# Patient Record
Sex: MALE | Race: WHITE | NOT HISPANIC OR LATINO | Employment: OTHER | URBAN - METROPOLITAN AREA
[De-identification: names, ages, dates, MRNs, and addresses within clinical notes are randomized per-mention and may not be internally consistent; named-entity substitution may affect disease eponyms.]

---

## 2017-11-27 ENCOUNTER — RECORDS - HEALTHEAST (OUTPATIENT)
Dept: LAB | Facility: CLINIC | Age: 72
End: 2017-11-27

## 2017-11-27 LAB
CHOLEST SERPL-MCNC: 132 MG/DL
FASTING STATUS PATIENT QL REPORTED: ABNORMAL
HDLC SERPL-MCNC: 35 MG/DL
LDLC SERPL CALC-MCNC: 68 MG/DL
PSA SERPL-MCNC: 0.9 NG/ML (ref 0–6.5)
TRIGL SERPL-MCNC: 144 MG/DL

## 2018-02-28 ENCOUNTER — RECORDS - HEALTHEAST (OUTPATIENT)
Dept: LAB | Facility: CLINIC | Age: 73
End: 2018-02-28

## 2018-02-28 LAB
ALBUMIN SERPL-MCNC: 3.9 G/DL (ref 3.5–5)
ALP SERPL-CCNC: 34 U/L (ref 45–120)
ALT SERPL W P-5'-P-CCNC: 35 U/L (ref 0–45)
ANION GAP SERPL CALCULATED.3IONS-SCNC: 11 MMOL/L (ref 5–18)
AST SERPL W P-5'-P-CCNC: 31 U/L (ref 0–40)
BILIRUB SERPL-MCNC: 0.8 MG/DL (ref 0–1)
BUN SERPL-MCNC: 31 MG/DL (ref 8–28)
CALCIUM SERPL-MCNC: 10.1 MG/DL (ref 8.5–10.5)
CHLORIDE BLD-SCNC: 100 MMOL/L (ref 98–107)
CO2 SERPL-SCNC: 27 MMOL/L (ref 22–31)
CREAT SERPL-MCNC: 1.83 MG/DL (ref 0.7–1.3)
GFR SERPL CREATININE-BSD FRML MDRD: 37 ML/MIN/1.73M2
GLUCOSE BLD-MCNC: 143 MG/DL (ref 70–125)
POTASSIUM BLD-SCNC: 3.4 MMOL/L (ref 3.5–5)
PROT SERPL-MCNC: 7.6 G/DL (ref 6–8)
SODIUM SERPL-SCNC: 138 MMOL/L (ref 136–145)

## 2018-05-30 ENCOUNTER — RECORDS - HEALTHEAST (OUTPATIENT)
Dept: LAB | Facility: CLINIC | Age: 73
End: 2018-05-30

## 2018-05-30 LAB
ALBUMIN SERPL-MCNC: 3.8 G/DL (ref 3.5–5)
ALP SERPL-CCNC: 32 U/L (ref 45–120)
ALT SERPL W P-5'-P-CCNC: 28 U/L (ref 0–45)
ANION GAP SERPL CALCULATED.3IONS-SCNC: 11 MMOL/L (ref 5–18)
AST SERPL W P-5'-P-CCNC: 27 U/L (ref 0–40)
BILIRUB SERPL-MCNC: 0.8 MG/DL (ref 0–1)
BUN SERPL-MCNC: 30 MG/DL (ref 8–28)
CALCIUM SERPL-MCNC: 10.2 MG/DL (ref 8.5–10.5)
CHLORIDE BLD-SCNC: 104 MMOL/L (ref 98–107)
CO2 SERPL-SCNC: 26 MMOL/L (ref 22–31)
CREAT SERPL-MCNC: 1.87 MG/DL (ref 0.7–1.3)
GFR SERPL CREATININE-BSD FRML MDRD: 36 ML/MIN/1.73M2
GLUCOSE BLD-MCNC: 153 MG/DL (ref 70–125)
POTASSIUM BLD-SCNC: 3.6 MMOL/L (ref 3.5–5)
PROT SERPL-MCNC: 6.9 G/DL (ref 6–8)
SODIUM SERPL-SCNC: 141 MMOL/L (ref 136–145)

## 2018-08-24 ENCOUNTER — RECORDS - HEALTHEAST (OUTPATIENT)
Dept: ADMINISTRATIVE | Facility: OTHER | Age: 73
End: 2018-08-24

## 2018-08-27 ENCOUNTER — HOSPITAL ENCOUNTER (OUTPATIENT)
Dept: ULTRASOUND IMAGING | Facility: HOSPITAL | Age: 73
Discharge: HOME | End: 2018-08-27
Attending: INTERNAL MEDICINE

## 2018-08-27 ENCOUNTER — COMMUNICATION - HEALTHEAST (OUTPATIENT)
Dept: TELEHEALTH | Facility: CLINIC | Age: 73
End: 2018-08-27

## 2018-08-27 DIAGNOSIS — N18.30 CHRONIC KIDNEY DISEASE, STAGE III (MODERATE) (H): ICD-10-CM

## 2018-08-30 ENCOUNTER — RECORDS - HEALTHEAST (OUTPATIENT)
Dept: LAB | Facility: CLINIC | Age: 73
End: 2018-08-30

## 2018-08-30 LAB
CREAT UR-MCNC: 296.8 MG/DL
MICROALBUMIN UR-MCNC: 2.7 MG/DL (ref 0–1.99)
MICROALBUMIN/CREAT UR: 9.1 MG/G

## 2018-11-29 ENCOUNTER — RECORDS - HEALTHEAST (OUTPATIENT)
Dept: LAB | Facility: CLINIC | Age: 73
End: 2018-11-29

## 2018-11-29 LAB
ALBUMIN SERPL-MCNC: 3.9 G/DL (ref 3.5–5)
ALP SERPL-CCNC: 30 U/L (ref 45–120)
ALT SERPL W P-5'-P-CCNC: 30 U/L (ref 0–45)
ANION GAP SERPL CALCULATED.3IONS-SCNC: 14 MMOL/L (ref 5–18)
AST SERPL W P-5'-P-CCNC: 30 U/L (ref 0–40)
BILIRUB SERPL-MCNC: 0.7 MG/DL (ref 0–1)
BUN SERPL-MCNC: 26 MG/DL (ref 8–28)
CALCIUM SERPL-MCNC: 9.9 MG/DL (ref 8.5–10.5)
CHLORIDE BLD-SCNC: 101 MMOL/L (ref 98–107)
CHOLEST SERPL-MCNC: 160 MG/DL
CO2 SERPL-SCNC: 26 MMOL/L (ref 22–31)
CREAT SERPL-MCNC: 2.08 MG/DL (ref 0.7–1.3)
FASTING STATUS PATIENT QL REPORTED: ABNORMAL
GFR SERPL CREATININE-BSD FRML MDRD: 31 ML/MIN/1.73M2
GLUCOSE BLD-MCNC: 124 MG/DL (ref 70–125)
HDLC SERPL-MCNC: 40 MG/DL
LDLC SERPL CALC-MCNC: 85 MG/DL
POTASSIUM BLD-SCNC: 3.2 MMOL/L (ref 3.5–5)
PROT SERPL-MCNC: 7.3 G/DL (ref 6–8)
SODIUM SERPL-SCNC: 141 MMOL/L (ref 136–145)
TRIGL SERPL-MCNC: 174 MG/DL

## 2019-01-18 ENCOUNTER — RECORDS - HEALTHEAST (OUTPATIENT)
Dept: LAB | Facility: CLINIC | Age: 74
End: 2019-01-18

## 2019-01-18 LAB
ALBUMIN SERPL-MCNC: 4.1 G/DL (ref 3.5–5)
ANION GAP SERPL CALCULATED.3IONS-SCNC: 15 MMOL/L (ref 5–18)
BUN SERPL-MCNC: 28 MG/DL (ref 8–28)
CALCIUM SERPL-MCNC: 10.7 MG/DL (ref 8.5–10.5)
CHLORIDE BLD-SCNC: 101 MMOL/L (ref 98–107)
CO2 SERPL-SCNC: 23 MMOL/L (ref 22–31)
CREAT SERPL-MCNC: 2.02 MG/DL (ref 0.7–1.3)
CREAT UR-MCNC: 124.7 MG/DL
GFR SERPL CREATININE-BSD FRML MDRD: 33 ML/MIN/1.73M2
GLUCOSE BLD-MCNC: 116 MG/DL (ref 70–125)
HGB BLD-MCNC: 16 G/DL (ref 14–18)
PHOSPHATE SERPL-MCNC: 3.9 MG/DL (ref 2.5–4.5)
POTASSIUM BLD-SCNC: 4 MMOL/L (ref 3.5–5)
PROTEIN, RANDOM URINE - HISTORICAL: 20 MG/DL
PROTEIN/CREAT RATIO, RANDOM UR: 0.16
PTH-INTACT SERPL-MCNC: 17 PG/ML (ref 10–86)
SODIUM SERPL-SCNC: 139 MMOL/L (ref 136–145)

## 2019-01-21 LAB — 25(OH)D3 SERPL-MCNC: 39.4 NG/ML (ref 30–80)

## 2019-03-18 ENCOUNTER — RECORDS - HEALTHEAST (OUTPATIENT)
Dept: LAB | Facility: CLINIC | Age: 74
End: 2019-03-18

## 2019-03-18 LAB
ALBUMIN SERPL-MCNC: 3.9 G/DL (ref 3.5–5)
ALBUMIN UR-MCNC: ABNORMAL MG/DL
ANION GAP SERPL CALCULATED.3IONS-SCNC: 13 MMOL/L (ref 5–18)
APPEARANCE UR: CLEAR
BACTERIA #/AREA URNS HPF: ABNORMAL HPF
BILIRUB UR QL STRIP: NEGATIVE
BUN SERPL-MCNC: 19 MG/DL (ref 8–28)
CALCIUM SERPL-MCNC: 10 MG/DL (ref 8.5–10.5)
CHLORIDE BLD-SCNC: 103 MMOL/L (ref 98–107)
CO2 SERPL-SCNC: 24 MMOL/L (ref 22–31)
COLOR UR AUTO: YELLOW
CREAT SERPL-MCNC: 1.66 MG/DL (ref 0.7–1.3)
CREAT UR-MCNC: 306.6 MG/DL
GFR SERPL CREATININE-BSD FRML MDRD: 41 ML/MIN/1.73M2
GLUCOSE BLD-MCNC: 124 MG/DL (ref 70–125)
GLUCOSE UR STRIP-MCNC: NEGATIVE MG/DL
HGB BLD-MCNC: 15.9 G/DL (ref 14–18)
HGB UR QL STRIP: NEGATIVE
HYALINE CASTS #/AREA URNS LPF: ABNORMAL LPF
KETONES UR STRIP-MCNC: NEGATIVE MG/DL
LEUKOCYTE ESTERASE UR QL STRIP: ABNORMAL
MUCOUS THREADS #/AREA URNS LPF: ABNORMAL LPF
NITRATE UR QL: NEGATIVE
PH UR STRIP: 6.5 [PH] (ref 4.5–8)
PHOSPHATE SERPL-MCNC: 3.1 MG/DL (ref 2.5–4.5)
POTASSIUM BLD-SCNC: 3.8 MMOL/L (ref 3.5–5)
PTH-INTACT SERPL-MCNC: 30 PG/ML (ref 10–86)
RBC #/AREA URNS AUTO: ABNORMAL HPF
SODIUM SERPL-SCNC: 140 MMOL/L (ref 136–145)
SP GR UR STRIP: 1.02 (ref 1–1.03)
SQUAMOUS #/AREA URNS AUTO: ABNORMAL LPF
UROBILINOGEN UR STRIP-ACNC: ABNORMAL
WBC #/AREA URNS AUTO: ABNORMAL HPF

## 2019-03-19 LAB — 25(OH)D3 SERPL-MCNC: 44 NG/ML (ref 30–80)

## 2019-05-17 ENCOUNTER — RECORDS - HEALTHEAST (OUTPATIENT)
Dept: LAB | Facility: CLINIC | Age: 74
End: 2019-05-17

## 2019-05-17 LAB
ANION GAP SERPL CALCULATED.3IONS-SCNC: 13 MMOL/L (ref 5–18)
BUN SERPL-MCNC: 35 MG/DL (ref 8–28)
CALCIUM SERPL-MCNC: 10.5 MG/DL (ref 8.5–10.5)
CHLORIDE BLD-SCNC: 105 MMOL/L (ref 98–107)
CO2 SERPL-SCNC: 24 MMOL/L (ref 22–31)
CREAT SERPL-MCNC: 1.95 MG/DL (ref 0.7–1.3)
GFR SERPL CREATININE-BSD FRML MDRD: 34 ML/MIN/1.73M2
GLUCOSE BLD-MCNC: 91 MG/DL (ref 70–125)
POTASSIUM BLD-SCNC: 3.8 MMOL/L (ref 3.5–5)
SODIUM SERPL-SCNC: 142 MMOL/L (ref 136–145)

## 2019-10-09 ENCOUNTER — RECORDS - HEALTHEAST (OUTPATIENT)
Dept: LAB | Facility: CLINIC | Age: 74
End: 2019-10-09

## 2019-10-09 LAB
CREAT UR-MCNC: 142 MG/DL
MICROALBUMIN UR-MCNC: 0.79 MG/DL (ref 0–1.99)
MICROALBUMIN/CREAT UR: 5.6 MG/G

## 2020-01-06 ENCOUNTER — RECORDS - HEALTHEAST (OUTPATIENT)
Dept: LAB | Facility: CLINIC | Age: 75
End: 2020-01-06

## 2020-01-06 LAB
ALBUMIN SERPL-MCNC: 4 G/DL (ref 3.5–5)
ALBUMIN UR-MCNC: ABNORMAL MG/DL
ANION GAP SERPL CALCULATED.3IONS-SCNC: 13 MMOL/L (ref 5–18)
APPEARANCE UR: CLEAR
BACTERIA #/AREA URNS HPF: ABNORMAL HPF
BILIRUB UR QL STRIP: NEGATIVE
BUN SERPL-MCNC: 24 MG/DL (ref 8–28)
CALCIUM SERPL-MCNC: 10.2 MG/DL (ref 8.5–10.5)
CHLORIDE BLD-SCNC: 102 MMOL/L (ref 98–107)
CHOLEST SERPL-MCNC: 140 MG/DL
CO2 SERPL-SCNC: 24 MMOL/L (ref 22–31)
COLOR UR AUTO: YELLOW
CREAT SERPL-MCNC: 1.66 MG/DL (ref 0.7–1.3)
CREAT UR-MCNC: 242.2 MG/DL
FASTING STATUS PATIENT QL REPORTED: YES
GFR SERPL CREATININE-BSD FRML MDRD: 41 ML/MIN/1.73M2
GLUCOSE BLD-MCNC: 190 MG/DL (ref 70–125)
GLUCOSE UR STRIP-MCNC: ABNORMAL MG/DL
HDLC SERPL-MCNC: 38 MG/DL
HGB UR QL STRIP: NEGATIVE
HYALINE CASTS #/AREA URNS LPF: ABNORMAL LPF
KETONES UR STRIP-MCNC: NEGATIVE MG/DL
LDLC SERPL CALC-MCNC: 69 MG/DL
LEUKOCYTE ESTERASE UR QL STRIP: NEGATIVE
MUCOUS THREADS #/AREA URNS LPF: ABNORMAL LPF
NITRATE UR QL: NEGATIVE
PH UR STRIP: 6.5 [PH] (ref 4.5–8)
PHOSPHATE SERPL-MCNC: 3 MG/DL (ref 2.5–4.5)
POTASSIUM BLD-SCNC: 3.7 MMOL/L (ref 3.5–5)
PROTEIN, RANDOM URINE - HISTORICAL: 73 MG/DL
PROTEIN/CREAT RATIO, RANDOM UR: 0.3
PSA SERPL-MCNC: 1.3 NG/ML (ref 0–6.5)
PTH-INTACT SERPL-MCNC: 20 PG/ML (ref 10–86)
RBC #/AREA URNS AUTO: ABNORMAL HPF
SODIUM SERPL-SCNC: 139 MMOL/L (ref 136–145)
SP GR UR STRIP: 1.02 (ref 1–1.03)
SQUAMOUS #/AREA URNS AUTO: ABNORMAL LPF
TRIGL SERPL-MCNC: 167 MG/DL
UROBILINOGEN UR STRIP-ACNC: ABNORMAL
WBC #/AREA URNS AUTO: ABNORMAL HPF

## 2020-06-11 ENCOUNTER — RECORDS - HEALTHEAST (OUTPATIENT)
Dept: LAB | Facility: CLINIC | Age: 75
End: 2020-06-11

## 2020-06-11 LAB
ALBUMIN SERPL-MCNC: 3.8 G/DL (ref 3.5–5)
ALP SERPL-CCNC: 37 U/L (ref 45–120)
ALT SERPL W P-5'-P-CCNC: 34 U/L (ref 0–45)
ANION GAP SERPL CALCULATED.3IONS-SCNC: 11 MMOL/L (ref 5–18)
AST SERPL W P-5'-P-CCNC: 31 U/L (ref 0–40)
BILIRUB SERPL-MCNC: 0.5 MG/DL (ref 0–1)
BUN SERPL-MCNC: 35 MG/DL (ref 8–28)
CALCIUM SERPL-MCNC: 9.9 MG/DL (ref 8.5–10.5)
CHLORIDE BLD-SCNC: 102 MMOL/L (ref 98–107)
CO2 SERPL-SCNC: 27 MMOL/L (ref 22–31)
CREAT SERPL-MCNC: 1.65 MG/DL (ref 0.7–1.3)
GFR SERPL CREATININE-BSD FRML MDRD: 41 ML/MIN/1.73M2
GLUCOSE BLD-MCNC: 189 MG/DL (ref 70–125)
POTASSIUM BLD-SCNC: 3.6 MMOL/L (ref 3.5–5)
PROT SERPL-MCNC: 7.2 G/DL (ref 6–8)
SODIUM SERPL-SCNC: 140 MMOL/L (ref 136–145)

## 2020-10-15 ENCOUNTER — RECORDS - HEALTHEAST (OUTPATIENT)
Dept: LAB | Facility: CLINIC | Age: 75
End: 2020-10-15

## 2020-10-15 LAB
ALBUMIN SERPL-MCNC: 4.1 G/DL (ref 3.5–5)
ALBUMIN UR-MCNC: NEGATIVE MG/DL
ANION GAP SERPL CALCULATED.3IONS-SCNC: 13 MMOL/L (ref 5–18)
APPEARANCE UR: CLEAR
BACTERIA #/AREA URNS HPF: ABNORMAL HPF
BASOPHILS # BLD AUTO: 0.1 THOU/UL (ref 0–0.2)
BASOPHILS NFR BLD AUTO: 1 % (ref 0–2)
BILIRUB UR QL STRIP: NEGATIVE
BUN SERPL-MCNC: 28 MG/DL (ref 8–28)
CALCIUM SERPL-MCNC: 9.5 MG/DL (ref 8.5–10.5)
CHLORIDE BLD-SCNC: 101 MMOL/L (ref 98–107)
CO2 SERPL-SCNC: 25 MMOL/L (ref 22–31)
COLOR UR AUTO: YELLOW
CREAT SERPL-MCNC: 1.55 MG/DL (ref 0.7–1.3)
CREAT UR-MCNC: 106.4 MG/DL
EOSINOPHIL # BLD AUTO: 0.2 THOU/UL (ref 0–0.4)
EOSINOPHIL NFR BLD AUTO: 2 % (ref 0–6)
ERYTHROCYTE [DISTWIDTH] IN BLOOD BY AUTOMATED COUNT: 12.8 % (ref 11–14.5)
GFR SERPL CREATININE-BSD FRML MDRD: 44 ML/MIN/1.73M2
GLUCOSE BLD-MCNC: 120 MG/DL (ref 70–125)
GLUCOSE UR STRIP-MCNC: NEGATIVE MG/DL
HCT VFR BLD AUTO: 47.4 % (ref 40–54)
HGB BLD-MCNC: 15.8 G/DL (ref 14–18)
HGB UR QL STRIP: NEGATIVE
HYALINE CASTS #/AREA URNS LPF: ABNORMAL LPF
IMM GRANULOCYTES # BLD: 0 THOU/UL
IMM GRANULOCYTES NFR BLD: 0 %
KETONES UR STRIP-MCNC: NEGATIVE MG/DL
LEUKOCYTE ESTERASE UR QL STRIP: NEGATIVE
LYMPHOCYTES # BLD AUTO: 2.5 THOU/UL (ref 0.8–4.4)
LYMPHOCYTES NFR BLD AUTO: 31 % (ref 20–40)
MCH RBC QN AUTO: 31.7 PG (ref 27–34)
MCHC RBC AUTO-ENTMCNC: 33.3 G/DL (ref 32–36)
MCV RBC AUTO: 95 FL (ref 80–100)
MONOCYTES # BLD AUTO: 1 THOU/UL (ref 0–0.9)
MONOCYTES NFR BLD AUTO: 12 % (ref 2–10)
MUCOUS THREADS #/AREA URNS LPF: ABNORMAL LPF
NEUTROPHILS # BLD AUTO: 4.4 THOU/UL (ref 2–7.7)
NEUTROPHILS NFR BLD AUTO: 54 % (ref 50–70)
NITRATE UR QL: NEGATIVE
PH UR STRIP: 6 [PH] (ref 4.5–8)
PHOSPHATE SERPL-MCNC: 3.1 MG/DL (ref 2.5–4.5)
PLATELET # BLD AUTO: 185 THOU/UL (ref 140–440)
PMV BLD AUTO: 11.9 FL (ref 8.5–12.5)
POTASSIUM BLD-SCNC: 3.4 MMOL/L (ref 3.5–5)
PROTEIN, RANDOM URINE - HISTORICAL: 12 MG/DL
PROTEIN/CREAT RATIO, RANDOM UR: 0.11
PTH-INTACT SERPL-MCNC: 46 PG/ML (ref 10–86)
RBC # BLD AUTO: 4.99 MILL/UL (ref 4.4–6.2)
RBC #/AREA URNS AUTO: ABNORMAL HPF
SODIUM SERPL-SCNC: 139 MMOL/L (ref 136–145)
SP GR UR STRIP: 1.01 (ref 1–1.03)
SQUAMOUS #/AREA URNS AUTO: ABNORMAL LPF
UROBILINOGEN UR STRIP-ACNC: ABNORMAL
WBC #/AREA URNS AUTO: ABNORMAL HPF
WBC: 8.1 THOU/UL (ref 4–11)

## 2020-10-16 LAB — 25(OH)D3 SERPL-MCNC: 31.2 NG/ML (ref 30–80)

## 2021-02-22 ENCOUNTER — RECORDS - HEALTHEAST (OUTPATIENT)
Dept: LAB | Facility: CLINIC | Age: 76
End: 2021-02-22

## 2021-02-22 LAB
CHOLEST SERPL-MCNC: 132 MG/DL
FASTING STATUS PATIENT QL REPORTED: ABNORMAL
HDLC SERPL-MCNC: 33 MG/DL
LDLC SERPL CALC-MCNC: 69 MG/DL
PSA SERPL-MCNC: 1.5 NG/ML (ref 0–6.5)
TRIGL SERPL-MCNC: 149 MG/DL

## 2021-04-05 ENCOUNTER — RECORDS - HEALTHEAST (OUTPATIENT)
Dept: LAB | Facility: CLINIC | Age: 76
End: 2021-04-05

## 2021-04-05 LAB
ALBUMIN SERPL-MCNC: 4 G/DL (ref 3.5–5)
ANION GAP SERPL CALCULATED.3IONS-SCNC: 15 MMOL/L (ref 5–18)
BUN SERPL-MCNC: 37 MG/DL (ref 8–28)
CALCIUM SERPL-MCNC: 9.2 MG/DL (ref 8.5–10.5)
CHLORIDE BLD-SCNC: 103 MMOL/L (ref 98–107)
CO2 SERPL-SCNC: 21 MMOL/L (ref 22–31)
CREAT SERPL-MCNC: 2.3 MG/DL (ref 0.7–1.3)
CREAT UR-MCNC: 290.8 MG/DL
GFR SERPL CREATININE-BSD FRML MDRD: 28 ML/MIN/1.73M2
GLUCOSE BLD-MCNC: 206 MG/DL (ref 70–125)
HGB BLD-MCNC: 15.3 G/DL (ref 14–18)
PHOSPHATE SERPL-MCNC: 3.3 MG/DL (ref 2.5–4.5)
POTASSIUM BLD-SCNC: 3.3 MMOL/L (ref 3.5–5)
PROTEIN, RANDOM URINE - HISTORICAL: 33 MG/DL
PROTEIN/CREAT RATIO, RANDOM UR: 0.11
PTH-INTACT SERPL-MCNC: 60 PG/ML (ref 10–86)
SODIUM SERPL-SCNC: 139 MMOL/L (ref 136–145)

## 2021-04-06 LAB
25(OH)D3 SERPL-MCNC: 36.8 NG/ML (ref 30–80)
HBA1C MFR BLD: 8.1 %

## 2021-05-19 ENCOUNTER — RECORDS - HEALTHEAST (OUTPATIENT)
Dept: LAB | Facility: CLINIC | Age: 76
End: 2021-05-19

## 2021-05-19 LAB
ALBUMIN SERPL-MCNC: 3.8 G/DL (ref 3.5–5)
ANION GAP SERPL CALCULATED.3IONS-SCNC: 13 MMOL/L (ref 5–18)
BASOPHILS # BLD AUTO: 0 THOU/UL (ref 0–0.2)
BASOPHILS NFR BLD AUTO: 1 % (ref 0–2)
BUN SERPL-MCNC: 26 MG/DL (ref 8–28)
CALCIUM SERPL-MCNC: 9.1 MG/DL (ref 8.5–10.5)
CHLORIDE BLD-SCNC: 103 MMOL/L (ref 98–107)
CO2 SERPL-SCNC: 23 MMOL/L (ref 22–31)
CREAT SERPL-MCNC: 1.8 MG/DL (ref 0.7–1.3)
CREAT UR-MCNC: 233.5 MG/DL
EOSINOPHIL # BLD AUTO: 0.2 THOU/UL (ref 0–0.4)
EOSINOPHIL NFR BLD AUTO: 3 % (ref 0–6)
ERYTHROCYTE [DISTWIDTH] IN BLOOD BY AUTOMATED COUNT: 12.7 % (ref 11–14.5)
GFR SERPL CREATININE-BSD FRML MDRD: 37 ML/MIN/1.73M2
GLUCOSE BLD-MCNC: 163 MG/DL (ref 70–125)
HCT VFR BLD AUTO: 45.1 % (ref 40–54)
HGB BLD-MCNC: 15.2 G/DL (ref 14–18)
IMM GRANULOCYTES # BLD: 0 THOU/UL
IMM GRANULOCYTES NFR BLD: 0 %
LYMPHOCYTES # BLD AUTO: 2.1 THOU/UL (ref 0.8–4.4)
LYMPHOCYTES NFR BLD AUTO: 30 % (ref 20–40)
MCH RBC QN AUTO: 31.8 PG (ref 27–34)
MCHC RBC AUTO-ENTMCNC: 33.7 G/DL (ref 32–36)
MCV RBC AUTO: 94 FL (ref 80–100)
MONOCYTES # BLD AUTO: 0.9 THOU/UL (ref 0–0.9)
MONOCYTES NFR BLD AUTO: 13 % (ref 2–10)
NEUTROPHILS # BLD AUTO: 3.8 THOU/UL (ref 2–7.7)
NEUTROPHILS NFR BLD AUTO: 53 % (ref 50–70)
PHOSPHATE SERPL-MCNC: 2.9 MG/DL (ref 2.5–4.5)
PLATELET # BLD AUTO: 185 THOU/UL (ref 140–440)
PMV BLD AUTO: 11.6 FL (ref 8.5–12.5)
POTASSIUM BLD-SCNC: 3.4 MMOL/L (ref 3.5–5)
PROTEIN, RANDOM URINE - HISTORICAL: 23 MG/DL
PROTEIN/CREAT RATIO, RANDOM UR: 0.1
RBC # BLD AUTO: 4.78 MILL/UL (ref 4.4–6.2)
SODIUM SERPL-SCNC: 139 MMOL/L (ref 136–145)
WBC: 7.2 THOU/UL (ref 4–11)

## 2021-06-26 ENCOUNTER — HEALTH MAINTENANCE LETTER (OUTPATIENT)
Age: 76
End: 2021-06-26

## 2021-10-16 ENCOUNTER — HEALTH MAINTENANCE LETTER (OUTPATIENT)
Age: 76
End: 2021-10-16

## 2021-11-16 ENCOUNTER — LAB REQUISITION (OUTPATIENT)
Dept: LAB | Facility: CLINIC | Age: 76
End: 2021-11-16

## 2021-11-16 DIAGNOSIS — N18.32 CHRONIC KIDNEY DISEASE, STAGE 3B (H): ICD-10-CM

## 2021-11-16 LAB
ALBUMIN MFR UR ELPH: 50.8 MG/DL
ALBUMIN SERPL-MCNC: 3.8 G/DL (ref 3.5–5)
ALBUMIN UR-MCNC: 50 MG/DL
ANION GAP SERPL CALCULATED.3IONS-SCNC: 15 MMOL/L (ref 5–18)
APPEARANCE UR: CLEAR
BASOPHILS # BLD AUTO: 0.1 10E3/UL (ref 0–0.2)
BASOPHILS NFR BLD AUTO: 1 %
BILIRUB UR QL STRIP: NEGATIVE
BUN SERPL-MCNC: 25 MG/DL (ref 8–28)
CALCIUM SERPL-MCNC: 9.3 MG/DL (ref 8.5–10.5)
CHLORIDE BLD-SCNC: 103 MMOL/L (ref 98–107)
CO2 SERPL-SCNC: 21 MMOL/L (ref 22–31)
COLOR UR AUTO: YELLOW
CREAT SERPL-MCNC: 2.05 MG/DL (ref 0.7–1.3)
CREAT UR-MCNC: 315 MG/DL
EOSINOPHIL # BLD AUTO: 0.3 10E3/UL (ref 0–0.7)
EOSINOPHIL NFR BLD AUTO: 4 %
ERYTHROCYTE [DISTWIDTH] IN BLOOD BY AUTOMATED COUNT: 12.9 % (ref 10–15)
GFR SERPL CREATININE-BSD FRML MDRD: 31 ML/MIN/1.73M2
GLUCOSE BLD-MCNC: 174 MG/DL (ref 70–125)
GLUCOSE UR STRIP-MCNC: NEGATIVE MG/DL
HCT VFR BLD AUTO: 47.5 % (ref 40–53)
HGB BLD-MCNC: 16 G/DL (ref 13.3–17.7)
HGB UR QL STRIP: NEGATIVE
IMM GRANULOCYTES # BLD: 0 10E3/UL
IMM GRANULOCYTES NFR BLD: 1 %
KETONES UR STRIP-MCNC: NEGATIVE MG/DL
LEUKOCYTE ESTERASE UR QL STRIP: ABNORMAL
LYMPHOCYTES # BLD AUTO: 2.3 10E3/UL (ref 0.8–5.3)
LYMPHOCYTES NFR BLD AUTO: 26 %
MCH RBC QN AUTO: 32.2 PG (ref 26.5–33)
MCHC RBC AUTO-ENTMCNC: 33.7 G/DL (ref 31.5–36.5)
MCV RBC AUTO: 96 FL (ref 78–100)
MONOCYTES # BLD AUTO: 1 10E3/UL (ref 0–1.3)
MONOCYTES NFR BLD AUTO: 12 %
NEUTROPHILS # BLD AUTO: 5.1 10E3/UL (ref 1.6–8.3)
NEUTROPHILS NFR BLD AUTO: 56 %
NITRATE UR QL: NEGATIVE
NRBC # BLD AUTO: 0 10E3/UL
NRBC BLD AUTO-RTO: 0 /100
PH UR STRIP: 6 [PH] (ref 5–7)
PHOSPHATE SERPL-MCNC: 2.8 MG/DL (ref 2.5–4.5)
PLATELET # BLD AUTO: 209 10E3/UL (ref 150–450)
POTASSIUM BLD-SCNC: 3.5 MMOL/L (ref 3.5–5)
PROT/CREAT 24H UR: 0.16 MG/MG CR
PTH-INTACT SERPL-MCNC: 91 PG/ML (ref 10–86)
RBC # BLD AUTO: 4.97 10E6/UL (ref 4.4–5.9)
SODIUM SERPL-SCNC: 139 MMOL/L (ref 136–145)
SP GR UR STRIP: 1.03 (ref 1–1.03)
UROBILINOGEN UR STRIP-MCNC: <2 MG/DL
WBC # BLD AUTO: 8.8 10E3/UL (ref 4–11)

## 2021-11-16 PROCEDURE — 80069 RENAL FUNCTION PANEL: CPT | Performed by: FAMILY MEDICINE

## 2021-11-16 PROCEDURE — 81001 URINALYSIS AUTO W/SCOPE: CPT | Performed by: FAMILY MEDICINE

## 2021-11-16 PROCEDURE — 83036 HEMOGLOBIN GLYCOSYLATED A1C: CPT | Performed by: FAMILY MEDICINE

## 2021-11-16 PROCEDURE — 85025 COMPLETE CBC W/AUTO DIFF WBC: CPT | Performed by: FAMILY MEDICINE

## 2021-11-16 PROCEDURE — 84156 ASSAY OF PROTEIN URINE: CPT | Performed by: FAMILY MEDICINE

## 2021-11-16 PROCEDURE — 82306 VITAMIN D 25 HYDROXY: CPT | Performed by: FAMILY MEDICINE

## 2021-11-16 PROCEDURE — 83970 ASSAY OF PARATHORMONE: CPT | Performed by: FAMILY MEDICINE

## 2021-11-17 LAB
DEPRECATED CALCIDIOL+CALCIFEROL SERPL-MC: 38 UG/L (ref 30–80)
HBA1C MFR BLD: 7.3 %

## 2022-04-28 ENCOUNTER — LAB REQUISITION (OUTPATIENT)
Dept: LAB | Facility: CLINIC | Age: 77
End: 2022-04-28

## 2022-04-28 DIAGNOSIS — I12.9 HYPERTENSIVE CHRONIC KIDNEY DISEASE WITH STAGE 1 THROUGH STAGE 4 CHRONIC KIDNEY DISEASE, OR UNSPECIFIED CHRONIC KIDNEY DISEASE: ICD-10-CM

## 2022-04-28 DIAGNOSIS — E78.5 HYPERLIPIDEMIA, UNSPECIFIED: ICD-10-CM

## 2022-04-28 LAB
ALBUMIN SERPL-MCNC: 3.8 G/DL (ref 3.5–5)
ALP SERPL-CCNC: 32 U/L (ref 45–120)
ALT SERPL W P-5'-P-CCNC: 31 U/L (ref 0–45)
ANION GAP SERPL CALCULATED.3IONS-SCNC: 14 MMOL/L (ref 5–18)
AST SERPL W P-5'-P-CCNC: 33 U/L (ref 0–40)
BILIRUB SERPL-MCNC: 0.7 MG/DL (ref 0–1)
BUN SERPL-MCNC: 30 MG/DL (ref 8–28)
CALCIUM SERPL-MCNC: 9.8 MG/DL (ref 8.5–10.5)
CHLORIDE BLD-SCNC: 103 MMOL/L (ref 98–107)
CHOLEST SERPL-MCNC: 123 MG/DL
CO2 SERPL-SCNC: 25 MMOL/L (ref 22–31)
CREAT SERPL-MCNC: 1.75 MG/DL (ref 0.7–1.3)
FASTING STATUS PATIENT QL REPORTED: ABNORMAL
GFR SERPL CREATININE-BSD FRML MDRD: 40 ML/MIN/1.73M2
GLUCOSE BLD-MCNC: 147 MG/DL (ref 70–125)
HDLC SERPL-MCNC: 35 MG/DL
LDLC SERPL CALC-MCNC: 65 MG/DL
POTASSIUM BLD-SCNC: 3.7 MMOL/L (ref 3.5–5)
PROT SERPL-MCNC: 7.1 G/DL (ref 6–8)
SODIUM SERPL-SCNC: 142 MMOL/L (ref 136–145)
TRIGL SERPL-MCNC: 115 MG/DL

## 2022-04-28 PROCEDURE — 80053 COMPREHEN METABOLIC PANEL: CPT | Performed by: FAMILY MEDICINE

## 2022-04-28 PROCEDURE — 80061 LIPID PANEL: CPT | Performed by: FAMILY MEDICINE

## 2022-06-28 ENCOUNTER — LAB REQUISITION (OUTPATIENT)
Dept: LAB | Facility: CLINIC | Age: 77
End: 2022-06-28

## 2022-06-28 DIAGNOSIS — E11.22 TYPE 2 DIABETES MELLITUS WITH DIABETIC CHRONIC KIDNEY DISEASE (H): ICD-10-CM

## 2022-06-28 DIAGNOSIS — N18.32 CHRONIC KIDNEY DISEASE, STAGE 3B (H): ICD-10-CM

## 2022-06-28 DIAGNOSIS — I12.9 HYPERTENSIVE CHRONIC KIDNEY DISEASE WITH STAGE 1 THROUGH STAGE 4 CHRONIC KIDNEY DISEASE, OR UNSPECIFIED CHRONIC KIDNEY DISEASE: ICD-10-CM

## 2022-06-28 LAB
ALBUMIN MFR UR ELPH: 18 MG/DL
ALBUMIN SERPL-MCNC: 3.7 G/DL (ref 3.5–5)
ANION GAP SERPL CALCULATED.3IONS-SCNC: 13 MMOL/L (ref 5–18)
BUN SERPL-MCNC: 26 MG/DL (ref 8–28)
CALCIUM SERPL-MCNC: 9.4 MG/DL (ref 8.5–10.5)
CHLORIDE BLD-SCNC: 103 MMOL/L (ref 98–107)
CO2 SERPL-SCNC: 26 MMOL/L (ref 22–31)
CREAT SERPL-MCNC: 1.77 MG/DL (ref 0.7–1.3)
CREAT UR-MCNC: 132 MG/DL
CREAT UR-MCNC: 140 MG/DL
GFR SERPL CREATININE-BSD FRML MDRD: 39 ML/MIN/1.73M2
GLUCOSE BLD-MCNC: 91 MG/DL (ref 70–125)
HBA1C MFR BLD: 6.9 %
HGB BLD-MCNC: 15.6 G/DL (ref 13.3–17.7)
MICROALBUMIN UR-MCNC: 1.56 MG/DL (ref 0–1.99)
MICROALBUMIN/CREAT UR: 11.8 MG/G CR
PHOSPHATE SERPL-MCNC: 2.6 MG/DL (ref 2.5–4.5)
POTASSIUM BLD-SCNC: 3.3 MMOL/L (ref 3.5–5)
PROT/CREAT 24H UR: 0.13 MG/MG CR
PTH-INTACT SERPL-MCNC: 46 PG/ML (ref 10–86)
SODIUM SERPL-SCNC: 142 MMOL/L (ref 136–145)

## 2022-06-28 PROCEDURE — 82043 UR ALBUMIN QUANTITATIVE: CPT | Performed by: FAMILY MEDICINE

## 2022-06-28 PROCEDURE — 82306 VITAMIN D 25 HYDROXY: CPT | Performed by: FAMILY MEDICINE

## 2022-06-28 PROCEDURE — 80069 RENAL FUNCTION PANEL: CPT | Performed by: FAMILY MEDICINE

## 2022-06-28 PROCEDURE — 83970 ASSAY OF PARATHORMONE: CPT | Performed by: FAMILY MEDICINE

## 2022-06-28 PROCEDURE — 85018 HEMOGLOBIN: CPT | Performed by: FAMILY MEDICINE

## 2022-06-28 PROCEDURE — 83036 HEMOGLOBIN GLYCOSYLATED A1C: CPT | Performed by: FAMILY MEDICINE

## 2022-06-28 PROCEDURE — 84156 ASSAY OF PROTEIN URINE: CPT | Performed by: FAMILY MEDICINE

## 2022-06-29 LAB — DEPRECATED CALCIDIOL+CALCIFEROL SERPL-MC: 35 UG/L (ref 20–75)

## 2022-07-17 ENCOUNTER — HEALTH MAINTENANCE LETTER (OUTPATIENT)
Age: 77
End: 2022-07-17

## 2022-09-25 ENCOUNTER — HEALTH MAINTENANCE LETTER (OUTPATIENT)
Age: 77
End: 2022-09-25

## 2022-12-15 ENCOUNTER — LAB REQUISITION (OUTPATIENT)
Dept: LAB | Facility: CLINIC | Age: 77
End: 2022-12-15

## 2022-12-15 DIAGNOSIS — E11.319 TYPE 2 DIABETES MELLITUS WITH UNSPECIFIED DIABETIC RETINOPATHY WITHOUT MACULAR EDEMA (H): ICD-10-CM

## 2022-12-15 DIAGNOSIS — I12.9 HYPERTENSIVE CHRONIC KIDNEY DISEASE WITH STAGE 1 THROUGH STAGE 4 CHRONIC KIDNEY DISEASE, OR UNSPECIFIED CHRONIC KIDNEY DISEASE: ICD-10-CM

## 2022-12-15 LAB
ALBUMIN MFR UR ELPH: 73.8 MG/DL
ALBUMIN SERPL BCG-MCNC: 4.2 G/DL (ref 3.5–5.2)
ANION GAP SERPL CALCULATED.3IONS-SCNC: 15 MMOL/L (ref 7–15)
BUN SERPL-MCNC: 23.5 MG/DL (ref 8–23)
CALCIUM SERPL-MCNC: 9.2 MG/DL (ref 8.8–10.2)
CHLORIDE SERPL-SCNC: 100 MMOL/L (ref 98–107)
CREAT SERPL-MCNC: 2.03 MG/DL (ref 0.67–1.17)
CREAT UR-MCNC: 226 MG/DL
CREAT UR-MCNC: 226 MG/DL
DEPRECATED HCO3 PLAS-SCNC: 25 MMOL/L (ref 22–29)
GFR SERPL CREATININE-BSD FRML MDRD: 33 ML/MIN/1.73M2
GLUCOSE SERPL-MCNC: 183 MG/DL (ref 70–99)
HGB BLD-MCNC: 15.6 G/DL (ref 13.3–17.7)
MICROALBUMIN UR-MCNC: 111 MG/L
MICROALBUMIN/CREAT UR: 49.12 MG/G CR (ref 0–17)
PHOSPHATE SERPL-MCNC: 2.7 MG/DL (ref 2.5–4.5)
POTASSIUM SERPL-SCNC: 3.2 MMOL/L (ref 3.4–5.3)
PROT/CREAT 24H UR: 0.33 MG/MG CR (ref 0–0.2)
PTH-INTACT SERPL-MCNC: 27 PG/ML (ref 15–65)
SODIUM SERPL-SCNC: 140 MMOL/L (ref 136–145)

## 2022-12-15 PROCEDURE — 82306 VITAMIN D 25 HYDROXY: CPT | Performed by: FAMILY MEDICINE

## 2022-12-15 PROCEDURE — 80069 RENAL FUNCTION PANEL: CPT | Performed by: FAMILY MEDICINE

## 2022-12-15 PROCEDURE — 84156 ASSAY OF PROTEIN URINE: CPT | Performed by: FAMILY MEDICINE

## 2022-12-15 PROCEDURE — 82043 UR ALBUMIN QUANTITATIVE: CPT | Performed by: FAMILY MEDICINE

## 2022-12-15 PROCEDURE — 85018 HEMOGLOBIN: CPT | Performed by: FAMILY MEDICINE

## 2022-12-15 PROCEDURE — 83970 ASSAY OF PARATHORMONE: CPT | Performed by: FAMILY MEDICINE

## 2022-12-16 LAB — DEPRECATED CALCIDIOL+CALCIFEROL SERPL-MC: 30 UG/L (ref 20–75)

## 2023-02-04 ENCOUNTER — HEALTH MAINTENANCE LETTER (OUTPATIENT)
Age: 78
End: 2023-02-04

## 2023-04-20 ENCOUNTER — LAB REQUISITION (OUTPATIENT)
Dept: LAB | Facility: CLINIC | Age: 78
End: 2023-04-20

## 2023-04-20 DIAGNOSIS — Z12.5 ENCOUNTER FOR SCREENING FOR MALIGNANT NEOPLASM OF PROSTATE: ICD-10-CM

## 2023-04-20 DIAGNOSIS — E78.5 HYPERLIPIDEMIA, UNSPECIFIED: ICD-10-CM

## 2023-04-20 LAB
CHOLEST SERPL-MCNC: 108 MG/DL
HDLC SERPL-MCNC: 35 MG/DL
LDLC SERPL CALC-MCNC: 51 MG/DL
NONHDLC SERPL-MCNC: 73 MG/DL
PSA SERPL DL<=0.01 NG/ML-MCNC: 2.21 NG/ML (ref 0–6.5)
TRIGL SERPL-MCNC: 108 MG/DL

## 2023-04-20 PROCEDURE — G0103 PSA SCREENING: HCPCS | Performed by: FAMILY MEDICINE

## 2023-04-20 PROCEDURE — 80061 LIPID PANEL: CPT | Performed by: FAMILY MEDICINE

## 2023-07-07 ENCOUNTER — LAB REQUISITION (OUTPATIENT)
Dept: LAB | Facility: CLINIC | Age: 78
End: 2023-07-07

## 2023-07-07 DIAGNOSIS — E78.5 HYPERLIPIDEMIA, UNSPECIFIED: ICD-10-CM

## 2023-07-07 DIAGNOSIS — N18.32 CHRONIC KIDNEY DISEASE, STAGE 3B (H): ICD-10-CM

## 2023-07-07 LAB
ALBUMIN SERPL BCG-MCNC: 4.4 G/DL (ref 3.5–5.2)
ANION GAP SERPL CALCULATED.3IONS-SCNC: 16 MMOL/L (ref 7–15)
BUN SERPL-MCNC: 30.4 MG/DL (ref 8–23)
CALCIUM SERPL-MCNC: 9.7 MG/DL (ref 8.8–10.2)
CHLORIDE SERPL-SCNC: 100 MMOL/L (ref 98–107)
CREAT SERPL-MCNC: 2.01 MG/DL (ref 0.67–1.17)
CREAT UR-MCNC: 211 MG/DL
DEPRECATED HCO3 PLAS-SCNC: 23 MMOL/L (ref 22–29)
GFR SERPL CREATININE-BSD FRML MDRD: 34 ML/MIN/1.73M2
GLUCOSE SERPL-MCNC: 176 MG/DL (ref 70–99)
MICROALBUMIN UR-MCNC: 24.4 MG/L
MICROALBUMIN/CREAT UR: 11.56 MG/G CR (ref 0–17)
PHOSPHATE SERPL-MCNC: 2.7 MG/DL (ref 2.5–4.5)
POTASSIUM SERPL-SCNC: 3.5 MMOL/L (ref 3.4–5.3)
SODIUM SERPL-SCNC: 139 MMOL/L (ref 136–145)

## 2023-07-07 PROCEDURE — 82306 VITAMIN D 25 HYDROXY: CPT | Performed by: FAMILY MEDICINE

## 2023-07-07 PROCEDURE — 87086 URINE CULTURE/COLONY COUNT: CPT | Performed by: FAMILY MEDICINE

## 2023-07-07 PROCEDURE — 80069 RENAL FUNCTION PANEL: CPT | Performed by: FAMILY MEDICINE

## 2023-07-07 PROCEDURE — 82570 ASSAY OF URINE CREATININE: CPT | Performed by: FAMILY MEDICINE

## 2023-07-07 PROCEDURE — 83970 ASSAY OF PARATHORMONE: CPT | Performed by: FAMILY MEDICINE

## 2023-07-08 LAB
DEPRECATED CALCIDIOL+CALCIFEROL SERPL-MC: 35 UG/L (ref 20–75)
PTH-INTACT SERPL-MCNC: 33 PG/ML (ref 15–65)

## 2023-07-09 LAB — BACTERIA UR CULT: NORMAL

## 2023-08-05 ENCOUNTER — HEALTH MAINTENANCE LETTER (OUTPATIENT)
Age: 78
End: 2023-08-05

## 2023-08-17 ENCOUNTER — TRANSFERRED RECORDS (OUTPATIENT)
Dept: HEALTH INFORMATION MANAGEMENT | Facility: CLINIC | Age: 78
End: 2023-08-17
Payer: COMMERCIAL

## 2023-08-17 ENCOUNTER — MEDICAL CORRESPONDENCE (OUTPATIENT)
Dept: HEALTH INFORMATION MANAGEMENT | Facility: CLINIC | Age: 78
End: 2023-08-17
Payer: COMMERCIAL

## 2023-08-25 ENCOUNTER — OFFICE VISIT (OUTPATIENT)
Dept: CARDIOLOGY | Facility: CLINIC | Age: 78
End: 2023-08-25
Payer: COMMERCIAL

## 2023-08-25 VITALS
HEART RATE: 68 BPM | RESPIRATION RATE: 16 BRPM | HEIGHT: 72 IN | WEIGHT: 215 LBS | OXYGEN SATURATION: 94 % | SYSTOLIC BLOOD PRESSURE: 108 MMHG | BODY MASS INDEX: 29.12 KG/M2 | DIASTOLIC BLOOD PRESSURE: 58 MMHG

## 2023-08-25 DIAGNOSIS — Z79.4 TYPE 2 DIABETES MELLITUS WITHOUT COMPLICATION, WITH LONG-TERM CURRENT USE OF INSULIN (H): ICD-10-CM

## 2023-08-25 DIAGNOSIS — N18.30 STAGE 3 CHRONIC KIDNEY DISEASE, UNSPECIFIED WHETHER STAGE 3A OR 3B CKD (H): ICD-10-CM

## 2023-08-25 DIAGNOSIS — I44.7 LBBB (LEFT BUNDLE BRANCH BLOCK): ICD-10-CM

## 2023-08-25 DIAGNOSIS — E11.9 TYPE 2 DIABETES MELLITUS WITHOUT COMPLICATION, WITH LONG-TERM CURRENT USE OF INSULIN (H): ICD-10-CM

## 2023-08-25 DIAGNOSIS — I25.9 CHEST PAIN DUE TO MYOCARDIAL ISCHEMIA, UNSPECIFIED ISCHEMIC CHEST PAIN TYPE: Primary | ICD-10-CM

## 2023-08-25 DIAGNOSIS — R00.1 SINUS BRADYCARDIA: ICD-10-CM

## 2023-08-25 PROCEDURE — 99204 OFFICE O/P NEW MOD 45 MIN: CPT | Performed by: INTERNAL MEDICINE

## 2023-08-25 RX ORDER — LISINOPRIL AND HYDROCHLOROTHIAZIDE 12.5; 2 MG/1; MG/1
1 TABLET ORAL DAILY
COMMUNITY

## 2023-08-25 RX ORDER — LOVASTATIN 40 MG
1 TABLET ORAL 2 TIMES DAILY
COMMUNITY
End: 2024-07-01

## 2023-08-25 RX ORDER — ASPIRIN 81 MG/1
2 TABLET, CHEWABLE ORAL EVERY 24 HOURS
Status: ON HOLD | COMMUNITY
End: 2023-12-19

## 2023-08-25 RX ORDER — FAMOTIDINE 20 MG/1
20 TABLET, FILM COATED ORAL 2 TIMES DAILY
COMMUNITY
Start: 2023-06-04

## 2023-08-25 RX ORDER — IBUPROFEN 200 MG
200 TABLET ORAL 4 TIMES DAILY PRN
COMMUNITY
End: 2023-08-25

## 2023-08-25 RX ORDER — AMLODIPINE BESYLATE 2.5 MG/1
5 TABLET ORAL DAILY
COMMUNITY
Start: 2023-08-22

## 2023-08-25 RX ORDER — ASPIRIN 325 MG
325 TABLET ORAL DAILY
COMMUNITY

## 2023-08-25 NOTE — LETTER
8/25/2023    Farzaneh Sandy MD  [address]  [place] [address]    RE: Amor Zavaleta       Dear Colleague,     I had the pleasure of seeing Amor Zavaleta in the [facility].      Click to link to [facility] HEART CARE NOTE    Thank you, Dr. Sandy, for asking me to see Amor Zavaleta in consultation at [facility] to evaluate chest pain, sinus bradycardia.      Assessment/Plan:   Chest pain: The patient developed intermittent chest pain, chronic.  It is not exertional.  The patient does have a several risk factors.  His ECG showed left bundle branch block.  He has several risk factors for developing type 2 diabetes, stage III CKD, hypertension and his age.  After discussion, stress cardiac MRI is requested for evaluation of myocardial ischemia, heart function and structure, viability.  Sinus bradycardia, left bundle branch block: The patient developed left bundle branch block, sinus bradycardia, ventricular rate of 40 bpm.  He is not on AV von blockade agents.  Holter monitor is requested for further evaluation to decide if he needs pacemaker placement.  Benign essential hypertension: His blood pressure is controlled with amlodipine 2.5 mg daily, lisinopril hydrochlorothiazide 20-12.5 mg daily.  Dyslipidemia: Continue lovastatin 40 mg at bedtime.  Stage III CKD: The patient has appointment with nephrology clinic.    Thank you for the opportunity to be involved in the care of Amor Zavaleta. If you have any questions, please feel free to contact me.  I will see the patient again in 2 months and as needed    Much or all of the text in this note was generated through the use of Dragon Dictate voice-to-text software. Errors in spelling or words which seem out of context are unintentional.   Sound alike errors, in particular, may have escaped editing.       History of Present Illness:   It is my pleasure to see Amor Zavaleta  at the [facility] clinic for evaluation of sinus bradycardia, chest pain. Amor Zavaleta is a 77 year old male with a medical history of type 2 diabetes, stage III CKD, benign essential hypertension, left bundle branch block.    The patient states that he has smoking injury 30 years ago.  He developed intermittent chest pain over last 30 years.  It is not exertional, moderate in severity, dull aching pain, no radiation.  He has mild shortness of breath on exertion sometimes up.  He had no palpitations, dizziness, syncope, orthopnea, leg edema.  His blood pressure and heart rate are controlled.    His ECG showed sinus bradycardia, left bundle branch block, ventricular rate of 40 bpm.    Past Medical History:     Patient Active Problem List   Diagnosis    Diabetes mellitus, type 2 (H)    Stage 3 chronic kidney disease, unspecified whether stage 3a or 3b CKD (H)    Sinus bradycardia    LBBB (left bundle branch block)       Past Surgical History:   No past surgical history on file.    Family History:   Reviewed: No family history of coronary artery disease    Social History:    reports that he has never smoked. He has never used smokeless tobacco. He reports that he does not currently use alcohol. He reports that he does not use drugs.    Review of Systems:   12 systems are reviewed negative except for in HPI.    Meds:     Current Outpatient Medications:     amLODIPine (NORVASC) 2.5 MG tablet, Take 2.5 mg by mouth daily, Disp: , Rfl:     aspirin (ASA) 325 MG tablet, Take 1 tablet by mouth, Disp: , Rfl:     aspirin (ASA) 81 MG chewable tablet, Take 2 tablets by mouth every 24 hours, Disp: , Rfl:     famotidine (PEPCID) 20 MG tablet, Take 20 mg by mouth daily, Disp: , Rfl:     insulin aspart (NOVOLOG PEN) 100 UNIT/ML pen, Inject 16 Units Subcutaneous, Disp: , Rfl:     insulin glargine (LANTUS PEN) 100 UNIT/ML pen, Inject 56 Units Subcutaneous every 24 hours, Disp: , Rfl:      lisinopril-hydrochlorothiazide (ZESTORETIC) 20-12.5 MG tablet, Take 1 tablet by mouth, Disp: , Rfl:     lovastatin (MEVACOR) 40 MG tablet, Take 1 tablet by mouth 2 times daily, Disp: , Rfl:      Allergies:   Sulfa antibiotics    Objective:      Physical Exam  97.5 kg (215 lb)  1.829 m (6')  Body mass index is 29.16 kg/m .  /58 (BP Location: Right arm, Patient Position: Sitting, Cuff Size: Adult Large)   Pulse 68   Resp 16   Ht 1.829 m (6')   Wt 97.5 kg (215 lb)   SpO2 94%   BMI 29.16 kg/m      General Appearance:   Awake, Alert, No acute distress.   HEENT:  Pupil equal, reactive to light. No scleral icterus; the mucous membranes were moist. No oral ulcers or thrush.    Neck: No cervical bruits. No JVD. No thyromegaly. No lymph node enlargement or tenderness.   Chest: The spine was straight. The chest was symmetric.   Lungs:   Respirations unlabored. Lungs are clear to auscultation. No crackles. No wheezing.   Cardiovascular:   RRR, normal first and second heart sounds with no murmurs. No rubs or gallops.    Abdomen:  Soft. No tenderness. Non-distended. Bowels sounds are present   Extremities: Equal posterior tibial pulses. No leg edema.   Skin: No rashes or ulcers. Warm, Dry.   Musculoskeletal: No tenderness. No deformity.   Neurologic: Mood and affect are appropriate. No focal deficits.         EKG:  Personally reivewed  Sinus bradycardia  Left bundle branch block  No previous ECG comparison    Lab Review   Lab Results   Component Value Date     07/07/2023    CO2 23 07/07/2023    CO2 26 06/28/2022    BUN 30.4 07/07/2023    BUN 26 06/28/2022     Lab Results   Component Value Date    WBC 8.8 11/16/2021    HGB 15.6 12/15/2022    HCT 47.5 11/16/2021    MCV 96 11/16/2021     11/16/2021     Lab Results   Component Value Date    CHOL 108 04/20/2023    TRIG 108 04/20/2023    HDL 35 04/20/2023    LDL 51 04/20/2023     LDL Cholesterol Calculated   Date Value Ref Range Status   04/20/2023 51 <=100  mg/dL Final                   Thank you for allowing me to participate in the care of your patient.      Sincerely,     Newton Fonseca MD     [facility]  cc:   No referring provider defined for this encounter.

## 2023-08-25 NOTE — PROGRESS NOTES
Click to link to [facility] HEART CARE NOTE    Thank you, Dr. Sandy, for asking me to see Amor Zavaleta in consultation at [facility] to evaluate chest pain, sinus bradycardia.      Assessment/Plan:   Chest pain: The patient developed intermittent chest pain, chronic.  It is not exertional.  The patient does have a several risk factors.  His ECG showed left bundle branch block.  He has several risk factors for developing type 2 diabetes, stage III CKD, hypertension and his age.  After discussion, stress cardiac MRI is requested for evaluation of myocardial ischemia, heart function and structure, viability.  Sinus bradycardia, left bundle branch block: The patient developed left bundle branch block, sinus bradycardia, ventricular rate of 40 bpm.  He is not on AV von blockade agents.  Holter monitor is requested for further evaluation to decide if he needs pacemaker placement.  Benign essential hypertension: His blood pressure is controlled with amlodipine 2.5 mg daily, lisinopril hydrochlorothiazide 20-12.5 mg daily.  Dyslipidemia: Continue lovastatin 40 mg at bedtime.  Stage III CKD: The patient has appointment with nephrology clinic.    Thank you for the opportunity to be involved in the care of Amor Zavaleta. If you have any questions, please feel free to contact me.  I will see the patient again in 2 months and as needed    Much or all of the text in this note was generated through the use of Dragon Dictate voice-to-text software. Errors in spelling or words which seem out of context are unintentional.   Sound alike errors, in particular, may have escaped editing.       History of Present Illness:   It is my pleasure to see Amor Zavaleta at the [facility] for evaluation of sinus bradycardia, chest pain. Amor Zavaleta is a 77 year old male with a medical history of type 2 diabetes, stage III CKD, benign essential  hypertension, left bundle branch block.    The patient states that he has smoking injury 30 years ago.  He developed intermittent chest pain over last 30 years.  It is not exertional, moderate in severity, dull aching pain, no radiation.  He has mild shortness of breath on exertion sometimes up.  He had no palpitations, dizziness, syncope, orthopnea, leg edema.  His blood pressure and heart rate are controlled.    His ECG showed sinus bradycardia, left bundle branch block, ventricular rate of 40 bpm.    Past Medical History:     Patient Active Problem List   Diagnosis    Diabetes mellitus, type 2 (H)    Stage 3 chronic kidney disease, unspecified whether stage 3a or 3b CKD (H)    Sinus bradycardia    LBBB (left bundle branch block)       Past Surgical History:   No past surgical history on file.    Family History:   Reviewed: No family history of coronary artery disease    Social History:    reports that he has never smoked. He has never used smokeless tobacco. He reports that he does not currently use alcohol. He reports that he does not use drugs.    Review of Systems:   12 systems are reviewed negative except for in HPI.    Meds:     Current Outpatient Medications:     amLODIPine (NORVASC) 2.5 MG tablet, Take 2.5 mg by mouth daily, Disp: , Rfl:     aspirin (ASA) 325 MG tablet, Take 1 tablet by mouth, Disp: , Rfl:     aspirin (ASA) 81 MG chewable tablet, Take 2 tablets by mouth every 24 hours, Disp: , Rfl:     famotidine (PEPCID) 20 MG tablet, Take 20 mg by mouth daily, Disp: , Rfl:     insulin aspart (NOVOLOG PEN) 100 UNIT/ML pen, Inject 16 Units Subcutaneous, Disp: , Rfl:     insulin glargine (LANTUS PEN) 100 UNIT/ML pen, Inject 56 Units Subcutaneous every 24 hours, Disp: , Rfl:     lisinopril-hydrochlorothiazide (ZESTORETIC) 20-12.5 MG tablet, Take 1 tablet by mouth, Disp: , Rfl:     lovastatin (MEVACOR) 40 MG tablet, Take 1 tablet by mouth 2 times daily, Disp: , Rfl:      Allergies:   Sulfa  antibiotics    Objective:      Physical Exam  97.5 kg (215 lb)  1.829 m (6')  Body mass index is 29.16 kg/m .  /58 (BP Location: Right arm, Patient Position: Sitting, Cuff Size: Adult Large)   Pulse 68   Resp 16   Ht 1.829 m (6')   Wt 97.5 kg (215 lb)   SpO2 94%   BMI 29.16 kg/m      General Appearance:   Awake, Alert, No acute distress.   HEENT:  Pupil equal, reactive to light. No scleral icterus; the mucous membranes were moist. No oral ulcers or thrush.    Neck: No cervical bruits. No JVD. No thyromegaly. No lymph node enlargement or tenderness.   Chest: The spine was straight. The chest was symmetric.   Lungs:   Respirations unlabored. Lungs are clear to auscultation. No crackles. No wheezing.   Cardiovascular:   RRR, normal first and second heart sounds with no murmurs. No rubs or gallops.    Abdomen:  Soft. No tenderness. Non-distended. Bowels sounds are present   Extremities: Equal posterior tibial pulses. No leg edema.   Skin: No rashes or ulcers. Warm, Dry.   Musculoskeletal: No tenderness. No deformity.   Neurologic: Mood and affect are appropriate. No focal deficits.         EKG:  Personally reivewed  Sinus bradycardia  Left bundle branch block  No previous ECG comparison    Lab Review   Lab Results   Component Value Date     07/07/2023    CO2 23 07/07/2023    CO2 26 06/28/2022    BUN 30.4 07/07/2023    BUN 26 06/28/2022     Lab Results   Component Value Date    WBC 8.8 11/16/2021    HGB 15.6 12/15/2022    HCT 47.5 11/16/2021    MCV 96 11/16/2021     11/16/2021     Lab Results   Component Value Date    CHOL 108 04/20/2023    TRIG 108 04/20/2023    HDL 35 04/20/2023    LDL 51 04/20/2023     LDL Cholesterol Calculated   Date Value Ref Range Status   04/20/2023 51 <=100 mg/dL Final

## 2023-08-29 ENCOUNTER — HOSPITAL ENCOUNTER (OUTPATIENT)
Dept: CARDIOLOGY | Facility: CLINIC | Age: 78
Discharge: HOME | End: 2023-08-29
Attending: INTERNAL MEDICINE | Admitting: INTERNAL MEDICINE
Payer: COMMERCIAL

## 2023-08-29 DIAGNOSIS — I44.7 LBBB (LEFT BUNDLE BRANCH BLOCK): ICD-10-CM

## 2023-08-29 DIAGNOSIS — R00.1 SINUS BRADYCARDIA: ICD-10-CM

## 2023-08-29 PROCEDURE — 93226 XTRNL ECG REC<48 HR SCAN A/R: CPT

## 2023-09-01 PROCEDURE — 93227 XTRNL ECG REC<48 HR R&I: CPT | Performed by: INTERNAL MEDICINE

## 2023-10-25 NOTE — PROGRESS NOTES
Pt called and was given the prep for the test.  Pt verbalized understanding of medications to be held.  Pt verbalized understanding of instructions.    aLura Sullivan RN

## 2023-10-26 ENCOUNTER — HOSPITAL ENCOUNTER (OUTPATIENT)
Dept: MRI IMAGING | Facility: HOSPITAL | Age: 78
Discharge: HOME | End: 2023-10-26
Attending: INTERNAL MEDICINE
Payer: COMMERCIAL

## 2023-10-26 VITALS — SYSTOLIC BLOOD PRESSURE: 116 MMHG | HEART RATE: 59 BPM | OXYGEN SATURATION: 95 % | DIASTOLIC BLOOD PRESSURE: 50 MMHG

## 2023-10-26 DIAGNOSIS — I44.7 LBBB (LEFT BUNDLE BRANCH BLOCK): Primary | ICD-10-CM

## 2023-10-26 DIAGNOSIS — R00.1 SINUS BRADYCARDIA: ICD-10-CM

## 2023-10-26 DIAGNOSIS — I25.9 CHEST PAIN DUE TO MYOCARDIAL ISCHEMIA, UNSPECIFIED ISCHEMIC CHEST PAIN TYPE: ICD-10-CM

## 2023-10-26 LAB
ATRIAL RATE - MUSE: 53 BPM
ATRIAL RATE - MUSE: 55 BPM
DIASTOLIC BLOOD PRESSURE - MUSE: NORMAL MMHG
DIASTOLIC BLOOD PRESSURE - MUSE: NORMAL MMHG
INTERPRETATION ECG - MUSE: NORMAL
INTERPRETATION ECG - MUSE: NORMAL
P AXIS - MUSE: 44 DEGREES
P AXIS - MUSE: 66 DEGREES
PR INTERVAL - MUSE: 232 MS
PR INTERVAL - MUSE: 234 MS
QRS DURATION - MUSE: 154 MS
QRS DURATION - MUSE: 164 MS
QT - MUSE: 482 MS
QT - MUSE: 498 MS
QTC - MUSE: 452 MS
QTC - MUSE: 476 MS
R AXIS - MUSE: -62 DEGREES
R AXIS - MUSE: -64 DEGREES
SYSTOLIC BLOOD PRESSURE - MUSE: NORMAL MMHG
SYSTOLIC BLOOD PRESSURE - MUSE: NORMAL MMHG
T AXIS - MUSE: 60 DEGREES
T AXIS - MUSE: 72 DEGREES
VENTRICULAR RATE- MUSE: 53 BPM
VENTRICULAR RATE- MUSE: 55 BPM

## 2023-10-26 PROCEDURE — 93005 ELECTROCARDIOGRAM TRACING: CPT

## 2023-10-26 PROCEDURE — 999N000122 MR MYOCARDIUM  OVERREAD

## 2023-10-26 PROCEDURE — A9585 GADOBUTROL INJECTION: HCPCS | Mod: JZ | Performed by: INTERNAL MEDICINE

## 2023-10-26 PROCEDURE — 75563 CARD MRI W/STRESS IMG & DYE: CPT

## 2023-10-26 PROCEDURE — 93017 CV STRESS TEST TRACING ONLY: CPT | Performed by: INTERNAL MEDICINE

## 2023-10-26 PROCEDURE — 93016 CV STRESS TEST SUPVJ ONLY: CPT | Performed by: INTERNAL MEDICINE

## 2023-10-26 PROCEDURE — 255N000002 HC RX 255 OP 636: Mod: JZ | Performed by: INTERNAL MEDICINE

## 2023-10-26 PROCEDURE — 93010 ELECTROCARDIOGRAM REPORT: CPT | Performed by: INTERNAL MEDICINE

## 2023-10-26 PROCEDURE — 75563 CARD MRI W/STRESS IMG & DYE: CPT | Mod: 26 | Performed by: INTERNAL MEDICINE

## 2023-10-26 PROCEDURE — 250N000011 HC RX IP 250 OP 636: Performed by: INTERNAL MEDICINE

## 2023-10-26 RX ORDER — GADOBUTROL 604.72 MG/ML
20 INJECTION INTRAVENOUS ONCE
Status: COMPLETED | OUTPATIENT
Start: 2023-10-26 | End: 2023-10-26

## 2023-10-26 RX ORDER — REGADENOSON 0.08 MG/ML
0.4 INJECTION, SOLUTION INTRAVENOUS ONCE
Status: COMPLETED | OUTPATIENT
Start: 2023-10-26 | End: 2023-10-26

## 2023-10-26 RX ADMIN — GADOBUTROL 20 ML: 604.72 INJECTION INTRAVENOUS at 11:59

## 2023-10-26 RX ADMIN — REGADENOSON 0.4 MG: 0.08 INJECTION, SOLUTION INTRAVENOUS at 11:21

## 2023-10-26 NOTE — PROGRESS NOTES
Pt here for ischemic evaluation and heart function.  Pt denies chest pain pressure or tightness.  Pt denies shortness of breath.  Laura Sullivan RN

## 2023-12-11 ENCOUNTER — ANESTHESIA (OUTPATIENT)
Dept: SURGERY | Facility: HOSPITAL | Age: 78
DRG: 853 | End: 2023-12-11
Payer: COMMERCIAL

## 2023-12-11 ENCOUNTER — ANESTHESIA EVENT (OUTPATIENT)
Dept: SURGERY | Facility: HOSPITAL | Age: 78
DRG: 853 | End: 2023-12-11
Payer: COMMERCIAL

## 2023-12-11 ENCOUNTER — ANCILLARY PROCEDURE (OUTPATIENT)
Dept: ULTRASOUND IMAGING | Facility: HOSPITAL | Age: 78
End: 2023-12-11
Payer: COMMERCIAL

## 2023-12-11 ENCOUNTER — APPOINTMENT (OUTPATIENT)
Dept: CT IMAGING | Facility: HOSPITAL | Age: 78
DRG: 853 | End: 2023-12-11
Attending: STUDENT IN AN ORGANIZED HEALTH CARE EDUCATION/TRAINING PROGRAM
Payer: COMMERCIAL

## 2023-12-11 ENCOUNTER — HOSPITAL ENCOUNTER (INPATIENT)
Facility: HOSPITAL | Age: 78
LOS: 9 days | Discharge: HOME | DRG: 853 | End: 2023-12-20
Attending: STUDENT IN AN ORGANIZED HEALTH CARE EDUCATION/TRAINING PROGRAM | Admitting: INTERNAL MEDICINE
Payer: COMMERCIAL
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
## 2023-12-11 DIAGNOSIS — L03.113 CELLULITIS OF RIGHT UPPER EXTREMITY: Primary | ICD-10-CM

## 2023-12-11 LAB
ALBUMIN SERPL BCG-MCNC: 3.4 G/DL (ref 3.5–5.2)
ALP SERPL-CCNC: 120 U/L (ref 40–150)
ALT SERPL W P-5'-P-CCNC: 45 U/L (ref 0–70)
ANION GAP SERPL CALCULATED.3IONS-SCNC: 17 MMOL/L (ref 7–15)
ANION GAP SERPL CALCULATED.3IONS-SCNC: 19 MMOL/L (ref 7–15)
AST SERPL W P-5'-P-CCNC: 27 U/L (ref 0–45)
BASE EXCESS BLDA CALC-SCNC: 0.9 MMOL/L
BASOPHILS # BLD AUTO: 0.1 10E3/UL (ref 0–0.2)
BASOPHILS NFR BLD AUTO: 0 %
BILIRUB SERPL-MCNC: 1.1 MG/DL
BUN SERPL-MCNC: 44 MG/DL (ref 8–23)
BUN SERPL-MCNC: 47.2 MG/DL (ref 8–23)
CALCIUM SERPL-MCNC: 8.5 MG/DL (ref 8.8–10.2)
CALCIUM SERPL-MCNC: 9 MG/DL (ref 8.8–10.2)
CHLORIDE SERPL-SCNC: 87 MMOL/L (ref 98–107)
CHLORIDE SERPL-SCNC: 95 MMOL/L (ref 98–107)
COHGB MFR BLD: 92.8 % (ref 95–96)
CREAT SERPL-MCNC: 2.01 MG/DL (ref 0.67–1.17)
CREAT SERPL-MCNC: 2.33 MG/DL (ref 0.67–1.17)
CRP SERPL-MCNC: 269 MG/L
DEPRECATED HCO3 PLAS-SCNC: 16 MMOL/L (ref 22–29)
DEPRECATED HCO3 PLAS-SCNC: 27 MMOL/L (ref 22–29)
EGFRCR SERPLBLD CKD-EPI 2021: 28 ML/MIN/1.73M2
EGFRCR SERPLBLD CKD-EPI 2021: 33 ML/MIN/1.73M2
EOSINOPHIL # BLD AUTO: 0 10E3/UL (ref 0–0.7)
EOSINOPHIL NFR BLD AUTO: 0 %
ERYTHROCYTE [DISTWIDTH] IN BLOOD BY AUTOMATED COUNT: 13 % (ref 10–15)
ERYTHROCYTE [SEDIMENTATION RATE] IN BLOOD BY WESTERGREN METHOD: 61 MM/HR (ref 0–20)
GLUCOSE BLDC GLUCOMTR-MCNC: 268 MG/DL (ref 70–99)
GLUCOSE BLDC GLUCOMTR-MCNC: 278 MG/DL (ref 70–99)
GLUCOSE BLDC GLUCOMTR-MCNC: 356 MG/DL (ref 70–99)
GLUCOSE BLDC GLUCOMTR-MCNC: 362 MG/DL (ref 70–99)
GLUCOSE BLDC GLUCOMTR-MCNC: 374 MG/DL (ref 70–99)
GLUCOSE SERPL-MCNC: 399 MG/DL (ref 70–99)
GLUCOSE SERPL-MCNC: 468 MG/DL (ref 70–99)
GRAM STAIN RESULT: NORMAL
HBA1C MFR BLD: 8.9 %
HCO3 BLD-SCNC: 25 MMOL/L (ref 23–29)
HCT VFR BLD AUTO: 44.8 % (ref 40–53)
HGB BLD-MCNC: 15.2 G/DL (ref 13.3–17.7)
HOLD SPECIMEN: NORMAL
HOLD SPECIMEN: NORMAL
IMM GRANULOCYTES # BLD: 0.9 10E3/UL
IMM GRANULOCYTES NFR BLD: 3 %
LACTATE SERPL-SCNC: 2.7 MMOL/L (ref 0.7–2)
LACTATE SERPL-SCNC: 3.5 MMOL/L (ref 0.7–2)
LYMPHOCYTES # BLD AUTO: 1.3 10E3/UL (ref 0.8–5.3)
LYMPHOCYTES NFR BLD AUTO: 5 %
MAGNESIUM SERPL-MCNC: 2.4 MG/DL (ref 1.7–2.3)
MCH RBC QN AUTO: 31.5 PG (ref 26.5–33)
MCHC RBC AUTO-ENTMCNC: 33.9 G/DL (ref 31.5–36.5)
MCV RBC AUTO: 93 FL (ref 78–100)
MONOCYTES # BLD AUTO: 1.5 10E3/UL (ref 0–1.3)
MONOCYTES NFR BLD AUTO: 6 %
NEUTROPHILS # BLD AUTO: 22.6 10E3/UL (ref 1.6–8.3)
NEUTROPHILS NFR BLD AUTO: 86 %
NRBC # BLD AUTO: 0 10E3/UL
NRBC BLD AUTO-RTO: 0 /100
OXYHGB MFR BLD: 91.9 % (ref 95–96)
PCO2 BLD: 35 MM HG (ref 35–45)
PH BLD: 7.46 [PH] (ref 7.37–7.44)
PLATELET # BLD AUTO: 242 10E3/UL (ref 150–450)
PO2 BLD: 63 MM HG (ref 75–85)
POTASSIUM SERPL-SCNC: 2.5 MMOL/L (ref 3.4–5.3)
POTASSIUM SERPL-SCNC: 2.9 MMOL/L (ref 3.4–5.3)
POTASSIUM SERPL-SCNC: 2.9 MMOL/L (ref 3.4–5.3)
PROT SERPL-MCNC: 7.5 G/DL (ref 6.4–8.3)
RBC # BLD AUTO: 4.83 10E6/UL (ref 4.4–5.9)
SODIUM SERPL-SCNC: 130 MMOL/L (ref 135–145)
SODIUM SERPL-SCNC: 131 MMOL/L (ref 135–145)
TEMPERATURE: 37 DEGREES C
WBC # BLD AUTO: 26.4 10E3/UL (ref 4–11)

## 2023-12-11 PROCEDURE — 250N000013 HC RX MED GY IP 250 OP 250 PS 637: Performed by: STUDENT IN AN ORGANIZED HEALTH CARE EDUCATION/TRAINING PROGRAM

## 2023-12-11 PROCEDURE — 250N000011 HC RX IP 250 OP 636: Mod: JZ | Performed by: STUDENT IN AN ORGANIZED HEALTH CARE EDUCATION/TRAINING PROGRAM

## 2023-12-11 PROCEDURE — 85652 RBC SED RATE AUTOMATED: CPT | Performed by: PHYSICIAN ASSISTANT

## 2023-12-11 PROCEDURE — 250N000011 HC RX IP 250 OP 636: Performed by: ORTHOPAEDIC SURGERY

## 2023-12-11 PROCEDURE — 85025 COMPLETE CBC W/AUTO DIFF WBC: CPT | Performed by: STUDENT IN AN ORGANIZED HEALTH CARE EDUCATION/TRAINING PROGRAM

## 2023-12-11 PROCEDURE — 86140 C-REACTIVE PROTEIN: CPT | Performed by: PHYSICIAN ASSISTANT

## 2023-12-11 PROCEDURE — 87205 SMEAR GRAM STAIN: CPT | Performed by: ORTHOPAEDIC SURGERY

## 2023-12-11 PROCEDURE — 250N000011 HC RX IP 250 OP 636: Mod: JZ | Performed by: INTERNAL MEDICINE

## 2023-12-11 PROCEDURE — 80053 COMPREHEN METABOLIC PANEL: CPT | Performed by: STUDENT IN AN ORGANIZED HEALTH CARE EDUCATION/TRAINING PROGRAM

## 2023-12-11 PROCEDURE — 83605 ASSAY OF LACTIC ACID: CPT | Performed by: STUDENT IN AN ORGANIZED HEALTH CARE EDUCATION/TRAINING PROGRAM

## 2023-12-11 PROCEDURE — 120N000001 HC R&B MED SURG/OB

## 2023-12-11 PROCEDURE — 258N000001 HC RX 258: Performed by: ORTHOPAEDIC SURGERY

## 2023-12-11 PROCEDURE — 83735 ASSAY OF MAGNESIUM: CPT | Performed by: STUDENT IN AN ORGANIZED HEALTH CARE EDUCATION/TRAINING PROGRAM

## 2023-12-11 PROCEDURE — 73200 CT UPPER EXTREMITY W/O DYE: CPT | Mod: RT,XS

## 2023-12-11 PROCEDURE — 999N000141 HC STATISTIC PRE-PROCEDURE NURSING ASSESSMENT: Performed by: ORTHOPAEDIC SURGERY

## 2023-12-11 PROCEDURE — 258N000003 HC RX IP 258 OP 636: Performed by: EMERGENCY MEDICINE

## 2023-12-11 PROCEDURE — 710N000009 HC RECOVERY PHASE 1, LEVEL 1, PER MIN: Performed by: ORTHOPAEDIC SURGERY

## 2023-12-11 PROCEDURE — 0JDJ0ZZ EXTRACTION OF RIGHT HAND SUBCUTANEOUS TISSUE AND FASCIA, OPEN APPROACH: ICD-10-PCS | Performed by: ORTHOPAEDIC SURGERY

## 2023-12-11 PROCEDURE — 250N000009 HC RX 250: Performed by: NURSE ANESTHETIST, CERTIFIED REGISTERED

## 2023-12-11 PROCEDURE — 73200 CT UPPER EXTREMITY W/O DYE: CPT | Mod: RT

## 2023-12-11 PROCEDURE — 84132 ASSAY OF SERUM POTASSIUM: CPT | Performed by: INTERNAL MEDICINE

## 2023-12-11 PROCEDURE — 999N000157 HC STATISTIC RCP TIME EA 10 MIN

## 2023-12-11 PROCEDURE — 36415 COLL VENOUS BLD VENIPUNCTURE: CPT | Performed by: STUDENT IN AN ORGANIZED HEALTH CARE EDUCATION/TRAINING PROGRAM

## 2023-12-11 PROCEDURE — 0LD50ZZ EXTRACTION OF RIGHT LOWER ARM AND WRIST TENDON, OPEN APPROACH: ICD-10-PCS | Performed by: ORTHOPAEDIC SURGERY

## 2023-12-11 PROCEDURE — 83036 HEMOGLOBIN GLYCOSYLATED A1C: CPT | Performed by: INTERNAL MEDICINE

## 2023-12-11 PROCEDURE — 87040 BLOOD CULTURE FOR BACTERIA: CPT | Performed by: STUDENT IN AN ORGANIZED HEALTH CARE EDUCATION/TRAINING PROGRAM

## 2023-12-11 PROCEDURE — 82962 GLUCOSE BLOOD TEST: CPT

## 2023-12-11 PROCEDURE — 82805 BLOOD GASES W/O2 SATURATION: CPT | Performed by: INTERNAL MEDICINE

## 2023-12-11 PROCEDURE — 250N000025 HC SEVOFLURANE, PER MIN: Performed by: ORTHOPAEDIC SURGERY

## 2023-12-11 PROCEDURE — 93005 ELECTROCARDIOGRAM TRACING: CPT | Performed by: STUDENT IN AN ORGANIZED HEALTH CARE EDUCATION/TRAINING PROGRAM

## 2023-12-11 PROCEDURE — 96365 THER/PROPH/DIAG IV INF INIT: CPT

## 2023-12-11 PROCEDURE — 36600 WITHDRAWAL OF ARTERIAL BLOOD: CPT

## 2023-12-11 PROCEDURE — 99285 EMERGENCY DEPT VISIT HI MDM: CPT | Mod: 25

## 2023-12-11 PROCEDURE — 99222 1ST HOSP IP/OBS MODERATE 55: CPT | Performed by: INTERNAL MEDICINE

## 2023-12-11 PROCEDURE — 36415 COLL VENOUS BLD VENIPUNCTURE: CPT | Performed by: INTERNAL MEDICINE

## 2023-12-11 PROCEDURE — 250N000011 HC RX IP 250 OP 636: Performed by: NURSE ANESTHETIST, CERTIFIED REGISTERED

## 2023-12-11 PROCEDURE — 96361 HYDRATE IV INFUSION ADD-ON: CPT

## 2023-12-11 PROCEDURE — 96375 TX/PRO/DX INJ NEW DRUG ADDON: CPT

## 2023-12-11 PROCEDURE — 87077 CULTURE AEROBIC IDENTIFY: CPT | Performed by: ORTHOPAEDIC SURGERY

## 2023-12-11 PROCEDURE — 272N000001 HC OR GENERAL SUPPLY STERILE: Performed by: ORTHOPAEDIC SURGERY

## 2023-12-11 PROCEDURE — 370N000017 HC ANESTHESIA TECHNICAL FEE, PER MIN: Performed by: ORTHOPAEDIC SURGERY

## 2023-12-11 PROCEDURE — 87102 FUNGUS ISOLATION CULTURE: CPT | Performed by: ORTHOPAEDIC SURGERY

## 2023-12-11 PROCEDURE — 87070 CULTURE OTHR SPECIMN AEROBIC: CPT | Performed by: ORTHOPAEDIC SURGERY

## 2023-12-11 PROCEDURE — 360N000075 HC SURGERY LEVEL 2, PER MIN: Performed by: ORTHOPAEDIC SURGERY

## 2023-12-11 PROCEDURE — 258N000003 HC RX IP 258 OP 636: Performed by: NURSE ANESTHETIST, CERTIFIED REGISTERED

## 2023-12-11 PROCEDURE — 258N000003 HC RX IP 258 OP 636: Performed by: STUDENT IN AN ORGANIZED HEALTH CARE EDUCATION/TRAINING PROGRAM

## 2023-12-11 PROCEDURE — 87075 CULTR BACTERIA EXCEPT BLOOD: CPT | Performed by: ORTHOPAEDIC SURGERY

## 2023-12-11 PROCEDURE — 250N000012 HC RX MED GY IP 250 OP 636 PS 637: Performed by: ANESTHESIOLOGY

## 2023-12-11 PROCEDURE — 258N000003 HC RX IP 258 OP 636: Performed by: INTERNAL MEDICINE

## 2023-12-11 RX ORDER — ONDANSETRON 4 MG/1
4 TABLET, ORALLY DISINTEGRATING ORAL EVERY 30 MIN PRN
Status: DISCONTINUED | OUTPATIENT
Start: 2023-12-11 | End: 2023-12-11 | Stop reason: HOSPADM

## 2023-12-11 RX ORDER — HYDROMORPHONE HYDROCHLORIDE 1 MG/ML
0.2 INJECTION, SOLUTION INTRAMUSCULAR; INTRAVENOUS; SUBCUTANEOUS EVERY 5 MIN PRN
Status: DISCONTINUED | OUTPATIENT
Start: 2023-12-11 | End: 2023-12-11 | Stop reason: HOSPADM

## 2023-12-11 RX ORDER — POTASSIUM CHLORIDE 1500 MG/1
40 TABLET, EXTENDED RELEASE ORAL ONCE
Status: COMPLETED | OUTPATIENT
Start: 2023-12-11 | End: 2023-12-11

## 2023-12-11 RX ORDER — NICOTINE POLACRILEX 4 MG
15-30 LOZENGE BUCCAL
Status: DISCONTINUED | OUTPATIENT
Start: 2023-12-11 | End: 2023-12-20 | Stop reason: HOSPADM

## 2023-12-11 RX ORDER — SODIUM CHLORIDE, SODIUM LACTATE, POTASSIUM CHLORIDE, CALCIUM CHLORIDE 600; 310; 30; 20 MG/100ML; MG/100ML; MG/100ML; MG/100ML
INJECTION, SOLUTION INTRAVENOUS CONTINUOUS
Status: DISCONTINUED | OUTPATIENT
Start: 2023-12-11 | End: 2023-12-11

## 2023-12-11 RX ORDER — LIDOCAINE 40 MG/G
CREAM TOPICAL
Status: DISCONTINUED | OUTPATIENT
Start: 2023-12-11 | End: 2023-12-11 | Stop reason: HOSPADM

## 2023-12-11 RX ORDER — FENTANYL CITRATE 50 UG/ML
25 INJECTION, SOLUTION INTRAMUSCULAR; INTRAVENOUS EVERY 5 MIN PRN
Status: DISCONTINUED | OUTPATIENT
Start: 2023-12-11 | End: 2023-12-11 | Stop reason: HOSPADM

## 2023-12-11 RX ORDER — AMOXICILLIN 250 MG
2 CAPSULE ORAL 2 TIMES DAILY PRN
Status: DISCONTINUED | OUTPATIENT
Start: 2023-12-11 | End: 2023-12-20 | Stop reason: HOSPADM

## 2023-12-11 RX ORDER — ASPIRIN 325 MG
325 TABLET ORAL DAILY
Status: DISCONTINUED | OUTPATIENT
Start: 2023-12-12 | End: 2023-12-20 | Stop reason: HOSPADM

## 2023-12-11 RX ORDER — VANCOMYCIN HYDROCHLORIDE 1 G/200ML
1000 INJECTION, SOLUTION INTRAVENOUS EVERY 24 HOURS
Status: DISCONTINUED | OUTPATIENT
Start: 2023-12-12 | End: 2023-12-14

## 2023-12-11 RX ORDER — ONDANSETRON 2 MG/ML
4 INJECTION INTRAMUSCULAR; INTRAVENOUS EVERY 30 MIN PRN
Status: DISCONTINUED | OUTPATIENT
Start: 2023-12-11 | End: 2023-12-11 | Stop reason: HOSPADM

## 2023-12-11 RX ORDER — FENTANYL CITRATE 50 UG/ML
50 INJECTION, SOLUTION INTRAMUSCULAR; INTRAVENOUS EVERY 5 MIN PRN
Status: DISCONTINUED | OUTPATIENT
Start: 2023-12-11 | End: 2023-12-11 | Stop reason: HOSPADM

## 2023-12-11 RX ORDER — LIDOCAINE 40 MG/G
CREAM TOPICAL
Status: DISCONTINUED | OUTPATIENT
Start: 2023-12-11 | End: 2023-12-20 | Stop reason: HOSPADM

## 2023-12-11 RX ORDER — FENTANYL CITRATE 50 UG/ML
25-100 INJECTION, SOLUTION INTRAMUSCULAR; INTRAVENOUS
Status: DISCONTINUED | OUTPATIENT
Start: 2023-12-11 | End: 2023-12-11 | Stop reason: HOSPADM

## 2023-12-11 RX ORDER — AMPICILLIN AND SULBACTAM 2; 1 G/1; G/1
3 INJECTION, POWDER, FOR SOLUTION INTRAMUSCULAR; INTRAVENOUS EVERY 6 HOURS
Status: DISCONTINUED | OUTPATIENT
Start: 2023-12-11 | End: 2023-12-15

## 2023-12-11 RX ORDER — LIDOCAINE HYDROCHLORIDE 10 MG/ML
INJECTION, SOLUTION INFILTRATION; PERINEURAL PRN
Status: DISCONTINUED | OUTPATIENT
Start: 2023-12-11 | End: 2023-12-11

## 2023-12-11 RX ORDER — FENTANYL CITRATE 50 UG/ML
INJECTION, SOLUTION INTRAMUSCULAR; INTRAVENOUS PRN
Status: DISCONTINUED | OUTPATIENT
Start: 2023-12-11 | End: 2023-12-11

## 2023-12-11 RX ORDER — ACETAMINOPHEN 325 MG/1
650 TABLET ORAL EVERY 4 HOURS PRN
Status: DISCONTINUED | OUTPATIENT
Start: 2023-12-11 | End: 2023-12-20 | Stop reason: HOSPADM

## 2023-12-11 RX ORDER — GUAIFENESIN 600 MG/1
15 TABLET, EXTENDED RELEASE ORAL 2 TIMES DAILY
Status: DISCONTINUED | OUTPATIENT
Start: 2023-12-11 | End: 2023-12-20 | Stop reason: HOSPADM

## 2023-12-11 RX ORDER — NICOTINE POLACRILEX 4 MG
15-30 LOZENGE BUCCAL
Status: DISCONTINUED | OUTPATIENT
Start: 2023-12-11 | End: 2023-12-16

## 2023-12-11 RX ORDER — AMOXICILLIN 250 MG
1 CAPSULE ORAL 2 TIMES DAILY PRN
Status: DISCONTINUED | OUTPATIENT
Start: 2023-12-11 | End: 2023-12-20 | Stop reason: HOSPADM

## 2023-12-11 RX ORDER — CALCIUM CARBONATE 500 MG/1
1000 TABLET, CHEWABLE ORAL 4 TIMES DAILY PRN
Status: DISCONTINUED | OUTPATIENT
Start: 2023-12-11 | End: 2023-12-20 | Stop reason: HOSPADM

## 2023-12-11 RX ORDER — POTASSIUM CHLORIDE 7.45 MG/ML
10 INJECTION INTRAVENOUS ONCE
Status: COMPLETED | OUTPATIENT
Start: 2023-12-11 | End: 2023-12-11

## 2023-12-11 RX ORDER — DEXTROSE MONOHYDRATE 25 G/50ML
25-50 INJECTION, SOLUTION INTRAVENOUS
Status: DISCONTINUED | OUTPATIENT
Start: 2023-12-11 | End: 2023-12-20 | Stop reason: HOSPADM

## 2023-12-11 RX ORDER — PANTOPRAZOLE SODIUM 40 MG/1
40 TABLET, DELAYED RELEASE ORAL
Status: DISCONTINUED | OUTPATIENT
Start: 2023-12-12 | End: 2023-12-20 | Stop reason: HOSPADM

## 2023-12-11 RX ORDER — SODIUM CHLORIDE 9 MG/ML
INJECTION, SOLUTION INTRAVENOUS CONTINUOUS PRN
Status: DISCONTINUED | OUTPATIENT
Start: 2023-12-11 | End: 2023-12-11

## 2023-12-11 RX ORDER — ATORVASTATIN CALCIUM 10 MG/1
10 TABLET, FILM COATED ORAL EVERY EVENING
Status: DISCONTINUED | OUTPATIENT
Start: 2023-12-11 | End: 2023-12-20 | Stop reason: HOSPADM

## 2023-12-11 RX ORDER — PROPOFOL 10 MG/ML
INJECTION, EMULSION INTRAVENOUS PRN
Status: DISCONTINUED | OUTPATIENT
Start: 2023-12-11 | End: 2023-12-11

## 2023-12-11 RX ORDER — ONDANSETRON 2 MG/ML
INJECTION INTRAMUSCULAR; INTRAVENOUS PRN
Status: DISCONTINUED | OUTPATIENT
Start: 2023-12-11 | End: 2023-12-11

## 2023-12-11 RX ORDER — HYDROMORPHONE HYDROCHLORIDE 1 MG/ML
0.4 INJECTION, SOLUTION INTRAMUSCULAR; INTRAVENOUS; SUBCUTANEOUS EVERY 5 MIN PRN
Status: DISCONTINUED | OUTPATIENT
Start: 2023-12-11 | End: 2023-12-11 | Stop reason: HOSPADM

## 2023-12-11 RX ORDER — SODIUM CHLORIDE 9 MG/ML
INJECTION, SOLUTION INTRAVENOUS CONTINUOUS
Status: DISCONTINUED | OUTPATIENT
Start: 2023-12-11 | End: 2023-12-13

## 2023-12-11 RX ORDER — AMPICILLIN AND SULBACTAM 2; 1 G/1; G/1
3 INJECTION, POWDER, FOR SOLUTION INTRAMUSCULAR; INTRAVENOUS ONCE
Status: COMPLETED | OUTPATIENT
Start: 2023-12-11 | End: 2023-12-11

## 2023-12-11 RX ORDER — PIPERACILLIN SODIUM, TAZOBACTAM SODIUM 3; .375 G/15ML; G/15ML
3.38 INJECTION, POWDER, LYOPHILIZED, FOR SOLUTION INTRAVENOUS ONCE
Status: DISCONTINUED | OUTPATIENT
Start: 2023-12-11 | End: 2023-12-11

## 2023-12-11 RX ORDER — EPHEDRINE SULFATE 50 MG/ML
INJECTION, SOLUTION INTRAMUSCULAR; INTRAVENOUS; SUBCUTANEOUS PRN
Status: DISCONTINUED | OUTPATIENT
Start: 2023-12-11 | End: 2023-12-11

## 2023-12-11 RX ORDER — ACETAMINOPHEN 650 MG/1
650 SUPPOSITORY RECTAL EVERY 4 HOURS PRN
Status: DISCONTINUED | OUTPATIENT
Start: 2023-12-11 | End: 2023-12-20 | Stop reason: HOSPADM

## 2023-12-11 RX ORDER — DEXTROSE MONOHYDRATE 25 G/50ML
25-50 INJECTION, SOLUTION INTRAVENOUS
Status: DISCONTINUED | OUTPATIENT
Start: 2023-12-11 | End: 2023-12-16

## 2023-12-11 RX ORDER — CEFAZOLIN SODIUM 1 G/50ML
2000 SOLUTION INTRAVENOUS ONCE
Status: COMPLETED | OUTPATIENT
Start: 2023-12-11 | End: 2023-12-11

## 2023-12-11 RX ADMIN — PHENYLEPHRINE HYDROCHLORIDE 100 MCG: 10 INJECTION INTRAVENOUS at 19:43

## 2023-12-11 RX ADMIN — AMPICILLIN SODIUM AND SULBACTAM SODIUM 3 G: 2; 1 INJECTION, POWDER, FOR SOLUTION INTRAMUSCULAR; INTRAVENOUS at 14:25

## 2023-12-11 RX ADMIN — FENTANYL CITRATE 100 MCG: 50 INJECTION INTRAMUSCULAR; INTRAVENOUS at 19:30

## 2023-12-11 RX ADMIN — VANCOMYCIN HYDROCHLORIDE 2000 MG: 5 INJECTION, POWDER, LYOPHILIZED, FOR SOLUTION INTRAVENOUS at 15:03

## 2023-12-11 RX ADMIN — INSULIN ASPART 10 UNITS: 100 INJECTION, SOLUTION INTRAVENOUS; SUBCUTANEOUS at 18:35

## 2023-12-11 RX ADMIN — SODIUM CHLORIDE 1000 ML: 9 INJECTION, SOLUTION INTRAVENOUS at 15:04

## 2023-12-11 RX ADMIN — AMPICILLIN SODIUM AND SULBACTAM SODIUM 3 G: 2; 1 INJECTION, POWDER, FOR SOLUTION INTRAMUSCULAR; INTRAVENOUS at 22:36

## 2023-12-11 RX ADMIN — POTASSIUM CHLORIDE 10 MEQ: 7.46 INJECTION, SOLUTION INTRAVENOUS at 14:10

## 2023-12-11 RX ADMIN — POTASSIUM CHLORIDE 40 MEQ: 1500 TABLET, EXTENDED RELEASE ORAL at 14:16

## 2023-12-11 RX ADMIN — SODIUM CHLORIDE: 9 INJECTION, SOLUTION INTRAVENOUS at 22:24

## 2023-12-11 RX ADMIN — PROPOFOL 200 MG: 10 INJECTION, EMULSION INTRAVENOUS at 19:30

## 2023-12-11 RX ADMIN — HYDROMORPHONE HYDROCHLORIDE 0.5 MG: 1 INJECTION, SOLUTION INTRAMUSCULAR; INTRAVENOUS; SUBCUTANEOUS at 20:05

## 2023-12-11 RX ADMIN — ROCURONIUM BROMIDE 40 MG: 50 INJECTION, SOLUTION INTRAVENOUS at 19:30

## 2023-12-11 RX ADMIN — HYDROMORPHONE HYDROCHLORIDE 0.5 MG: 1 INJECTION, SOLUTION INTRAMUSCULAR; INTRAVENOUS; SUBCUTANEOUS at 19:55

## 2023-12-11 RX ADMIN — ONDANSETRON 4 MG: 2 INJECTION INTRAMUSCULAR; INTRAVENOUS at 20:05

## 2023-12-11 RX ADMIN — SODIUM CHLORIDE 1000 ML: 9 INJECTION, SOLUTION INTRAVENOUS at 13:58

## 2023-12-11 RX ADMIN — SUGAMMADEX 200 MG: 100 INJECTION, SOLUTION INTRAVENOUS at 20:15

## 2023-12-11 RX ADMIN — Medication 5 MG: at 19:43

## 2023-12-11 RX ADMIN — LIDOCAINE HYDROCHLORIDE 50 MG: 10 INJECTION, SOLUTION INFILTRATION; PERINEURAL at 19:30

## 2023-12-11 RX ADMIN — SODIUM CHLORIDE: 9 INJECTION, SOLUTION INTRAVENOUS at 19:22

## 2023-12-11 ASSESSMENT — ACTIVITIES OF DAILY LIVING (ADL)
ADLS_ACUITY_SCORE: 35
ADLS_ACUITY_SCORE: 23
ADLS_ACUITY_SCORE: 35

## 2023-12-11 ASSESSMENT — ENCOUNTER SYMPTOMS: DYSRHYTHMIAS: 1

## 2023-12-11 NOTE — H&P
[facility]    History and Physical - Hospitalist Service       Date of Admission:  12/11/2023    Assessment & Plan   Amor Zavaleta is a 78 year old male with history of type 2 diabetes, stage III kidney disease, hypertension and LBBB who presents to the ER with painful right upper extremity swelling and redness following cat bite.    Extensive right upper extremity cellulitis  Concern for right upper extremity abscess  Extensive right upper extremity swelling and redness  Severe sepsis  CT scan of the right upper extremity showed multifocal tenosynovitis involving the extensor tendons in the hand hand extensive cellulitis in the forearm, wrist and hand without discrete fluid collection to suggest an abscess or soft tissue gas.    N.p.o. for now  Gentle IV hydration with normal saline for now  Stop IV Ringer's lactate  Incision and drainage of the right upper extremity in the OR for this evening.  Patient is currently NPO.  Continue Unasyn   Trend lactate  Vancomycin as per pharmacy  Surgical cultures to be obtained during surgery  Follow-up blood culture    Hyponatremia  Possibly secondary to hyperglycemia   Monitor sodium  IV hydration as above    Hypokalemia  Monitor potassium and replace as per protocol    Stage IV CKD  Creatinine 2.33 compared to 2.01 on 7/7/2023  Gentle IV hydration as above  Monitor BMP  Avoid nephrotoxins    .  Diet: NPO per Anesthesia Guidelines for Procedure/Surgery Except for: Meds  DVT Prophylaxis: Pneumatic Compression Devices  Browning Catheter: Not present  Lines: None     Cardiac Monitoring: None  Code Status: Full Code    Clinically Significant Risk Factors Present on Admission        # Hypokalemia: Lowest K = 2.5 mmol/L in last 2 days, will replace as needed       # Hypoalbuminemia: Lowest albumin = 3.4 g/dL at 12/11/2023  1:16 PM, will monitor as appropriate   # Drug Induced Platelet Defect: home medication list includes an antiplatelet medication   #  Hypertension: Home medication list includes antihypertensive(s)                 Disposition Plan      Expected Discharge Date: 12/13/2023                  Garcia Tillman MD  Hospitalist Service  [facility]  Securely message with [employer] (more info)  Text page via [facility] Paging/Directory     ______________________________________________________________________    Chief Complaint   Painful right upper extremity swelling and redness following cat bite.    History is obtained from the patient    History of Present Illness   Amor Zavaleta is a 78 year old male with history of type 2 diabetes, stage III kidney disease, hypertension and LBBB who presents to the ER with painful right upper extremity swelling and redness following cat bite.    He was in his usual state of health until about a week ago when he sustained a cat bite to the right upper extremity. He reports that a friend had recently given him the cat to care for, and the bite occurred on the first day the cat arrived at his residence. The cat is current with its rabies vaccinations, and the patient is up-to-date on tetanus vaccination.  CT scan of the right upper extremity showed multifocal tenosynovitis involving the extensor tendons in the hand hand extensive cellulitis in the forearm, wrist and hand without discrete fluid collection to suggest an abscess or soft tissue gas.     Hand surgery service has scheduled incision and drainage of the right upper extremity in the OR for this evening.  Patient is currently NPO.      Initial blood pressure was 134/65, pulse 56, respiratory rate 18, temperature 98.7  F and oxygen saturation of 94% on room air.  Notable laboratory findings include sodium 131, potassium 2.5, creatinine 2.33 compared to 2.01 on 7/7/2023, magnesium 2.4, glucose 468, lactate 2.5, and WBC 26.4.  Blood culture is pending. CT scan of the right upper extremity showed multifocal tenosynovitis involving the extensor  tendons in the hand hand extensive cellulitis in the forearm, wrist and hand without discrete fluid collection to suggest an abscess or soft tissue gas.     He received Unasyn, potassium chloride, and vancomycin in the ER.    Past Medical History    No past medical history on file.    Past Surgical History   No past surgical history on file.    Prior to Admission Medications   Prior to Admission Medications   Prescriptions Last Dose Informant Patient Reported? Taking?   Multiple Vitamin (MULTIVITAMIN PO) 12/10/2023 at pm  Yes Yes   Sig: Take 1 tablet by mouth 2 times daily   amLODIPine (NORVASC) 2.5 MG tablet 12/10/2023 at am  Yes Yes   Sig: Take 2.5 mg by mouth daily   aspirin (ASA) 325 MG tablet 12/10/2023 at am  Yes Yes   Sig: Take 1 tablet by mouth   aspirin (ASA) 81 MG chewable tablet 12/10/2023 at am  Yes Yes   Sig: Take 2 tablets by mouth every 24 hours   famotidine (PEPCID) 20 MG tablet 12/10/2023 at pm  Yes Yes   Sig: Take 20 mg by mouth daily   insulin aspart (NOVOLOG PEN) 100 UNIT/ML pen 12/10/2023 at am  Yes Yes   Sig: Inject 16 Units Subcutaneous   insulin glargine (LANTUS PEN) 100 UNIT/ML pen 12/10/2023 at pm  Yes Yes   Sig: Inject 56 Units Subcutaneous every 24 hours   lisinopril-hydrochlorothiazide (ZESTORETIC) 20-12.5 MG tablet 12/10/2023 at am  Yes Yes   Sig: Take 1 tablet by mouth   lovastatin (MEVACOR) 40 MG tablet 12/10/2023 at pm  Yes Yes   Sig: Take 1 tablet by mouth 2 times daily      Facility-Administered Medications: None        Review of Systems    The 10 point Review of Systems is negative other than noted in the HPI or here.     Physical Exam   Vital Signs: Temp: 98.7  F (37.1  C) Temp src: Oral BP: 129/68 Pulse: 59   Resp: 21 SpO2: 96 % O2 Device: None (Room air)    Weight: 0 lbs 0 oz      General appearance: Dehydrated, awake, Alert, Cooperative, not in any obvious distress and appears stated age   HEENT: Normocephalic, atraumatic, conjunctiva clear without icterus and ears without  discharge  Lungs: Clear to auscultation bilaterally, no wheezing, good air exchange, normal work of breathing  Cardiovascular: Regular Rate and Rythm, normal apical impulse, normal S1 and S2, no lower extremity edema bilaterally  Abdomen: Soft, non-tender and Non-distended, active bowel sounds  Skin: Extensive right upper extremity swelling with areas of erythema and tenderness.  Distal radial pulses are intact, however, the swelling is firm   Musculoskeletal: No bony deformities or joint tenderness. Normal ROM upon flexion & extension.   Neurologic: Alert & Oriented X 3, Facial symmetry preserved and upper & lower extremities moving well with symmetry  Psychiatric: Calm, normal eye contact and normal affect      Medical Decision Making       60 MINUTES SPENT BY ME on the date of service doing chart review, history, exam, documentation & further activities per the note.      Data     I have personally reviewed the following data over the past 24 hrs:    26.4 (H)  \   15.2   / 242     131 (L) 87 (L) 47.2 (H) /  362 (H)   2.5 (LL) 27 2.33 (H) \     ALT: 45 AST: 27 AP: 120 TBILI: 1.1   ALB: 3.4 (L) TOT PROTEIN: 7.5 LIPASE: N/A     Procal: N/A CRP: 269.00 (H) Lactic Acid: 2.7 (H)         Imaging results reviewed over the past 24 hrs:   Recent Results (from the past 24 hour(s))   CT Forearm Right w/o Contrast    Narrative    EXAM: CT FOREARM RIGHT W/O CONTRAST, CT HAND RIGHT W/O CONTRAST  LOCATION: [facility]  DATE: 12/11/2023    INDICATION: Infection sepsis cat bite, forearm and hand pain.  COMPARISON: None.  TECHNIQUE: Noncontrast. Axial, sagittal and coronal thin-section reconstruction. Dose reduction techniques were used.     FINDINGS:     BONES:  -No fracture. No degenerative changes. No evidence of osteomyelitis.    SOFT TISSUES:  -There is moderate circumferential subcutaneous cellulitis throughout the forearm extending into the wrist and hand. Distally, in the hand and wrist these changes  are more marked within the dorsal soft tissues of the wrist and hand including likely   tenosynovitis of the compartment 2, 3 and 4 extensor tendons as seen on series 5 image 16 of the hand study. No soft tissue gas. No discrete fluid collection to suggest an abscess. No deep fascial fluid. The forearm musculature is normal in appearance as   is the musculature of the hand.      Impression    IMPRESSION:  1.  Extensive cellulitis in the forearm, wrist and hand. No discrete fluid collection to suggest an abscess. No soft tissue gas.    2.  There is likely multifocal tenosynovitis involving the extensor tendons in the hand, as described. MRI may be helpful in further evaluation.    3.  No fracture or foreign body.     CT Hand Right w/o Contrast    Narrative    EXAM: CT FOREARM RIGHT W/O CONTRAST, CT HAND RIGHT W/O CONTRAST  LOCATION: [facility]  DATE: 12/11/2023    INDICATION: Infection sepsis cat bite, forearm and hand pain.  COMPARISON: None.  TECHNIQUE: Noncontrast. Axial, sagittal and coronal thin-section reconstruction. Dose reduction techniques were used.     FINDINGS:     BONES:  -No fracture. No degenerative changes. No evidence of osteomyelitis.    SOFT TISSUES:  -There is moderate circumferential subcutaneous cellulitis throughout the forearm extending into the wrist and hand. Distally, in the hand and wrist these changes are more marked within the dorsal soft tissues of the wrist and hand including likely   tenosynovitis of the compartment 2, 3 and 4 extensor tendons as seen on series 5 image 16 of the hand study. No soft tissue gas. No discrete fluid collection to suggest an abscess. No deep fascial fluid. The forearm musculature is normal in appearance as   is the musculature of the hand.      Impression    IMPRESSION:  1.  Extensive cellulitis in the forearm, wrist and hand. No discrete fluid collection to suggest an abscess. No soft tissue gas.    2.  There is likely multifocal  "tenosynovitis involving the extensor tendons in the hand, as described. MRI may be helpful in further evaluation.    3.  No fracture or foreign body.     POC US Guidance Needle Placement    Narrative    Ultrasound was performed as guidance to an anesthesia procedure.  Click   \"PACS images\" hyperlink below to view any stored images.  For specific   procedure details, view procedure note authored by anesthesia.     "

## 2023-12-11 NOTE — PHARMACY-VANCOMYCIN DOSING SERVICE
Pharmacy Vancomycin Initial Note  Date of Service 2023  Patient's  1945  78 year old, male    Indication: Skin and Soft Tissue Infection    Current estimated CrCl = Estimated Creatinine Clearance: 31.6 mL/min (A) (based on SCr of 2.33 mg/dL (H)).    Creatinine for last 3 days  2023:  1:16 PM Creatinine 2.33 mg/dL    Recent Vancomycin Level(s) for last 3 days  No results found for requested labs within last 3 days.      Vancomycin IV Administrations (past 72 hours)        No vancomycin orders with administrations in past 72 hours.                    Nephrotoxins and other renal medications (From now, onward)      Start     Dose/Rate Route Frequency Ordered Stop    23 1430  vancomycin (VANCOCIN) 2,000 mg in sodium chloride 0.9 % 500 mL intermittent infusion         2,000 mg  over 2 Hours Intravenous ONCE 23 1419              Contrast Orders - past 72 hours (72h ago, onward)      None           Plan:  Start vancomycin  2000 mg IV one time  Please reconsult pharmacy if vancomycin therapy is desired beyond this one time dose. Thank you.      Brian Mena, [facility]

## 2023-12-11 NOTE — PROGRESS NOTES
Took over pt cares at 1720. . Awaiting insulin pen. Pt has not been covered. Surgery called and they want pt sent up. K+ being drawn at present time.     Pt left for procedure at 1735.

## 2023-12-11 NOTE — OR NURSING
1747: Spoke with Dr. Cano prior to pt arrival in [facility] re: pre-op POC. Will obtain STAT BMP et ABG, including capillary blood glucose, upon arrival in [facility] et prior to regional block.   Insulin, ordered by ED provider post 1316 CMP, was not given. BMP not able to be run off blood drawn in ED immediately prior to departure for [facility]. Lab called for stat drawn.   1814:  at bedside, has just successfully drawn BMP. Dr. Cano notified of capillary blood glucose et no ED Insulin coverage for prior reported blood glucose. Order rec'd for 10U Aspart subcutaneous Stat with recheck 30 minutes post.   1930: Dr. Cano notified of 30 minute post insulin capillary blood glucose recheck, 356 mg/dL. No new orders rec'd.   2036: Post-op, capillary Blood Glucose 276 mg/dL. Dr. Cano notified. No new orders rec'd.

## 2023-12-11 NOTE — MEDICATION SCRIBE - ADMISSION MEDICATION HISTORY
Medication Scribe Admission Medication History    Admission medication history is complete. The information provided in this note is only as accurate as the sources available at the time of the update.    Information Source(s): Patient and CareEverywhere/SureScripts via in-person    Pertinent Information: pt states hasn't taken any am med's today   Pt reports didn't eat today,didn't take both insulins today.    Changes made to PTA medication list:  Added: multi vitamin   Deleted: None  Changed: None    Medication Affordability:  Not including over the counter (OTC) medications, was there a time in the past 3 months when you did not take your medications as prescribed because of cost?: No    Allergies reviewed with patient and updates made in EHR: yes    Medication History Completed By: OLESYA BAZZI 12/11/2023 2:24 PM    PTA Med List   Medication Sig Last Dose    amLODIPine (NORVASC) 2.5 MG tablet Take 2.5 mg by mouth daily 12/10/2023 at am    aspirin (ASA) 325 MG tablet Take 1 tablet by mouth 12/10/2023 at am    aspirin (ASA) 81 MG chewable tablet Take 2 tablets by mouth every 24 hours 12/10/2023 at pm    famotidine (PEPCID) 20 MG tablet Take 20 mg by mouth daily 12/10/2023 at pm    insulin aspart (NOVOLOG PEN) 100 UNIT/ML pen Inject 16 Units Subcutaneous 12/10/2023 at am    insulin glargine (LANTUS PEN) 100 UNIT/ML pen Inject 56 Units Subcutaneous every 24 hours 12/10/2023 at pm    lisinopril-hydrochlorothiazide (ZESTORETIC) 20-12.5 MG tablet Take 1 tablet by mouth 12/10/2023 at am    lovastatin (MEVACOR) 40 MG tablet Take 1 tablet by mouth 2 times daily 12/10/2023 at pm    Multiple Vitamin (MULTIVITAMIN PO) Take 1 tablet by mouth 2 times daily 12/10/2023 at pm

## 2023-12-11 NOTE — ED PROVIDER NOTES
NAME: Amor Zavaleta  AGE: 78 year old male  YOB: 1945  MRN: [id number]  EVALUATION DATE & TIME: No admission date for patient encounter.    PCP: Farzaneh Sandy  ED PROVIDER: Penelope Womack MD.    Chief Complaint   Patient presents with    HAND AND ARM SWELLING       FINAL IMPRESSION:  1. Cellulitis of right upper extremity        MEDICAL DECISION MAKIN:53 PM I met with the patient, obtained history, performed an initial exam, and discussed options and plan for diagnostics and treatment here in the ED.   2:02 PM I spoke with the clinical pharmacist about the patient plan of care and they recommended vancomycin and unasyn for antibiotics  2:44 PM I rechecked and updated the patient  3:24 PM I spoke with Leah Leyva MD, [facility], about patient plan of care. Someone will come and see him at bedside  4:24 PM I rechecked and updated the patient. Continues to have strong right 2+ DP pulse  4:47 PM I spoke with Dr. Tillman, hospitalist, about patient admission.     MDM: 77 y/o M with h/o T2DM, sinus bradycardia, CKD who presents with hand and arm swelling that has worsened since bit by a cat 8 days ago. Per patient cat is UTD on rabies vaccine. Patient with tetanus UTD (2017). He has significant arm/hand swelling on exam. Considered compartment syndrome, necrotizing infection, abscess, cellulitis, septic joint, DVT among others. Has strong pulses and normal capillary refill. Found to be septic with elevated lactic acid WBC 26.4. Started on unasyn, vancomycin and fluids. Also found to have significant hypokalemia and prolonged Qtc - potassium replacement started and magnesium checked (2.4). Glucose is elevated with mild AG at 17 but normal CO2 and is getting fluids. Started with stat CT scan to evaluate for soft tissue gas, etc (tech called radiologist and states we should not give contrast given his GFR, may need to also consider MRI). CT showed extensive cellulitis  without evidence of abscess or soft tissue gas, likely multifocal extensor tenosynovitis. Hand surgery saw patient at bedside and plan for OR tonight, please see their note. Hospitalist accepted patient for admission. Patient with pain well controlled and continues to have normal perfusion on repeat exams.    Medical Decision Making    History:  Supplemental history from: Documented in chart, if applicable  External Record(s) reviewed: Documented in chart, if applicable.    Work Up:  Chart documentation includes differential considered and any EKGs or imaging interpreted by provider.  In additional to work up documented, I considered the following work up: Documented in chart, if applicable.    External consultation:  Discussion of management with another provider: Documented in chart, if applicable    Complicating factors:  Care impacted by chronic illness: Chronic Kidney Disease, Diabetes, and Hypertension  Care affected by social determinants of health: N/A    Disposition considerations: Admit.    Critical Care  Performed by: Penelope Womack MD  Authorized by: Penelope Womack MD     Total critical care time: 60 minutes  Critical care time was exclusive of separately billable procedures and treating other patients.  Critical care was necessary to treat or prevent imminent or life-threatening deterioration of the following conditions: severe sepsis  Critical care was time spent personally by me on the following activities: development of treatment plan with patient or surrogate, discussions with consultants, examination of patient, evaluation of patient's response to treatment, obtaining history from patient or surrogate, ordering and performing treatments and interventions, ordering and review of laboratory studies, ordering and review of radiographic studies and re-evaluation of patient's condition, this excludes any separately billable procedures.      MEDICATIONS GIVEN IN THE EMERGENCY:  Medications   vancomycin  (VANCOCIN) 2,000 mg in sodium chloride 0.9 % 500 mL intermittent infusion (2,000 mg Intravenous $New Bag 12/11/23 1503)   sodium chloride 0.9% BOLUS 1,000 mL (0 mLs Intravenous Stopped 12/11/23 1643)   sodium chloride 0.9% BOLUS 1,000 mL (0 mLs Intravenous Stopped 12/11/23 1502)   potassium chloride ER (KLOR-CON M) CR tablet 40 mEq (40 mEq Oral $Given 12/11/23 1416)   potassium chloride 10 mEq in 100 mL sterile water infusion (0 mEq Intravenous Stopped 12/11/23 1518)   ampicillin-sulbactam (UNASYN) 3 g vial to attach to  mL bag (0 g Intravenous Stopped 12/11/23 1502)       NEW PRESCRIPTIONS STARTED AT TODAY'S ER VISIT:  New Prescriptions    No medications on file        =================================================================  HPI    Patient information was obtained from: patient   Use of : N/A       Amor Zavaleta is a 78 year old male with a past medical history of hypertension, T2D, CKD, who presents for hand and arm swelling. Patient reports he was scratched by a cat 8 days ago and developed right forearm and right hand swelling. He endorses pain in the top of his hand. The patient knows the owner of the cat who assured him that the cat has had its rabies vaccinations UTD. Denies fever, vomiting, diarrhea, chest pain, abdominal pain, upper arm pain. Tetanus last updated 2017.    PAST MEDICAL HISTORY:  No past medical history on file.    PAST SURGICAL HISTORY:  No past surgical history on file.    CURRENT MEDICATIONS:      Current Facility-Administered Medications:     vancomycin (VANCOCIN) 2,000 mg in sodium chloride 0.9 % 500 mL intermittent infusion, 2,000 mg, Intravenous, Once, Penelope Womack MD, 2,000 mg at 12/11/23 1503    Current Outpatient Medications:     amLODIPine (NORVASC) 2.5 MG tablet, Take 2.5 mg by mouth daily, Disp: , Rfl:     aspirin (ASA) 325 MG tablet, Take 1 tablet by mouth, Disp: , Rfl:     aspirin (ASA) 81 MG chewable tablet, Take 2 tablets by mouth every 24  hours, Disp: , Rfl:     famotidine (PEPCID) 20 MG tablet, Take 20 mg by mouth daily, Disp: , Rfl:     insulin aspart (NOVOLOG PEN) 100 UNIT/ML pen, Inject 16 Units Subcutaneous, Disp: , Rfl:     insulin glargine (LANTUS PEN) 100 UNIT/ML pen, Inject 56 Units Subcutaneous every 24 hours, Disp: , Rfl:     lisinopril-hydrochlorothiazide (ZESTORETIC) 20-12.5 MG tablet, Take 1 tablet by mouth, Disp: , Rfl:     lovastatin (MEVACOR) 40 MG tablet, Take 1 tablet by mouth 2 times daily, Disp: , Rfl:     Multiple Vitamin (MULTIVITAMIN PO), Take 1 tablet by mouth 2 times daily, Disp: , Rfl:     ALLERGIES:  Allergies   Allergen Reactions    Sulfa Antibiotics Other (See Comments)       FAMILY HISTORY:  No family history on file.    SOCIAL HISTORY:   Social History     Socioeconomic History    Marital status: Single   Tobacco Use    Smoking status: Never    Smokeless tobacco: Never   Substance and Sexual Activity    Alcohol use: Not Currently    Drug use: Never       PHYSICAL EXAM:    Vitals: /65   Pulse 56   Temp 98.7  F (37.1  C) (Oral)   Resp 18   SpO2 94%    Constitutional: Well developed, well nourished. No acute distress.  HENT: Normocephalic, atraumatic. Neck-gross ROM intact.   Eyes: Pupils mid-range, sclera white  Respiratory: no respiratory distress  Cardiovascular: Normal heart rate. 2+ right DP pulse with normal capillary refill to right fingers  Musculoskeletal/Skin: dorsum of right hand and proximal right forearm with tense swelling and erythema, no crepitance or obvious focal areas of flucutance, erythema extends up the forearm   Neurologic: Alert & oriented, speech clear, slight decreased sensation in finger tips otherwise sensation intact in the RUE, strength sensation difficult due to limited ROM          LAB:  All pertinent labs reviewed and interpreted.  Labs Ordered and Resulted from Time of ED Arrival to Time of ED Departure   COMPREHENSIVE METABOLIC PANEL - Abnormal       Result Value    Sodium 131  (*)     Potassium 2.5 (*)     Carbon Dioxide (CO2) 27      Anion Gap 17 (*)     Urea Nitrogen 47.2 (*)     Creatinine 2.33 (*)     GFR Estimate 28 (*)     Calcium 9.0      Chloride 87 (*)     Glucose 468 (*)     Alkaline Phosphatase 120      AST 27      ALT 45      Protein Total 7.5      Albumin 3.4 (*)     Bilirubin Total 1.1     LACTIC ACID WHOLE BLOOD - Abnormal    Lactic Acid 3.5 (*)    CBC WITH PLATELETS AND DIFFERENTIAL - Abnormal    WBC Count 26.4 (*)     RBC Count 4.83      Hemoglobin 15.2      Hematocrit 44.8      MCV 93      MCH 31.5      MCHC 33.9      RDW 13.0      Platelet Count 242      % Neutrophils 86      % Lymphocytes 5      % Monocytes 6      % Eosinophils 0      % Basophils 0      % Immature Granulocytes 3      NRBCs per 100 WBC 0      Absolute Neutrophils 22.6 (*)     Absolute Lymphocytes 1.3      Absolute Monocytes 1.5 (*)     Absolute Eosinophils 0.0      Absolute Basophils 0.1      Absolute Immature Granulocytes 0.9 (*)     Absolute NRBCs 0.0     MAGNESIUM - Abnormal    Magnesium 2.4 (*)    LACTIC ACID WHOLE BLOOD   POTASSIUM   ERYTHROCYTE SEDIMENTATION RATE AUTO   CRP INFLAMMATION   BLOOD CULTURE   BLOOD CULTURE       RADIOLOGY:  CT Hand Right w/o Contrast   Final Result   IMPRESSION:   1.  Extensive cellulitis in the forearm, wrist and hand. No discrete fluid collection to suggest an abscess. No soft tissue gas.      2.  There is likely multifocal tenosynovitis involving the extensor tendons in the hand, as described. MRI may be helpful in further evaluation.      3.  No fracture or foreign body.         CT Forearm Right w/o Contrast   Final Result   IMPRESSION:   1.  Extensive cellulitis in the forearm, wrist and hand. No discrete fluid collection to suggest an abscess. No soft tissue gas.      2.  There is likely multifocal tenosynovitis involving the extensor tendons in the hand, as described. MRI may be helpful in further evaluation.      3.  No fracture or foreign body.              EKG:   Performed at: 2:07 PM on 12/11/23  Impression: Sinus bradycardia with 1st degree A-V block. Left axis deviation. Left bundle branch block.  Rate: 57  Rhythm: Sinus   QRS Interval: 176  QTc Interval: 580  Comparison: When compared with ECG of 10/26/23, QT has lengthened   I have independently reviewed and interpreted the EKG(s) documented above.     PROCEDURES:   Procedures     I, Rubio Hall , am serving as a scribe to document services personally performed by Dr. Penelope Womack based on my observation and the provider's statements to me. I, Penelope Womack MD attest that Rubio Hall  is acting in a scribe capacity, has observed my performance of the services and has documented them in accordance with my direction.    Penelope Womack M.D.  Emergency Medicine  [facility] EMERGENCY DEPARTMENT  [address] [place] [id number]  [phone]  Dept: [phone]     Penelope Womack MD  12/11/23 2036

## 2023-12-12 ENCOUNTER — APPOINTMENT (OUTPATIENT)
Dept: OCCUPATIONAL THERAPY | Facility: HOSPITAL | Age: 78
DRG: 853 | End: 2023-12-12
Attending: INTERNAL MEDICINE
Payer: COMMERCIAL

## 2023-12-12 ENCOUNTER — APPOINTMENT (OUTPATIENT)
Dept: PHYSICAL THERAPY | Facility: HOSPITAL | Age: 78
DRG: 853 | End: 2023-12-12
Attending: INTERNAL MEDICINE
Payer: COMMERCIAL

## 2023-12-12 LAB
ANION GAP SERPL CALCULATED.3IONS-SCNC: 14 MMOL/L (ref 7–15)
BASOPHILS # BLD AUTO: 0.1 10E3/UL (ref 0–0.2)
BASOPHILS NFR BLD AUTO: 0 %
BUN SERPL-MCNC: 30.9 MG/DL (ref 8–23)
CALCIUM SERPL-MCNC: 7.5 MG/DL (ref 8.8–10.2)
CHLORIDE SERPL-SCNC: 99 MMOL/L (ref 98–107)
CREAT SERPL-MCNC: 1.5 MG/DL (ref 0.67–1.17)
CREAT SERPL-MCNC: 1.64 MG/DL (ref 0.67–1.17)
DEPRECATED HCO3 PLAS-SCNC: 19 MMOL/L (ref 22–29)
EGFRCR SERPLBLD CKD-EPI 2021: 43 ML/MIN/1.73M2
EGFRCR SERPLBLD CKD-EPI 2021: 47 ML/MIN/1.73M2
EOSINOPHIL # BLD AUTO: 0.1 10E3/UL (ref 0–0.7)
EOSINOPHIL NFR BLD AUTO: 0 %
ERYTHROCYTE [DISTWIDTH] IN BLOOD BY AUTOMATED COUNT: 13.3 % (ref 10–15)
ERYTHROCYTE [DISTWIDTH] IN BLOOD BY AUTOMATED COUNT: 13.4 % (ref 10–15)
GLUCOSE BLDC GLUCOMTR-MCNC: 174 MG/DL (ref 70–99)
GLUCOSE BLDC GLUCOMTR-MCNC: 297 MG/DL (ref 70–99)
GLUCOSE BLDC GLUCOMTR-MCNC: 303 MG/DL (ref 70–99)
GLUCOSE BLDC GLUCOMTR-MCNC: 333 MG/DL (ref 70–99)
GLUCOSE SERPL-MCNC: 320 MG/DL (ref 70–99)
HCT VFR BLD AUTO: 34.7 % (ref 40–53)
HCT VFR BLD AUTO: 38.1 % (ref 40–53)
HGB BLD-MCNC: 11.6 G/DL (ref 13.3–17.7)
HGB BLD-MCNC: 12.7 G/DL (ref 13.3–17.7)
HOLD SPECIMEN: NORMAL
IMM GRANULOCYTES # BLD: 0.6 10E3/UL
IMM GRANULOCYTES NFR BLD: 2 %
LACTATE SERPL-SCNC: 1.2 MMOL/L (ref 0.7–2)
LYMPHOCYTES # BLD AUTO: 2 10E3/UL (ref 0.8–5.3)
LYMPHOCYTES NFR BLD AUTO: 8 %
MAGNESIUM SERPL-MCNC: 2.2 MG/DL (ref 1.7–2.3)
MCH RBC QN AUTO: 31.4 PG (ref 26.5–33)
MCH RBC QN AUTO: 31.5 PG (ref 26.5–33)
MCHC RBC AUTO-ENTMCNC: 33.3 G/DL (ref 31.5–36.5)
MCHC RBC AUTO-ENTMCNC: 33.4 G/DL (ref 31.5–36.5)
MCV RBC AUTO: 94 FL (ref 78–100)
MCV RBC AUTO: 94 FL (ref 78–100)
MONOCYTES # BLD AUTO: 1.4 10E3/UL (ref 0–1.3)
MONOCYTES NFR BLD AUTO: 5 %
NEUTROPHILS # BLD AUTO: 21.1 10E3/UL (ref 1.6–8.3)
NEUTROPHILS NFR BLD AUTO: 85 %
NRBC # BLD AUTO: 0 10E3/UL
NRBC BLD AUTO-RTO: 0 /100
PLATELET # BLD AUTO: 194 10E3/UL (ref 150–450)
PLATELET # BLD AUTO: 201 10E3/UL (ref 150–450)
POTASSIUM SERPL-SCNC: 2.7 MMOL/L (ref 3.4–5.3)
POTASSIUM SERPL-SCNC: 3.7 MMOL/L (ref 3.4–5.3)
RBC # BLD AUTO: 3.68 10E6/UL (ref 4.4–5.9)
RBC # BLD AUTO: 4.04 10E6/UL (ref 4.4–5.9)
SODIUM SERPL-SCNC: 132 MMOL/L (ref 135–145)
WBC # BLD AUTO: 25 10E3/UL (ref 4–11)
WBC # BLD AUTO: 25.2 10E3/UL (ref 4–11)

## 2023-12-12 PROCEDURE — 83735 ASSAY OF MAGNESIUM: CPT | Performed by: INTERNAL MEDICINE

## 2023-12-12 PROCEDURE — 250N000013 HC RX MED GY IP 250 OP 250 PS 637: Performed by: INTERNAL MEDICINE

## 2023-12-12 PROCEDURE — 0R9N0ZZ DRAINAGE OF RIGHT WRIST JOINT, OPEN APPROACH: ICD-10-PCS | Performed by: ORTHOPAEDIC SURGERY

## 2023-12-12 PROCEDURE — 36415 COLL VENOUS BLD VENIPUNCTURE: CPT | Performed by: INTERNAL MEDICINE

## 2023-12-12 PROCEDURE — 80048 BASIC METABOLIC PNL TOTAL CA: CPT | Performed by: INTERNAL MEDICINE

## 2023-12-12 PROCEDURE — 84132 ASSAY OF SERUM POTASSIUM: CPT | Performed by: INTERNAL MEDICINE

## 2023-12-12 PROCEDURE — 120N000001 HC R&B MED SURG/OB

## 2023-12-12 PROCEDURE — 83605 ASSAY OF LACTIC ACID: CPT | Performed by: INTERNAL MEDICINE

## 2023-12-12 PROCEDURE — 99232 SBSQ HOSP IP/OBS MODERATE 35: CPT | Performed by: INTERNAL MEDICINE

## 2023-12-12 PROCEDURE — 0JDJ0ZZ EXTRACTION OF RIGHT HAND SUBCUTANEOUS TISSUE AND FASCIA, OPEN APPROACH: ICD-10-PCS | Performed by: ORTHOPAEDIC SURGERY

## 2023-12-12 PROCEDURE — 85027 COMPLETE CBC AUTOMATED: CPT | Performed by: INTERNAL MEDICINE

## 2023-12-12 PROCEDURE — 97165 OT EVAL LOW COMPLEX 30 MIN: CPT | Mod: GO

## 2023-12-12 PROCEDURE — 0LD50ZZ EXTRACTION OF RIGHT LOWER ARM AND WRIST TENDON, OPEN APPROACH: ICD-10-PCS | Performed by: ORTHOPAEDIC SURGERY

## 2023-12-12 PROCEDURE — 99222 1ST HOSP IP/OBS MODERATE 55: CPT | Performed by: INTERNAL MEDICINE

## 2023-12-12 PROCEDURE — 250N000013 HC RX MED GY IP 250 OP 250 PS 637: Performed by: PHYSICIAN ASSISTANT

## 2023-12-12 PROCEDURE — 82565 ASSAY OF CREATININE: CPT | Performed by: INTERNAL MEDICINE

## 2023-12-12 PROCEDURE — 258N000003 HC RX IP 258 OP 636: Performed by: INTERNAL MEDICINE

## 2023-12-12 PROCEDURE — 85025 COMPLETE CBC W/AUTO DIFF WBC: CPT | Performed by: PHYSICIAN ASSISTANT

## 2023-12-12 PROCEDURE — 250N000012 HC RX MED GY IP 250 OP 636 PS 637: Performed by: INTERNAL MEDICINE

## 2023-12-12 PROCEDURE — 97535 SELF CARE MNGMENT TRAINING: CPT | Mod: GO

## 2023-12-12 PROCEDURE — 250N000011 HC RX IP 250 OP 636: Mod: JZ | Performed by: INTERNAL MEDICINE

## 2023-12-12 PROCEDURE — 97161 PT EVAL LOW COMPLEX 20 MIN: CPT | Mod: GP

## 2023-12-12 RX ORDER — AMOXICILLIN 250 MG
2 CAPSULE ORAL 2 TIMES DAILY PRN
Qty: 30 TABLET | Refills: 0 | Status: SHIPPED | OUTPATIENT
Start: 2023-12-12 | End: 2024-03-11

## 2023-12-12 RX ORDER — POTASSIUM CHLORIDE 1500 MG/1
40 TABLET, EXTENDED RELEASE ORAL ONCE
Status: COMPLETED | OUTPATIENT
Start: 2023-12-12 | End: 2023-12-12

## 2023-12-12 RX ORDER — POTASSIUM CHLORIDE 1500 MG/1
20 TABLET, EXTENDED RELEASE ORAL ONCE
Status: COMPLETED | OUTPATIENT
Start: 2023-12-12 | End: 2023-12-12

## 2023-12-12 RX ORDER — NALOXONE HYDROCHLORIDE 0.4 MG/ML
0.2 INJECTION, SOLUTION INTRAMUSCULAR; INTRAVENOUS; SUBCUTANEOUS
Status: DISCONTINUED | OUTPATIENT
Start: 2023-12-12 | End: 2023-12-20 | Stop reason: HOSPADM

## 2023-12-12 RX ORDER — OXYCODONE HYDROCHLORIDE 5 MG/1
5-10 TABLET ORAL EVERY 4 HOURS PRN
Status: DISCONTINUED | OUTPATIENT
Start: 2023-12-12 | End: 2023-12-13

## 2023-12-12 RX ORDER — NALOXONE HYDROCHLORIDE 0.4 MG/ML
0.4 INJECTION, SOLUTION INTRAMUSCULAR; INTRAVENOUS; SUBCUTANEOUS
Status: DISCONTINUED | OUTPATIENT
Start: 2023-12-12 | End: 2023-12-20 | Stop reason: HOSPADM

## 2023-12-12 RX ORDER — ACETAMINOPHEN 325 MG/1
650 TABLET ORAL EVERY 4 HOURS PRN
Qty: 100 TABLET | Refills: 0 | Status: SHIPPED | OUTPATIENT
Start: 2023-12-12 | End: 2024-03-15

## 2023-12-12 RX ADMIN — AMPICILLIN SODIUM AND SULBACTAM SODIUM 3 G: 2; 1 INJECTION, POWDER, FOR SOLUTION INTRAMUSCULAR; INTRAVENOUS at 20:05

## 2023-12-12 RX ADMIN — Medication 15 ML: at 08:51

## 2023-12-12 RX ADMIN — HYDROMORPHONE HYDROCHLORIDE 0.4 MG: 1 INJECTION, SOLUTION INTRAMUSCULAR; INTRAVENOUS; SUBCUTANEOUS at 03:10

## 2023-12-12 RX ADMIN — SODIUM CHLORIDE: 9 INJECTION, SOLUTION INTRAVENOUS at 19:37

## 2023-12-12 RX ADMIN — AMPICILLIN SODIUM AND SULBACTAM SODIUM 3 G: 2; 1 INJECTION, POWDER, FOR SOLUTION INTRAMUSCULAR; INTRAVENOUS at 15:32

## 2023-12-12 RX ADMIN — SODIUM CHLORIDE: 9 INJECTION, SOLUTION INTRAVENOUS at 08:51

## 2023-12-12 RX ADMIN — ACETAMINOPHEN 650 MG: 325 TABLET ORAL at 20:32

## 2023-12-12 RX ADMIN — AMPICILLIN SODIUM AND SULBACTAM SODIUM 3 G: 2; 1 INJECTION, POWDER, FOR SOLUTION INTRAMUSCULAR; INTRAVENOUS at 08:51

## 2023-12-12 RX ADMIN — PANTOPRAZOLE SODIUM 40 MG: 40 TABLET, DELAYED RELEASE ORAL at 06:41

## 2023-12-12 RX ADMIN — INSULIN GLARGINE 56 UNITS: 100 INJECTION, SOLUTION SUBCUTANEOUS at 08:51

## 2023-12-12 RX ADMIN — POTASSIUM CHLORIDE 20 MEQ: 1500 TABLET, EXTENDED RELEASE ORAL at 12:51

## 2023-12-12 RX ADMIN — POTASSIUM CHLORIDE 40 MEQ: 1500 TABLET, EXTENDED RELEASE ORAL at 03:15

## 2023-12-12 RX ADMIN — ATORVASTATIN CALCIUM 10 MG: 10 TABLET, FILM COATED ORAL at 20:01

## 2023-12-12 RX ADMIN — AMPICILLIN SODIUM AND SULBACTAM SODIUM 3 G: 2; 1 INJECTION, POWDER, FOR SOLUTION INTRAMUSCULAR; INTRAVENOUS at 02:08

## 2023-12-12 RX ADMIN — ACETAMINOPHEN 650 MG: 325 TABLET ORAL at 00:14

## 2023-12-12 RX ADMIN — POTASSIUM CHLORIDE 40 MEQ: 1500 TABLET, EXTENDED RELEASE ORAL at 11:09

## 2023-12-12 RX ADMIN — POTASSIUM CHLORIDE 20 MEQ: 1500 TABLET, EXTENDED RELEASE ORAL at 05:13

## 2023-12-12 RX ADMIN — Medication 15 ML: at 20:01

## 2023-12-12 RX ADMIN — OXYCODONE HYDROCHLORIDE 5 MG: 5 TABLET ORAL at 22:14

## 2023-12-12 RX ADMIN — ACETAMINOPHEN 650 MG: 325 TABLET ORAL at 16:24

## 2023-12-12 RX ADMIN — VANCOMYCIN HYDROCHLORIDE 1000 MG: 1 INJECTION, SOLUTION INTRAVENOUS at 05:15

## 2023-12-12 RX ADMIN — ASPIRIN 325 MG ORAL TABLET 325 MG: 325 PILL ORAL at 08:51

## 2023-12-12 ASSESSMENT — ACTIVITIES OF DAILY LIVING (ADL)
ADLS_ACUITY_SCORE: 25
ADLS_ACUITY_SCORE: 23
ADLS_ACUITY_SCORE: 23
IADL_COMMENTS: IND FOR IADLS, DRIVES
ADLS_ACUITY_SCORE: 24
ADLS_ACUITY_SCORE: 23
ADLS_ACUITY_SCORE: 25
ADLS_ACUITY_SCORE: 23
ADLS_ACUITY_SCORE: 24
ADLS_ACUITY_SCORE: 25
DEPENDENT_IADLS:: INDEPENDENT
ADLS_ACUITY_SCORE: 25
ADLS_ACUITY_SCORE: 25
ADLS_ACUITY_SCORE: 23

## 2023-12-12 NOTE — PHARMACY-VANCOMYCIN DOSING SERVICE
Pharmacy Vancomycin Initial Note  Date of Service 2023  Patient's  1945  78 year old, male    Indication: Skin and Soft Tissue Infection    Current estimated CrCl =  36.8mL/min (Cb=789.9cm, Wt=98.2kg (Weight obtained from [facility] records 23)    Creatinine for last 3 days  2023:  1:16 PM Creatinine 2.33 mg/dL;  6:18 PM Creatinine 2.01 mg/dL    Recent Vancomycin Level(s) for last 3 days  No results found for requested labs within last 3 days.      Vancomycin IV Administrations (past 72 hours)                     vancomycin (VANCOCIN) 2,000 mg in sodium chloride 0.9 % 500 mL intermittent infusion (mg) 2,000 mg New Bag 23 1503                    Nephrotoxins and other renal medications (From now, onward)      Start     Dose/Rate Route Frequency Ordered Stop    23 0600  vancomycin (VANCOCIN) 1,000 mg in 200 mL dextrose intermittent infusion         1,000 mg  200 mL/hr over 1 Hours Intravenous EVERY 24 HOURS 23 2235      23 2100  ampicillin-sulbactam (UNASYN) 3 g vial to attach to  mL bag         3 g  over 15-30 Minutes Intravenous EVERY 6 HOURS 23 1710              Contrast Orders - past 72 hours (72h ago, onward)      None            [employer] Prediction of Planned Initial Vancomycin Regimen  Loading dose: N/A  Regimen: 1000 mg IV every 24 hours.  Start time: 06:00 on 2023  Exposure target: AUC24 (range)400-600 mg/L.hr   AUC24,ss: 507 mg/L.hr  Probability of AUC24 > 400: 75 %  Ctrough,ss: 17 mg/L  Probability of Ctrough,ss > 20: 35 %  Probability of nephrotoxicity (Lodise FREDDY ): 13 %          Plan:  Start vancomycin  1000 mg IV q24h.   Vancomycin monitoring method: AUC  Vancomycin therapeutic monitoring goal: 400-600 mg*h/L  Pharmacy will check vancomycin levels as appropriate in 1-3 Days.    Serum creatinine levels will be ordered daily for the first week of therapy and at least twice weekly for subsequent weeks.      Jeremy Behl, [facility]

## 2023-12-12 NOTE — PROGRESS NOTES
CLINICAL NUTRITION SERVICES - ASSESSMENT NOTE     Nutrition Prescription    RECOMMENDATIONS FOR MDs/PROVIDERS TO ORDER:      Malnutrition Status:    Moderate in context of acute illness    Recommendations already ordered by Registered Dietitian (RD):  Asked CDE to stop by to see pt for DM guidelines when ill/injured.    Future/Additional Recommendations:  Will monitor po intake, wt, labs, wound healing, education needs     REASON FOR ASSESSMENT  Amor Zavaleta is a/an 78 year old male assessed by the dietitian for Admission Nutrition Risk Screen for eating poorly.  Pt reports weight loss.     Per chart, pt with history of type 2 diabetes, stage III kidney disease, hypertension and LBBB who presents to the ER with painful right upper extremity swelling and redness following cat bite. S/p I&D right hand/wrist 23    NUTRITION HISTORY  Pt reports he has not been eating well for 8 days.  He was eating only breakfast some days, as he was trying not to increase his BG any further.  He takes insulin and Lantus at night for DM.  His blood glucose was high 360 which is the maximum for his continuous glucose monitor.  His did check with meter which read at 365.  Pt was unsure about eating and taking his insulin and Lantus in this situation. This is the first time this has happened to him.       CURRENT NUTRITION ORDERS  Diet: Regular  Intake/Tolerance: 100% lunch today    LABS  Labs reviewed  K 2.7 L  Creat 1.64 H  FSB, 333    MEDICATIONS  Medications reviewed  IV abx  Lipitor   Novolog with meals and at bedtime sliding scale insulin, and 1 unit per gm cho before meals  Lantus 56 units at breakfast  Multivitamin with minerals  Pantoprazole  Kcl  Cont, IV NaCl at 100 mL/hr    ANTHROPOMETRICS  Height: 6'  Most Recent Weight: 97.7 kg (215 lb 6.2 oz)    IBW: 80.9 kg  BMI: Overweight BMI 25-29.9  Weight History:   Wt Readings from Last 3 Encounters:   23 97.7 kg (215 lb 6.2 oz)   23 97.5 kg (215 lb)        Dosing Weight: 85.1 kg adjusted    ASSESSED NUTRITION NEEDS  Estimated Energy Needs: 2130 -2555 kcals/day (25 - 30 kcals/kg)  Justification: Increased needs  Estimated Protein Needs:  grams protein/day (1 - 1.2 grams of pro/kg)  Justification: Increased needs and Wound healing, watch renal labs   Estimated Fluid Needs: 2555 mL/day (30 mL/kg), or per provider  Justification: Increased needs and Maintenance    PHYSICAL FINDINGS  Per chart  Drains right hand  No BM yet    MALNUTRITION:  % Weight Loss:  None noted  % Intake:  </= 50% for >/= 5 days (severe malnutrition)  Subcutaneous Fat Loss:  Orbital region mild depletion  Muscle Loss:  Clavicle bone region mild depletion  Fluid Retention:  right arm, hand, wrist +3    Malnutrition Diagnosis: Moderate malnutrition  In Context of:  Acute illness or injury    NUTRITION DIAGNOSIS  Malnutrition related to poor oral intake as evidenced by < =50% intake for >= 5 days, mild subcutaneous fat and muscle losses     INTERVENTIONS  Implementation  Nutrition education on nutrition-related medication management   Collaboration with other providers     Goals  Patient to consume % of nutritionally adequate meals three times per day, or the equivalent with supplements/snacks.    BG < 180 mg/dL    Wound healing     Monitoring/Evaluation  Progress toward goals will be monitored and evaluated per protocol.

## 2023-12-12 NOTE — PLAN OF CARE
Problem: Adult Inpatient Plan of Care  Goal: Absence of Hospital-Acquired Illness or Injury  Outcome: Progressing     Problem: Adult Inpatient Plan of Care  Goal: Optimal Comfort and Wellbeing  Outcome: Progressing   Goal Outcome Evaluation:         Pt arrived on unit about 2155. Pt alert and oriented, VSS on 2L NC oxygen. Rating 4/10 right hand pain, declines pain meds. R arm dressing intact, with some drainage. NS cont running 100 ml/hr. Ampicillin infused as ordered. Blood sugar 268 covered. Call light within reach. Bed alarm on.

## 2023-12-12 NOTE — ANESTHESIA POSTPROCEDURE EVALUATION
Patient: Amor Zavaleta    Procedure: Procedure(s):  INCISION AND DRAINAGE, UPPER EXTREMITY       Anesthesia Type:  General    Note:  Disposition: Inpatient   Postop Pain Control: Uneventful            Sign Out: Well controlled pain   PONV: No   Neuro/Psych:    Airway/Respiratory: Uneventful            Sign Out: Acceptable/Baseline resp. status   CV/Hemodynamics: Uneventful            Sign Out: Acceptable CV status; No obvious hypovolemia; No obvious fluid overload   Other NRE: NONE   DID A NON-ROUTINE EVENT OCCUR? No    Event details/Postop Comments:  Ongoing cares with fluid resuscitation, antibiosis, BG control, and RENO remediation needed.  Patient admitted for these cares.           Last vitals:  Vitals Value Taken Time   /62 12/11/23 2130   Temp 37  C (98.6  F) 12/11/23 2028   Pulse 61 12/11/23 2137   Resp 22 12/11/23 2137   SpO2 95 % 12/11/23 2137   Vitals shown include unfiled device data.    Electronically Signed By: Rayna Patel MD  December 11, 2023  9:44 PM

## 2023-12-12 NOTE — PROGRESS NOTES
[facility]    Medicine Progress Note - Hospitalist Service    Date of Admission:  12/11/2023    Assessment & Plan   Amor Zavaleta is a 78 year old male with history of type 2 diabetes, stage III kidney disease, hypertension and LBBB who presents to the ER with painful right upper extremity swelling and redness following cat bite.    Incision and drainage with copious irrigation of right dorsal forearm, wrist, hand and fourth dorsal extensor compartment was performed by the surgeon on 12/11/2023.  Notable findings operative include right dorsal forearm abscess and superior of extensor tenosynovitis.  He is receiving Unasyn and vancomycin per ID recommendation.    Right dorsal forearm abscess and superior of extensor tenosynovitis.   Extensive right upper extremity cellulitis  Concern for right upper extremity abscess  Extensive right upper extremity swelling and redness  Severe sepsis  CT scan of the right upper extremity showed multifocal tenosynovitis involving the extensor tendons in the hand hand extensive cellulitis in the forearm, wrist and hand without discrete fluid collection to suggest an abscess or soft tissue gas.      Continue Unasyn   Trend lactate  Vancomycin as per pharmacy  Follow-up culture of surgical specimen  Follow-up blood culture  ID following-appreciate assistance    Hyponatremia  Possibly secondary to hyperglycemia and hypovolemia  Monitor sodium  IV hydration as above    Hypokalemia  Monitor potassium and replace as per protocol    RENO on stage III CKD  Creatinine is improving  Received IV fluid briefly.  Oral hydration as tolerated  Monitor BMP  Avoid nephrotoxins        Diet: Full Liquid Diet  NPO per Anesthesia Guidelines for Procedure/Surgery Except for: Meds  Discharge Instruction - Regular Diet Adult    DVT Prophylaxis: Pneumatic Compression Devices  Browning Catheter: Not present  Lines: None     Cardiac Monitoring: None  Code Status: Full Code       Clinically Significant Risk Factors Present on Admission        # Hypokalemia: Lowest K = 2.5 mmol/L in last 2 days, will replace as needed      # Anion Gap Metabolic Acidosis: Highest Anion Gap = 19 mmol/L in last 2 days, will monitor and treat as appropriate  # Hypoalbuminemia: Lowest albumin = 3.4 g/dL at 12/11/2023  1:16 PM, will monitor as appropriate   # Drug Induced Platelet Defect: home medication list includes an antiplatelet medication   # Hypertension: Home medication list includes antihypertensive(s)     # DMII: A1C = 8.9 % (Ref range: <5.7 %) within past 6 months              Disposition Plan     Expected Discharge Date: 12/13/2023                 Garcia Tillman MD  Hospitalist Service  [facility]  Securely message with [employer] (more info)  Text page via [employer] Paging/Directory   ______________________________________________________________________    Interval History   No new complaints today and no acute events overnight.  Glucose is well-controlled.    Physical Exam   Vital Signs: Temp: 98.3  F (36.8  C) Temp src: Oral BP: 123/62 Pulse: 60   Resp: 21 SpO2: 94 % O2 Device: Nasal cannula Oxygen Delivery: 1 LPM  Weight: 215 lbs 6.23 oz    General appearance: Dehydrated, awake, Alert, Cooperative, not in any obvious distress and appears stated age   HEENT: Normocephalic, atraumatic, conjunctiva clear without icterus and ears without discharge  Lungs: Clear to auscultation bilaterally, no wheezing, good air exchange, normal work of breathing  Cardiovascular: Regular Rate and Rythm, normal apical impulse, normal S1 and S2, no lower extremity edema bilaterally  Abdomen: Soft, non-tender and Non-distended, active bowel sounds  Skin: Extensive right upper extremity swelling with areas of erythema and tenderness.  Ace wrap in place.  Musculoskeletal: No bony deformities or joint tenderness. Normal ROM upon flexion & extension.   Neurologic: Alert & Oriented X 3, Facial symmetry  preserved and upper & lower extremities moving well with symmetry  Psychiatric: Calm, normal eye contact and normal affect         Medical Decision Making       40 MINUTES SPENT BY ME on the date of service doing chart review, history, exam, documentation & further activities per the note.      Data   Imaging results reviewed over the past 24 hrs:   Recent Results (from the past 24 hour(s))   CT Forearm Right w/o Contrast    Narrative    EXAM: CT FOREARM RIGHT W/O CONTRAST, CT HAND RIGHT W/O CONTRAST  LOCATION: [facility]  DATE: 12/11/2023    INDICATION: Infection sepsis cat bite, forearm and hand pain.  COMPARISON: None.  TECHNIQUE: Noncontrast. Axial, sagittal and coronal thin-section reconstruction. Dose reduction techniques were used.     FINDINGS:     BONES:  -No fracture. No degenerative changes. No evidence of osteomyelitis.    SOFT TISSUES:  -There is moderate circumferential subcutaneous cellulitis throughout the forearm extending into the wrist and hand. Distally, in the hand and wrist these changes are more marked within the dorsal soft tissues of the wrist and hand including likely   tenosynovitis of the compartment 2, 3 and 4 extensor tendons as seen on series 5 image 16 of the hand study. No soft tissue gas. No discrete fluid collection to suggest an abscess. No deep fascial fluid. The forearm musculature is normal in appearance as   is the musculature of the hand.      Impression    IMPRESSION:  1.  Extensive cellulitis in the forearm, wrist and hand. No discrete fluid collection to suggest an abscess. No soft tissue gas.    2.  There is likely multifocal tenosynovitis involving the extensor tendons in the hand, as described. MRI may be helpful in further evaluation.    3.  No fracture or foreign body.     CT Hand Right w/o Contrast    Narrative    EXAM: CT FOREARM RIGHT W/O CONTRAST, CT HAND RIGHT W/O CONTRAST  LOCATION: [facility]  DATE:  "12/11/2023    INDICATION: Infection sepsis cat bite, forearm and hand pain.  COMPARISON: None.  TECHNIQUE: Noncontrast. Axial, sagittal and coronal thin-section reconstruction. Dose reduction techniques were used.     FINDINGS:     BONES:  -No fracture. No degenerative changes. No evidence of osteomyelitis.    SOFT TISSUES:  -There is moderate circumferential subcutaneous cellulitis throughout the forearm extending into the wrist and hand. Distally, in the hand and wrist these changes are more marked within the dorsal soft tissues of the wrist and hand including likely   tenosynovitis of the compartment 2, 3 and 4 extensor tendons as seen on series 5 image 16 of the hand study. No soft tissue gas. No discrete fluid collection to suggest an abscess. No deep fascial fluid. The forearm musculature is normal in appearance as   is the musculature of the hand.      Impression    IMPRESSION:  1.  Extensive cellulitis in the forearm, wrist and hand. No discrete fluid collection to suggest an abscess. No soft tissue gas.    2.  There is likely multifocal tenosynovitis involving the extensor tendons in the hand, as described. MRI may be helpful in further evaluation.    3.  No fracture or foreign body.     POC US Guidance Needle Placement    Narrative    Ultrasound was performed as guidance to an anesthesia procedure.  Click   \"PACS images\" hyperlink below to view any stored images.  For specific   procedure details, view procedure note authored by anesthesia.     "

## 2023-12-12 NOTE — PLAN OF CARE
Problem: Adult Inpatient Plan of Care  Goal: Absence of Hospital-Acquired Illness or Injury  12/12/2023 0604 by Maral Tejada, RN  Outcome: Progressing     Problem: Adult Inpatient Plan of Care  Goal: Optimal Comfort and Wellbeing  12/12/2023 0604 by Maral Tejada, RN  Outcome: Progressing   Goal Outcome Evaluation:         Pt alert and oriented, VSS on 2L NC oxygen. Rating 6/10 right hand pain, managed with PRN dilaudid and tylenol. R arm dressing intact, with drainage. NS cont running 100 ml/hr. Ampicillin and vanco infused as ordered. On K and Mg protocol, Mg recheck at 6am. K replaced with recheck at 0952. Call light within reach. Bed alarm on.

## 2023-12-12 NOTE — PROGRESS NOTES
"Orthopedic Progress Note      Assessment: 1 Day Post-Op  S/P Procedure(s):  INCISION AND DRAINAGE, RIGHT UPPER FOREARM     Plan:   - Continue PT/OT  - Weightbearing status: WBAT RUE  - Activity: Up with assist and assistive device until independent.  - Anticoagulation: Prophylaxis per primary in addition to SCDs, randal stockings and early ambulation.  - Antibiotics: Per ID  - Pain Management; continue current regimen  - Diet: progress diet as tolerated  - Labs: hgb 12.7, transfuse if <7.0. No indication today.  WBC 25.0 (25.2)  - Dressing: Keep dry and intact, may remove for warm soapy water soaks TID  - Elevation: Elevate RUE on pillow to keep above the level of the heart as much as possible  - Follow-up: Outpatient follow up in 2 weeks  - We will continue to monitor for improvement.  NPO at midnight tonight ahead of evaluation tomorrow morning.      Subjective:  Pain: moderate  Nausea, Vomiting:  No  Lightheadedness, Dizziness:  No  Neuro:  Patient denies new onset numbness or paresthesias  Fever, chills: No  Chest pain: No  SOB: No    Patient reports feeling well today. Patient reports pain is tolerable with current pain regimen. Patient eating and drinking well. Patient voiding and passing gas however no BM. All questions/concerns answered.      Objective:  /66 (BP Location: Left arm)   Pulse 62   Temp 98.3  F (36.8  C) (Oral)   Resp 19   Wt 97.7 kg (215 lb 6.2 oz)   SpO2 97%   BMI 29.21 kg/m      The patient is A&Ox3. Appears comfortable, sitting up at bedside.  Sensation is intact.  AIN, median, and motor nerve intact.   Wrist ROM and  strength is intact.  Radial pulse intact.  Incision draining serosanguinous fluid.  No purulent drainage.  Drains in place.  Still significant erythema and swelling of dorsal hand and wrist        Pertinent Labs   Lab Results: personally reviewed.   No results found for: \"INR\", \"PROTIME\"  Lab Results   Component Value Date    WBC 25.0 (H) 12/12/2023    HGB 12.7 (L) " 12/12/2023    HCT 38.1 (L) 12/12/2023    MCV 94 12/12/2023     12/12/2023     Lab Results   Component Value Date     (L) 12/11/2023    CO2 16 (L) 12/11/2023         Report completed by:  FLAVIO GRAYSON PA-C  Date: 12/12/2023    [facility]

## 2023-12-12 NOTE — OP NOTE
Date of Procedure: 12/11/2023    Surgeon: Leah Leyva M.D.    Assistant: Sharron New PA-C PA-C assist required for patient positioning, soft tissue retraction, instrumentation assist, and patient's safety.    Preoperative diagnosis: Right dorsal forearm cellulitis with impending abscess and/or extensor tenosynovitis.    Postoperative diagnosis: Right dorsal forearm abscess and superior of extensor tenosynovitis.    Operation/procedures performed: Incision and drainage with copious irrigation of right dorsal forearm, wrist, hand and fourth dorsal extensor compartment.    Sedation/anesthesia: General endotracheal anesthesia    Complications: None    Drains: None    Estimated blood loss: 30 cc    Implants: None.    Specimens: The purulent material over the dorsum of the right distal forearm, the purulent material over the dorsum of the right hand, necrotic soft tissue over the dorsum of the right hand, and tenosynovium from the fourth dorsal compartment were swabbed/collected and sent for:  1.  Stat Gram stain.  2.  Aerobic culture.  3.  Anaerobic culture.  4.  Fungal culture.  5.  AFB culture.    Indications for Surgery: The patient is a 78-year-old male with a history of type 2 diabetes sustained multiple cat scratches approximately 8 days ago.  He has developed significant pain, swelling, and erythema over the dorsum of his right forearm, wrist, and hand.  Based on the patient's history and clinical exam, I have recommended proceeding with surgery to do a surgical debridement of the dorsum of the forearm.  The risks, benefits, and alternatives of this surgical procedure were thoroughly discussed with the patient.  I outlined the risks of surgery which included, but are not limited to: Infection, recurrence, incisional pain, the development of scar tissue, and/or neurovascular injury. The consequences of such risks could include hospital admission, additional surgery, chronic pain, loss of strength, loss of  "sensation, loss of motion and/or loss of function. I explained that although these risks are low they are real. The patient then had opportunity to ask questions which I answered. After thorough discussion, the patient verbalized understanding and stated that they wished proceed with operative intervention.    Tourniquet time: Per anesthesia record    Disposition: Recovery room in good condition    Operation description: The patient was identified, informed consent was obtained, and the surgical site was marked in the preop area.  The patient was then brought into the operating room and placed supine with the right upper extremity on an arm table.  The anesthetist administered general endotracheal anesthesia.    The patient's right upper extremity was prepped and draped in standard surgical fashion.  This included placing a well-padded tourniquet, upper arm.  Prior to beginning the case of a \"timeout\" was performed by the surgical team to confirm surgical site, side, and procedure.  The right upper extremity was elevated for ~5 minutes.  The tourniquet was then inflated to 250 mmHg.    I made a 10-15 cm incision over the dorsum of the wrist/hand.  The incision went from 4 cm proximal to the wrist crease to over the metacarpal.  The incision was made midline through skin only.    After making the incision I used tenotomy scissors to just dissect through the subcutaneous tissue.  It immediately became clear that there was purulence in the subcutaneous tissue.  The purulent material over the distal forearm was swabbed and sent for stat Gram stain, aerobic culture, anaerobic culture, fungal culture, and AFB.  Distally, the purulent material over the dorsum of the hand was also swabbed and sent for stat Gram stain, aerobic culture, anaerobic culture, fungal culture, and AFB.  I then excised some necrotic tissue from the dorsal subcutaneous tissue.  This tissue was sent for aerobic and anaerobic culture.    Bluntly " dissected with my finger to the radial border of the wrist and the ulnar border of the wrist.  I also bluntly dissected to the level of the MP joint.  There was copious amount of pus throughout the entire dorsum of the wrist and hand.  I evacuated approximately 30 to 40 cc of purulent material.      There was also pus identified in the fourth extensor compartment.  I opened up the extensor retinaculum.  There was approximately 5-10 cc of purulence within the extensor compartment.  I took some tenosynovium and this was sent for aerobic and anaerobic culture.    The dorsal soft tissues of the distal forearm, wrist, and hand were then copiously irrigated with 3 L of normal saline impregnated with 3 g of Ancef.  2 segments of 1/4 inch Penrose drain placed in the wound to allow for drainage.    The surgical incision was loosely closed with 3-0 nylon placed in a simple fashion.  A sterile soft dressing was applied.  The patient was then awoken from anesthesia and brought to recovery in good condition.  There were no complications.    Postoperative plan:  1.  Pain control.  2.  Obtain ID consult.  3.  Continue IV Vanco until ID makes different recommendation.  4.  Elevate right upper extremity.  5.  Warm soaks to right upper extremity incision.  6.  Pull drains on Wednesday, 12/13/2023, in AM.

## 2023-12-12 NOTE — CONSULTS
Care Management Initial Consult    General Information  Assessment completed with: Patient,    Type of CM/SW Visit: Initial Assessment    Primary Care Provider verified and updated as needed: Yes   Readmission within the last 30 days: no previous admission in last 30 days      Reason for Consult: discharge planning  Advance Care Planning:            Communication Assessment  Patient's communication style: spoken language (English or Bilingual)    Hearing Difficulty or Deaf: no   Wear Glasses or Blind: yes    Cognitive  Cognitive/Neuro/Behavioral: WDL  Level of Consciousness: alert  Arousal Level: opens eyes spontaneously, arouses to touch/gentle shaking  Orientation: disoriented to, place  Mood/Behavior: calm, cooperative  Best Language: 0 - No aphasia  Speech: logical, clear    Living Environment:   People in home: alone     Current living Arrangements: apartment      Able to return to prior arrangements: yes       Family/Social Support:  Care provided by: self  Provides care for: no one  Marital Status: Single  Sibling(s)          Description of Support System: Supportive, Involved         Current Resources:   Patient receiving home care services: No     Community Resources: None  Equipment currently used at home: none  Supplies currently used at home: None    Employment/Financial:  Employment Status: retired        Financial Concerns: none   Referral to Financial Worker: No  Lifestyle & Psychosocial Needs:  Social Determinants of Health     Food Insecurity: Not on file   Depression: Not on file   Housing Stability: Not on file   Tobacco Use: Low Risk  (12/12/2023)    Patient History     Smoking Tobacco Use: Never     Smokeless Tobacco Use: Never     Passive Exposure: Not on file   Financial Resource Strain: Not on file   Alcohol Use: Not on file   Transportation Needs: Not on file   Physical Activity: Not on file   Interpersonal Safety: Not on file   Stress: Not on file   Social Connections: Not on file        Functional Status:  Prior to admission patient needed assistance:   Dependent ADLs:: Ambulation-cane  Dependent IADLs:: Independent       Mental Health Status:  Mental Health Status: No Current Concerns       Chemical Dependency Status:  Chemical Dependency Status: No Current Concerns             Values/Beliefs:  Spiritual, Cultural Beliefs, Protestant Practices, Values that affect care: no               Additional Information:  SW introduced self and CM role to Pt and Pt's friend, Rosita who was present at bedside. Pt reports living in an apartment alone.  Pt reports independence with I/ADLs, uses a cane for ambulation.  Pt identifies positive support from his brother and sister (pt requests that SW adds his sister, Melba Shaffer's contact information to emergency contact list), pt confirms his siblings can receive information for hospital staff as needed.  SW discussed therapy recommendations for home PT and OT.  Pt in agreement with this, denies any agency preference. SW sent referral to [facility]. Rosita will assist Pt with groceries, check in, and provide transport upon discharge.     CM following care progression to assist with safe discharge planning and follow up on team recommendations. ID following.     JEFFREY Damico

## 2023-12-12 NOTE — PROGRESS NOTES
12/12/23 1040   Appointment Info   Signing Clinician's Name / Credentials (PT) Clarisse Johnson PT   Living Environment   People in Home alone   Current Living Arrangements apartment   Home Accessibility stairs to enter home;stairs within home   Number of Stairs, Main Entrance 2   Stair Railings, Main Entrance railings safe and in good condition   Number of Stairs, Within Home, Primary greater than 10 stairs  (3rd floor apt)   Stair Railings, Within Home, Primary railings safe and in good condition   Transportation Anticipated family or friend will provide   Living Environment Comments 3rd floor apt   Self-Care   Equipment Currently Used at Home walker, rolling;cane, straight  (4WW)   Activity/Exercise/Self-Care Comment independent ADL's/IADL's   General Information   Onset of Illness/Injury or Date of Surgery 12/11/23   Referring Physician Dr. Tillman   Patient/Family Therapy Goals Statement (PT) none stated   Pertinent History of Current Problem (include personal factors and/or comorbidities that impact the POC) RUE cellulitis from cat bite s/p I&D 12/11/23   Existing Precautions/Restrictions fall;oxygen therapy device and L/min  (O2 1L NC)   Weight-Bearing Status - RUE weight-bearing as tolerated   Cognition   Affect/Mental Status (Cognition) WFL   Orientation Status (Cognition) oriented x 4   Follows Commands (Cognition) WFL   Integumentary/Edema   Integumentary/Edema Comments R hand/wrist dressing and drains in place; R hand edema   Range of Motion (ROM)   ROM Comment decreased rom/strength R hand/wrist   Strength (Manual Muscle Testing)   Strength Comments decreased rom/strength R hand/wrist; generalized weakness BLE   Bed Mobility   Bed Mobility supine-sit;sit-supine   Supine-Sit [place] (Bed Mobility) modified independence   Sit-Supine [place] (Bed Mobility) independent   Transfers   Transfers sit-stand transfer   Sit-Stand Transfer   Sit-Stand [place] (Transfers) supervision;verbal  cues;nonverbal cues (demo/gesture)   Assistive Device (Sit-Stand Transfers) walker, 4-wheeled   Comment, (Sit-Stand Transfer) cuing for safe use of 4WW brakes   Gait/Stairs (Locomotion)   [place] Level (Gait) supervision;verbal cues;nonverbal cues (demo/gesture)   Assistive Device (Gait) walker, 4-wheeled   Distance in Feet (Gait) 90   Pattern (Gait) step-through   Deviations/Abnormal Patterns (Gait) gait speed decreased;sulaiman decreased;stride length decreased;weight shifting decreased   Comment, (Gait/Stairs) pt declines stair trial this session; educated pt on safe stair climbing technique; pt states he can go up stairs on his bottom if needed   Clinical Impression   Criteria for Skilled Therapeutic Intervention Yes, treatment indicated   PT Diagnosis (PT) impaired functional mobility   Influenced by the following impairments decreased rom, strength, balance, skin integrity   Functional limitations due to impairments gait, stairs   Clinical Presentation (PT Evaluation Complexity) evolving   Clinical Presentation Rationale pt presents as medically diagnosed   Clinical Decision Making (Complexity) low complexity   Planned Therapy Interventions (PT) balance training;bed mobility training;gait training;home exercise program;neuromuscular re-education;patient/family education;stair training;strengthening;ROM (range of motion);stretching;transfer training   Risk & Benefits of therapy have been explained evaluation/treatment results reviewed;patient   PT Total Evaluation Time   PT Eval, Low Complexity Minutes (63551) 20   Physical Therapy Goals   PT Frequency Daily   PT Predicted Duration/Target Date for Goal Attainment 12/19/23   PT Goals Transfers;Gait;Stairs   PT: Transfers Modified independent;Sit to/from stand;Assistive device   PT: Gait Modified independent;Assistive device;150 feet   PT: Stairs Supervision/stand-by assist;8 stairs;Rail on left;Rail on right   PT Discharge Planning   PT Plan bring 4WW with  O2 for gait; stairs with railing   PT Discharge Recommendation (DC Rec) home with assist;home with home care physical therapy   PT Rationale for DC Rec continue with home PT for strenthening and mobility training   PT Brief overview of current status amb. 90 feet sba with 4WW   PT Equipment Needed at Discharge walker, rolling  (pt has 4WW at home)   Total Session Time   Total Session Time (sum of timed and untimed services) 20

## 2023-12-12 NOTE — ANESTHESIA PREPROCEDURE EVALUATION
Anesthesia Pre-Procedure Evaluation    Patient: Amor Zavaleta   MRN: [id number] : 1945        Procedure : Procedure(s):  INCISION AND DRAINAGE, UPPER EXTREMITY          No past medical history on file.   No past surgical history on file.   Allergies   Allergen Reactions    Sulfa Antibiotics Other (See Comments)      Social History     Tobacco Use    Smoking status: Never    Smokeless tobacco: Never   Substance Use Topics    Alcohol use: Not Currently      Wt Readings from Last 1 Encounters:   23 97.5 kg (215 lb)        Anesthesia Evaluation   Pt has had prior anesthetic.         ROS/MED HX  ENT/Pulmonary:       Neurologic:       Cardiovascular: Comment: Echo 10.26.2023    (+)  - -   -  - -                        dysrhythmias (LBBB), Other,        Previous cardiac testing   Echo: Date: Results:    Stress Test:  Date: 10/2023 Results:  1.  Pharmacological Regadenoson stress cardiac MRI is negative for inducible myocardial ischemia.  2.  Regadenoson stress ECG is nondiagnostic due to left bundle branch block.  3.  Normal left ventricular size, wall motion and systolic function.  Septal motion is consistent with left  bundle branch block.  4.  No previous myocardial infarction. Myocardial scar in the mid layer of basal inferolateral segment  indicative non-ischemic pattern, possible viral infection in the past. No other infiltrative process.  5.  Normal right ventricular size and systolic function.  6.  No obvious valvular disease    ECG Reviewed:  Date: Results:    Cath:  Date: Results:      METS/Exercise Tolerance:     Hematologic:       Musculoskeletal:       GI/Hepatic:       Renal/Genitourinary:     (+) renal disease, type: CRI,            Endo:     (+)  type II DM, Last HgA1c: 8.9,                   Psychiatric/Substance Use:       Infectious Disease: Comment: Cat scratch with significant cellulitis of UE    (+) Recent Fever,           Malignancy:       Other:            Physical Exam    Airway   "      Mallampati: II   TM distance: > 3 FB      Respiratory Devices and Support         Dental     Comment: Dicussed risk of dental trauma    (+) Multiple visibly decayed, broken teeth      Cardiovascular   cardiovascular exam normal       Rhythm and rate: regular and normal     Pulmonary           breath sounds clear to auscultation           OUTSIDE LABS:  CBC:   Lab Results   Component Value Date    WBC 26.4 (H) 12/11/2023    WBC 8.8 11/16/2021    HGB 15.2 12/11/2023    HGB 15.6 12/15/2022    HCT 44.8 12/11/2023    HCT 47.5 11/16/2021     12/11/2023     11/16/2021     BMP:   Lab Results   Component Value Date     (L) 12/11/2023     07/07/2023    POTASSIUM 2.5 (LL) 12/11/2023    POTASSIUM 3.5 07/07/2023    CHLORIDE 87 (L) 12/11/2023    CHLORIDE 100 07/07/2023    CO2 27 12/11/2023    CO2 23 07/07/2023    BUN 47.2 (H) 12/11/2023    BUN 30.4 (H) 07/07/2023    CR 2.33 (H) 12/11/2023    CR 2.01 (H) 07/07/2023     (H) 12/11/2023     (H) 12/11/2023     COAGS: No results found for: \"PTT\", \"INR\", \"FIBR\"  POC: No results found for: \"BGM\", \"HCG\", \"HCGS\"  HEPATIC:   Lab Results   Component Value Date    ALBUMIN 3.4 (L) 12/11/2023    PROTTOTAL 7.5 12/11/2023    ALT 45 12/11/2023    AST 27 12/11/2023    ALKPHOS 120 12/11/2023    BILITOTAL 1.1 12/11/2023     OTHER:   Lab Results   Component Value Date    LACT 2.7 (H) 12/11/2023    A1C 8.9 (H) 12/11/2023    VLADIMIR 9.0 12/11/2023    PHOS 2.7 07/07/2023    MAG 2.4 (H) 12/11/2023    SED 61 (H) 12/11/2023       Anesthesia Plan    ASA Status:  3, emergent    NPO Status:  NPO Appropriate    Anesthesia Type: General.     - Airway: ETT   Induction: Intravenous.   Maintenance: Balanced.        Consents            Postoperative Care            Comments:    Other Comments: Patient with multiple lab abnormalities and significant UE swelling and sensate/mobility issues.  Given this and the history of poorly controlled DM and kidney dysfunction we will be " not do a PNB.  Patient will be a GETA.    Will treat BG with insulin SubCu, 10 units  check ABG for possible sepsis protocol  Hydrate with NS with fluid bolus given hyponatremia  Dilaudid for pain control  PONV prophylaxis with zofran only, avoid decadron               Rayna Patel MD    I have reviewed the pertinent notes and labs in the chart from the past 30 days and (re)examined the patient.  Any updates or changes from those notes are reflected in this note.    # Hypokalemia: Lowest K = 2.5 mmol/L in last 2 days, will replace as needed  # Hyponatremia: Lowest Na = 131 mmol/L in last 30 days, will monitor as appropriate      # Hypoalbuminemia: Lowest albumin = 3.4 g/dL at 12/11/2023  1:16 PM, will monitor as appropriate    # Drug Induced Platelet Defect: home medication list includes an antiplatelet medication  # DMII: A1C = 8.9 % (Ref range: <5.7 %) within past 6 months

## 2023-12-12 NOTE — ANESTHESIA PROCEDURE NOTES
Airway       Patient location during procedure: OR       Procedure Start/Stop Times: 12/11/2023 7:32 PM  Staff -        CRNA: Aylin Lowe APRN CRNA       Performed By: CRNA  Consent for Airway        Urgency: elective  Indications and Patient Condition       Indications for airway management: leana-procedural       Induction type:intravenous       Mask difficulty assessment: 1 - vent by mask    Final Airway Details       Final airway type: endotracheal airway       Successful airway: ETT - single and Oral  Endotracheal Airway Details        ETT size (mm): 8.0       Cuffed: yes       Cuff volume (mL): 10       Successful intubation technique: direct laryngoscopy       DL Blade Type: [place] 2       Grade View of Cords: 1       Adjucts: stylet       Position: Right       Measured from: lips       Secured at (cm): 21       Bite block used: None    Post intubation assessment        Placement verified by: capnometry, equal breath sounds and chest rise        Number of attempts at approach: 1       Secured with: tape       Ease of procedure: easy       Dentition: Intact and Unchanged       Dental guard used and removed.    Medication(s) Administered   Medication Administration Time: 12/11/2023 7:32 PM

## 2023-12-12 NOTE — PROGRESS NOTES
12/12/23 1310   Appointment Info   Signing Clinician's Name / Credentials (OT) Farzaneh Gannon, OTR/L   Living Environment   People in Home alone   Current Living Arrangements apartment   Living Environment Comments tub shower, regular toilet   Self-Care   Equipment Currently Used at Home none   Activity/Exercise/Self-Care Comment ind for BADLs   Instrumental Activities of Daily Living (IADL)   IADL Comments ind for IADLs, drives   General Information   Onset of Illness/Injury or Date of Surgery 12/11/23   Referring Physician Garcia Tillman MD   Patient/Family Therapy Goal Statement (OT) go home   Additional Occupational Profile Info/Pertinent History of Current Problem admit following cat bite with R dorsal forearm abcess, extensor tenosynovitis, cellulitis, dx of severe sepsis   Right Upper Extremity (Weight-bearing Status) weight-bearing as tolerated (WBAT)   Cognitive Status Examination   Orientation Status orientation to person, place and time   Affect/Mental Status (Cognitive) WFL   Follows Commands WFL   Pain Assessment   Patient Currently in Pain Yes, see Vital Sign flowsheet   Range of Motion Comprehensive   Comment, General Range of Motion R wrist/hand in ACE wrap and bandages, R digits swollen with minimal ability to move, LUE WFL   Strength Comprehensive (MMT)   Comment, General Manual Muscle Testing (MMT) Assessment LUE WFL, RUE NT   Bed Mobility   Bed Mobility supine-sit;sit-supine   Supine-Sit [place] (Bed Mobility) supervision   Sit-Supine [place] (Bed Mobility) supervision   Transfers   Transfers sit-stand transfer;toilet transfer;shower transfer   Sit-Stand Transfer   Sit-Stand [place] (Transfers) contact guard;verbal cues   Shower Transfer   Type (Shower Transfer) lateral   [place] Level (Shower Transfer) contact guard   Shower Transfer Comments per clinical judgement   Toilet Transfer   Type (Toilet Transfer) sit-stand;stand-sit   [place] Level (Toilet Transfer)  contact guard;verbal cues   Activities of Daily Living   BADL Assessment/Intervention lower body dressing;toileting   Lower Body Dressing Assessment/Training   [place] Level (Lower Body Dressing) moderate assist (50% patient effort)   Toileting   [place] Level (Toileting) minimum assist (75% patient effort)   Clinical Impression   Criteria for Skilled Therapeutic Interventions Met (OT) Yes, treatment indicated   OT Diagnosis dec BADL indep   OT Problem List-Impairments impacting ADL problems related to;activity tolerance impaired;mobility;strength;post-surgical precautions   Assessment of Occupational Performance 3-5 Performance Deficits   Identified Performance Deficits dressing, toileting, bathing, household mobility, functional transfers   Planned Therapy Interventions (OT) ADL retraining;transfer training;progressive activity/exercise   Clinical Decision Making Complexity (OT) problem focused assessment/low complexity   Risk & Benefits of therapy have been explained evaluation/treatment results reviewed;participants included;patient   OT Total Evaluation Time   OT Eval, Low Complexity Minutes (98809) 10   OT Goals   Therapy Frequency (OT) Daily   OT Predicted Duration/Target Date for Goal Attainment 12/19/23   OT Goals Lower Body Dressing;Transfers;Toilet Transfer/Toileting;OT Goal 1   OT: Lower Body Dressing Modified independent;within precautions   OT: Transfer Modified independent  (tub and shower chair sit <> stand)   OT: Toilet Transfer/Toileting Modified independent;toilet transfer;cleaning and garment management;within precautions   OT: Goal 1 Pt will complete RUE ROM exercises within precautions IND with handout.   Self-Care/Home Management   Self-Care/Home Mgmt/ADL, Compensatory, Meal Prep Minutes (36108) 24   Symptoms Noted During/After Treatment (Meal Preparation/Planning Training) fatigue   Treatment Detail/Skilled Intervention Educ pt on 1 handed TB dressing techniques. Following educ and  initial min cues, mod IND to don underwear and don/doff socks while at EOB. Discussed options for shoes - pt confirms he has outside shoes that he can slip on. Educ on hand placement for transfers - mod IND for transfers following educ. SBA with FWW for mobility to/from toilet. VC for hand placement to increase safety and simulate home - mod IND for toilet sit <> stand. Ind for BM hygiene while seated and mod IND for clothing management. Educ pt on use of shower chair to increase ability to complete LE bathing safely - confirms friend will purchase from [employer].   OT Discharge Planning   OT Plan check ortho note for ROM precautions (sticky note left, possible repeat I&D 12/13) and issue ROM exercises as able, tub transfer   OT Discharge Recommendation (DC Rec) home with home care occupational therapy;home with assist   OT Rationale for DC Rec recommend pt have friends check-in daily to provide IADL and transportation assist as needed. anticipate pt to achieve mod IND for BADLs.   OT Brief overview of current status mod IND toileting and LB dressing   Total Session Time   Timed Code Treatment Minutes 24   Total Session Time (sum of timed and untimed services) 34

## 2023-12-12 NOTE — ANESTHESIA CARE TRANSFER NOTE
Patient: Amor Zavaleta    Procedure: Procedure(s):  INCISION AND DRAINAGE, UPPER EXTREMITY       Diagnosis: Cellulitis of right upper extremity [L03.113]  Diagnosis Additional Information: No value filed.    Anesthesia Type:   General     Note:    Oropharynx: oropharynx clear of all foreign objects  Level of Consciousness: drowsy  Oxygen Supplementation: face mask  Level of Supplemental Oxygen (L/min / FiO2): 6  Independent Airway: airway patency satisfactory and stable  Dentition: dentition unchanged  Vital Signs Stable: post-procedure vital signs reviewed and stable  Report to RN Given: handoff report given  Patient transferred to: PACU    Handoff Report: Identifed the Patient, Identified the Reponsible Provider, Reviewed the pertinent medical history, Discussed the surgical course, Reviewed Intra-OP anesthesia mangement and issues during anesthesia, Set expectations for post-procedure period and Allowed opportunity for questions and acknowledgement of understanding    Vitals:  Vitals Value Taken Time   /63 12/11/23 2030   Temp 37  C (98.6  F) 12/11/23 2028   Pulse 62 12/11/23 2043   Resp 20 12/11/23 2043   SpO2 95 % 12/11/23 2043   Vitals shown include unfiled device data.    Electronically Signed By: CHIN Rehman CRNA  December 11, 2023  8:44 PM

## 2023-12-12 NOTE — CONSULTS
[facility] Orthopedic/Hand Surgery Consultation    Amor Zavaleta MRN# [id number]   Age: 78 year old YOB: 1945     Date of Admission:  12/11/2023    Reason for consult: Right forearm cellulitis       Requesting physician: Penelope Womack MD       Level of consult: Consult, follow and place orders           Assessment and Plan:   Assessment:   Right forearm cellulitis with possible extensor tendon tenosynovitis and/or impending abscess      Plan:   Based on patient's history, clinical exam, and laboratory markers, I am concerned that he is developing an abscess or provide he has an extensor tenosynovitis.  I am concerned that he may go on to develop sepsis.  I therefore have recommended taking patient to the OR today for an I&D of the dorsum of his right forearm where he has the most swelling and erythema.  The risks, benefits, and alternatives of [this surgical procedure] were thoroughly discussed with the patient.  I outlined the risks of surgery which included, but are not limited to: Infection, recurrence, incisional pain, the development of scar tissue, and/or neurovascular injury. The consequences of such risks could include hospital admission, additional surgery, chronic pain, loss of strength, loss of sensation, loss of motion and/or loss of function. I explained that although these risks are low they are real. The patient then had opportunity to ask questions which I answered. After thorough discussion, the patient verbalized understanding and stated that they wished proceed with operative intervention.              Chief Complaint:   Right dorsal forearm pain, erythema, & swelling s/p multiple cat scratches 8 days ago       History is obtained from the patient and electronic health record         History of Present Illness:   This patient is a 78 year old male with a history of type 2 diabetes, stage III kidney disease, hypertension and left bundle branch block  who presented to the ER earlier today with painful right upper extremity, swelling, and erythema.  The patient reports that he was scratched by a friend's cat 8 days ago and developed right forearm and right hand swelling soon afterwards.  He has had ongoing right forearm and right hand pain and swelling.  He denies fever, vomiting, diarrhea, chest pain, or abdominal pain.          Past Medical History:   I have reviewed this patient's past medical history          Past Surgical History:   History reviewed. No pertinent surgical history.          Social History:     Social History     Tobacco Use    Smoking status: Never    Smokeless tobacco: Never   Substance Use Topics    Alcohol use: Not Currently             Family History:   This patient has no significant family history          Immunizations:     Immunization History   Administered Date(s) Administered    COVID-19 12+ (2023-24) (Pfizer) 10/27/2023    COVID-19 Bivalent 12+ (Pfizer) 10/10/2022    COVID-19 MONOVALENT 12+ (Pfizer) 03/03/2021, 03/23/2021, 09/28/2021    COVID-19 Monovalent 12+ (Pfizer 2022) 04/04/2022             Allergies:     Allergies   Allergen Reactions    Sulfa Antibiotics Other (See Comments)             Medications:     vancomycin (VANCOCIN) 2,000 mg in sodium chloride 0.9 % 500 mL intermittent infusion (2,000 mg Intravenous $New Bag 12/11/23 1503)   sodium chloride 0.9% BOLUS 1,000 mL (0 mLs Intravenous Stopped 12/11/23 1643)   sodium chloride 0.9% BOLUS 1,000 mL (0 mLs Intravenous Stopped 12/11/23 1502)   potassium chloride ER (KLOR-CON M) CR tablet 40 mEq (40 mEq Oral $Given 12/11/23 1416)   potassium chloride 10 mEq in 100 mL sterile water infusion (0 mEq Intravenous Stopped 12/11/23 1518)   ampicillin-sulbactam (UNASYN) 3 g vial to attach to  mL bag (0 g Intravenous Stopped 12/11/23 1502)               Review of Systems:   The Review of Systems is negative other than noted in the HPI          Physical Exam:   Temp: 98.7  F (37.1  " C) Temp src: Oral BP: (!) 160/70 Pulse: 60   Resp: 20 SpO2: 96 % O2 Device: (P) None (Room air)    All vitals have been reviewed  RIGHT UPPER EXTREMITY:  There is extensive erythema and swelling over the dorsum right forearm, wrist, and hand.  There is some erythema that extends medially up towards the axilla.  The soft tissues over the dorsum of the right forearm, wrist, and hand are tense.  The patient otherwise complaints over the volar aspect of his wrist and hand.  No pain with passive range of motion of the digits.  There is no tenderness with palpation over the flexor tendons or in the deep spaces of the hand  The patient has limited range of motion of the digits secondary to swelling.  The patient has limited range of motion of the wrist secondary to pain and swelling.  Patient has full normal range of motion of his right elbow.  The patient has slightly diminished sensation at the tip of his digits.  There is brisk cap refill.            Data:     Lab Results   Component Value Date    PH 7.46 (H) 12/11/2023    PO2 63 (L) 12/11/2023    PCO2 35 12/11/2023    HCO3 25 12/11/2023    NAE 0.9 12/11/2023          Lab Results   Component Value Date     12/11/2023    Lab Results   Component Value Date    CHLORIDE 87 12/11/2023    CHLORIDE 103 06/28/2022    Lab Results   Component Value Date    BUN 47.2 12/11/2023    BUN 26 06/28/2022      Lab Results   Component Value Date    POTASSIUM 2.9 12/11/2023    POTASSIUM 3.3 06/28/2022    Lab Results   Component Value Date    CO2 27 12/11/2023    CO2 26 06/28/2022    Lab Results   Component Value Date    CR 2.33 12/11/2023        No results found for: \"NTBNPI\", \"NTBNP\"  Lab Results   Component Value Date    WBC 26.4 (H) 12/11/2023    HGB 15.2 12/11/2023    HCT 44.8 12/11/2023    MCV 93 12/11/2023     12/11/2023     Lab Results   Component Value Date    SED 61 (H) 12/11/2023     EXAM: CT FOREARM RIGHT W/O CONTRAST, CT HAND RIGHT W/O CONTRAST  LOCATION: [facility]  DATE: 12/11/2023     INDICATION: Infection sepsis cat bite, forearm and hand pain.  COMPARISON: None.  TECHNIQUE: Noncontrast. Axial, sagittal and coronal thin-section reconstruction. Dose reduction techniques were used.      FINDINGS:      BONES:  -No fracture. No degenerative changes. No evidence of osteomyelitis.     SOFT TISSUES:  -There is moderate circumferential subcutaneous cellulitis throughout the forearm extending into the wrist and hand. Distally, in the hand and wrist these changes are more marked within the dorsal soft tissues of the wrist and hand including likely   tenosynovitis of the compartment 2, 3 and 4 extensor tendons as seen on series 5 image 16 of the hand study. No soft tissue gas. No discrete fluid collection to suggest an abscess. No deep fascial fluid. The forearm musculature is normal in appearance as   is the musculature of the hand.                                                                      IMPRESSION:  1.  Extensive cellulitis in the forearm, wrist and hand. No discrete fluid collection to suggest an abscess. No soft tissue gas.     2.  There is likely multifocal tenosynovitis involving the extensor tendons in the hand, as described. MRI may be helpful in further evaluation.     3.  No fracture or foreign body.     Attestation:  Amount of time performed on this consult: 20 minutes.    Leah Leyva MD

## 2023-12-12 NOTE — PLAN OF CARE
Goal Outcome Evaluation:    Alert and oriented x 4. No c/o pain. Soak hand wound in soap and water as order. Attended PT/OT.

## 2023-12-12 NOTE — CONSULTS
"Infectious Disease Consultation:  Requesting MD:  Reason for consult:      HISTORY:  Nice man with DM.  He fostered a cat from a home for four years and brought another cat from the same dwelling to his to foster that cat and then immediately \"#Feline World War\" broke out causing major cellulitis to much of his R hand and forearm along with a less infected injury to finger 3.  He went to OR last night and has a big time leukemoid reaction.  Micro is pending.  On vanco and unasyn.  Denies other serious infections.         Pertinent past history, past infectious disease history:    Past Medical History   No past medical history on file.           Past Surgical History   No past surgical history on file.             Medications:  Reviewed prior to admission meds as applicable in chart review.  Current meds are reviewed in the EMR listed MAR.     ANTIBIOTICS:    Current:above   Prior:   Allergy to:    SH/FH and  travel history(if applicable to consult):    Social History           Tobacco Use    Smoking status: Never    Smokeless tobacco: Never   Substance Use Topics    Alcohol use: Not Currently              Family History:   This patient has no significant family history  REVIEW OF SYSTEMS:  All other systems negative    EXAMINATION:  /62 (BP Location: Left arm)   Pulse 60   Temp 98.3  F (36.8  C) (Oral)   Resp 21   Wt 97.7 kg (215 lb 6.2 oz)   SpO2 94%   BMI 29.21 kg/m    Alert, awake  Vitals tabulated above, reviewed  HEENT:normal  Neck supple without lymphadenopathy  Sclera clear  CARDIOVASCULAR regular rate and rhythm, no murmur  Lungs CLEAR TO AUSCULTATION   Abdomen soft, NT/ND, absent HEPATOSPLENOMEGALY  Skin normal  Joints normal  Neurologic exam non focal  Wound:  Most of the R arm is bandaged, a small lesion is seen on finger 3 not all that deep        CLINICAL DATABASE FOR---LAB/MICRO/CULTURES/IMAGING STUDIES:  Lab Results   Component Value Date    WBC 25.0 12/12/2023     Lab Results   Component " "Value Date    RBC 4.04 12/12/2023     Lab Results   Component Value Date    HGB 12.7 12/12/2023     Lab Results   Component Value Date    HCT 38.1 12/12/2023     No components found for: \"MCT\"  Lab Results   Component Value Date    MCV 94 12/12/2023     Lab Results   Component Value Date    MCH 31.4 12/12/2023     Lab Results   Component Value Date    MCHC 33.3 12/12/2023     Lab Results   Component Value Date    RDW 13.4 12/12/2023     Lab Results   Component Value Date     12/12/2023       Last Comprehensive Metabolic Panel:  Sodium   Date Value Ref Range Status   12/11/2023 130 (L) 135 - 145 mmol/L Final     Comment:     Reference intervals for this test were updated on 09/26/2023 to more accurately reflect our healthy population. There may be differences in the flagging of prior results with similar values performed with this method. Interpretation of those prior results can be made in the context of the updated reference intervals.      Potassium   Date Value Ref Range Status   12/12/2023 2.7 (L) 3.4 - 5.3 mmol/L Final   06/28/2022 3.3 (L) 3.5 - 5.0 mmol/L Final     Chloride   Date Value Ref Range Status   12/11/2023 95 (L) 98 - 107 mmol/L Final   06/28/2022 103 98 - 107 mmol/L Final     Carbon Dioxide (CO2)   Date Value Ref Range Status   12/11/2023 16 (L) 22 - 29 mmol/L Final   06/28/2022 26 22 - 31 mmol/L Final     Anion Gap   Date Value Ref Range Status   12/11/2023 19 (H) 7 - 15 mmol/L Final   06/28/2022 13 5 - 18 mmol/L Final     Glucose   Date Value Ref Range Status   06/28/2022 91 70 - 125 mg/dL Final     GLUCOSE BY METER POCT   Date Value Ref Range Status   12/12/2023 303 (H) 70 - 99 mg/dL Final     Urea Nitrogen   Date Value Ref Range Status   12/11/2023 44.0 (H) 8.0 - 23.0 mg/dL Final   06/28/2022 26 8 - 28 mg/dL Final     Creatinine   Date Value Ref Range Status   12/12/2023 1.64 (H) 0.67 - 1.17 mg/dL Final     GFR Estimate   Date Value Ref Range Status   12/12/2023 43 (L) >60 mL/min/1.73m2 " "Final   05/19/2021 37 (L) >60 mL/min/1.73m2 Final     Calcium   Date Value Ref Range Status   12/11/2023 8.5 (L) 8.8 - 10.2 mg/dL Final       Liver Function Studies -   Recent Labs   Lab Test 12/11/23  1316   PROTTOTAL 7.5   ALBUMIN 3.4*   BILITOTAL 1.1   ALKPHOS 120   AST 27   ALT 45       Erythrocyte Sedimentation Rate   Date Value Ref Range Status   12/11/2023 61 (H) 0 - 20 mm/hr Final       No results found for: \"CRP\"            IMPRESSION:  Cat scratch cellulitis, tenosynovitis  Post debride  DM  Specimens: The purulent material over the dorsum of the right distal forearm, the purulent material over the dorsum of the right hand, necrotic soft tissue over the dorsum of the right hand, and tenosynovium from the fourth dorsal compartment were swabbed/collected and sent for:  1.  Stat Gram stain.  2.  Aerobic culture.  3.  Anaerobic culture.  4.  Fungal culture.  5.  AFB culture.    PLAN:  Agree with present abx  I'll adjust if need to for things as micro guides.        MIRANDA ANSARI MD  [facility]  Office [phone]      "

## 2023-12-13 ENCOUNTER — ANESTHESIA EVENT (OUTPATIENT)
Dept: SURGERY | Facility: HOSPITAL | Age: 78
DRG: 853 | End: 2023-12-13
Payer: COMMERCIAL

## 2023-12-13 ENCOUNTER — ANESTHESIA (OUTPATIENT)
Dept: SURGERY | Facility: HOSPITAL | Age: 78
DRG: 853 | End: 2023-12-13
Payer: COMMERCIAL

## 2023-12-13 ENCOUNTER — APPOINTMENT (OUTPATIENT)
Dept: PHYSICAL THERAPY | Facility: HOSPITAL | Age: 78
DRG: 853 | End: 2023-12-13
Payer: COMMERCIAL

## 2023-12-13 LAB
ANION GAP SERPL CALCULATED.3IONS-SCNC: 10 MMOL/L (ref 7–15)
BACTERIA TISS BX CULT: ABNORMAL
BACTERIA TISS BX CULT: ABNORMAL
BUN SERPL-MCNC: 24.2 MG/DL (ref 8–23)
CALCIUM SERPL-MCNC: 7.5 MG/DL (ref 8.8–10.2)
CHLORIDE SERPL-SCNC: 105 MMOL/L (ref 98–107)
CREAT SERPL-MCNC: 1.38 MG/DL (ref 0.67–1.17)
CREAT SERPL-MCNC: 1.4 MG/DL (ref 0.67–1.17)
DEPRECATED HCO3 PLAS-SCNC: 23 MMOL/L (ref 22–29)
EGFRCR SERPLBLD CKD-EPI 2021: 51 ML/MIN/1.73M2
EGFRCR SERPLBLD CKD-EPI 2021: 52 ML/MIN/1.73M2
ERYTHROCYTE [DISTWIDTH] IN BLOOD BY AUTOMATED COUNT: 13.4 % (ref 10–15)
GLUCOSE BLDC GLUCOMTR-MCNC: 143 MG/DL (ref 70–99)
GLUCOSE BLDC GLUCOMTR-MCNC: 158 MG/DL (ref 70–99)
GLUCOSE BLDC GLUCOMTR-MCNC: 159 MG/DL (ref 70–99)
GLUCOSE BLDC GLUCOMTR-MCNC: 174 MG/DL (ref 70–99)
GLUCOSE BLDC GLUCOMTR-MCNC: 197 MG/DL (ref 70–99)
GLUCOSE SERPL-MCNC: 148 MG/DL (ref 70–99)
HCT VFR BLD AUTO: 35.9 % (ref 40–53)
HGB BLD-MCNC: 12 G/DL (ref 13.3–17.7)
HOLD SPECIMEN: NORMAL
MAGNESIUM SERPL-MCNC: 2.1 MG/DL (ref 1.7–2.3)
MCH RBC QN AUTO: 31.7 PG (ref 26.5–33)
MCHC RBC AUTO-ENTMCNC: 33.4 G/DL (ref 31.5–36.5)
MCV RBC AUTO: 95 FL (ref 78–100)
PLATELET # BLD AUTO: 214 10E3/UL (ref 150–450)
POTASSIUM SERPL-SCNC: 3.2 MMOL/L (ref 3.4–5.3)
POTASSIUM SERPL-SCNC: 3.2 MMOL/L (ref 3.4–5.3)
RBC # BLD AUTO: 3.79 10E6/UL (ref 4.4–5.9)
SODIUM SERPL-SCNC: 138 MMOL/L (ref 135–145)
WBC # BLD AUTO: 18.4 10E3/UL (ref 4–11)

## 2023-12-13 PROCEDURE — 250N000011 HC RX IP 250 OP 636

## 2023-12-13 PROCEDURE — 99232 SBSQ HOSP IP/OBS MODERATE 35: CPT | Performed by: INTERNAL MEDICINE

## 2023-12-13 PROCEDURE — 250N000009 HC RX 250

## 2023-12-13 PROCEDURE — 258N000001 HC RX 258: Performed by: ORTHOPAEDIC SURGERY

## 2023-12-13 PROCEDURE — 36415 COLL VENOUS BLD VENIPUNCTURE: CPT | Performed by: INTERNAL MEDICINE

## 2023-12-13 PROCEDURE — 84132 ASSAY OF SERUM POTASSIUM: CPT | Performed by: INTERNAL MEDICINE

## 2023-12-13 PROCEDURE — 97110 THERAPEUTIC EXERCISES: CPT | Mod: GP

## 2023-12-13 PROCEDURE — 250N000011 HC RX IP 250 OP 636: Performed by: ORTHOPAEDIC SURGERY

## 2023-12-13 PROCEDURE — 85027 COMPLETE CBC AUTOMATED: CPT | Performed by: INTERNAL MEDICINE

## 2023-12-13 PROCEDURE — 250N000009 HC RX 250: Performed by: NURSE ANESTHETIST, CERTIFIED REGISTERED

## 2023-12-13 PROCEDURE — 97116 GAIT TRAINING THERAPY: CPT | Mod: GP

## 2023-12-13 PROCEDURE — 120N000001 HC R&B MED SURG/OB

## 2023-12-13 PROCEDURE — 250N000013 HC RX MED GY IP 250 OP 250 PS 637: Performed by: PHYSICIAN ASSISTANT

## 2023-12-13 PROCEDURE — 82310 ASSAY OF CALCIUM: CPT | Performed by: INTERNAL MEDICINE

## 2023-12-13 PROCEDURE — 250N000011 HC RX IP 250 OP 636: Performed by: NURSE ANESTHETIST, CERTIFIED REGISTERED

## 2023-12-13 PROCEDURE — 360N000075 HC SURGERY LEVEL 2, PER MIN: Performed by: ORTHOPAEDIC SURGERY

## 2023-12-13 PROCEDURE — 370N000017 HC ANESTHESIA TECHNICAL FEE, PER MIN: Performed by: ORTHOPAEDIC SURGERY

## 2023-12-13 PROCEDURE — 83735 ASSAY OF MAGNESIUM: CPT | Performed by: INTERNAL MEDICINE

## 2023-12-13 PROCEDURE — 250N000013 HC RX MED GY IP 250 OP 250 PS 637: Performed by: INTERNAL MEDICINE

## 2023-12-13 PROCEDURE — 250N000011 HC RX IP 250 OP 636: Mod: JZ | Performed by: ANESTHESIOLOGY

## 2023-12-13 PROCEDURE — 258N000003 HC RX IP 258 OP 636: Performed by: INTERNAL MEDICINE

## 2023-12-13 PROCEDURE — 82565 ASSAY OF CREATININE: CPT | Performed by: INTERNAL MEDICINE

## 2023-12-13 PROCEDURE — 250N000011 HC RX IP 250 OP 636: Mod: JZ | Performed by: INTERNAL MEDICINE

## 2023-12-13 PROCEDURE — 258N000003 HC RX IP 258 OP 636: Performed by: NURSE ANESTHETIST, CERTIFIED REGISTERED

## 2023-12-13 PROCEDURE — 272N000001 HC OR GENERAL SUPPLY STERILE: Performed by: ORTHOPAEDIC SURGERY

## 2023-12-13 PROCEDURE — 999N000141 HC STATISTIC PRE-PROCEDURE NURSING ASSESSMENT: Performed by: ORTHOPAEDIC SURGERY

## 2023-12-13 PROCEDURE — 250N000011 HC RX IP 250 OP 636: Performed by: ANESTHESIOLOGY

## 2023-12-13 PROCEDURE — 250N000011 HC RX IP 250 OP 636: Performed by: INTERNAL MEDICINE

## 2023-12-13 RX ORDER — METHYLPREDNISOLONE SODIUM SUCCINATE 40 MG/ML
40 INJECTION, POWDER, LYOPHILIZED, FOR SOLUTION INTRAMUSCULAR; INTRAVENOUS ONCE
Status: COMPLETED | OUTPATIENT
Start: 2023-12-13 | End: 2023-12-13

## 2023-12-13 RX ORDER — PROPOFOL 10 MG/ML
INJECTION, EMULSION INTRAVENOUS CONTINUOUS PRN
Status: DISCONTINUED | OUTPATIENT
Start: 2023-12-13 | End: 2023-12-13

## 2023-12-13 RX ORDER — LIDOCAINE HYDROCHLORIDE 10 MG/ML
INJECTION, SOLUTION INFILTRATION; PERINEURAL PRN
Status: DISCONTINUED | OUTPATIENT
Start: 2023-12-13 | End: 2023-12-13

## 2023-12-13 RX ORDER — POLYETHYLENE GLYCOL 3350 17 G/17G
17 POWDER, FOR SOLUTION ORAL DAILY
Status: DISCONTINUED | OUTPATIENT
Start: 2023-12-14 | End: 2023-12-20 | Stop reason: HOSPADM

## 2023-12-13 RX ORDER — FENTANYL CITRATE 50 UG/ML
100 INJECTION, SOLUTION INTRAMUSCULAR; INTRAVENOUS ONCE
Status: COMPLETED | OUTPATIENT
Start: 2023-12-13 | End: 2023-12-13

## 2023-12-13 RX ORDER — LIDOCAINE 40 MG/G
CREAM TOPICAL
Status: DISCONTINUED | OUTPATIENT
Start: 2023-12-13 | End: 2023-12-16

## 2023-12-13 RX ORDER — AMOXICILLIN 250 MG
1 CAPSULE ORAL 2 TIMES DAILY
Status: DISCONTINUED | OUTPATIENT
Start: 2023-12-13 | End: 2023-12-20 | Stop reason: HOSPADM

## 2023-12-13 RX ORDER — ONDANSETRON 2 MG/ML
4 INJECTION INTRAMUSCULAR; INTRAVENOUS EVERY 6 HOURS PRN
Status: DISCONTINUED | OUTPATIENT
Start: 2023-12-13 | End: 2023-12-20 | Stop reason: HOSPADM

## 2023-12-13 RX ORDER — OXYCODONE HYDROCHLORIDE 5 MG/1
10 TABLET ORAL EVERY 4 HOURS PRN
Status: DISCONTINUED | OUTPATIENT
Start: 2023-12-13 | End: 2023-12-20 | Stop reason: HOSPADM

## 2023-12-13 RX ORDER — POTASSIUM CHLORIDE 1500 MG/1
40 TABLET, EXTENDED RELEASE ORAL ONCE
Status: COMPLETED | OUTPATIENT
Start: 2023-12-13 | End: 2023-12-13

## 2023-12-13 RX ORDER — PROCHLORPERAZINE MALEATE 5 MG
5 TABLET ORAL EVERY 6 HOURS PRN
Status: DISCONTINUED | OUTPATIENT
Start: 2023-12-13 | End: 2023-12-20 | Stop reason: HOSPADM

## 2023-12-13 RX ORDER — PROPOFOL 10 MG/ML
INJECTION, EMULSION INTRAVENOUS PRN
Status: DISCONTINUED | OUTPATIENT
Start: 2023-12-13 | End: 2023-12-13

## 2023-12-13 RX ORDER — OXYCODONE HYDROCHLORIDE 5 MG/1
5 TABLET ORAL EVERY 4 HOURS PRN
Status: DISCONTINUED | OUTPATIENT
Start: 2023-12-13 | End: 2023-12-20 | Stop reason: HOSPADM

## 2023-12-13 RX ORDER — SODIUM CHLORIDE 9 MG/ML
INJECTION, SOLUTION INTRAVENOUS CONTINUOUS
Status: DISCONTINUED | OUTPATIENT
Start: 2023-12-13 | End: 2023-12-15

## 2023-12-13 RX ORDER — BISACODYL 10 MG
10 SUPPOSITORY, RECTAL RECTAL DAILY PRN
Status: DISCONTINUED | OUTPATIENT
Start: 2023-12-13 | End: 2023-12-20 | Stop reason: HOSPADM

## 2023-12-13 RX ORDER — ONDANSETRON 2 MG/ML
INJECTION INTRAMUSCULAR; INTRAVENOUS PRN
Status: DISCONTINUED | OUTPATIENT
Start: 2023-12-13 | End: 2023-12-13

## 2023-12-13 RX ORDER — ONDANSETRON 4 MG/1
4 TABLET, ORALLY DISINTEGRATING ORAL EVERY 6 HOURS PRN
Status: DISCONTINUED | OUTPATIENT
Start: 2023-12-13 | End: 2023-12-20 | Stop reason: HOSPADM

## 2023-12-13 RX ORDER — SODIUM CHLORIDE, SODIUM LACTATE, POTASSIUM CHLORIDE, CALCIUM CHLORIDE 600; 310; 30; 20 MG/100ML; MG/100ML; MG/100ML; MG/100ML
INJECTION, SOLUTION INTRAVENOUS CONTINUOUS PRN
Status: DISCONTINUED | OUTPATIENT
Start: 2023-12-13 | End: 2023-12-13

## 2023-12-13 RX ADMIN — ROPIVACAINE HYDROCHLORIDE 30 ML: 5 INJECTION, SOLUTION EPIDURAL; INFILTRATION; PERINEURAL at 14:45

## 2023-12-13 RX ADMIN — VANCOMYCIN HYDROCHLORIDE 1000 MG: 1 INJECTION, SOLUTION INTRAVENOUS at 05:33

## 2023-12-13 RX ADMIN — AMPICILLIN SODIUM AND SULBACTAM SODIUM 3 G: 2; 1 INJECTION, POWDER, FOR SOLUTION INTRAMUSCULAR; INTRAVENOUS at 02:47

## 2023-12-13 RX ADMIN — AMPICILLIN SODIUM AND SULBACTAM SODIUM 3 G: 2; 1 INJECTION, POWDER, FOR SOLUTION INTRAMUSCULAR; INTRAVENOUS at 09:23

## 2023-12-13 RX ADMIN — ASPIRIN 325 MG ORAL TABLET 325 MG: 325 PILL ORAL at 08:16

## 2023-12-13 RX ADMIN — PROPOFOL 75 MCG/KG/MIN: 10 INJECTION, EMULSION INTRAVENOUS at 16:05

## 2023-12-13 RX ADMIN — SENNOSIDES AND DOCUSATE SODIUM 1 TABLET: 8.6; 5 TABLET ORAL at 20:09

## 2023-12-13 RX ADMIN — SODIUM CHLORIDE, POTASSIUM CHLORIDE, SODIUM LACTATE AND CALCIUM CHLORIDE: 600; 310; 30; 20 INJECTION, SOLUTION INTRAVENOUS at 15:56

## 2023-12-13 RX ADMIN — FENTANYL CITRATE 100 MCG: 50 INJECTION, SOLUTION INTRAMUSCULAR; INTRAVENOUS at 14:49

## 2023-12-13 RX ADMIN — METHYLPREDNISOLONE SODIUM SUCCINATE 40 MG: 40 INJECTION, POWDER, FOR SOLUTION INTRAMUSCULAR; INTRAVENOUS at 13:18

## 2023-12-13 RX ADMIN — INSULIN GLARGINE 56 UNITS: 100 INJECTION, SOLUTION SUBCUTANEOUS at 08:11

## 2023-12-13 RX ADMIN — LIDOCAINE HYDROCHLORIDE 3 ML: 10 INJECTION, SOLUTION INFILTRATION; PERINEURAL at 16:02

## 2023-12-13 RX ADMIN — ATORVASTATIN CALCIUM 10 MG: 10 TABLET, FILM COATED ORAL at 19:41

## 2023-12-13 RX ADMIN — Medication 15 ML: at 08:15

## 2023-12-13 RX ADMIN — OXYCODONE HYDROCHLORIDE 5 MG: 5 TABLET ORAL at 00:21

## 2023-12-13 RX ADMIN — SENNOSIDES AND DOCUSATE SODIUM 1 TABLET: 8.6; 5 TABLET ORAL at 08:15

## 2023-12-13 RX ADMIN — AMPICILLIN SODIUM AND SULBACTAM SODIUM 3 G: 2; 1 INJECTION, POWDER, FOR SOLUTION INTRAMUSCULAR; INTRAVENOUS at 20:09

## 2023-12-13 RX ADMIN — ACETAMINOPHEN 650 MG: 325 TABLET ORAL at 02:46

## 2023-12-13 RX ADMIN — POTASSIUM CHLORIDE 40 MEQ: 1500 TABLET, EXTENDED RELEASE ORAL at 19:41

## 2023-12-13 RX ADMIN — SODIUM CHLORIDE: 9 INJECTION, SOLUTION INTRAVENOUS at 08:23

## 2023-12-13 RX ADMIN — Medication 15 ML: at 19:43

## 2023-12-13 RX ADMIN — PROPOFOL 60 MG: 10 INJECTION, EMULSION INTRAVENOUS at 16:05

## 2023-12-13 RX ADMIN — ONDANSETRON 4 MG: 2 INJECTION INTRAMUSCULAR; INTRAVENOUS at 16:31

## 2023-12-13 RX ADMIN — PANTOPRAZOLE SODIUM 40 MG: 40 TABLET, DELAYED RELEASE ORAL at 06:40

## 2023-12-13 ASSESSMENT — ACTIVITIES OF DAILY LIVING (ADL)
ADLS_ACUITY_SCORE: 35
ADLS_ACUITY_SCORE: 23
ADLS_ACUITY_SCORE: 35
ADLS_ACUITY_SCORE: 35
ADLS_ACUITY_SCORE: 23

## 2023-12-13 ASSESSMENT — LIFESTYLE VARIABLES: TOBACCO_USE: 0

## 2023-12-13 NOTE — PROGRESS NOTES
Right hand wrist wound care soak with soap and water, cleansing of the site and bandage change has been completed.  Pt denies pain. Magi Mckeon RN

## 2023-12-13 NOTE — PROGRESS NOTES
[facility]    Medicine Progress Note - Hospitalist Service    Date of Admission:  12/11/2023    Assessment & Plan   Amor Zavaleta is a 78 year old male with history of type 2 diabetes, stage III kidney disease, hypertension and LBBB who presents to the ER with painful right upper extremity swelling and redness following cat bite.    Incision and drainage with copious irrigation of right dorsal forearm, wrist, hand and fourth dorsal extensor compartment was performed by the surgeon on 12/11/2023.  Notable findings operative include right dorsal forearm abscess and superior of extensor tenosynovitis.  He is receiving Unasyn and vancomycin per ID recommendation.    Right dorsal forearm abscess and superior of extensor tenosynovitis.   Extensive right upper extremity cellulitis  Concern for right upper extremity abscess  Extensive right upper extremity swelling and redness  Severe sepsis  CT scan of the right upper extremity showed multifocal tenosynovitis involving the extensor tendons in the hand hand extensive cellulitis in the forearm, wrist and hand without discrete fluid collection to suggest an abscess or soft tissue gas.      Continue Unasyn   Vancomycin as per pharmacy  Follow-up culture of surgical specimen  Follow-up blood culture  ID following-appreciate assistance  Hand surgery follow up - appreciate assistance     Hyponatremia  Possibly secondary to hyperglycemia and hypovolemia  Monitor sodium  IV hydration as above    Hypokalemia  Monitor potassium and replace as per protocol    RENO on stage III CKD  Creatinine is improving  Received IV fluid briefly.  Oral hydration as tolerated  Monitor BMP  Avoid nephrotoxins        Diet: Advance Diet as Tolerated: Regular Diet Adult  Discharge Instruction - Regular Diet Adult    DVT Prophylaxis: Pneumatic Compression Devices  Browning Catheter: Not present  Lines: None     Cardiac Monitoring: None  Code Status: Full Code      Clinically  Significant Risk Factors        # Hypokalemia: Lowest K = 2.5 mmol/L in last 2 days, will replace as needed   # Hypocalcemia: Lowest Ca = 7.5 mg/dL in last 2 days, will monitor and replace as appropriate    # Anion Gap Metabolic Acidosis: Highest Anion Gap = 19 mmol/L in last 2 days, will monitor and treat as appropriate  # Hypoalbuminemia: Lowest albumin = 3.4 g/dL at 12/11/2023  1:16 PM, will monitor as appropriate           # DMII: A1C = 8.9 % (Ref range: <5.7 %) within past 6 months    # Moderate Malnutrition: based on nutrition assessment, PRESENT ON ADMISSION   # Financial/Environmental Concerns: none         Disposition Plan     Expected Discharge Date: 12/13/2023      Destination: home with help/services           Garcia Tillman MD  Hospitalist Service  [facility]  Securely message with [employer] (more info)  Text page via [employer] Paging/Directory   ______________________________________________________________________    Interval History   No new complaints today and no acute events overnight.  Drainage from right upper extremity surgical wound seems to have increased.  Patient is currently n.p.o. as recommended by surgery team.  He is also receiving IV fluids.  Pain is well-controlled. Glucose is in the 150s this morning.      Physical Exam   Vital Signs: Temp: 99.1  F (37.3  C) Temp src: Oral BP: (!) 144/73 Pulse: 57   Resp: 18 SpO2: 96 % O2 Device: None (Room air) Oxygen Delivery: 1 LPM  Weight: 215 lbs 6.23 oz    General appearance: Dehydrated, awake, Alert, Cooperative, not in any obvious distress and appears stated age   HEENT: Normocephalic, atraumatic, conjunctiva clear without icterus and ears without discharge  Lungs: Clear to auscultation bilaterally, no wheezing, good air exchange, normal work of breathing  Cardiovascular: Regular Rate and Rythm, normal apical impulse, normal S1 and S2, no lower extremity edema bilaterally  Abdomen: Soft, non-tender and Non-distended,  active bowel sounds  Skin: Right upper extremity swelling with areas of erythema and tenderness.  Right hand wound dressing is moderately soaked. Ace wrap in place.  Musculoskeletal: No bony deformities or joint tenderness. Normal ROM upon flexion & extension.   Neurologic: Alert & Oriented X 3, Facial symmetry preserved and upper & lower extremities moving well with symmetry  Psychiatric: Calm, normal eye contact and normal affect         Medical Decision Making       40 MINUTES SPENT BY ME on the date of service doing chart review, history, exam, documentation & further activities per the note.      Data   Imaging results reviewed over the past 24 hrs:   No results found for this or any previous visit (from the past 24 hour(s)).

## 2023-12-13 NOTE — ANESTHESIA POSTPROCEDURE EVALUATION
Patient: Amor Zavaleta    Procedure: Procedure(s):  INCISION AND DRAINAGE, UPPER EXTREMITY       Anesthesia Type:  General    Note:  Disposition: Inpatient   Postop Pain Control: Uneventful            Sign Out: Well controlled pain   PONV: No   Neuro/Psych: Uneventful            Sign Out: Acceptable/Baseline neuro status   Airway/Respiratory: Uneventful            Sign Out: Acceptable/Baseline resp. status   CV/Hemodynamics: Uneventful            Sign Out: Acceptable CV status; No obvious hypovolemia; No obvious fluid overload   Other NRE: NONE   DID A NON-ROUTINE EVENT OCCUR?            Last vitals:  Vitals Value Taken Time   BP     Temp     Pulse     Resp     SpO2         Electronically Signed By: Bucky Brown MD  December 13, 2023  5:01 PM

## 2023-12-13 NOTE — PLAN OF CARE
Goal Outcome Evaluation:    Slept well. Endorsing 6/10 right hand pain- prn oxycodone and tylenol given with some effectiveness. Right hand kept elevated on pillow, chucks underneath d/t penrose draining serosanguinous output- gauze dressing in place and wrapped with ACE bandage. IVF and antibiotics infusing. NPO since midnight for possible procedure today- ortho to see and make decision this AM. K+ and mag protocols, re-check this am.     Temp: 98.6  F (37  C) Temp src: Oral BP: (!) 148/68 Pulse: 62   Resp: 20 SpO2: 94 % O2 Device: None (Room air) Oxygen Delivery: 1 LPM

## 2023-12-13 NOTE — ANESTHESIA PROCEDURE NOTES
Supraclavicular Procedure Note    Pre-Procedure   Staff -        Anesthesiologist:  Josh Taylor MD       Performed By: anesthesiologist       Location: pre-op       Procedure Start/Stop Times: 12/13/2023 2:45 PM and 12/13/2023 2:55 PM       Pre-Anesthestic Checklist: patient identified, IV checked, site marked, risks and benefits discussed, informed consent, monitors and equipment checked, pre-op evaluation, at physician/surgeon's request and post-op pain management  Timeout:       Correct Patient: Yes        Correct Procedure: Yes        Correct Site: Yes        Correct Position: Yes        Correct Laterality: Yes        Site Marked: Yes  Procedure Documentation  Procedure: Supraclavicular       Laterality: right       Patient Position: supine       Patient Prep/Sterile Barriers: sterile gloves, mask       Skin prep: Chloraprep       Needle Type: short bevel       Needle Gauge: 20.        Needle Length (Inches): 4        Ultrasound guided       1. Ultrasound was used to identify targeted nerve, plexus, vascular marker, or fascial plane and place a needle adjacent to it in real-time.       2. Ultrasound was used to visualize the spread of anesthetic in close proximity to the above referenced structure.       3. A permanent image is entered into the patient's record.       4. The visualized anatomic structures appeared normal.       5. There were no apparent abnormal pathologic findings.    Assessment/Narrative         The placement was negative for: blood aspirated, painful injection and site bleeding       Paresthesias: No.       Test dose of mL at.         Test dose negative, 3 minutes after injection, for signs of intravascular, subdural, or intrathecal injection.       Bolus given via needle. no blood aspirated via catheter.        Secured via.        Insertion/Infusion Method: Single Shot       Complications: none       Injection made incrementally with aspirations every 5 mL.    Medication(s)  "Administered   Ropivacaine 0.5% w/ 1:200K Epi (Injection) (Mixture components: ROPivacaine 5 MG/ML Soln, 50 mL; EPINEPHrine PF 1 MG/ML Soln, 0.25 mL) - Injection   30 mL - 12/13/2023 2:45:00 PM  Medication Administration Time: 12/13/2023 2:45 PM      FOR [facility] ONLY:   Pain Team Contact information: please page the Pain Team Via [employer]. Search \"Pain\". During daytime hours, please page the attending first. At night please page the resident first.      "

## 2023-12-13 NOTE — ANESTHESIA CARE TRANSFER NOTE
Patient: Amor Zavaleta    Procedure: Procedure(s):  INCISION AND DRAINAGE, UPPER EXTREMITY       Diagnosis: Cellulitis of right upper extremity [L03.113]  Diagnosis Additional Information: No value filed.    Anesthesia Type:   General     Note:    Oropharynx: oropharynx clear of all foreign objects  Level of Consciousness: awake  Oxygen Supplementation: room air    Independent Airway: airway patency satisfactory and stable  Dentition: dentition unchanged  Vital Signs Stable: post-procedure vital signs reviewed and stable  Report to RN Given: handoff report given            Vitals:  Vitals Value Taken Time   BP     Temp     Pulse     Resp     SpO2         Electronically Signed By: CHIN Anderson CRNA  December 13, 2023  4:55 PM

## 2023-12-13 NOTE — PROGRESS NOTES
Diabetes Care  Note  Situation:  Nurse called me about patient's questions about his insulin use    Background:  Noted blood sugars remain elevated above hospital goal of < 180 for improved outcomes.  Home PTA diabetes meds:  56 units of Lantus at hs  16 units of Novolog at meals plus correction scale (he thinks it is 1 unit for every 20 mg/dl over 140)  Current Inpatient diabetes meds:  56 units of Lantus in am  1:10 I:C ratio at meals, Novolog to carb grams  Novolog correction scale: 1/50 over 140 at meals, over 200 at hs  Values;  A1C:  8.9         GFR:  47      BMI:29.21       Intake documented 100%    Talked to patient about his insulin management and the two types of insulin he is on.  Educated about always taking his baseline insulin, Lantus and the 16 units Novolog at meals implies consistent carbs at meals. Reviewed carbs.  Reviewed sick day guidelines. Talked about effect of infection on Bg. Also reviewed the complications of diabetes. Aware of low BG sx as used to be on more Lantus and Novolog but doses decreased due to low BG.  He does see an OP CDE.     Assessment: BG elevated with infection, also could use insulin adjustment at meals    Recommendations:  1.Noting amount of basal insulin ordered, PTA insulin regimen, and guidelines, I:C ratio could be changed to 1:5 to improve BG control.   2.Follow-up with PCP, OP CDE for further insulin adjustment based on BG patterns    Hospital BG goals < 180 for improved outcomes. There is impaired immune function at BG levels above 180 mg/dl.    Thanks.     Tania De Anda RN, Certified [place] [address]  [email or web address]   Office: [phone]  Pager: [phone]

## 2023-12-13 NOTE — PROGRESS NOTES
Care Management Follow Up    Length of Stay (days): 2    Expected Discharge Date: 12/15/2023     Concerns to be Addressed: discharge planning     Patient plan of care discussed at interdisciplinary rounds: Yes    Anticipated Discharge Disposition: Home, Home Care     Anticipated Discharge Services: None  Anticipated Discharge DME: None    Patient/family educated on Medicare website which has current facility and service quality ratings: yes  Education Provided on the Discharge Plan: Yes  Patient/Family in Agreement with the Plan: yes    Referrals Placed by CM/SW: Homecare  Private pay costs discussed: Not applicable    Additional Information:      IVAN Walters

## 2023-12-13 NOTE — ANESTHESIA POSTPROCEDURE EVALUATION
Patient: Amor Zavaleta    Procedure: Procedure(s):  INCISION AND DRAINAGE, UPPER EXTREMITY       Anesthesia Type:  General    Note:  Anesthesia Post Evaluation    Last vitals:  Vitals Value Taken Time   BP     Temp     Pulse     Resp     SpO2         Electronically Signed By: CHIN Anderson CRNA  December 13, 2023  4:54 PM

## 2023-12-13 NOTE — OP NOTE
Date of Procedure: 12/13/2023     Surgeon: Leah Leyva M.D.     Assistant: Sharron New PA-C PA-C assist required for patient positioning, soft tissue retraction, instrumentation assist, and patient's safety.     Preoperative diagnosis:   1.  Right dorsal forearm suppurative fascitis/abscess and extensor tenosynovitis.  2.  Rule out septic right wrist joint.     Postoperative diagnosis:   Right dorsal forearm suppurative fascitis/abscess and extensor tenosynovitis.     Operation/procedures performed:   1.  Right wrist arthrotomy for possible septic arthritis  2.  Incision and drainage with copious irrigation of right dorsal forearm, wrist, hand and fourth dorsal extensor compartment.    Findings:  No purulence in the right wrist joint.     Sedation/anesthesia: General endotracheal anesthesia     Complications: None     Drains: 2 segments of a Penrose drain were placed to allow for drainage postoperatively     Estimated blood loss: 10 cc     Implants: None.     Specimens: None.  Cultures were taken on 12/11/2023.     Indications for Surgery: The patient is a 78-year-old male with a history of type 2 diabetes who sustained multiple cat scratches approximately 10 days ago.  He has developed significant pain, swelling, and erythema over the dorsum of his right forearm, wrist, and hand.  He presented to the ED on 12/11/2023.  Based on the patient's history and clinical exam, I recommended proceeding with surgery to incise and debride the dorsum of the forearm, wrist, and hand.  Patient was taken to the OR on the evening of 12/11/2023 and this patient procedure was performed.  There was extensive purulence in the soft tissues between the subcutaneous fat and the extensor tendon fascia.  There was also some purulence within the fourth dorsal compartment.  The patient has been on IV antibiotics and is clinically improving, but the improvement is quite slow.  I therefore have recommended taking him back to the OR for a  "repeat I&D.  The risks, benefits, and alternatives of this surgical procedure were thoroughly discussed with the patient.  I outlined the risks of surgery which included, but are not limited to: Infection, recurrence, incisional pain, the development of scar tissue, and/or neurovascular injury. The consequences of such risks could include hospital admission, additional surgery, chronic pain, loss of strength, loss of sensation, loss of motion and/or loss of function. I explained that although these risks are low they are real. The patient then had opportunity to ask questions which I answered. After thorough discussion, the patient verbalized understanding and stated that they wished proceed with operative intervention.     Tourniquet time: Per anesthesia record     Disposition: Recovery room in good condition     Operation description: The patient was identified, informed consent was obtained, and the surgical site was marked in the preop area.  The patient was then brought into the operating room and placed supine with the right upper extremity on an arm table.  The anesthetist administered general endotracheal anesthesia.     The patient's right upper extremity was prepped and draped in standard surgical fashion.  This included placing a well-padded tourniquet, upper arm.  Prior to beginning the case of a \"timeout\" was performed by the surgical team to confirm surgical site, side, and procedure.  The right upper extremity was elevated for ~5 minutes.  The tourniquet was then inflated to 250 mmHg.     I reopened the 10-15 cm incision over the midline of the dorsum of the wrist/hand.  The incision went from 4 cm proximal to the wrist crease to over the metacarpal.  I used tenotomy scissors to just dissect through the subcutaneous tissue.      [There was purulence in the subcutaneous tissue over the wrist and fourth dorsal extensor compartment.  Bluntly dissected with my finger to the radial border of the wrist and " the ulnar border of the wrist.  I also bluntly dissected to the level of the MP joint.  The pus in the subcutaneous tissue was contained to the of the fourth dorsal compartment just proximal to the carpal joint.  There was also purulence identified in the fourth extensor compartment.  I opened up the extensor retinaculum.  There was approximately 1-2 cc of purulence within the extensor compartment.  I opened up the second and third dorsal compartments.  There was also some purulence in the second and third dorsal compartments.     I debrided necrotic tissue from the the dorsal soft tissues of the distal forearm, wrist, and hand.  I also debrided necrotic tissue and tenosynovium from the second, third, and fourth dorsal compartments.  The dorsal soft tissues and the second, third, and fourth dorsal compartments were then copiously irrigated with 2.5 L of a bag of 3 L of normal saline impregnated with 3 g of Ancef.      After copiously irrigating the dorsal soft tissues, I retracted the EPL tendon.  I made a vertical incision in the dorsal wrist capsule and evaluated the radiocarpal joint.  There was joint fluid and serous fluid in the wrist joint.  There was no significant purulence in the wrist joint.  I then irrigated the wrist joint as well as the dorsal tissues with the remaining 0.5 L of the 3 L bag of normal saline impregnated with 3 g of Ancef.    2 segments of 1/4 inch Penrose drain placed in the wound to allow for drainage.     The surgical incision was loosely closed with 3-0 nylon placed in a simple fashion.  A sterile soft dressing was applied.  The patient was then awoken from anesthesia and brought to recovery in good condition.  There were no complications.     Postoperative plan:  1.  Pain control.  2.  Continue IV Abx (Vanco and Unasyn) per ID recommendation.  4.  Elevate right upper extremity.  5.  Warm soaks to right upper extremity incision.  6.  Pull drains on Friday, 12/15/2023, in AM.

## 2023-12-13 NOTE — PROGRESS NOTES
Patient has returned from I & D of the dorsum forearm, wrist and hand.  VSS Pt is A&O   Pt received a block, and has the R arm in a sling at this time.  Magi Mckeon

## 2023-12-13 NOTE — PROGRESS NOTES
"[facility] Progress Note    SUBJECTIVE:  Did soak the wound.  We have + micro now.        REVIEW OF SYSTEMS:  Negative unless as listed above.  Social history, Family history, Medications: reviewed.    OBJECTIVE:  BP (!) 144/73 (BP Location: Left arm)   Pulse 57   Temp 99.1  F (37.3  C) (Oral)   Resp 18   Wt 97.7 kg (215 lb 6.2 oz)   SpO2 96%   BMI 29.21 kg/m                PHYSICAL EXAM:  Alert, awake  Vitals tabulated above, reviewed  Neck supple without lymphadenopathy  Sclera normal color, not injected  CARDIOVASCULAR regular rate and rhythm, no murmur  Lungs CLEAR TO AUSCULTATION   Abdomen soft, NT/ND, absent HEPATOSPLENOMEGALY  Skin normal  Joints normal  Neurologic exam non focal  Wound wrapped but very swollen and warm R forearm    Antibiotics:unasyn, vanco    Pertinent labs:    Recent Labs   Lab Test 12/13/23  0806 12/12/23  0746 12/12/23  0624   WBC 18.4* 25.0* 25.2*   HGB 12.0* 12.7* 11.6*   HCT 35.9* 38.1* 34.7*   MCV 95 94 94    201 194       Lab Results   Component Value Date    RBC 3.79 12/13/2023     Lab Results   Component Value Date    HGB 12.0 12/13/2023     Lab Results   Component Value Date    HCT 35.9 12/13/2023     No components found for: \"MCT\"  Lab Results   Component Value Date    MCV 95 12/13/2023     Lab Results   Component Value Date    MCH 31.7 12/13/2023     Lab Results   Component Value Date    MCHC 33.4 12/13/2023     Lab Results   Component Value Date    RDW 13.4 12/13/2023     Lab Results   Component Value Date     12/13/2023       Last Comprehensive Metabolic Panel:  Sodium   Date Value Ref Range Status   12/13/2023 138 135 - 145 mmol/L Final     Comment:     Reference intervals for this test were updated on 09/26/2023 to more accurately reflect our healthy population. There may be differences in the flagging of prior results with similar values performed with this method. Interpretation of those prior results can be made in the context of the " "updated reference intervals.      Potassium   Date Value Ref Range Status   12/13/2023 3.2 (L) 3.4 - 5.3 mmol/L Final   12/13/2023 3.2 (L) 3.4 - 5.3 mmol/L Final   06/28/2022 3.3 (L) 3.5 - 5.0 mmol/L Final     Chloride   Date Value Ref Range Status   12/13/2023 105 98 - 107 mmol/L Final   06/28/2022 103 98 - 107 mmol/L Final     Carbon Dioxide (CO2)   Date Value Ref Range Status   12/13/2023 23 22 - 29 mmol/L Final   06/28/2022 26 22 - 31 mmol/L Final     Anion Gap   Date Value Ref Range Status   12/13/2023 10 7 - 15 mmol/L Final   06/28/2022 13 5 - 18 mmol/L Final     Glucose   Date Value Ref Range Status   12/13/2023 148 (H) 70 - 99 mg/dL Final   06/28/2022 91 70 - 125 mg/dL Final     GLUCOSE BY METER POCT   Date Value Ref Range Status   12/13/2023 159 (H) 70 - 99 mg/dL Final     Urea Nitrogen   Date Value Ref Range Status   12/13/2023 24.2 (H) 8.0 - 23.0 mg/dL Final   06/28/2022 26 8 - 28 mg/dL Final     Creatinine   Date Value Ref Range Status   12/13/2023 1.40 (H) 0.67 - 1.17 mg/dL Final   12/13/2023 1.38 (H) 0.67 - 1.17 mg/dL Final     GFR Estimate   Date Value Ref Range Status   12/13/2023 51 (L) >60 mL/min/1.73m2 Final   12/13/2023 52 (L) >60 mL/min/1.73m2 Final   05/19/2021 37 (L) >60 mL/min/1.73m2 Final     Calcium   Date Value Ref Range Status   12/13/2023 7.5 (L) 8.8 - 10.2 mg/dL Final       Liver Function Studies -   Recent Labs   Lab Test 12/11/23  1316   PROTTOTAL 7.5   ALBUMIN 3.4*   BILITOTAL 1.1   ALKPHOS 120   AST 27   ALT 45       Erythrocyte Sedimentation Rate   Date Value Ref Range Status   12/11/2023 61 (H) 0 - 20 mm/hr Final       No results found for: \"CRP\"            MICROBIOLOGY DATA:    pasteurella    IMAGING/RADIOLOGY:          ASSESSMENT:  Pasteurella cat bite infection/cellulitis    RECOMMENDATION:  Same abx  Surgery again today  Give 40mg solumedrol see if can tamp the swelling down some  Will follow    MIRANDA Barnes MD  Office [phone] option 2 to desk staff  "

## 2023-12-13 NOTE — PLAN OF CARE
Problem: Adult Inpatient Plan of Care  Goal: Plan of Care Review  Description: The Plan of Care Review/Shift note should be completed every shift.  The Outcome Evaluation is a brief statement about your assessment that the patient is improving, declining, or no change.  This information will be displayed automatically on your shift  note.  Outcome: Progressing   Goal Outcome Evaluation:       Warm water soak done and dressing changed as per order. Pt still having a lot of drainage from the penrose. CMS intact. NPO after midnight. Pt updated on plan of care. Call light and items placed within reach, bed in low position, alarm on, and side rails up.

## 2023-12-13 NOTE — ANESTHESIA PREPROCEDURE EVALUATION
Anesthesia Pre-Procedure Evaluation    Patient: Amor Zavaleta   MRN: [id number] : 1945        Procedure : Procedure(s):  INCISION AND DRAINAGE, UPPER EXTREMITY          History reviewed. No pertinent past medical history.   Past Surgical History:   Procedure Laterality Date    INCISION AND DRAINAGE UPPER EXTREMITY, COMBINED Right 2023    Procedure: INCISION AND DRAINAGE, RIGHT UPPER FOREARM;  Surgeon: Leah Leyva MD;  Location: [facility]      Allergies   Allergen Reactions    Sulfa Antibiotics Other (See Comments)      Social History     Tobacco Use    Smoking status: Never    Smokeless tobacco: Never   Substance Use Topics    Alcohol use: Not Currently      Wt Readings from Last 1 Encounters:   23 97.7 kg (215 lb 6.2 oz)        Anesthesia Evaluation   Pt has had prior anesthetic.     No history of anesthetic complications       ROS/MED HX  ENT/Pulmonary:    (-) tobacco use   Neurologic:  - neg neurologic ROS   (+)    peripheral neuropathy,                            Cardiovascular: Comment: LBBB.    (+)  hypertension- -   -  - -                          Irregular Heartbeat/Palpitations,            METS/Exercise Tolerance:     Hematologic:     (+)      anemia,          Musculoskeletal:  - neg musculoskeletal ROS     GI/Hepatic:     (+) GERD, Asymptomatic on medication,               (-) hiatal hernia and esophageal disease   Renal/Genitourinary:     (+) renal disease, type: CRI,            Endo:     (+)  type II DM,       Diabetic complications: nephropathy neuropathy retinopathy.             Psychiatric/Substance Use:  - neg psychiatric ROS     Infectious Disease: Comment: UE infection.    (+) Recent Fever,           Malignancy:  - neg malignancy ROS     Other:  - neg other ROS          Physical Exam    Airway        Mallampati: II   TM distance: > 3 FB   Neck ROM: full   Mouth opening: > 3 cm    Respiratory Devices and Support         Dental           Cardiovascular    "cardiovascular exam normal          Pulmonary                   OUTSIDE LABS:  CBC:   Lab Results   Component Value Date    WBC 18.4 (H) 12/13/2023    WBC 25.0 (H) 12/12/2023    HGB 12.0 (L) 12/13/2023    HGB 12.7 (L) 12/12/2023    HCT 35.9 (L) 12/13/2023    HCT 38.1 (L) 12/12/2023     12/13/2023     12/12/2023     BMP:   Lab Results   Component Value Date     12/13/2023     (L) 12/12/2023    POTASSIUM 3.2 (L) 12/13/2023    POTASSIUM 3.2 (L) 12/13/2023    CHLORIDE 105 12/13/2023    CHLORIDE 99 12/12/2023    CO2 23 12/13/2023    CO2 19 (L) 12/12/2023    BUN 24.2 (H) 12/13/2023    BUN 30.9 (H) 12/12/2023    CR 1.40 (H) 12/13/2023    CR 1.38 (H) 12/13/2023     (H) 12/13/2023     (H) 12/13/2023     COAGS: No results found for: \"PTT\", \"INR\", \"FIBR\"  POC: No results found for: \"BGM\", \"HCG\", \"HCGS\"  HEPATIC:   Lab Results   Component Value Date    ALBUMIN 3.4 (L) 12/11/2023    PROTTOTAL 7.5 12/11/2023    ALT 45 12/11/2023    AST 27 12/11/2023    ALKPHOS 120 12/11/2023    BILITOTAL 1.1 12/11/2023     OTHER:   Lab Results   Component Value Date    PH 7.46 (H) 12/11/2023    LACT 1.2 12/12/2023    A1C 8.9 (H) 12/11/2023    VLADIMIR 7.5 (L) 12/13/2023    PHOS 2.7 07/07/2023    MAG 2.1 12/13/2023    SED 61 (H) 12/11/2023       Anesthesia Plan    ASA Status:  3    NPO Status:  NPO Appropriate    Anesthesia Type: General.     - Airway: Mask Only      Maintenance: TIVA.        Consents    Anesthesia Plan(s) and associated risks, benefits, and realistic alternatives discussed. Questions answered and patient/representative(s) expressed understanding.     - Discussed:     - Discussed with:  Patient      - Extended Intubation/Ventilatory Support Discussed: No.      - Patient is DNR/DNI Status: No     Use of blood products discussed: No .     Postoperative Care    Pain management: Peripheral nerve block (Single Shot), IV analgesics, Multi-modal analgesia, Oral pain medications.   PONV prophylaxis: " Ondansetron (or other 5HT-3), Dexamethasone or Solumedrol     Comments:    Other Comments: Decadron, Zofran.  Diprivan infusion.  Ketamine (0.5 mg/kg).             Josh Taylor MD    I have reviewed the pertinent notes and labs in the chart from the past 30 days and (re)examined the patient.  Any updates or changes from those notes are reflected in this note.

## 2023-12-14 ENCOUNTER — APPOINTMENT (OUTPATIENT)
Dept: OCCUPATIONAL THERAPY | Facility: HOSPITAL | Age: 78
DRG: 853 | End: 2023-12-14
Payer: COMMERCIAL

## 2023-12-14 LAB
ATRIAL RATE - MUSE: 57 BPM
BACTERIA ABSC ANAEROBE+AEROBE CULT: ABNORMAL
BACTERIA TISS BX CULT: ABNORMAL
BACTERIA TISS BX CULT: ABNORMAL
CREAT SERPL-MCNC: 1.24 MG/DL (ref 0.67–1.17)
DIASTOLIC BLOOD PRESSURE - MUSE: 61 MMHG
EGFRCR SERPLBLD CKD-EPI 2021: 60 ML/MIN/1.73M2
ERYTHROCYTE [DISTWIDTH] IN BLOOD BY AUTOMATED COUNT: 13.3 % (ref 10–15)
GLUCOSE BLDC GLUCOMTR-MCNC: 172 MG/DL (ref 70–99)
GLUCOSE BLDC GLUCOMTR-MCNC: 177 MG/DL (ref 70–99)
GLUCOSE BLDC GLUCOMTR-MCNC: 205 MG/DL (ref 70–99)
GLUCOSE BLDC GLUCOMTR-MCNC: 206 MG/DL (ref 70–99)
GLUCOSE BLDC GLUCOMTR-MCNC: 216 MG/DL (ref 70–99)
GLUCOSE BLDC GLUCOMTR-MCNC: 223 MG/DL (ref 70–99)
HCT VFR BLD AUTO: 36.9 % (ref 40–53)
HGB BLD-MCNC: 12.2 G/DL (ref 13.3–17.7)
INTERPRETATION ECG - MUSE: NORMAL
MAGNESIUM SERPL-MCNC: 1.9 MG/DL (ref 1.7–2.3)
MCH RBC QN AUTO: 31.4 PG (ref 26.5–33)
MCHC RBC AUTO-ENTMCNC: 33.1 G/DL (ref 31.5–36.5)
MCV RBC AUTO: 95 FL (ref 78–100)
P AXIS - MUSE: 77 DEGREES
PLATELET # BLD AUTO: 236 10E3/UL (ref 150–450)
POTASSIUM SERPL-SCNC: 3.2 MMOL/L (ref 3.4–5.3)
POTASSIUM SERPL-SCNC: 4.1 MMOL/L (ref 3.4–5.3)
PR INTERVAL - MUSE: 218 MS
QRS DURATION - MUSE: 176 MS
QT - MUSE: 596 MS
QTC - MUSE: 580 MS
R AXIS - MUSE: -75 DEGREES
RBC # BLD AUTO: 3.88 10E6/UL (ref 4.4–5.9)
SYSTOLIC BLOOD PRESSURE - MUSE: 134 MMHG
T AXIS - MUSE: 66 DEGREES
VANCOMYCIN SERPL-MCNC: 25.2 UG/ML
VENTRICULAR RATE- MUSE: 57 BPM
WBC # BLD AUTO: 15.8 10E3/UL (ref 4–11)

## 2023-12-14 PROCEDURE — 82565 ASSAY OF CREATININE: CPT | Performed by: INTERNAL MEDICINE

## 2023-12-14 PROCEDURE — 99232 SBSQ HOSP IP/OBS MODERATE 35: CPT | Performed by: INTERNAL MEDICINE

## 2023-12-14 PROCEDURE — 250N000013 HC RX MED GY IP 250 OP 250 PS 637: Performed by: INTERNAL MEDICINE

## 2023-12-14 PROCEDURE — 84132 ASSAY OF SERUM POTASSIUM: CPT | Performed by: INTERNAL MEDICINE

## 2023-12-14 PROCEDURE — 36415 COLL VENOUS BLD VENIPUNCTURE: CPT | Performed by: INTERNAL MEDICINE

## 2023-12-14 PROCEDURE — 85027 COMPLETE CBC AUTOMATED: CPT | Performed by: PHYSICIAN ASSISTANT

## 2023-12-14 PROCEDURE — 36415 COLL VENOUS BLD VENIPUNCTURE: CPT | Performed by: PHYSICIAN ASSISTANT

## 2023-12-14 PROCEDURE — 83735 ASSAY OF MAGNESIUM: CPT | Performed by: INTERNAL MEDICINE

## 2023-12-14 PROCEDURE — 97110 THERAPEUTIC EXERCISES: CPT | Mod: GO

## 2023-12-14 PROCEDURE — 80202 ASSAY OF VANCOMYCIN: CPT | Performed by: INTERNAL MEDICINE

## 2023-12-14 PROCEDURE — 250N000013 HC RX MED GY IP 250 OP 250 PS 637: Performed by: PHYSICIAN ASSISTANT

## 2023-12-14 PROCEDURE — 250N000011 HC RX IP 250 OP 636: Performed by: INTERNAL MEDICINE

## 2023-12-14 PROCEDURE — 97535 SELF CARE MNGMENT TRAINING: CPT | Mod: GO

## 2023-12-14 PROCEDURE — 120N000001 HC R&B MED SURG/OB

## 2023-12-14 RX ORDER — METRONIDAZOLE 500 MG/1
500 TABLET ORAL 2 TIMES DAILY
Status: DISCONTINUED | OUTPATIENT
Start: 2023-12-14 | End: 2023-12-18

## 2023-12-14 RX ORDER — CEFAZOLIN SODIUM 1 G/50ML
1250 SOLUTION INTRAVENOUS EVERY 24 HOURS
Status: DISCONTINUED | OUTPATIENT
Start: 2023-12-15 | End: 2023-12-15

## 2023-12-14 RX ORDER — POTASSIUM CHLORIDE 1500 MG/1
40 TABLET, EXTENDED RELEASE ORAL ONCE
Status: COMPLETED | OUTPATIENT
Start: 2023-12-14 | End: 2023-12-14

## 2023-12-14 RX ADMIN — METRONIDAZOLE 500 MG: 500 TABLET ORAL at 20:58

## 2023-12-14 RX ADMIN — POLYETHYLENE GLYCOL 3350 17 G: 17 POWDER, FOR SOLUTION ORAL at 08:36

## 2023-12-14 RX ADMIN — HYDROMORPHONE HYDROCHLORIDE 0.2 MG: 1 INJECTION, SOLUTION INTRAMUSCULAR; INTRAVENOUS; SUBCUTANEOUS at 08:45

## 2023-12-14 RX ADMIN — SENNOSIDES AND DOCUSATE SODIUM 1 TABLET: 8.6; 5 TABLET ORAL at 20:58

## 2023-12-14 RX ADMIN — AMPICILLIN SODIUM AND SULBACTAM SODIUM 3 G: 2; 1 INJECTION, POWDER, FOR SOLUTION INTRAMUSCULAR; INTRAVENOUS at 15:15

## 2023-12-14 RX ADMIN — ATORVASTATIN CALCIUM 10 MG: 10 TABLET, FILM COATED ORAL at 20:58

## 2023-12-14 RX ADMIN — SENNOSIDES AND DOCUSATE SODIUM 1 TABLET: 8.6; 5 TABLET ORAL at 08:27

## 2023-12-14 RX ADMIN — AMPICILLIN SODIUM AND SULBACTAM SODIUM 3 G: 2; 1 INJECTION, POWDER, FOR SOLUTION INTRAMUSCULAR; INTRAVENOUS at 02:38

## 2023-12-14 RX ADMIN — ACETAMINOPHEN 650 MG: 325 TABLET ORAL at 20:58

## 2023-12-14 RX ADMIN — Medication 15 ML: at 20:58

## 2023-12-14 RX ADMIN — PANTOPRAZOLE SODIUM 40 MG: 40 TABLET, DELAYED RELEASE ORAL at 06:33

## 2023-12-14 RX ADMIN — AMPICILLIN SODIUM AND SULBACTAM SODIUM 3 G: 2; 1 INJECTION, POWDER, FOR SOLUTION INTRAMUSCULAR; INTRAVENOUS at 20:58

## 2023-12-14 RX ADMIN — ASPIRIN 325 MG ORAL TABLET 325 MG: 325 PILL ORAL at 08:28

## 2023-12-14 RX ADMIN — ACETAMINOPHEN 650 MG: 325 TABLET ORAL at 16:53

## 2023-12-14 RX ADMIN — VANCOMYCIN HYDROCHLORIDE 1000 MG: 1 INJECTION, SOLUTION INTRAVENOUS at 05:30

## 2023-12-14 RX ADMIN — METRONIDAZOLE 500 MG: 500 TABLET ORAL at 12:18

## 2023-12-14 RX ADMIN — HYDROMORPHONE HYDROCHLORIDE 0.4 MG: 1 INJECTION, SOLUTION INTRAMUSCULAR; INTRAVENOUS; SUBCUTANEOUS at 04:51

## 2023-12-14 RX ADMIN — POTASSIUM CHLORIDE 40 MEQ: 1500 TABLET, EXTENDED RELEASE ORAL at 08:26

## 2023-12-14 RX ADMIN — Medication 15 ML: at 08:26

## 2023-12-14 RX ADMIN — AMPICILLIN SODIUM AND SULBACTAM SODIUM 3 G: 2; 1 INJECTION, POWDER, FOR SOLUTION INTRAMUSCULAR; INTRAVENOUS at 08:32

## 2023-12-14 ASSESSMENT — ACTIVITIES OF DAILY LIVING (ADL)
ADLS_ACUITY_SCORE: 35

## 2023-12-14 NOTE — PROGRESS NOTES
[facility]    Medicine Progress Note - Hospitalist Service    Date of Admission:  12/11/2023    Assessment & Plan   Amor Zavaleta is a 78 year old male with history of type 2 diabetes, stage III kidney disease, hypertension and LBBB who presents to the ER with painful right upper extremity swelling and redness following cat bite.    Incision and drainage with copious irrigation of right dorsal forearm, wrist, hand and fourth dorsal extensor compartment was performed by the surgeon on 12/11/2023.  Notable findings operative include right dorsal forearm abscess and superior of extensor tenosynovitis.  He is receiving Unasyn and vancomycin per ID recommendation.    He was taken to OR again on 12/13/23 for right wrist arthrotomy for possible septic arthritis and Incision and drainage with copious irrigation of right dorsal forearm, wrist, hand and fourth dorsal extensor compartment.     Right dorsal forearm abscess and superior of extensor tenosynovitis.   Extensive right upper extremity cellulitis  Concern for right upper extremity abscess  Extensive right upper extremity swelling and redness  Severe sepsis  CT scan of the right upper extremity showed multifocal tenosynovitis involving the extensor tendons in the hand hand extensive cellulitis in the forearm, wrist and hand without discrete fluid collection to suggest an abscess or soft tissue gas.      Continue Unasyn   Vancomycin as per pharmacy  Follow-up culture of surgical specimen  Follow-up blood culture  ID following-appreciate assistance  Hand surgery follow up - appreciate assistance     Hyponatremia  Possibly secondary to hyperglycemia and hypovolemia  Monitor sodium  IV hydration as above    Hypokalemia  Monitor potassium and replace as per protocol    RENO on stage III CKD  Creatinine is improving  Received IV fluid briefly.  Oral hydration as tolerated  Monitor BMP  Avoid nephrotoxins    Type 2 diabetes  Increase lantus to 60  units daily   Continue insulin sliding scale   Insulin carb coverage 1:7  Monitor for hypoglycemia and correct per protocol        Diet: Discharge Instruction - Regular Diet Adult  Advance Diet as Tolerated: Regular Diet Adult    DVT Prophylaxis: Pneumatic Compression Devices  Browning Catheter: Not present  Lines: None     Cardiac Monitoring: None  Code Status: Full Code      Clinically Significant Risk Factors        # Hypokalemia: Lowest K = 3.2 mmol/L in last 2 days, will replace as needed       # Hypoalbuminemia: Lowest albumin = 3.4 g/dL at 12/11/2023  1:16 PM, will monitor as appropriate           # DMII: A1C = 8.9 % (Ref range: <5.7 %) within past 6 months    # Moderate Malnutrition: based on nutrition assessment, PRESENT ON ADMISSION   # Financial/Environmental Concerns: none         Disposition Plan     Expected Discharge Date: 12/15/2023    Discharge Delays: IV Medication - consider oral or Home Infusion  Lab Result Pending (enter specific test & time in comments)  Destination: home with help/services  Discharge Comments: IV abx         Garcia Tillman MD  Hospitalist Service  [facility]  Securely message with [employer] (more info)  Text page via [employer] by [employer] Paging/Directory   ______________________________________________________________________    Interval History   No new complaints today and no acute events overnight.  Pain is well controlled  Physical Exam   Vital Signs: Temp: 99  F (37.2  C) Temp src: Oral BP: (!) 158/74 Pulse: 62   Resp: 20 SpO2: 93 % O2 Device: None (Room air)    Weight: 215 lbs 6.23 oz    General appearance: Dehydrated, awake, Alert, Cooperative, not in any obvious distress and appears stated age   HEENT: Normocephalic, atraumatic, conjunctiva clear without icterus and ears without discharge  Lungs: Clear to auscultation bilaterally, no wheezing, good air exchange, normal work of breathing  Cardiovascular: Regular Rate and Rythm, normal apical impulse, normal S1  and S2, no lower extremity edema bilaterally  Abdomen: Soft, non-tender and Non-distended, active bowel sounds  Skin: Right hand and forearm in ace -wrap  Musculoskeletal: Right upper extremity sling in place. Right hand and forearm in ace -wrap  Neurologic: Alert & Oriented X 3, Facial symmetry preserved and upper & lower extremities moving well with symmetry  Psychiatric: Calm, normal eye contact and normal affect         Medical Decision Making       40 MINUTES SPENT BY ME on the date of service doing chart review, history, exam, documentation & further activities per the note.      Data   Imaging results reviewed over the past 24 hrs:   No results found for this or any previous visit (from the past 24 hour(s)).

## 2023-12-14 NOTE — PLAN OF CARE
Problem: Adult Inpatient Plan of Care  Goal: Absence of Hospital-Acquired Illness or Injury  Intervention: Identify and Manage Fall Risk  Recent Flowsheet Documentation  Taken 12/13/2023 1900 by Annmarie Mckeon RN  Safety Promotion/Fall Prevention:   assistive device/personal items within reach   clutter free environment maintained   increased rounding and observation   nonskid shoes/slippers when out of bed   treat reversible contributory factors   treat underlying cause  Taken 12/13/2023 1856 by Annmarie Mckeon RN  Safety Promotion/Fall Prevention:   assistive device/personal items within reach   clutter free environment maintained   increased rounding and observation   nonskid shoes/slippers when out of bed   treat reversible contributory factors   treat underlying cause  T Goal Outcome Evaluation:  BP (!) 149/70 (BP Location: Left arm)   Pulse 60   Temp 98.8  F (37.1  C) (Oral)   Resp 20   Wt 97.7 kg (215 lb 6.2 oz)   SpO2 96%   BMI 29.21 kg/m    A&O x4 VSS  See notes regarding I&D this afternoon.  Pt's diet has been advanced to regular.  Good appetite, good hydration.  Using urinal, with good urine output.  Pt had block anesthesia for afternoon procedure and denies any pain.  Patient appears comfortable in bed at bedside shift change.  Magi Mckeon RN

## 2023-12-14 NOTE — PLAN OF CARE
Problem: Adult Inpatient Plan of Care  Goal: Plan of Care Review  Description: The Plan of Care Review/Shift note should be completed every shift.  The Outcome Evaluation is a brief statement about your assessment that the patient is improving, declining, or no change.  This information will be displayed automatically on your shift  note.  Outcome: Progressing     Problem: Pain Acute  Goal: Optimal Pain Control and Function  Outcome: Progressing  Intervention: Prevent or Manage Pain  Recent Flowsheet Documentation  Taken 12/14/2023 0013 by Erik Calderón, RN  Medication Review/Management: medications reviewed  Taken 12/13/2023 1938 by Erik Calderón, RN  Medication Review/Management: medications reviewed     Problem: Comorbidity Management  Goal: Blood Glucose Levels Within Targeted Range  Outcome: Progressing  Intervention: Monitor and Manage Glycemia  Recent Flowsheet Documentation  Taken 12/14/2023 0013 by Erik Calderón, RN  Medication Review/Management: medications reviewed  Taken 12/13/2023 1938 by Erik Calderón, RN  Medication Review/Management: medications reviewed   Goal Outcome Evaluation:       PRN Dilaudid given for arm pain with good effect. IV antibiotics given as scheduled. Dressing on right upper extremity intact but soak, incontinent sheet applied under right arm. Voiding with no issue using urinals. BG At  and 0200  172. Vitals within normal range. No significant changes noted this shift. VSS on RA.

## 2023-12-14 NOTE — PROGRESS NOTES
"[facility] Progress Note    SUBJECTIVE:  His sister is here.  The arm is better today.  Second debridement done 12-13, reviewed.  There was pus found again.        REVIEW OF SYSTEMS:  Negative unless as listed above.  Social history, Family history, Medications: reviewed.    OBJECTIVE:  BP (!) 158/74 (BP Location: Left arm)   Pulse 62   Temp 99  F (37.2  C) (Oral)   Resp 20   Wt 97.7 kg (215 lb 6.2 oz)   SpO2 93%   BMI 29.21 kg/m                PHYSICAL EXAM:  Alert, awake  Vitals tabulated above, reviewed  Neck supple without lymphadenopathy  Sclera normal color, not injected  CARDIOVASCULAR regular rate and rhythm, no murmur  Lungs CLEAR TO AUSCULTATION   Abdomen soft, NT/ND, absent HEPATOSPLENOMEGALY  Skin normal  Joints normal  Neurologic exam non focal  Wound wrapped but very swollen and warm R forearm    Antibiotics:unasyn, vanco    Pertinent labs:    Recent Labs   Lab Test 12/14/23  0843 12/13/23  0806 12/12/23  0746   WBC 15.8* 18.4* 25.0*   HGB 12.2* 12.0* 12.7*   HCT 36.9* 35.9* 38.1*   MCV 95 95 94    214 201       Lab Results   Component Value Date    RBC 3.79 12/13/2023     Lab Results   Component Value Date    HGB 12.0 12/13/2023     Lab Results   Component Value Date    HCT 35.9 12/13/2023     No components found for: \"MCT\"  Lab Results   Component Value Date    MCV 95 12/13/2023     Lab Results   Component Value Date    MCH 31.7 12/13/2023     Lab Results   Component Value Date    MCHC 33.4 12/13/2023     Lab Results   Component Value Date    RDW 13.4 12/13/2023     Lab Results   Component Value Date     12/13/2023       Last Comprehensive Metabolic Panel:  Sodium   Date Value Ref Range Status   12/13/2023 138 135 - 145 mmol/L Final     Comment:     Reference intervals for this test were updated on 09/26/2023 to more accurately reflect our healthy population. There may be differences in the flagging of prior results with similar values performed with this method. " "Interpretation of those prior results can be made in the context of the updated reference intervals.      Potassium   Date Value Ref Range Status   12/14/2023 3.2 (L) 3.4 - 5.3 mmol/L Final   06/28/2022 3.3 (L) 3.5 - 5.0 mmol/L Final     Chloride   Date Value Ref Range Status   12/13/2023 105 98 - 107 mmol/L Final   06/28/2022 103 98 - 107 mmol/L Final     Carbon Dioxide (CO2)   Date Value Ref Range Status   12/13/2023 23 22 - 29 mmol/L Final   06/28/2022 26 22 - 31 mmol/L Final     Anion Gap   Date Value Ref Range Status   12/13/2023 10 7 - 15 mmol/L Final   06/28/2022 13 5 - 18 mmol/L Final     Glucose   Date Value Ref Range Status   06/28/2022 91 70 - 125 mg/dL Final     GLUCOSE BY METER POCT   Date Value Ref Range Status   12/14/2023 206 (H) 70 - 99 mg/dL Final     Comment:     Dr/RN Notified     Urea Nitrogen   Date Value Ref Range Status   12/13/2023 24.2 (H) 8.0 - 23.0 mg/dL Final   06/28/2022 26 8 - 28 mg/dL Final     Creatinine   Date Value Ref Range Status   12/14/2023 1.24 (H) 0.67 - 1.17 mg/dL Final     GFR Estimate   Date Value Ref Range Status   12/14/2023 60 (L) >60 mL/min/1.73m2 Final   05/19/2021 37 (L) >60 mL/min/1.73m2 Final     Calcium   Date Value Ref Range Status   12/13/2023 7.5 (L) 8.8 - 10.2 mg/dL Final       Liver Function Studies -   Recent Labs   Lab Test 12/11/23  1316   PROTTOTAL 7.5   ALBUMIN 3.4*   BILITOTAL 1.1   ALKPHOS 120   AST 27   ALT 45       Erythrocyte Sedimentation Rate   Date Value Ref Range Status   12/11/2023 61 (H) 0 - 20 mm/hr Final       No results found for: \"CRP\"            MICROBIOLOGY DATA:    Pasteurella, staph, bacteroides pyogenes    IMAGING/RADIOLOGY:          ASSESSMENT:  Pasteurella cat bite infection/cellulitis    RECOMMENDATION:  Vanco, unasyn, add flagyl PO  Pretty bad one here, may end up doing PICC line for a stretch.     MIRANDA Barnes MD  Office [phone] option 2 to desk staff  "

## 2023-12-14 NOTE — PLAN OF CARE
"  Problem: Adult Inpatient Plan of Care  Description: The Care Plan Review/Shift Note, Individualized Goals, Hospital-Acquired Illness or Injury, Comfort and Wellbeing, and Transition Planning are the \"Overarching Goals\" and should be updated throughout the hospitalization.  Please hover over the (i) for specific information on each goal topic.  Goal: Plan of Care Review  Description: The Plan of Care Review/Shift note should be completed every shift.  The Outcome Evaluation is a brief statement about your assessment that the patient is improving, declining, or no change.  This information will be displayed automatically on your shift  note.  Outcome: Progressing  Flowsheets (Taken 12/14/2023 3281)  Outcome Evaluation:   pt reports intake is better than baseline. Ate 100% of breakfast this am. Pt states needing ot order easy to handle foods d/t right hand dominant and unable to use at this time. Pt ordering inadequately   1500 kcal, 52 g protien yesterday over 3 meal = 70% of estimated kcal, 50%  of estimated protein needs.  Educated and reviewed protein foods with pt. BG in fair and improved control. RENO improving. Will order 75g CH/meal DM diet d/t dm and elevated BG and Glucerna BID = 440 kcal, 20 g protein  Plan of Care Reviewed With: patient  Overall Patient Progress: improving     Problem: Malnutrition  Goal: Improved Nutritional Intake  Outcome: Progressing   Goal Outcome Evaluation:      Plan of Care Reviewed With: patient    Overall Patient Progress: improvingOverall Patient Progress: improving    Outcome Evaluation: pt reports intake is better than baseline. Ate 100% of breakfast this am. Pt states needing ot order easy to handle foods d/t right hand dominant and unable to use at this time. Pt ordering inadequately; 1500 kcal, 52 g protien yesterday over 3 meal = 70% of estimated kcal, 50%  of estimated protein needs.  Educated and reviewed protein foods with pt. BG in fair and improved control. RENO " improving. Will order 75g CH/meal DM diet d/t dm and elevated BG and Glucerna BID = 440 kcal, 20 g protein

## 2023-12-14 NOTE — PHARMACY-VANCOMYCIN DOSING SERVICE
Pharmacy Vancomycin Note  Date of Service 2023  Patient's  1945   78 year old, male    Indication: Skin and Soft Tissue Infection  Day of Therapy: 4  Current vancomycin regimen:  1000 mg IV q24h  Current vancomycin monitoring method: AUC  Current vancomycin therapeutic monitoring goal: 400-600 mg*h/L    InsightRX Prediction of Current Vancomycin Regimen    Loading dose: N/A  Regimen: 1000 mg IV every 24 hours.  Start time: 05:30 on 12/15/2023  Exposure target: AUC24 (range)400-600 mg/L.hr   AUC24,ss: 357 mg/L.hr  Probability of AUC24 > 400: 33 %  Ctrough,ss: 10.7 mg/L  Probability of Ctrough,ss > 20: 4 %  Probability of nephrotoxicity (Lodise FREDDY ): 6 %      Current estimated CrCl = Estimated Creatinine Clearance: 59.4 mL/min (A) (based on SCr of 1.24 mg/dL (H)).    Creatinine for last 3 days  2023:  1:16 PM Creatinine 2.33 mg/dL;  6:18 PM Creatinine 2.01 mg/dL  2023:  6:24 AM Creatinine 1.64 mg/dL;  4:24 PM Creatinine 1.50 mg/dL  2023:  7:15 AM Creatinine 1.40 mg/dL;  7:15 AM Creatinine 1.38 mg/dL  2023:  6:54 AM Creatinine 1.24 mg/dL    Recent Vancomycin Levels (past 3 days)  2023:  6:54 AM Vancomycin 25.2 ug/mL    Vancomycin IV Administrations (past 72 hours)                     vancomycin (VANCOCIN) 1,000 mg in 200 mL dextrose intermittent infusion (mg) 1,000 mg New Bag 23 0530     1,000 mg New Bag 23 0533     1,000 mg New Bag 23 0515    vancomycin (VANCOCIN) 2,000 mg in sodium chloride 0.9 % 500 mL intermittent infusion (mg) 2,000 mg New Bag 23 1503                    Nephrotoxins and other renal medications (From now, onward)      Start     Dose/Rate Route Frequency Ordered Stop    12/15/23 0630  vancomycin (VANCOCIN) 1,250 mg in sodium chloride 0.9 % 250 mL intermittent infusion         1,250 mg  over 90 Minutes Intravenous EVERY 24 HOURS 23 0855      23  ampicillin-sulbactam (UNASYN) 3 g vial to attach to   mL bag         3 g  over 15-30 Minutes Intravenous EVERY 6 HOURS 12/11/23 1710                 Contrast Orders - past 72 hours (72h ago, onward)      None            Interpretation of levels and current regimen:  Vancomycin level is reflective of AUC less than 400    Unfortunately the level today was drawn only 30 minutes after the dose of vancomycin was given this morning (intended for the level to drawn prior to the dose). The distribution phase wasn't complete before this level was drawn, therefore the level may be inaccurate. Regardless, the patient's renal function has been improving and is predicted to be subtherapeutic. It's reasonable to increase the dose and attempt a new level tomorrow (ordered).    Has serum creatinine changed greater than 50% in last 72 hours: No    Urine output:  good urine output    Renal Function: Improving    InsightRX Prediction of Planned New Vancomycin Regimen    Loading dose: N/A  Regimen: 1250 mg IV every 24 hours.  Start time: 05:30 on 12/15/2023  Exposure target: AUC24 (range)400-600 mg/L.hr   AUC24,ss: 440 mg/L.hr  Probability of AUC24 > 400: 65 %  Ctrough,ss: 13.2 mg/L  Probability of Ctrough,ss > 20: 11 %  Probability of nephrotoxicity (Lodise FREDDY 2009): 8 %      Plan:  Increase Dose to 1250 mg q24 hours  Vancomycin monitoring method: AUC  Vancomycin therapeutic monitoring goal: 400-600 mg*h/L  Pharmacy will check vancomycin levels as appropriate tomorrow morning.  Serum creatinine levels will be ordered daily for the first week of therapy and at least twice weekly for subsequent weeks.    Samira Lopez, [facility]

## 2023-12-14 NOTE — PROGRESS NOTES
SPIRITUAL HEALTH SERVICES Note     Saw pt Amor Zavaleta and administered [employer] sacrament of anointing for the healing of the sick.    Fr. Calos Gunn

## 2023-12-14 NOTE — PROGRESS NOTES
Orthopedic Progress Note      Assessment: 1 Day Post-Op  S/P Procedure(s):  INCISION AND DRAINAGE, RIGHT UPPER FOREARM     Plan:   - Continue PT/OT  - Weightbearing status: WBAT RUE  - Activity: Up with assist and assistive device until independent.  - Anticoagulation: Prophylaxis per primary in addition to SCDs, randal stockings and early ambulation.  - Antibiotics: Per ID  - Pain Management; continue current regimen  - Diet: progress diet as tolerated  - Labs: hgb 12.2, transfuse if <7.0. No indication today.  WBC 15.8 (18.4)  - Dressing: Keep dry and intact, may remove for warm soapy water soaks TID  - Elevation: Elevate RUE on pillow to keep above the level of the heart as much as possible  - Will remove drains tomorrow if improving  - Follow-up: Outpatient follow up in 2 weeks  - We will continue to monitor for improvement.  NPO at midnight tonight ahead of evaluation tomorrow morning.      Subjective:  Pain: moderate  Nausea, Vomiting:  No  Lightheadedness, Dizziness:  No  Neuro:  Patient denies new onset numbness or paresthesias  Fever, chills: No  Chest pain: No  SOB: No    Patient reports feeling well today. Patient reports pain is tolerable with current pain regimen. Patient eating and drinking well. Patient voiding and passing gas however no BM. Denies fevers/chills  All questions/concerns answered.      Objective:  BP (!) 158/74 (BP Location: Left arm)   Pulse 62   Temp 99  F (37.2  C) (Oral)   Resp 20   Wt 97.7 kg (215 lb 6.2 oz)   SpO2 93%   BMI 29.21 kg/m      The patient is A&Ox3. Appears comfortable, sitting up at bedside.  Sensation is intact.  AIN, median, and motor nerve intact.   Wrist ROM and  strength is intact.  Radial pulse intact.  Incision draining serosanguinous fluid.  No purulent drainage.  Drains in place.  Still significant erythema and swelling of dorsal hand and wrist, improved compared to yesterday        Pertinent Labs   Lab Results: personally reviewed.   No results found  "for: \"INR\", \"PROTIME\"  Lab Results   Component Value Date    WBC 15.8 (H) 12/14/2023    HGB 12.2 (L) 12/14/2023    HCT 36.9 (L) 12/14/2023    MCV 95 12/14/2023     12/14/2023     Lab Results   Component Value Date     12/13/2023    CO2 23 12/13/2023         Report completed by:  FLAVIO GRAYSON PA-C  Date: 12/14/2023    [facility]              "

## 2023-12-15 ENCOUNTER — APPOINTMENT (OUTPATIENT)
Dept: OCCUPATIONAL THERAPY | Facility: HOSPITAL | Age: 78
DRG: 853 | End: 2023-12-15
Payer: COMMERCIAL

## 2023-12-15 LAB
CREAT SERPL-MCNC: 1.17 MG/DL (ref 0.67–1.17)
EGFRCR SERPLBLD CKD-EPI 2021: 64 ML/MIN/1.73M2
ERYTHROCYTE [DISTWIDTH] IN BLOOD BY AUTOMATED COUNT: 13.2 % (ref 10–15)
GLUCOSE BLDC GLUCOMTR-MCNC: 170 MG/DL (ref 70–99)
GLUCOSE BLDC GLUCOMTR-MCNC: 175 MG/DL (ref 70–99)
GLUCOSE BLDC GLUCOMTR-MCNC: 176 MG/DL (ref 70–99)
GLUCOSE BLDC GLUCOMTR-MCNC: 178 MG/DL (ref 70–99)
HCT VFR BLD AUTO: 36.2 % (ref 40–53)
HGB BLD-MCNC: 11.9 G/DL (ref 13.3–17.7)
MAGNESIUM SERPL-MCNC: 1.8 MG/DL (ref 1.7–2.3)
MCH RBC QN AUTO: 31.2 PG (ref 26.5–33)
MCHC RBC AUTO-ENTMCNC: 32.9 G/DL (ref 31.5–36.5)
MCV RBC AUTO: 95 FL (ref 78–100)
PLATELET # BLD AUTO: 245 10E3/UL (ref 150–450)
POTASSIUM SERPL-SCNC: 3.7 MMOL/L (ref 3.4–5.3)
RBC # BLD AUTO: 3.81 10E6/UL (ref 4.4–5.9)
VANCOMYCIN SERPL-MCNC: 5.8 UG/ML
WBC # BLD AUTO: 10.5 10E3/UL (ref 4–11)

## 2023-12-15 PROCEDURE — 83735 ASSAY OF MAGNESIUM: CPT | Performed by: INTERNAL MEDICINE

## 2023-12-15 PROCEDURE — 99232 SBSQ HOSP IP/OBS MODERATE 35: CPT | Performed by: INTERNAL MEDICINE

## 2023-12-15 PROCEDURE — 36415 COLL VENOUS BLD VENIPUNCTURE: CPT | Performed by: PHYSICIAN ASSISTANT

## 2023-12-15 PROCEDURE — 84132 ASSAY OF SERUM POTASSIUM: CPT | Performed by: INTERNAL MEDICINE

## 2023-12-15 PROCEDURE — 250N000013 HC RX MED GY IP 250 OP 250 PS 637: Performed by: INTERNAL MEDICINE

## 2023-12-15 PROCEDURE — 120N000001 HC R&B MED SURG/OB

## 2023-12-15 PROCEDURE — 85027 COMPLETE CBC AUTOMATED: CPT | Performed by: PHYSICIAN ASSISTANT

## 2023-12-15 PROCEDURE — 97110 THERAPEUTIC EXERCISES: CPT | Mod: GO

## 2023-12-15 PROCEDURE — 82565 ASSAY OF CREATININE: CPT | Performed by: INTERNAL MEDICINE

## 2023-12-15 PROCEDURE — 97535 SELF CARE MNGMENT TRAINING: CPT | Mod: GO

## 2023-12-15 PROCEDURE — 80202 ASSAY OF VANCOMYCIN: CPT | Performed by: INTERNAL MEDICINE

## 2023-12-15 PROCEDURE — 250N000011 HC RX IP 250 OP 636: Performed by: INTERNAL MEDICINE

## 2023-12-15 PROCEDURE — 250N000013 HC RX MED GY IP 250 OP 250 PS 637: Performed by: PHYSICIAN ASSISTANT

## 2023-12-15 PROCEDURE — 258N000003 HC RX IP 258 OP 636: Performed by: INTERNAL MEDICINE

## 2023-12-15 RX ORDER — PIPERACILLIN SODIUM, TAZOBACTAM SODIUM 3; .375 G/15ML; G/15ML
3.38 INJECTION, POWDER, LYOPHILIZED, FOR SOLUTION INTRAVENOUS EVERY 8 HOURS
Status: DISCONTINUED | OUTPATIENT
Start: 2023-12-15 | End: 2023-12-18

## 2023-12-15 RX ORDER — CEFAZOLIN SODIUM 2 G/100ML
2 INJECTION, SOLUTION INTRAVENOUS EVERY 8 HOURS
Status: DISCONTINUED | OUTPATIENT
Start: 2023-12-15 | End: 2023-12-18

## 2023-12-15 RX ORDER — PIPERACILLIN SODIUM, TAZOBACTAM SODIUM 3; .375 G/15ML; G/15ML
3.38 INJECTION, POWDER, LYOPHILIZED, FOR SOLUTION INTRAVENOUS EVERY 8 HOURS
Status: DISCONTINUED | OUTPATIENT
Start: 2023-12-15 | End: 2023-12-15

## 2023-12-15 RX ORDER — PIPERACILLIN SODIUM, TAZOBACTAM SODIUM 3; .375 G/15ML; G/15ML
3.38 INJECTION, POWDER, LYOPHILIZED, FOR SOLUTION INTRAVENOUS ONCE
Status: COMPLETED | OUTPATIENT
Start: 2023-12-15 | End: 2023-12-15

## 2023-12-15 RX ORDER — AMLODIPINE BESYLATE 2.5 MG/1
2.5 TABLET ORAL DAILY
Status: DISCONTINUED | OUTPATIENT
Start: 2023-12-15 | End: 2023-12-20 | Stop reason: HOSPADM

## 2023-12-15 RX ADMIN — SENNOSIDES AND DOCUSATE SODIUM 1 TABLET: 8.6; 5 TABLET ORAL at 21:36

## 2023-12-15 RX ADMIN — METRONIDAZOLE 500 MG: 500 TABLET ORAL at 09:20

## 2023-12-15 RX ADMIN — AMPICILLIN SODIUM AND SULBACTAM SODIUM 3 G: 2; 1 INJECTION, POWDER, FOR SOLUTION INTRAMUSCULAR; INTRAVENOUS at 03:37

## 2023-12-15 RX ADMIN — Medication 15 ML: at 20:25

## 2023-12-15 RX ADMIN — AMPICILLIN SODIUM AND SULBACTAM SODIUM 3 G: 2; 1 INJECTION, POWDER, FOR SOLUTION INTRAMUSCULAR; INTRAVENOUS at 09:26

## 2023-12-15 RX ADMIN — HYDROMORPHONE HYDROCHLORIDE 0.4 MG: 1 INJECTION, SOLUTION INTRAMUSCULAR; INTRAVENOUS; SUBCUTANEOUS at 00:48

## 2023-12-15 RX ADMIN — PIPERACILLIN AND TAZOBACTAM 3.38 G: 3; .375 INJECTION, POWDER, FOR SOLUTION INTRAVENOUS at 23:52

## 2023-12-15 RX ADMIN — METRONIDAZOLE 500 MG: 500 TABLET ORAL at 21:36

## 2023-12-15 RX ADMIN — SENNOSIDES AND DOCUSATE SODIUM 1 TABLET: 8.6; 5 TABLET ORAL at 09:20

## 2023-12-15 RX ADMIN — CEFAZOLIN SODIUM 2 G: 2 INJECTION, SOLUTION INTRAVENOUS at 11:39

## 2023-12-15 RX ADMIN — HYDROMORPHONE HYDROCHLORIDE 0.2 MG: 1 INJECTION, SOLUTION INTRAMUSCULAR; INTRAVENOUS; SUBCUTANEOUS at 20:36

## 2023-12-15 RX ADMIN — AMLODIPINE BESYLATE 2.5 MG: 2.5 TABLET ORAL at 18:19

## 2023-12-15 RX ADMIN — Medication 15 ML: at 09:21

## 2023-12-15 RX ADMIN — PIPERACILLIN AND TAZOBACTAM 3.38 G: 3; .375 INJECTION, POWDER, FOR SOLUTION INTRAVENOUS at 15:55

## 2023-12-15 RX ADMIN — PIPERACILLIN AND TAZOBACTAM 3.38 G: 3; .375 INJECTION, POWDER, FOR SOLUTION INTRAVENOUS at 10:25

## 2023-12-15 RX ADMIN — PANTOPRAZOLE SODIUM 40 MG: 40 TABLET, DELAYED RELEASE ORAL at 09:20

## 2023-12-15 RX ADMIN — SODIUM CHLORIDE: 9 INJECTION, SOLUTION INTRAVENOUS at 01:06

## 2023-12-15 RX ADMIN — ATORVASTATIN CALCIUM 10 MG: 10 TABLET, FILM COATED ORAL at 20:25

## 2023-12-15 RX ADMIN — CEFAZOLIN SODIUM 2 G: 2 INJECTION, SOLUTION INTRAVENOUS at 20:25

## 2023-12-15 RX ADMIN — POLYETHYLENE GLYCOL 3350 17 G: 17 POWDER, FOR SOLUTION ORAL at 09:22

## 2023-12-15 RX ADMIN — VANCOMYCIN HYDROCHLORIDE 1250 MG: 5 INJECTION, POWDER, LYOPHILIZED, FOR SOLUTION INTRAVENOUS at 06:18

## 2023-12-15 RX ADMIN — ASPIRIN 325 MG ORAL TABLET 325 MG: 325 PILL ORAL at 09:21

## 2023-12-15 ASSESSMENT — ACTIVITIES OF DAILY LIVING (ADL)
ADLS_ACUITY_SCORE: 28
ADLS_ACUITY_SCORE: 35
ADLS_ACUITY_SCORE: 28
ADLS_ACUITY_SCORE: 35
ADLS_ACUITY_SCORE: 28
ADLS_ACUITY_SCORE: 35
ADLS_ACUITY_SCORE: 28

## 2023-12-15 NOTE — PLAN OF CARE
Problem: Risk for Delirium  Goal: Optimal Coping  Outcome: Progressing  Goal: Improved Behavioral Control  Outcome: Progressing  Intervention: Minimize Safety Risk  Recent Flowsheet Documentation  Taken 12/15/2023 0000 by Yobani Severino RN  Enhanced Safety Measures: other (see comments)  Goal: Improved Attention and Thought Clarity  Outcome: Progressing  Goal: Improved Sleep  Outcome: Progressing     Problem: Pain Acute  Goal: Optimal Pain Control and Function  Outcome: Progressing  Intervention: Prevent or Manage Pain  Recent Flowsheet Documentation  Taken 12/15/2023 0000 by Yobani Severino RN  Medication Review/Management: medications reviewed   Goal Outcome Evaluation:   Pt A&O, mag recheck this morning, complained pain PRN IV dilaudid given, pain down to 2/10. Pt NPO since midnight for surgery. Pt use urinal urine output 1000 ml.  Yobani Severino RN

## 2023-12-15 NOTE — PROGRESS NOTES
Care Management Follow Up    Length of Stay (days): 4    Expected Discharge Date: 12/17/2023     Concerns to be Addressed: discharge planning     Patient plan of care discussed at interdisciplinary rounds: Yes    Anticipated Discharge Disposition: Home, Home Care     Anticipated Discharge Services: PT/OT   (pending PICC IV abx)  Anticipated Discharge DME: None    Patient/family educated on Medicare website which has current facility and service quality ratings: yes  Education Provided on the Discharge Plan: Yes  Patient/Family in Agreement with the Plan: yes    Referrals Placed by CM/SW: Homecare  Private pay costs discussed: Not applicable    Additional Information:  SW following for recommendations. Pt lives alone and has one cat. Pt sister Penelope Shaffer is involved and considering having pt come to her house at discharge. Pt on IV antibiotics, being followed by ID, unsure if pt will get PICC or not, infectious disease charted plan to watch over weekend. Pt has a friend Rosita who is supportive and will transport at discharge if not family.     IVAN Walters

## 2023-12-15 NOTE — PLAN OF CARE
Problem: Adult Inpatient Plan of Care  Goal: Optimal Comfort and Wellbeing  Intervention: Monitor Pain and Promote Comfort  Recent Flowsheet Documentation  Taken 12/14/2023 1735 by Annmarie Mckeon RN  Pain Management Interventions:   hydrotherapy provided   medication (see MAR)  Taken 12/14/2023 1653 by Annmarie Mckeon RN  Pain Management Interventions:   hydrotherapy provided   medication (see MAR)  Taken 12/14/2023 0845 by Annmarie Mckeon RN  Pain Management Interventions:   hydrotherapy provided   medication (see MAR)  BP (!) 142/68 (BP Location: Left arm, Patient Position: Semi-Lomeli's, Cuff Size: Adult Regular)   Pulse 70   Temp 98.4  F (36.9  C) (Oral)   Resp 18   Wt 97.7 kg (215 lb 6.2 oz)   SpO2 95%   BMI 29.21 kg/m       Goal Outcome Evaluation: Soaks in soapy water x2 on this shift with bandage changes, PRN 0.2 dilaudid given x1, PRN tylenol given x1. Pt reports that he feels his pain is well managed, has been relaxed and visiting with family / friends during this shift, has eaten well and hydrated well.  Pt ambulated with the assist of 1 in his room today, uses the urinal frequently and successfully.  BS check and carb coverage along with sliding scale insulin aspart and sched Lantus continue.  Nutritional support Ensure consumed with dinner this evening.  Pt is in bed, was found to have fallen asleep upon bedside shift change.  Alarms on for safety.  Pt denies any pain at this time. Magi Mckeon RN

## 2023-12-15 NOTE — PROGRESS NOTES
Orthopedic Progress Note      Assessment: 2 Days Post-Op  S/P Procedure(s):  INCISION AND DRAINAGE, RIGHT UPPER FOREARM     Plan:   - Continue PT/OT  - Weightbearing status: WBAT RUE  - Activity: Up with assist and assistive device until independent.  - Anticoagulation: Prophylaxis per primary in addition to SCDs, randal stockings and early ambulation.  - Antibiotics: Per ID  - Pain Management; continue current regimen  - Diet: progress diet as tolerated  - Labs: hgb 12.2, transfuse if <7.0. No indication today.  WBC 10.5  - Dressing: Keep dry and intact, may remove for warm soapy water soaks TID  - Elevation: Elevate RUE on pillow to keep above the level of the heart as much as possible  - Drains removed today  - Follow-up: Outpatient follow up in 2 weeks  - Per ID, keep through the weekend to monitor for improvement on current regimen, determine if PICC needed or not.  Would agree with this, we will also continue to monitor.  No plan for surgery at this time.      Subjective:  Pain: moderate  Nausea, Vomiting:  No  Lightheadedness, Dizziness:  No  Neuro:  Patient denies new onset numbness or paresthesias  Fever, chills: No  Chest pain: No  SOB: No    Patient reports feeling well today. Patient reports pain is tolerable with current pain regimen. Patient eating and drinking well. Patient voiding and passing gas however no BM. Denies fevers/chills  All questions/concerns answered.      Objective:  BP (!) 164/81 (BP Location: Left arm)   Pulse 64   Temp 98.1  F (36.7  C) (Oral)   Resp 18   Wt 97.7 kg (215 lb 6.2 oz)   SpO2 95%   BMI 29.21 kg/m      The patient is A&Ox3. Appears comfortable, sitting up at bedside.  Sensation is intact.  AIN, median, and motor nerve intact.   Wrist ROM and  strength is intact.  Radial pulse intact.  Incision draining serosanguinous fluid.  No purulent drainage.  Drains in place.  Still significant erythema and swelling of dorsal hand and wrist, much improved compared to  "yesterday.        Pertinent Labs   Lab Results: personally reviewed.   No results found for: \"INR\", \"PROTIME\"  Lab Results   Component Value Date    WBC 10.5 12/15/2023    HGB 11.9 (L) 12/15/2023    HCT 36.2 (L) 12/15/2023    MCV 95 12/15/2023     12/15/2023     Lab Results   Component Value Date     12/13/2023    CO2 23 12/13/2023         Report completed by:  FLAVIO GRAYSON PA-C  Date: 12/15/2023    [facility]              "

## 2023-12-15 NOTE — PROGRESS NOTES
"[place] Infectious Disease Progress Note    SUBJECTIVE:  Able to see hand and arm while soaking.  Still 2X in size compared with contralateral hand and arm.        REVIEW OF SYSTEMS:  Negative unless as listed above.  Social history, Family history, Medications: reviewed.    OBJECTIVE:  BP (!) 164/81 (BP Location: Left arm)   Pulse 64   Temp 98.1  F (36.7  C) (Oral)   Resp 18   Wt 97.7 kg (215 lb 6.2 oz)   SpO2 95%   BMI 29.21 kg/m                PHYSICAL EXAM:  Alert, awake  Vitals tabulated above, reviewed  Neck supple without lymphadenopathy  Sclera normal color, not injected  CARDIOVASCULAR regular rate and rhythm, no murmur  Lungs CLEAR TO AUSCULTATION   Abdomen soft, NT/ND, absent HEPATOSPLENOMEGALY  Skin normal  Joints normal  Neurologic exam non focal  Wound wrapped but very swollen and warm R forearm    Antibiotics:unasyn, vanco    Pertinent labs:    Recent Labs   Lab Test 12/15/23  0550 12/14/23  0843 12/13/23  0806   WBC 10.5 15.8* 18.4*   HGB 11.9* 12.2* 12.0*   HCT 36.2* 36.9* 35.9*   MCV 95 95 95    236 214       Lab Results   Component Value Date    RBC 3.79 12/13/2023     Lab Results   Component Value Date    HGB 12.0 12/13/2023     Lab Results   Component Value Date    HCT 35.9 12/13/2023     No components found for: \"MCT\"  Lab Results   Component Value Date    MCV 95 12/13/2023     Lab Results   Component Value Date    MCH 31.7 12/13/2023     Lab Results   Component Value Date    MCHC 33.4 12/13/2023     Lab Results   Component Value Date    RDW 13.4 12/13/2023     Lab Results   Component Value Date     12/13/2023       Last Comprehensive Metabolic Panel:  Sodium   Date Value Ref Range Status   12/13/2023 138 135 - 145 mmol/L Final     Comment:     Reference intervals for this test were updated on 09/26/2023 to more accurately reflect our healthy population. There may be differences in the flagging of prior results with similar values performed with this method. Interpretation " "of those prior results can be made in the context of the updated reference intervals.      Potassium   Date Value Ref Range Status   12/15/2023 3.7 3.4 - 5.3 mmol/L Final   06/28/2022 3.3 (L) 3.5 - 5.0 mmol/L Final     Chloride   Date Value Ref Range Status   12/13/2023 105 98 - 107 mmol/L Final   06/28/2022 103 98 - 107 mmol/L Final     Carbon Dioxide (CO2)   Date Value Ref Range Status   12/13/2023 23 22 - 29 mmol/L Final   06/28/2022 26 22 - 31 mmol/L Final     Anion Gap   Date Value Ref Range Status   12/13/2023 10 7 - 15 mmol/L Final   06/28/2022 13 5 - 18 mmol/L Final     Glucose   Date Value Ref Range Status   06/28/2022 91 70 - 125 mg/dL Final     GLUCOSE BY METER POCT   Date Value Ref Range Status   12/15/2023 178 (H) 70 - 99 mg/dL Final     Urea Nitrogen   Date Value Ref Range Status   12/13/2023 24.2 (H) 8.0 - 23.0 mg/dL Final   06/28/2022 26 8 - 28 mg/dL Final     Creatinine   Date Value Ref Range Status   12/15/2023 1.17 0.67 - 1.17 mg/dL Final     GFR Estimate   Date Value Ref Range Status   12/15/2023 64 >60 mL/min/1.73m2 Final   05/19/2021 37 (L) >60 mL/min/1.73m2 Final     Calcium   Date Value Ref Range Status   12/13/2023 7.5 (L) 8.8 - 10.2 mg/dL Final       Liver Function Studies -   Recent Labs   Lab Test 12/11/23  1316   PROTTOTAL 7.5   ALBUMIN 3.4*   BILITOTAL 1.1   ALKPHOS 120   AST 27   ALT 45       Erythrocyte Sedimentation Rate   Date Value Ref Range Status   12/11/2023 61 (H) 0 - 20 mm/hr Final       No results found for: \"CRP\"            MICROBIOLOGY DATA:    Pasteurella, staph MSSA, bacteroides pyogenes, fusobacterium    IMAGING/RADIOLOGY:          ASSESSMENT:  Pasteurella cat bite infection/cellulitis    RECOMMENDATION:  I'm on the fence about picc vs not.    I'd like to keep him over weekend on zosyn, ancef, flagyl.   Right now on vanco, unasyn, flagyl.   See harjinder.      MIRANDA Barnes MD  Office [phone] option 2 to desk staff  "

## 2023-12-15 NOTE — PROGRESS NOTES
[facility]    Medicine Progress Note - Hospitalist Service    Date of Admission:  12/11/2023    Assessment & Plan   Amor Zavaleta is a 78 year old male with history of type 2 diabetes, stage III kidney disease, hypertension and LBBB who presents to the ER with painful right upper extremity swelling and redness following cat bite.    Incision and drainage with copious irrigation of right dorsal forearm, wrist, hand and fourth dorsal extensor compartment was performed by the surgeon on 12/11/2023.  Notable findings operative include right dorsal forearm abscess and superior of extensor tenosynovitis.  He is receiving Unasyn and vancomycin per ID recommendation.    He was taken to OR again on 12/13/23 for right wrist arthrotomy for possible septic arthritis and Incision and drainage with copious irrigation of right dorsal forearm, wrist, hand and fourth dorsal extensor compartment.     Right dorsal forearm abscess and superior of extensor tenosynovitis.   Extensive right upper extremity cellulitis  Concern for right upper extremity abscess  Extensive right upper extremity swelling and redness  Severe sepsis  CT scan of the right upper extremity showed multifocal tenosynovitis involving the extensor tendons in the hand hand extensive cellulitis in the forearm, wrist and hand without discrete fluid collection to suggest an abscess or soft tissue gas.      Received Unasyn briefly  Continue cefazolin-started 12/15/2023 per ID recommendation  Continue Zosyn-started 12/15/2023 per ID recommendation  Continue metronidazole-started 12/14/2023 per ID recommendation  Off vancomycin    Culture of surgical specimen grew Pasteurella and Staphylococcus aureus  Follow-up blood culture  ID following-appreciate assistance  Hand surgery follow up - appreciate assistance     Hyponatremia  Possibly secondary to hyperglycemia and hypovolemia  Monitor sodium  Stop IV fluid    Hypokalemia  Monitor potassium and  replace as per protocol    RENO on stage III CKD  Resolved   Received IV fluid briefly.  Oral hydration as tolerated  Monitor BMP  Avoid nephrotoxins    Type 2 diabetes  Increase lantus to 60 units daily   Continue insulin sliding scale   Insulin carb coverage 1:5  Monitor for hypoglycemia and correct per protocol        Diet: Discharge Instruction - Regular Diet Adult  NPO per Anesthesia Guidelines for Procedure/Surgery Except for: Meds  Snacks/Supplements Adult: Glucerna; With Meals    DVT Prophylaxis: Pneumatic Compression Devices  Browning Catheter: Not present  Lines: None     Cardiac Monitoring: None  Code Status: Full Code      Clinically Significant Risk Factors        # Hypokalemia: Lowest K = 3.2 mmol/L in last 2 days, will replace as needed       # Hypoalbuminemia: Lowest albumin = 3.4 g/dL at 12/11/2023  1:16 PM, will monitor as appropriate           # DMII: A1C = 8.9 % (Ref range: <5.7 %) within past 6 months    # Moderate Malnutrition: based on nutrition assessment, PRESENT ON ADMISSION   # Financial/Environmental Concerns: none         Disposition Plan     Expected Discharge Date: 12/16/2023    Discharge Delays: IV Medication - consider oral or Home Infusion  Lab Result Pending (enter specific test & time in comments)  Destination: home with help/services  Discharge Comments: IV abx- might need PICC placement   ID & ortho following         Garcia Tillman MD  Hospitalist Service  [facility]  Securely message with Path (more info)  Text page via [employer] Paging/Directory   ______________________________________________________________________    Interval History   No new complaints today and no acute events overnight.  Pain is well controlled    Physical Exam   Vital Signs: Temp: 98.1  F (36.7  C) Temp src: Oral BP: (!) 164/81 Pulse: 64   Resp: 18 SpO2: 95 % O2 Device: None (Room air)    Weight: 215 lbs 6.23 oz    General appearance: Dehydrated, awake, Alert, Cooperative, not in  any obvious distress and appears stated age   HEENT: Normocephalic, atraumatic, conjunctiva clear without icterus and ears without discharge  Lungs: Clear to auscultation bilaterally, no wheezing, good air exchange, normal work of breathing  Cardiovascular: Regular Rate and Rythm, normal apical impulse, normal S1 and S2, no lower extremity edema bilaterally  Abdomen: Soft, non-tender and Non-distended, active bowel sounds  Skin: Right hand and forearm in ace -wrap  Musculoskeletal: Right upper extremity sling in place. Right hand and forearm in ace -wrap  Neurologic: Alert & Oriented X 3, Facial symmetry preserved and upper & lower extremities moving well with symmetry  Psychiatric: Calm, normal eye contact and normal affect         Medical Decision Making       40 MINUTES SPENT BY ME on the date of service doing chart review, history, exam, documentation & further activities per the note.      Data   Imaging results reviewed over the past 24 hrs:   No results found for this or any previous visit (from the past 24 hour(s)).

## 2023-12-16 LAB
BACTERIA BLD CULT: NO GROWTH
BACTERIA BLD CULT: NO GROWTH
CREAT SERPL-MCNC: 1.18 MG/DL (ref 0.67–1.17)
EGFRCR SERPLBLD CKD-EPI 2021: 63 ML/MIN/1.73M2
ERYTHROCYTE [DISTWIDTH] IN BLOOD BY AUTOMATED COUNT: 13.1 % (ref 10–15)
GLUCOSE BLDC GLUCOMTR-MCNC: 150 MG/DL (ref 70–99)
GLUCOSE BLDC GLUCOMTR-MCNC: 153 MG/DL (ref 70–99)
GLUCOSE BLDC GLUCOMTR-MCNC: 184 MG/DL (ref 70–99)
GLUCOSE BLDC GLUCOMTR-MCNC: 198 MG/DL (ref 70–99)
GLUCOSE BLDC GLUCOMTR-MCNC: 266 MG/DL (ref 70–99)
GLUCOSE BLDC GLUCOMTR-MCNC: 304 MG/DL (ref 70–99)
HCT VFR BLD AUTO: 35.4 % (ref 40–53)
HGB BLD-MCNC: 11.9 G/DL (ref 13.3–17.7)
MAGNESIUM SERPL-MCNC: 1.8 MG/DL (ref 1.7–2.3)
MCH RBC QN AUTO: 32 PG (ref 26.5–33)
MCHC RBC AUTO-ENTMCNC: 33.6 G/DL (ref 31.5–36.5)
MCV RBC AUTO: 95 FL (ref 78–100)
PLATELET # BLD AUTO: 285 10E3/UL (ref 150–450)
POTASSIUM SERPL-SCNC: 3.7 MMOL/L (ref 3.4–5.3)
RBC # BLD AUTO: 3.72 10E6/UL (ref 4.4–5.9)
WBC # BLD AUTO: 14.6 10E3/UL (ref 4–11)

## 2023-12-16 PROCEDURE — 97116 GAIT TRAINING THERAPY: CPT | Mod: GP | Performed by: PHYSICAL THERAPIST

## 2023-12-16 PROCEDURE — 83735 ASSAY OF MAGNESIUM: CPT | Performed by: INTERNAL MEDICINE

## 2023-12-16 PROCEDURE — 84132 ASSAY OF SERUM POTASSIUM: CPT | Performed by: INTERNAL MEDICINE

## 2023-12-16 PROCEDURE — 36415 COLL VENOUS BLD VENIPUNCTURE: CPT | Performed by: INTERNAL MEDICINE

## 2023-12-16 PROCEDURE — 250N000013 HC RX MED GY IP 250 OP 250 PS 637: Performed by: PHYSICIAN ASSISTANT

## 2023-12-16 PROCEDURE — 99232 SBSQ HOSP IP/OBS MODERATE 35: CPT | Performed by: INTERNAL MEDICINE

## 2023-12-16 PROCEDURE — 250N000011 HC RX IP 250 OP 636: Performed by: INTERNAL MEDICINE

## 2023-12-16 PROCEDURE — 250N000013 HC RX MED GY IP 250 OP 250 PS 637: Performed by: INTERNAL MEDICINE

## 2023-12-16 PROCEDURE — 120N000001 HC R&B MED SURG/OB

## 2023-12-16 PROCEDURE — 85027 COMPLETE CBC AUTOMATED: CPT | Performed by: PHYSICIAN ASSISTANT

## 2023-12-16 PROCEDURE — 82565 ASSAY OF CREATININE: CPT | Performed by: INTERNAL MEDICINE

## 2023-12-16 RX ADMIN — OXYCODONE HYDROCHLORIDE 10 MG: 5 TABLET ORAL at 04:07

## 2023-12-16 RX ADMIN — METRONIDAZOLE 500 MG: 500 TABLET ORAL at 20:05

## 2023-12-16 RX ADMIN — ASPIRIN 325 MG ORAL TABLET 325 MG: 325 PILL ORAL at 08:51

## 2023-12-16 RX ADMIN — AMLODIPINE BESYLATE 2.5 MG: 2.5 TABLET ORAL at 08:52

## 2023-12-16 RX ADMIN — OXYCODONE HYDROCHLORIDE 10 MG: 5 TABLET ORAL at 19:55

## 2023-12-16 RX ADMIN — PIPERACILLIN AND TAZOBACTAM 3.38 G: 3; .375 INJECTION, POWDER, FOR SOLUTION INTRAVENOUS at 08:58

## 2023-12-16 RX ADMIN — Medication 15 ML: at 19:58

## 2023-12-16 RX ADMIN — PIPERACILLIN AND TAZOBACTAM 3.38 G: 3; .375 INJECTION, POWDER, FOR SOLUTION INTRAVENOUS at 23:34

## 2023-12-16 RX ADMIN — OXYCODONE HYDROCHLORIDE 10 MG: 5 TABLET ORAL at 23:46

## 2023-12-16 RX ADMIN — OXYCODONE HYDROCHLORIDE 10 MG: 5 TABLET ORAL at 00:01

## 2023-12-16 RX ADMIN — INSULIN GLARGINE 64 UNITS: 100 INJECTION, SOLUTION SUBCUTANEOUS at 08:51

## 2023-12-16 RX ADMIN — SENNOSIDES AND DOCUSATE SODIUM 1 TABLET: 8.6; 5 TABLET ORAL at 20:05

## 2023-12-16 RX ADMIN — Medication 15 ML: at 09:20

## 2023-12-16 RX ADMIN — CEFAZOLIN SODIUM 2 G: 2 INJECTION, SOLUTION INTRAVENOUS at 13:32

## 2023-12-16 RX ADMIN — PIPERACILLIN AND TAZOBACTAM 3.38 G: 3; .375 INJECTION, POWDER, FOR SOLUTION INTRAVENOUS at 17:03

## 2023-12-16 RX ADMIN — PANTOPRAZOLE SODIUM 40 MG: 40 TABLET, DELAYED RELEASE ORAL at 08:52

## 2023-12-16 RX ADMIN — CEFAZOLIN SODIUM 2 G: 2 INJECTION, SOLUTION INTRAVENOUS at 20:01

## 2023-12-16 RX ADMIN — ATORVASTATIN CALCIUM 10 MG: 10 TABLET, FILM COATED ORAL at 19:56

## 2023-12-16 RX ADMIN — ACETAMINOPHEN 650 MG: 325 TABLET ORAL at 08:51

## 2023-12-16 RX ADMIN — SENNOSIDES AND DOCUSATE SODIUM 1 TABLET: 8.6; 5 TABLET ORAL at 08:52

## 2023-12-16 RX ADMIN — POLYETHYLENE GLYCOL 3350 17 G: 17 POWDER, FOR SOLUTION ORAL at 08:53

## 2023-12-16 RX ADMIN — CEFAZOLIN SODIUM 2 G: 2 INJECTION, SOLUTION INTRAVENOUS at 04:00

## 2023-12-16 RX ADMIN — METRONIDAZOLE 500 MG: 500 TABLET ORAL at 08:52

## 2023-12-16 ASSESSMENT — ACTIVITIES OF DAILY LIVING (ADL)
ADLS_ACUITY_SCORE: 28

## 2023-12-16 NOTE — PROGRESS NOTES
Hand Surgery PA  Supervising Physician Yoan Moreno MD    S: Pt is now POD#3 s/p revision I&D R. Wrist for septic arthritis.  States his wrist still hurts, but otherwise feels well, and is looking forward to watching the Seen game soon.      O: Afebrile 99.3, WBC up to 14.6k (from 10.5 yesterday).  A&O x3, appears well.  Eager to talk about pets, football.    RUE dorsal wrist wound appears erythematous still, with serous drainage.  Edges loosely approximated with sutures intact.  Moves fingers in both flexion and extension but with obvious discomfort.  FROM testing deferred.    Impression/plan: POD#3 s/p revision R wrist I&D of septic arthritis from cat bite.  Still with erythema and edema, elevated white count.  Will continue to monitor, treat with IV Abx and reasess tomorrow, with possible return to OR for another I&D depending on appearance and WBC trend.  NPO after MN.  Discussed with Dr. Moreno, who recommended this plan.

## 2023-12-16 NOTE — PLAN OF CARE
Problem: Adult Inpatient Plan of Care  Goal: Plan of Care Review  Description: The Plan of Care Review/Shift note should be completed every shift.  The Outcome Evaluation is a brief statement about your assessment that the patient is improving, declining, or no change.  This information will be displayed automatically on your shift  note.  Outcome: Progressing  Flowsheets (Taken 12/16/2023 0541)  Plan of Care Reviewed With: patient  Overall Patient Progress: improving   Goal Outcome Evaluation:      Plan of Care Reviewed With: patient    Overall Patient Progress: improvingOverall Patient Progress: improving    Alert and oriented. Admitted with cellulitis of the right upper extremity. ID following. Patient on IV and PO antibiotics. Right hand soak TID, Was done last night. Pain on the right upper extremity managed with IV dilaudid and PO oxy. Continent of bowel and bladder. Uses the urinal. Assist of 1. Fall precautions. Bed alarm on . Potassium and Magnesium protocol. AC and HS blood glucose checks. Spot check at 0200 was 184 mg/dl.  RUE elevated above the level of the heart.

## 2023-12-16 NOTE — PROGRESS NOTES
"[facility] Progress Note    SUBJECTIVE:  first visit by me. Overall still with pain and swelling. Tolerating antibiotics.       REVIEW OF SYSTEMS:  Negative unless as listed above.  Social history, Family history, Medications: reviewed.    OBJECTIVE:  BP (!) 140/68 (BP Location: Left arm)   Pulse 64   Temp 98.4  F (36.9  C) (Oral)   Resp 18   Wt 97.7 kg (215 lb 6.2 oz)   SpO2 96%   BMI 29.21 kg/m                PHYSICAL EXAM:  Alert, awake  Vitals tabulated above, reviewed  Sclera normal color, not injected  CARDIOVASCULAR regular rate and rhythm, no murmur  Lungs CLEAR TO AUSCULTATION   Abdomen soft, NT/ND, absent HEPATOSPLENOMEGALY  Skin normal  Joints normal  Neurologic exam non focal  Wound wrapped but very swollen and warm R forearm    Antibiotics:cefazolin, flagyl, vanco    Pertinent labs:    Recent Labs   Lab Test 12/16/23  0636 12/15/23  0550 12/14/23  0843   WBC 14.6* 10.5 15.8*   HGB 11.9* 11.9* 12.2*   HCT 35.4* 36.2* 36.9*   MCV 95 95 95    245 236       Lab Results   Component Value Date    RBC 3.79 12/13/2023     Lab Results   Component Value Date    HGB 12.0 12/13/2023     Lab Results   Component Value Date    HCT 35.9 12/13/2023     No components found for: \"MCT\"  Lab Results   Component Value Date    MCV 95 12/13/2023     Lab Results   Component Value Date    MCH 31.7 12/13/2023     Lab Results   Component Value Date    MCHC 33.4 12/13/2023     Lab Results   Component Value Date    RDW 13.4 12/13/2023     Lab Results   Component Value Date     12/13/2023       Last Comprehensive Metabolic Panel:  Sodium   Date Value Ref Range Status   12/13/2023 138 135 - 145 mmol/L Final     Comment:     Reference intervals for this test were updated on 09/26/2023 to more accurately reflect our healthy population. There may be differences in the flagging of prior results with similar values performed with this method. Interpretation of those prior results can be made in the " "context of the updated reference intervals.      Potassium   Date Value Ref Range Status   12/16/2023 3.7 3.4 - 5.3 mmol/L Final   06/28/2022 3.3 (L) 3.5 - 5.0 mmol/L Final     Chloride   Date Value Ref Range Status   12/13/2023 105 98 - 107 mmol/L Final   06/28/2022 103 98 - 107 mmol/L Final     Carbon Dioxide (CO2)   Date Value Ref Range Status   12/13/2023 23 22 - 29 mmol/L Final   06/28/2022 26 22 - 31 mmol/L Final     Anion Gap   Date Value Ref Range Status   12/13/2023 10 7 - 15 mmol/L Final   06/28/2022 13 5 - 18 mmol/L Final     Glucose   Date Value Ref Range Status   06/28/2022 91 70 - 125 mg/dL Final     GLUCOSE BY METER POCT   Date Value Ref Range Status   12/16/2023 266 (H) 70 - 99 mg/dL Final     Urea Nitrogen   Date Value Ref Range Status   12/13/2023 24.2 (H) 8.0 - 23.0 mg/dL Final   06/28/2022 26 8 - 28 mg/dL Final     Creatinine   Date Value Ref Range Status   12/16/2023 1.18 (H) 0.67 - 1.17 mg/dL Final     GFR Estimate   Date Value Ref Range Status   12/16/2023 63 >60 mL/min/1.73m2 Final   05/19/2021 37 (L) >60 mL/min/1.73m2 Final     Calcium   Date Value Ref Range Status   12/13/2023 7.5 (L) 8.8 - 10.2 mg/dL Final       Liver Function Studies -   Recent Labs   Lab Test 12/11/23  1316   PROTTOTAL 7.5   ALBUMIN 3.4*   BILITOTAL 1.1   ALKPHOS 120   AST 27   ALT 45       Erythrocyte Sedimentation Rate   Date Value Ref Range Status   12/11/2023 61 (H) 0 - 20 mm/hr Final       No results found for: \"CRP\"            MICROBIOLOGY DATA:    Pasteurella, staph MSSA, bacteroides pyogenes, fusobacterium    IMAGING/RADIOLOGY:          ASSESSMENT:  Pasteurella cat bite infection/cellulitis    RECOMMENDATION:  I'm on the fence about picc vs not.    I'd like to keep him over weekend on zosyn, ancef, flagyl.   WBC up, ortho to reevaluate repeat OR tomorrow.     Silvia Thayer MD  Office [phone] option 2 to desk staff  "

## 2023-12-16 NOTE — PLAN OF CARE
Goal Outcome Evaluation:         Pt alert and very cooperative. Pt denies any pain. Pt's wound soaked in warm water, and dressing changed. Pen dion later removed by the MD at bedside. Pt currently getting Abx.

## 2023-12-16 NOTE — PROGRESS NOTES
[facility]    Medicine Progress Note - Hospitalist Service    Date of Admission:  12/11/2023    Assessment & Plan   Amor Zavaleta is a 78 year old male with history of type 2 diabetes, stage III kidney disease, hypertension and LBBB who presents to the ER with painful right upper extremity swelling and redness following cat bite.    Incision and drainage with copious irrigation of right dorsal forearm, wrist, hand and fourth dorsal extensor compartment was performed by the surgeon on 12/11/2023.  Notable findings operative include right dorsal forearm abscess and superior of extensor tenosynovitis.  He is receiving Unasyn and vancomycin per ID recommendation.    He was taken to OR again on 12/13/23 for right wrist arthrotomy for possible septic arthritis and Incision and drainage with copious irrigation of right dorsal forearm, wrist, hand and fourth dorsal extensor compartment.     Right dorsal forearm abscess and superior of extensor tenosynovitis.   Extensive right upper extremity cellulitis  Concern for right upper extremity abscess  Extensive right upper extremity swelling and redness  Severe sepsis  CT scan of the right upper extremity showed multifocal tenosynovitis involving the extensor tendons in the hand hand extensive cellulitis in the forearm, wrist and hand without discrete fluid collection to suggest an abscess or soft tissue gas.      Received Unasyn briefly  Continue cefazolin-started 12/15/2023 per ID recommendation  Continue Zosyn-started 12/15/2023 per ID recommendation  Continue metronidazole-started 12/14/2023 per ID recommendation  Off vancomycin    Culture of surgical specimen grew Pasteurella and Staphylococcus aureus  Follow-up blood culture  ID following-appreciate assistance  Hand surgery follow up - appreciate assistance     Hyponatremia  Possibly secondary to hyperglycemia and hypovolemia  Monitor sodium  Stop IV fluid    Hypokalemia  Monitor potassium and  replace as per protocol    RENO on stage III CKD  Resolved   Received IV fluid briefly.  Oral hydration as tolerated  Monitor BMP  Avoid nephrotoxins    Type 2 diabetes  Increase lantus to 64 units daily   Continue insulin sliding scale   Insulin carb coverage 1:5  Monitor for hypoglycemia and correct per protocol        Diet: Discharge Instruction - Regular Diet Adult  Snacks/Supplements Adult: Glucerna; With Meals  Regular Diet Adult    DVT Prophylaxis: Pneumatic Compression Devices  Browning Catheter: Not present  Lines: None     Cardiac Monitoring: None  Code Status: Full Code      Clinically Significant Risk Factors              # Hypoalbuminemia: Lowest albumin = 3.4 g/dL at 12/11/2023  1:16 PM, will monitor as appropriate           # DMII: A1C = 8.9 % (Ref range: <5.7 %) within past 6 months    # Moderate Malnutrition: based on nutrition assessment    # Financial/Environmental Concerns: none         Disposition Plan     Expected Discharge Date: 12/18/2023    Discharge Delays: IV Medication - consider oral or Home Infusion  Lab Result Pending (enter specific test & time in comments)  Destination: home with help/services  Discharge Comments: IV abx- might need PICC placement   ID & ortho following         Garcia Tillman MD  Hospitalist Service  [facility]  Securely message with [employer] (more info)  Text page via [employer] Paging/Directory   ______________________________________________________________________    Interval History   He complains of mild headache otherwise no acute issues.  Planned surgical debridement was canceled yesterday.  Right upper extremity swelling and redness have improved.      Physical Exam   Vital Signs: Temp: 98.9  F (37.2  C) Temp src: Oral BP: 132/67 Pulse: 64   Resp: 18 SpO2: 95 % O2 Device: None (Room air)    Weight: 215 lbs 6.23 oz    General appearance: Dehydrated, awake, Alert, Cooperative, not in any obvious distress and appears stated age   HEENT:  Normocephalic, atraumatic, conjunctiva clear without icterus and ears without discharge  Lungs: Clear to auscultation bilaterally, no wheezing, good air exchange, normal work of breathing  Cardiovascular: Regular Rate and Rythm, normal apical impulse, normal S1 and S2, no lower extremity edema bilaterally  Abdomen: Soft, non-tender and Non-distended, active bowel sounds  Skin: Right hand and forearm in ace -wrap  Musculoskeletal: Right upper extremity sling in place. Right hand and forearm in ace -wrap  Neurologic: Alert & Oriented X 3, Facial symmetry preserved and upper & lower extremities moving well with symmetry  Psychiatric: Calm, normal eye contact and normal affect         Medical Decision Making       40 MINUTES SPENT BY ME on the date of service doing chart review, history, exam, documentation & further activities per the note.      Data   Imaging results reviewed over the past 24 hrs:   No results found for this or any previous visit (from the past 24 hour(s)).

## 2023-12-17 ENCOUNTER — APPOINTMENT (OUTPATIENT)
Dept: OCCUPATIONAL THERAPY | Facility: HOSPITAL | Age: 78
DRG: 853 | End: 2023-12-17
Payer: COMMERCIAL

## 2023-12-17 LAB
BASOPHILS # BLD AUTO: 0.1 10E3/UL (ref 0–0.2)
BASOPHILS NFR BLD AUTO: 0 %
CREAT SERPL-MCNC: 1.3 MG/DL (ref 0.67–1.17)
EGFRCR SERPLBLD CKD-EPI 2021: 56 ML/MIN/1.73M2
EOSINOPHIL # BLD AUTO: 0.2 10E3/UL (ref 0–0.7)
EOSINOPHIL NFR BLD AUTO: 1 %
ERYTHROCYTE [DISTWIDTH] IN BLOOD BY AUTOMATED COUNT: 12.9 % (ref 10–15)
GLUCOSE BLDC GLUCOMTR-MCNC: 106 MG/DL (ref 70–99)
GLUCOSE BLDC GLUCOMTR-MCNC: 117 MG/DL (ref 70–99)
GLUCOSE BLDC GLUCOMTR-MCNC: 131 MG/DL (ref 70–99)
GLUCOSE BLDC GLUCOMTR-MCNC: 167 MG/DL (ref 70–99)
HCT VFR BLD AUTO: 37.7 % (ref 40–53)
HGB BLD-MCNC: 12.9 G/DL (ref 13.3–17.7)
IMM GRANULOCYTES # BLD: 0.2 10E3/UL
IMM GRANULOCYTES NFR BLD: 1 %
LYMPHOCYTES # BLD AUTO: 1.9 10E3/UL (ref 0.8–5.3)
LYMPHOCYTES NFR BLD AUTO: 14 %
MAGNESIUM SERPL-MCNC: 1.8 MG/DL (ref 1.7–2.3)
MCH RBC QN AUTO: 32.1 PG (ref 26.5–33)
MCHC RBC AUTO-ENTMCNC: 34.2 G/DL (ref 31.5–36.5)
MCV RBC AUTO: 94 FL (ref 78–100)
MONOCYTES # BLD AUTO: 1.2 10E3/UL (ref 0–1.3)
MONOCYTES NFR BLD AUTO: 9 %
NEUTROPHILS # BLD AUTO: 10 10E3/UL (ref 1.6–8.3)
NEUTROPHILS NFR BLD AUTO: 75 %
NRBC # BLD AUTO: 0 10E3/UL
NRBC BLD AUTO-RTO: 0 /100
PLATELET # BLD AUTO: 358 10E3/UL (ref 150–450)
RBC # BLD AUTO: 4.02 10E6/UL (ref 4.4–5.9)
WBC # BLD AUTO: 13.5 10E3/UL (ref 4–11)

## 2023-12-17 PROCEDURE — 36415 COLL VENOUS BLD VENIPUNCTURE: CPT | Performed by: PHYSICIAN ASSISTANT

## 2023-12-17 PROCEDURE — 250N000013 HC RX MED GY IP 250 OP 250 PS 637: Performed by: INTERNAL MEDICINE

## 2023-12-17 PROCEDURE — 99232 SBSQ HOSP IP/OBS MODERATE 35: CPT | Performed by: INTERNAL MEDICINE

## 2023-12-17 PROCEDURE — 97535 SELF CARE MNGMENT TRAINING: CPT | Mod: GO

## 2023-12-17 PROCEDURE — 36415 COLL VENOUS BLD VENIPUNCTURE: CPT | Performed by: INTERNAL MEDICINE

## 2023-12-17 PROCEDURE — 250N000011 HC RX IP 250 OP 636: Performed by: INTERNAL MEDICINE

## 2023-12-17 PROCEDURE — 258N000003 HC RX IP 258 OP 636: Performed by: INTERNAL MEDICINE

## 2023-12-17 PROCEDURE — 120N000001 HC R&B MED SURG/OB

## 2023-12-17 PROCEDURE — 250N000013 HC RX MED GY IP 250 OP 250 PS 637: Performed by: PHYSICIAN ASSISTANT

## 2023-12-17 PROCEDURE — 83735 ASSAY OF MAGNESIUM: CPT | Performed by: INTERNAL MEDICINE

## 2023-12-17 PROCEDURE — 97110 THERAPEUTIC EXERCISES: CPT | Mod: GO

## 2023-12-17 PROCEDURE — 82565 ASSAY OF CREATININE: CPT | Performed by: INTERNAL MEDICINE

## 2023-12-17 PROCEDURE — 85025 COMPLETE CBC W/AUTO DIFF WBC: CPT | Performed by: PHYSICIAN ASSISTANT

## 2023-12-17 RX ORDER — SODIUM CHLORIDE 9 MG/ML
INJECTION, SOLUTION INTRAVENOUS CONTINUOUS
Status: DISCONTINUED | OUTPATIENT
Start: 2023-12-17 | End: 2023-12-19

## 2023-12-17 RX ADMIN — ATORVASTATIN CALCIUM 10 MG: 10 TABLET, FILM COATED ORAL at 22:05

## 2023-12-17 RX ADMIN — SENNOSIDES AND DOCUSATE SODIUM 1 TABLET: 8.6; 5 TABLET ORAL at 22:05

## 2023-12-17 RX ADMIN — Medication 15 ML: at 22:06

## 2023-12-17 RX ADMIN — PIPERACILLIN AND TAZOBACTAM 3.38 G: 3; .375 INJECTION, POWDER, FOR SOLUTION INTRAVENOUS at 18:02

## 2023-12-17 RX ADMIN — INSULIN GLARGINE 64 UNITS: 100 INJECTION, SOLUTION SUBCUTANEOUS at 09:01

## 2023-12-17 RX ADMIN — OXYCODONE HYDROCHLORIDE 10 MG: 5 TABLET ORAL at 10:48

## 2023-12-17 RX ADMIN — SENNOSIDES AND DOCUSATE SODIUM 1 TABLET: 8.6; 5 TABLET ORAL at 09:04

## 2023-12-17 RX ADMIN — ASPIRIN 325 MG ORAL TABLET 325 MG: 325 PILL ORAL at 09:03

## 2023-12-17 RX ADMIN — Medication 15 ML: at 09:03

## 2023-12-17 RX ADMIN — POLYETHYLENE GLYCOL 3350 17 G: 17 POWDER, FOR SOLUTION ORAL at 09:04

## 2023-12-17 RX ADMIN — CEFAZOLIN SODIUM 2 G: 2 INJECTION, SOLUTION INTRAVENOUS at 04:48

## 2023-12-17 RX ADMIN — PIPERACILLIN AND TAZOBACTAM 3.38 G: 3; .375 INJECTION, POWDER, FOR SOLUTION INTRAVENOUS at 09:03

## 2023-12-17 RX ADMIN — SODIUM CHLORIDE: 9 INJECTION, SOLUTION INTRAVENOUS at 11:41

## 2023-12-17 RX ADMIN — METRONIDAZOLE 500 MG: 500 TABLET ORAL at 09:03

## 2023-12-17 RX ADMIN — PANTOPRAZOLE SODIUM 40 MG: 40 TABLET, DELAYED RELEASE ORAL at 05:03

## 2023-12-17 RX ADMIN — METRONIDAZOLE 500 MG: 500 TABLET ORAL at 22:05

## 2023-12-17 RX ADMIN — AMLODIPINE BESYLATE 2.5 MG: 2.5 TABLET ORAL at 09:04

## 2023-12-17 RX ADMIN — CEFAZOLIN SODIUM 2 G: 2 INJECTION, SOLUTION INTRAVENOUS at 14:11

## 2023-12-17 RX ADMIN — CEFAZOLIN SODIUM 2 G: 2 INJECTION, SOLUTION INTRAVENOUS at 22:04

## 2023-12-17 ASSESSMENT — ACTIVITIES OF DAILY LIVING (ADL)
ADLS_ACUITY_SCORE: 28

## 2023-12-17 NOTE — PLAN OF CARE
Shift: [id number]    Goal Outcome Evaluation:    Problem: Pain Acute  Goal: Optimal Pain Control and Function  Outcome: Progressing  Intervention: Develop Pain Management Plan  Recent Flowsheet Documentation  Taken 12/16/2023 1955 by Chasity Harmon RN  Pain Management Interventions: medication (see MAR)  Intervention: Prevent or Manage Pain  Recent Flowsheet Documentation  Taken 12/16/2023 1955 by Chasity Harmon RN  Sensory Stimulation Regulation: quiet environment promoted  Medication Review/Management: medications reviewed  Intervention: Optimize Psychosocial Wellbeing  Recent Flowsheet Documentation  Taken 12/16/2023 1955 by Chasity Harmon, RN  Supportive Measures:   active listening utilized   relaxation techniques promoted   verbalization of feelings encouraged     Problem: Comorbidity Management  Goal: Blood Glucose Levels Within Targeted Range  Outcome: Progressing  Intervention: Monitor and Manage Glycemia  Recent Flowsheet Documentation  Taken 12/16/2023 1955 by Chasity Harmon RN  Glycemic Management: blood glucose monitored  Medication Review/Management: medications reviewed          A&Ox4. Had PRN oxycodone 10mg for RUE pain. Effective. Performed hand soak and applied new dressing. No HS sliding scale novolog for BG <200. VSS on RA    /60 (BP Location: Left arm)   Pulse 62   Temp 98.4  F (36.9  C) (Oral)   Resp 18   Wt 97.7 kg (215 lb 6.2 oz)   SpO2 94%   BMI 29.21 kg/m

## 2023-12-17 NOTE — PLAN OF CARE
Goal Outcome Evaluation:  Pt A&Ox4. RA. No Tele. Assist of one with gait belt walker. Pt c/o of pain 8/10 received x1 prn oxycodone 10 mg, noted relief. Dressing change completed  around 0530. NPO at 0000 for Potential I/D.     Oh Saunders RN     Problem: Adult Inpatient Plan of Care  Goal: Absence of Hospital-Acquired Illness or Injury  Intervention: Prevent Infection  Recent Flowsheet Documentation  Taken 12/16/2023 2351 by Oh Saunders RN  Infection Prevention: rest/sleep promoted     Problem: Pain Acute  Goal: Optimal Pain Control and Function  Outcome: Progressing  Intervention: Prevent or Manage Pain  Recent Flowsheet Documentation  Taken 12/16/2023 2351 by Oh Saunders RN  Sensory Stimulation Regulation: quiet environment promoted

## 2023-12-17 NOTE — PLAN OF CARE
Problem: Adult Inpatient Plan of Care  Goal: Absence of Hospital-Acquired Illness or Injury  Intervention: Prevent Infection  Recent Flowsheet Documentation  Taken 12/16/2023 1700 by Komal Adams, RN  Infection Prevention: rest/sleep promoted  Taken 12/16/2023 0910 by Komal Adams, RN  Infection Prevention: rest/sleep promoted   Goal Outcome Evaluation:         Pt alert, continues to be very cooperative with his cares. Pt's wound soaked and dressing changed today. PT continuing ABX, NPO after midnight for anticipatory surgery tomorrow. Pt participated in his therapy session today. BG got the due coverage. Call light within reach. Calls appropriately.

## 2023-12-17 NOTE — PROGRESS NOTES
[facility]    Medicine Progress Note - Hospitalist Service    Date of Admission:  12/11/2023    Assessment & Plan   Amor Zavaleta is a 78 year old male with history of type 2 diabetes, stage III kidney disease, hypertension and LBBB admitted on 12/11/2023 with painful right upper extremity swelling and redness following cat bite. Found to have right dorsal forearm abscess and possible septic arthritis of the left wrist, hyponatremia and RENO.    Hand surgeon performed incision and drainage with copious irrigation of right dorsal forearm, wrist, hand and fourth dorsal extensor compartment on 12/11/2023.  Notable findings operative include right dorsal forearm abscess and superior of extensor tenosynovitis.  He subsequently underwent right wrist arthrotomy for possible septic arthritis and I&D with copious irrigation of right dorsal forearm, wrist, hand and fourth dorsal extensor compartment in the OR on 12/13/2023. Culture of surgical specimen grew Pasteurella and Staphylococcus aureus.  He is receiving Zosyn, cefazolin and metronidazole per ID recommendation.  Right upper extremity swelling and redness have improved.    RENO and hyponatremia improved with IV hydration.  He will likely be here for few more days until he is cleared by surgery service and ID specialist.        Right dorsal forearm abscess and superior of extensor tenosynovitis.   Extensive right upper extremity cellulitis  Concern for right upper extremity abscess  Extensive right upper extremity swelling and redness  Severe sepsis  CT scan of the right upper extremity showed multifocal tenosynovitis involving the extensor tendons in the hand hand extensive cellulitis in the forearm, wrist and hand without discrete fluid collection to suggest an abscess or soft tissue gas.      Follow-up blood culture  ID following-appreciate assistance  Hand surgery follow up - appreciate assistance     Hyponatremia  Possibly secondary to  hyperglycemia and hypovolemia  Monitor sodium      Hypokalemia  Monitor potassium and replace as per protocol    RENO on stage III CKD  Improved.  Normal saline at 75 mL/h  Oral hydration as tolerated  Monitor BMP  Avoid nephrotoxins    Type 2 diabetes  Glucose control has improved  Continue lantus 64 units daily   Continue insulin sliding scale   Insulin carb coverage 1:5  Monitor for hypoglycemia and correct per protocol        Diet: Discharge Instruction - Regular Diet Adult  Snacks/Supplements Adult: Glucerna; With Meals  Moderate Consistent Carb (60 g CHO per Meal) Diet    DVT Prophylaxis: Pneumatic Compression Devices  Browning Catheter: Not present  Lines: None     Cardiac Monitoring: None  Code Status: Full Code      Clinically Significant Risk Factors              # Hypoalbuminemia: Lowest albumin = 3.4 g/dL at 12/11/2023  1:16 PM, will monitor as appropriate           # DMII: A1C = 8.9 % (Ref range: <5.7 %) within past 6 months    # Moderate Malnutrition: based on nutrition assessment    # Financial/Environmental Concerns: none         Disposition Plan     Expected Discharge Date: 12/18/2023    Discharge Delays: IV Medication - consider oral or Home Infusion  Lab Result Pending (enter specific test & time in comments)  Destination: home with help/services  Discharge Comments: IV abx- might need PICC placement   ID & ortho following  Might need to have another I & D. NPO at Midnight         Garcia Tillman MD  Hospitalist Service  [facility]  Securely message with [employer] (more info)  Text page via [employer] Paging/Directory   ______________________________________________________________________    Interval History   No new complaints today and no acute events overnight.  Right upper extremity swelling continues to improve.      Physical Exam   Vital Signs: Temp: 98.4  F (36.9  C) Temp src: Oral BP: 129/69 Pulse: 62   Resp: 18 SpO2: 93 % O2 Device: None (Room air)    Weight: 215 lbs  6.23 oz    General appearance: Dehydrated, awake, Alert, Cooperative, not in any obvious distress and appears stated age   HEENT: Normocephalic, atraumatic, conjunctiva clear without icterus and ears without discharge  Lungs: Clear to auscultation bilaterally, no wheezing, good air exchange, normal work of breathing  Cardiovascular: Regular Rate and Rythm, normal apical impulse, normal S1 and S2, no lower extremity edema bilaterally  Abdomen: Soft, non-tender and Non-distended, active bowel sounds  Skin: Right hand and forearm in ace -wrap  Musculoskeletal: Right upper extremity sling in place. Right hand and forearm in ace -wrap  Neurologic: Alert & Oriented X 3, Facial symmetry preserved and upper & lower extremities moving well with symmetry  Psychiatric: Calm, normal eye contact and normal affect         Medical Decision Making       40 MINUTES SPENT BY ME on the date of service doing chart review, history, exam, documentation & further activities per the note.      Data   Imaging results reviewed over the past 24 hrs:   No results found for this or any previous visit (from the past 24 hour(s)).

## 2023-12-17 NOTE — PROGRESS NOTES
[facility]   Supervising Physician Yoan Moreno MD    S: POD#4 s/p revision R wrist I&D of septic arthritis.  Pt states he slept fairly well, and is feeling well subjectively.  Still pain/stiffness of RUE.    O: Remains afebrile.  No CBC drawn this morning.  A&O x3, appears well.    RUE dorsal wrist wound still loosely approximated with surrounding erythema, but smaller area of erythema, now not to include dorsal hand, an improvement from yesterday.  Moving the fingers and wrist with discomfort still.  No proximal lymphangitis.    Impression/plan:  POD#4 s/p revision R wrist I&D septic arthritis from cat bite.  Clinically appears slightly improved.  No WBC available from this morning.  Ordered.  Pending WBC, please keep NPO.  If falling from yesterday, may continue Abx Tx alone.    Addendum: per communication with Dr. Moreno, advance to regular diet.  NPO after midnight again; still watching WBC tomorrow morning.

## 2023-12-17 NOTE — PROGRESS NOTES
"[facility] Progress Note    SUBJECTIVE: pain and swelling a bit better today. No OR per patient. Tolerating antibiotics.       REVIEW OF SYSTEMS:  Negative unless as listed above.  Social history, Family history, Medications: reviewed.    OBJECTIVE:  /69 (BP Location: Left arm)   Pulse 62   Temp 98.4  F (36.9  C) (Oral)   Resp 18   Wt 97.7 kg (215 lb 6.2 oz)   SpO2 93%   BMI 29.21 kg/m                PHYSICAL EXAM:  Alert, awake  Vitals tabulated above, reviewed  Sclera normal color, not injected  CARDIOVASCULAR regular rate and rhythm, no murmur  Lungs CLEAR TO AUSCULTATION   Abdomen soft, NT/ND, absent HEPATOSPLENOMEGALY  Skin normal  Joints normal  Neurologic exam non focal  Wound wrapped, fingers swollen.     Antibiotics:cefazolin, flagyl, vanco    Pertinent labs:    Recent Labs   Lab Test 12/17/23  0945 12/16/23  0636 12/15/23  0550   WBC 13.5* 14.6* 10.5   HGB 12.9* 11.9* 11.9*   HCT 37.7* 35.4* 36.2*   MCV 94 95 95    285 245       Lab Results   Component Value Date    RBC 3.79 12/13/2023     Lab Results   Component Value Date    HGB 12.0 12/13/2023     Lab Results   Component Value Date    HCT 35.9 12/13/2023     No components found for: \"MCT\"  Lab Results   Component Value Date    MCV 95 12/13/2023     Lab Results   Component Value Date    MCH 31.7 12/13/2023     Lab Results   Component Value Date    MCHC 33.4 12/13/2023     Lab Results   Component Value Date    RDW 13.4 12/13/2023     Lab Results   Component Value Date     12/13/2023       Last Comprehensive Metabolic Panel:  Sodium   Date Value Ref Range Status   12/13/2023 138 135 - 145 mmol/L Final     Comment:     Reference intervals for this test were updated on 09/26/2023 to more accurately reflect our healthy population. There may be differences in the flagging of prior results with similar values performed with this method. Interpretation of those prior results can be made in the context of the updated " "reference intervals.      Potassium   Date Value Ref Range Status   12/16/2023 3.7 3.4 - 5.3 mmol/L Final   06/28/2022 3.3 (L) 3.5 - 5.0 mmol/L Final     Chloride   Date Value Ref Range Status   12/13/2023 105 98 - 107 mmol/L Final   06/28/2022 103 98 - 107 mmol/L Final     Carbon Dioxide (CO2)   Date Value Ref Range Status   12/13/2023 23 22 - 29 mmol/L Final   06/28/2022 26 22 - 31 mmol/L Final     Anion Gap   Date Value Ref Range Status   12/13/2023 10 7 - 15 mmol/L Final   06/28/2022 13 5 - 18 mmol/L Final     Glucose   Date Value Ref Range Status   06/28/2022 91 70 - 125 mg/dL Final     GLUCOSE BY METER POCT   Date Value Ref Range Status   12/17/2023 117 (H) 70 - 99 mg/dL Final     Urea Nitrogen   Date Value Ref Range Status   12/13/2023 24.2 (H) 8.0 - 23.0 mg/dL Final   06/28/2022 26 8 - 28 mg/dL Final     Creatinine   Date Value Ref Range Status   12/17/2023 1.30 (H) 0.67 - 1.17 mg/dL Final     GFR Estimate   Date Value Ref Range Status   12/17/2023 56 (L) >60 mL/min/1.73m2 Final   05/19/2021 37 (L) >60 mL/min/1.73m2 Final     Calcium   Date Value Ref Range Status   12/13/2023 7.5 (L) 8.8 - 10.2 mg/dL Final       Liver Function Studies -   Recent Labs   Lab Test 12/11/23  1316   PROTTOTAL 7.5   ALBUMIN 3.4*   BILITOTAL 1.1   ALKPHOS 120   AST 27   ALT 45       Erythrocyte Sedimentation Rate   Date Value Ref Range Status   12/11/2023 61 (H) 0 - 20 mm/hr Final       No results found for: \"CRP\"            MICROBIOLOGY DATA:    Pasteurella, staph MSSA, bacteroides pyogenes, fusobacterium    IMAGING/RADIOLOGY:      ASSESSMENT:  Pasteurella cat bite infection/cellulitis    RECOMMENDATION:  I'm on the fence about picc vs not.    I'd like to keep him over weekend on zosyn, ancef, flagyl.   WBC stable. Clinically slightly better. Keep on same abx.     Silvia Thayer MD  Office [phone] option 2 to desk staff  "

## 2023-12-18 ENCOUNTER — APPOINTMENT (OUTPATIENT)
Dept: PHYSICAL THERAPY | Facility: HOSPITAL | Age: 78
DRG: 853 | End: 2023-12-18
Payer: COMMERCIAL

## 2023-12-18 ENCOUNTER — APPOINTMENT (OUTPATIENT)
Dept: OCCUPATIONAL THERAPY | Facility: HOSPITAL | Age: 78
DRG: 853 | End: 2023-12-18
Payer: COMMERCIAL

## 2023-12-18 LAB
BACTERIA ABSC ANAEROBE+AEROBE CULT: ABNORMAL
BACTERIA ABSC ANAEROBE+AEROBE CULT: ABNORMAL
BACTERIA TISS BX CULT: ABNORMAL
BASOPHILS # BLD AUTO: 0.1 10E3/UL (ref 0–0.2)
BASOPHILS NFR BLD AUTO: 1 %
CREAT SERPL-MCNC: 1.34 MG/DL (ref 0.67–1.17)
EGFRCR SERPLBLD CKD-EPI 2021: 54 ML/MIN/1.73M2
EOSINOPHIL # BLD AUTO: 0.2 10E3/UL (ref 0–0.7)
EOSINOPHIL NFR BLD AUTO: 2 %
ERYTHROCYTE [DISTWIDTH] IN BLOOD BY AUTOMATED COUNT: 12.9 % (ref 10–15)
GLUCOSE BLDC GLUCOMTR-MCNC: 191 MG/DL (ref 70–99)
GLUCOSE BLDC GLUCOMTR-MCNC: 215 MG/DL (ref 70–99)
GLUCOSE BLDC GLUCOMTR-MCNC: 229 MG/DL (ref 70–99)
GLUCOSE BLDC GLUCOMTR-MCNC: 65 MG/DL (ref 70–99)
GLUCOSE BLDC GLUCOMTR-MCNC: 76 MG/DL (ref 70–99)
GLUCOSE BLDC GLUCOMTR-MCNC: 77 MG/DL (ref 70–99)
GLUCOSE BLDC GLUCOMTR-MCNC: 95 MG/DL (ref 70–99)
HCT VFR BLD AUTO: 36.1 % (ref 40–53)
HGB BLD-MCNC: 12.1 G/DL (ref 13.3–17.7)
IMM GRANULOCYTES # BLD: 0.1 10E3/UL
IMM GRANULOCYTES NFR BLD: 1 %
LYMPHOCYTES # BLD AUTO: 1.8 10E3/UL (ref 0.8–5.3)
LYMPHOCYTES NFR BLD AUTO: 15 %
MAGNESIUM SERPL-MCNC: 1.9 MG/DL (ref 1.7–2.3)
MCH RBC QN AUTO: 31.9 PG (ref 26.5–33)
MCHC RBC AUTO-ENTMCNC: 33.5 G/DL (ref 31.5–36.5)
MCV RBC AUTO: 95 FL (ref 78–100)
MONOCYTES # BLD AUTO: 1.2 10E3/UL (ref 0–1.3)
MONOCYTES NFR BLD AUTO: 10 %
NEUTROPHILS # BLD AUTO: 8.4 10E3/UL (ref 1.6–8.3)
NEUTROPHILS NFR BLD AUTO: 71 %
NRBC # BLD AUTO: 0 10E3/UL
NRBC BLD AUTO-RTO: 0 /100
PLATELET # BLD AUTO: 349 10E3/UL (ref 150–450)
POTASSIUM SERPL-SCNC: 4 MMOL/L (ref 3.4–5.3)
RBC # BLD AUTO: 3.79 10E6/UL (ref 4.4–5.9)
WBC # BLD AUTO: 11.7 10E3/UL (ref 4–11)

## 2023-12-18 PROCEDURE — 84132 ASSAY OF SERUM POTASSIUM: CPT | Performed by: HOSPITALIST

## 2023-12-18 PROCEDURE — 250N000013 HC RX MED GY IP 250 OP 250 PS 637: Performed by: PHYSICIAN ASSISTANT

## 2023-12-18 PROCEDURE — 250N000013 HC RX MED GY IP 250 OP 250 PS 637

## 2023-12-18 PROCEDURE — 99232 SBSQ HOSP IP/OBS MODERATE 35: CPT

## 2023-12-18 PROCEDURE — 99232 SBSQ HOSP IP/OBS MODERATE 35: CPT | Performed by: HOSPITALIST

## 2023-12-18 PROCEDURE — 250N000013 HC RX MED GY IP 250 OP 250 PS 637: Performed by: INTERNAL MEDICINE

## 2023-12-18 PROCEDURE — 258N000003 HC RX IP 258 OP 636: Performed by: INTERNAL MEDICINE

## 2023-12-18 PROCEDURE — 83735 ASSAY OF MAGNESIUM: CPT | Performed by: HOSPITALIST

## 2023-12-18 PROCEDURE — 97535 SELF CARE MNGMENT TRAINING: CPT | Mod: GO

## 2023-12-18 PROCEDURE — 36415 COLL VENOUS BLD VENIPUNCTURE: CPT | Performed by: INTERNAL MEDICINE

## 2023-12-18 PROCEDURE — 97116 GAIT TRAINING THERAPY: CPT | Mod: GP

## 2023-12-18 PROCEDURE — 120N000001 HC R&B MED SURG/OB

## 2023-12-18 PROCEDURE — 82565 ASSAY OF CREATININE: CPT | Performed by: INTERNAL MEDICINE

## 2023-12-18 PROCEDURE — 250N000012 HC RX MED GY IP 250 OP 636 PS 637: Performed by: INTERNAL MEDICINE

## 2023-12-18 PROCEDURE — 250N000011 HC RX IP 250 OP 636: Performed by: INTERNAL MEDICINE

## 2023-12-18 PROCEDURE — 85025 COMPLETE CBC W/AUTO DIFF WBC: CPT | Performed by: PHYSICIAN ASSISTANT

## 2023-12-18 PROCEDURE — 97110 THERAPEUTIC EXERCISES: CPT | Mod: GO

## 2023-12-18 PROCEDURE — 97110 THERAPEUTIC EXERCISES: CPT | Mod: GP

## 2023-12-18 RX ORDER — METRONIDAZOLE 500 MG/1
500 TABLET ORAL 3 TIMES DAILY
Qty: 21 TABLET | Refills: 0 | Status: DISCONTINUED | OUTPATIENT
Start: 2023-12-18 | End: 2023-12-20 | Stop reason: HOSPADM

## 2023-12-18 RX ORDER — OXYCODONE HYDROCHLORIDE 5 MG/1
5 TABLET ORAL EVERY 4 HOURS PRN
Qty: 20 TABLET | Refills: 0 | Status: ON HOLD | OUTPATIENT
Start: 2023-12-18 | End: 2024-01-05

## 2023-12-18 RX ADMIN — METRONIDAZOLE 500 MG: 500 TABLET ORAL at 21:42

## 2023-12-18 RX ADMIN — METRONIDAZOLE 500 MG: 500 TABLET ORAL at 08:46

## 2023-12-18 RX ADMIN — SENNOSIDES AND DOCUSATE SODIUM 1 TABLET: 8.6; 5 TABLET ORAL at 21:42

## 2023-12-18 RX ADMIN — AMOXICILLIN AND CLAVULANATE POTASSIUM 1 TABLET: 875; 125 TABLET, FILM COATED ORAL at 21:41

## 2023-12-18 RX ADMIN — OXYCODONE HYDROCHLORIDE 5 MG: 5 TABLET ORAL at 01:12

## 2023-12-18 RX ADMIN — OXYCODONE HYDROCHLORIDE 5 MG: 5 TABLET ORAL at 08:46

## 2023-12-18 RX ADMIN — Medication 15 ML: at 08:47

## 2023-12-18 RX ADMIN — INSULIN GLARGINE 57 UNITS: 100 INJECTION, SOLUTION SUBCUTANEOUS at 08:52

## 2023-12-18 RX ADMIN — CEFAZOLIN SODIUM 2 G: 2 INJECTION, SOLUTION INTRAVENOUS at 06:10

## 2023-12-18 RX ADMIN — Medication 15 ML: at 21:41

## 2023-12-18 RX ADMIN — SENNOSIDES AND DOCUSATE SODIUM 1 TABLET: 8.6; 5 TABLET ORAL at 08:45

## 2023-12-18 RX ADMIN — ACETAMINOPHEN 650 MG: 325 TABLET ORAL at 01:12

## 2023-12-18 RX ADMIN — ATORVASTATIN CALCIUM 10 MG: 10 TABLET, FILM COATED ORAL at 21:42

## 2023-12-18 RX ADMIN — PANTOPRAZOLE SODIUM 40 MG: 40 TABLET, DELAYED RELEASE ORAL at 08:45

## 2023-12-18 RX ADMIN — AMLODIPINE BESYLATE 2.5 MG: 2.5 TABLET ORAL at 08:46

## 2023-12-18 RX ADMIN — PIPERACILLIN AND TAZOBACTAM 3.38 G: 3; .375 INJECTION, POWDER, FOR SOLUTION INTRAVENOUS at 08:43

## 2023-12-18 RX ADMIN — ASPIRIN 325 MG ORAL TABLET 325 MG: 325 PILL ORAL at 12:51

## 2023-12-18 RX ADMIN — METRONIDAZOLE 500 MG: 500 TABLET ORAL at 16:19

## 2023-12-18 RX ADMIN — SODIUM CHLORIDE: 9 INJECTION, SOLUTION INTRAVENOUS at 12:51

## 2023-12-18 RX ADMIN — OXYCODONE HYDROCHLORIDE 5 MG: 5 TABLET ORAL at 16:15

## 2023-12-18 RX ADMIN — CEFAZOLIN SODIUM 2 G: 2 INJECTION, SOLUTION INTRAVENOUS at 12:48

## 2023-12-18 RX ADMIN — PIPERACILLIN AND TAZOBACTAM 3.38 G: 3; .375 INJECTION, POWDER, FOR SOLUTION INTRAVENOUS at 01:11

## 2023-12-18 ASSESSMENT — ACTIVITIES OF DAILY LIVING (ADL)
ADLS_ACUITY_SCORE: 28

## 2023-12-18 NOTE — PROGRESS NOTES
[place] [facility] Progress Note    SUBJECTIVE: feels better. No more plans for surgery.       REVIEW OF SYSTEMS:  Negative unless as listed above.  Social history, Family history, Medications: reviewed.    OBJECTIVE:  BP (!) 146/70 (BP Location: Left arm)   Pulse 57   Temp 98.4  F (36.9  C) (Oral)   Resp 20   Wt 98.7 kg (217 lb 9.5 oz)   SpO2 96%   BMI 29.51 kg/m        PHYSICAL EXAM:  Alert, awake  Vitals tabulated above, reviewed  Sclera normal color, not injected  CARDIOVASCULAR regular rate and rhythm, no murmur  Lungs CLEAR TO AUSCULTATION   Abdomen soft, NT/ND, absent HEPATOSPLENOMEGALY  Skin normal  Joints normal  Neurologic exam non focal  Wound wrapped, fingers swollen. Moves well.     Antibiotics:cefazolin, flagyl, pip/tazo    Pertinent labs:    Recent Labs   Lab Test 12/18/23  0724 12/17/23  0945 12/16/23  0636   WBC 11.7* 13.5* 14.6*   HGB 12.1* 12.9* 11.9*   HCT 36.1* 37.7* 35.4*   MCV 95 94 95    358 285          Last Comprehensive Metabolic Panel:  Sodium   Date Value Ref Range Status   12/13/2023 138 135 - 145 mmol/L Final     Comment:     Reference intervals for this test were updated on 09/26/2023 to more accurately reflect our healthy population. There may be differences in the flagging of prior results with similar values performed with this method. Interpretation of those prior results can be made in the context of the updated reference intervals.      Potassium   Date Value Ref Range Status   12/18/2023 4.0 3.4 - 5.3 mmol/L Final   06/28/2022 3.3 (L) 3.5 - 5.0 mmol/L Final     Chloride   Date Value Ref Range Status   12/13/2023 105 98 - 107 mmol/L Final   06/28/2022 103 98 - 107 mmol/L Final     Carbon Dioxide (CO2)   Date Value Ref Range Status   12/13/2023 23 22 - 29 mmol/L Final   06/28/2022 26 22 - 31 mmol/L Final     Anion Gap   Date Value Ref Range Status   12/13/2023 10 7 - 15 mmol/L Final   06/28/2022 13 5 - 18 mmol/L Final     Glucose   Date Value Ref Range  "Status   06/28/2022 91 70 - 125 mg/dL Final     GLUCOSE BY METER POCT   Date Value Ref Range Status   12/18/2023 95 70 - 99 mg/dL Final     Urea Nitrogen   Date Value Ref Range Status   12/13/2023 24.2 (H) 8.0 - 23.0 mg/dL Final   06/28/2022 26 8 - 28 mg/dL Final     Creatinine   Date Value Ref Range Status   12/18/2023 1.34 (H) 0.67 - 1.17 mg/dL Final     GFR Estimate   Date Value Ref Range Status   12/18/2023 54 (L) >60 mL/min/1.73m2 Final   05/19/2021 37 (L) >60 mL/min/1.73m2 Final     Calcium   Date Value Ref Range Status   12/13/2023 7.5 (L) 8.8 - 10.2 mg/dL Final              Erythrocyte Sedimentation Rate   Date Value Ref Range Status   12/11/2023 61 (H) 0 - 20 mm/hr Final       No results found for: \"CRP\"            MICROBIOLOGY DATA:    7-Day Micro Results       Collected Updated Procedure Result Status      12/11/2023 2000 12/18/2023 0749 Anaerobic Bacterial Culture Routine [[id number]]   (Abnormal)   Tissue from Wrist, Right    Final result Component Value   Culture 1+ Bacteroides pyogenes    Susceptibilities not routinely done, refer to antibiogram to view typical susceptibility profiles               12/11/2023 2000 12/11/2023 2359 Gram Stain [[id number]]   Tissue from Wrist, Right    Final result Component Value   Gram Stain Result No organisms seen   Gram Stain Result 1+ WBC seen            12/11/2023 2000 12/17/2023 2331 Fungal or Yeast Culture Routine [[id number]]   Tissue from Wrist, Right    Preliminary result Component Value   Culture No growth after 6 days  [P]                12/11/2023 2000 12/14/2023 1123 Tissue Aerobic Bacterial Culture Routine [[id number]]    (Abnormal)   Tissue from Wrist, Right    Final result Component Value   Culture 1+ Pasteurella multocida    Susceptibilities done on previous cultures    1+ Staphylococcus aureus        Susceptibility        Staphylococcus aureus      ARIELLE      Ciprofloxacin <=0.5 ug/mL Susceptible  [*]       Clindamycin 0.25 ug/mL Susceptible  "     Daptomycin 0.25 ug/mL Susceptible      Doxycycline <=0.5 ug/mL Susceptible      Erythromycin <=0.25 ug/mL Susceptible      Gentamicin <=0.5 ug/mL Susceptible      Inducible macrolide resistance test Negative ug/mL Negative  [*]       Levofloxacin <=0.12 ug/mL Susceptible  [*]       Linezolid 2 ug/mL Susceptible  [*]       Moxifloxacin <=0.25 ug/mL Susceptible  [*]       Nitrofurantoin 32 ug/mL Susceptible  [*]       Oxacillin 0.5 ug/mL Susceptible  [1]       Rifampin <=0.5 ug/mL Susceptible  [*]       Tetracycline <=1 ug/mL Susceptible      Tigecycline <=0.12 ug/mL Susceptible  [*]       Trimethoprim/Sulfamethoxazole <=0.5/9.5 ug/mL Susceptible      Vancomycin 1 ug/mL Susceptible                   [*]  Suppressed Antibiotic     [1]  Oxacillin susceptible isolates are susceptible to cephalosporins (example: cefazolin and cephalexin) and beta lactam combination agents. Oxacillin resistant isolates are resistant to these agents.                   12/11/2023 1957 12/18/2023 0755 Anaerobic Bacterial Culture Routine [[id number]]   (Abnormal)   Tissue from Hand, Right    Final result Component Value   Culture 1+ Fusobacterium nucleatum    Susceptibilities not routinely done, refer to antibiogram to view typical susceptibility profiles    2+ Bacteroides pyogenes    Susceptibilities not routinely done, refer to antibiogram to view typical susceptibility profiles               12/11/2023 1957 12/11/2023 2359 Gram Stain [[id number]]   Tissue from Hand, Right    Final result Component Value   Gram Stain Result No organisms seen   Gram Stain Result 1+ WBC seen            12/11/2023 1957 12/17/2023 2346 Fungal or Yeast Culture Routine [[id number]]   Tissue from Hand, Right    Preliminary result Component Value   Culture No growth after 6 days  [P]                12/11/2023 1957 12/13/2023 1209 Tissue Aerobic Bacterial Culture Routine [[id number]]   (Abnormal)   Tissue from Hand, Right    Final result Component Value    Culture 1+ Pasteurella multocida    Susceptibilities done on previous cultures    1+ Staphylococcus aureus    Susceptibilities done on previous cultures               12/11/2023 1953 12/18/2023 0757 Anaerobic Bacterial Culture Routine [[id number]]   (Abnormal)   Abscess from Hand, Right    Final result Component Value   Culture 3+ Bacteroides pyogenes    Susceptibilities not routinely done, refer to antibiogram to view typical susceptibility profiles    2+ Fusobacterium nucleatum    Susceptibilities not routinely done, refer to antibiogram to view typical susceptibility profiles               12/11/2023 1953 12/11/2023 2359 Gram Stain [[id number]]   Abscess from Hand, Right    Final result Component Value   Gram Stain Result No organisms seen   Gram Stain Result 3+ WBC seen   Predominantly PMNs            12/11/2023 1953 12/17/2023 2331 Fungal or Yeast Culture Routine [[id number]]   Abscess from Hand, Right    Preliminary result Component Value   Culture No growth after 6 days  [P]                12/11/2023 1953 12/14/2023 0836 Abscess Aerobic Bacterial Culture Routine [[id number]]    (Abnormal)   Abscess from Hand, Right    Final result Component Value   Culture 1+ Pasteurella multocida        Susceptibility        Pasteurella multocida      ARIELLE      Ampicillin 0.19 ug/mL Susceptible      Azithromycin 0.75 ug/mL Susceptible      Ceftriaxone 0.002 ug/mL Susceptible      Levofloxacin 0.023 ug/mL Susceptible      Penicillin 0.094 ug/mL Susceptible      Trimethoprim/Sulfamethoxazole 0.047 ug/mL Susceptible                                     IMAGING/RADIOLOGY:      ASSESSMENT:  Pasteurella cat bite infection/cellulitis    RECOMMENDATION:  Change to po Augmentin and metronidazole for 7 days.    If doing well on 12/19, OK to discharge to home anytime from Infectious Disease standpoint.     ELÍAS WILKINS MD   Office [phone] option 2 to desk staff

## 2023-12-18 NOTE — PROGRESS NOTES
[facility]    Medicine Progress Note - Hospitalist Service    Date of Admission:  12/11/2023    Assessment & Plan   Amor Zavaleta is a 78 year old male with a history of type 2 diabetes, stage III kidney disease, hypertension and LBBB admitted on 12/11/2023 with painful right upper extremity swelling and redness following a cat bite. He was assessed with right dorsal forearm abscess and possible septic arthritis of the left wrist, hyponatremia and RENO on CKDIII.  RENO and hyponatremia improved with IV hydration.    Patient underwent I&D with copious irrigation of right dorsal forearm, wrist, hand and fourth dorsal extensor compartment on 12/11/2023.  Postop findings included right dorsal forearm abscess and superior extensor tenosynovitis.  On 12/13/2023 patient underwent right wrist arthrotomy for possible septic arthritis and I&D with copious irrigation of right dorsal forearm wrist hand and fourth dorsal extensor compartment.  Cultures of surgical specimen grew Pasteurella, Staphylococcus aureus, B. Pyogenes, F. Nucleatum. Patient placed on Zosyn, cefazolin and Flagyl per ID recommendations with subsequent improvement of right upper extremity swelling and redness. Clinically improved and with improving leukocytosis. Today pending possible I&D. Discussed with ID and patient to continue IV antibiotics given pending I&D.     Right dorsal forearm abscess and superior of extensor tenosynovitis (S/P I&D 12/11 and 12/13/2023)  Extensive right upper extremity cellulitis  Concern for right upper extremity abscess  Severe sepsis  CT scan of the right upper extremity showed multifocal tenosynovitis involving the extensor tendons in the hand hand extensive cellulitis in the forearm, wrist and hand without discrete fluid collection to suggest an abscess or soft tissue gas.  Initial abscess cultures with P. Multicoda, pan sensitive S. Aureus, B. Pyogenes and F. Nucleatum.    Blood cultures NGTD    Afebrile since admission with improving leukocytosis   Continue Cefazolin, Zosyn and Flagyl  Discussed with ID- patient to continue on IV antibiotics pending possible I&D today  Hand surgery follow up - appreciate assistance      Hyponatremia-resolved  Possibly secondary to hyperglycemia and hypovolemia  Monitor BMP     Hypokalemia-resolved  Monitor potassium and replace as per protocol     RENO on stage III CKD  Improved.  Normal saline at 75 mL/h  Oral hydration as tolerated  Monitor BMP  Avoid nephrotoxins     Type 2 diabetes  Glucose control has improved  Continue lantus 64 units daily but given lower dose today due to NPO status  Continue insulin sliding scale   Insulin carb coverage 1:5  Monitor for hypoglycemia and correct per protocol           Diet: Discharge Instruction - Regular Diet Adult  Snacks/Supplements Adult: Glucerna; With Meals  NPO per Anesthesia Guidelines for Procedure/Surgery Except for: Meds    DVT Prophylaxis: Pneumatic Compression Devices  Browning Catheter: Not present  Lines: None     Cardiac Monitoring: None  Code Status: Full Code      Clinically Significant Risk Factors              # Hypoalbuminemia: Lowest albumin = 3.4 g/dL at 12/11/2023  1:16 PM, will monitor as appropriate           # DMII: A1C = 8.9 % (Ref range: <5.7 %) within past 6 months    # Moderate Malnutrition: based on nutrition assessment    # Financial/Environmental Concerns: none         Disposition Plan      Expected Discharge Date: 12/20/2023    Discharge Delays: IV Medication - consider oral or Home Infusion  Lab Result Pending (enter specific test & time in comments)  Destination: home with help/services  Discharge Comments: IV abx- might need PICC placement   ID & ortho following  Might need to have another I & D. NPO at Midnight            Kristina Sanchez MD  Hospitalist Service  [facility]  Securely message with [employer] (more info)  Text page via [facility] Paging/Directory    ______________________________________________________________________    Interval History   No acute events overnight. He denies any fever but reports ongoing discomfort in his right wrist and elbow. He is able to mobilize fingers,     Physical Exam   Vital Signs: Temp: 98.4  F (36.9  C) Temp src: Oral BP: (!) 146/70 Pulse: 57   Resp: 20 SpO2: 96 % O2 Device: None (Room air)    Weight: 215 lbs 6.23 oz    Physical Exam  Vitals reviewed.   Constitutional:       General: He is not in acute distress.  HENT:      Head: Normocephalic.      Nose: Nose normal.      Mouth/Throat:      Mouth: Mucous membranes are moist.      Pharynx: Oropharynx is clear.   Eyes:      General: No scleral icterus.     Extraocular Movements: Extraocular movements intact.      Conjunctiva/sclera: Conjunctivae normal.      Pupils: Pupils are equal, round, and reactive to light.   Cardiovascular:      Rate and Rhythm: Normal rate and regular rhythm.      Heart sounds: No murmur heard.  Pulmonary:      Effort: Pulmonary effort is normal. No respiratory distress.      Breath sounds: Normal breath sounds. No wheezing.   Abdominal:      General: Bowel sounds are normal. There is no distension.      Palpations: Abdomen is soft.      Tenderness: There is no abdominal tenderness.   Musculoskeletal:      Cervical back: Neck supple.      Right lower leg: No edema.      Left lower leg: No edema.      Comments: Right lower arm with ace bandage, elbow ROM limited by pain, mild erythema no extension past elbow noted, warm to touch   Skin:     General: Skin is warm.      Capillary Refill: Capillary refill takes 2 to 3 seconds.      Findings: Erythema present.   Neurological:      Mental Status: He is alert and oriented to person, place, and time.   Psychiatric:         Mood and Affect: Mood normal.          Medical Decision Making       45 MINUTES SPENT BY ME on the date of service doing chart review, history, exam, documentation & further activities per the  note.      Data     I have personally reviewed the following data over the past 24 hrs:    11.7 (H)  \   12.1 (L)   / 349     N/A N/A N/A /  76   4.0 N/A 1.34 (H) \

## 2023-12-18 NOTE — PLAN OF CARE
Problem: Adult Inpatient Plan of Care  Goal: Optimal Comfort and Wellbeing  Outcome: Progressing     Problem: Pain Acute  Goal: Optimal Pain Control and Function  Outcome: Progressing   Goal Outcome Evaluation: Patient alert/oriented, pleasant. PRN oxy and tylenol given at bedtime for pain. Dressing to RUE intact, heat and swelling noted. CMS to RUE intact and denies numbness and tingling. Has neuropathy to feet at baseline. NPO tonight for possible repeat I&D. IVF @ 75mL/hr. Voiding adequately via urinal overnight. Dressing changed this am.

## 2023-12-18 NOTE — PROGRESS NOTES
Care Management Follow Up    Length of Stay (days): 7    Expected Discharge Date: 12/20/2023     Concerns to be Addressed: discharge planning     Patient plan of care discussed at interdisciplinary rounds: Yes    Anticipated Discharge Disposition: Home, Home Care     Anticipated Discharge Services: None  Anticipated Discharge DME: None    Patient/family educated on Medicare website which has current facility and service quality ratings: yes  Education Provided on the Discharge Plan: Yes  Patient/Family in Agreement with the Plan: yes    Referrals Placed by CM/SW: Homecare  Private pay costs discussed: Not applicable    Additional Information:  CM following. Per chart review, pt on IV antibiotics, ortho plans to remove pt drains on 12-19 if pt continues to improve. CM following for recommendations, possible PICC for antibiotics.     IVAN Walters

## 2023-12-18 NOTE — PROGRESS NOTES
CLINICAL NUTRITION SERVICES - REASSESSMENT NOTE     Nutrition Prescription    RECOMMENDATIONS FOR MDs/PROVIDERS TO ORDER:  Recommend high consistent CHO diet (75g/meal) when ready to restart diet to optimize nutrition intake with increased needs for wound healing    Malnutrition Status:    Moderate in context of acute illness    Recommendations already ordered by Registered Dietitian (RD):  When diet restarted will increase glucerna to tid, add expedite daily to better meet increased nutrition needs fro wound healing = 760 kcal, 40 g protein total    Future/Additional Recommendations:  Will monitor po intake, wt, labs, wound healing, education needs     EVALUATION OF PROGRESS TOWARDS GOALS      CURRENT NUTRITION ORDERS  Diet: NPO for procedure today  Moderate consistent CHO diet prior  Pt has been intermittently NPO for multiple days    Supplement; Glucerna bid    Intake/Tolerance: Eating 100% of meals and shake at 3184-3157 kcal, 60-78 g protein/day over 2-3 meal/day  Good fluid intake, 2160 ml/day    Pt is meeting approximately 65% of estimated kcal and protein needs on average    Pt reports meals have been going well, he is taking the glucerna and eating at baseline. Pt agreed to increased glucerna to tid    Receiving NS IVF at 75 ml/hr    LABS  Labs reviewed  Cr 1.3 (H), rising  BG  mgl/dl past 24 hours, in good control    MEDICATIONS  Medications reviewed  Iv abx, lipitor, ssi, novolog 1u: 5 g cho, lantus, oral antifungal, mvi with minerals, protonix, miralax daily, pericolace bid,     ANTHROPOMETRICS  Height: 6'  Most Recent Weight: 97.7 kg (215 lb 6.2 oz)    IBW: 80.9 kg  BMI: Overweight BMI 25-29.9  Weight History: No new weight  Wt Readings from Last 3 Encounters:   12/12/23 97.7 kg (215 lb 6.2 oz)   08/25/23 97.5 kg (215 lb)       Dosing Weight: 85.1 kg adjusted    ASSESSED NUTRITION NEEDS  Estimated Energy Needs: 2130 -2555 kcals/day (25 - 30 kcals/kg)  Justification: Increased needs  Estimated  Protein Needs:  grams protein/day (1 - 1.2 grams of pro/kg)  Justification: Increased needs and Wound healing, watch renal labs   Estimated Fluid Needs: 2555 mL/day (30 mL/kg), or per provider  Justification: Increased needs and Maintenance    PHYSICAL FINDINGS  Per chart  GI - last BM 12/16 x 1 formed    MALNUTRITION:  % Weight Loss:  None noted  % Intake:  </= 50% for >/= 5 days (severe malnutrition)  Subcutaneous Fat Loss:  Orbital region mild depletion  Muscle Loss:  Clavicle bone region mild depletion  Fluid Retention:  right arm, hand, wrist +3, 1 generalized    Malnutrition Diagnosis: Moderate malnutrition  In Context of:  Acute illness or injury    NUTRITION DIAGNOSIS  Malnutrition related to poor oral intake as evidenced by < =50% intake for >= 5 days, mild subcutaneous fat and muscle losses - improving    INTERVENTIONS  Implementation  -Increase glucerna to tid to better meet increased nutrition needs for wound healing= 660 kcal, 30 g protein    -Recommend high consistent CHO diet (75g/meal) when ready to restart diet to optimize nutrition intake with increased needs for wound healing    Goals  -Patient to consume % of nutritionally adequate meals three times per day, or the equivalent with supplements/snacks.- not met  -BG < 180 mg/dL- met  -Wound healing-in progress     Monitoring/Evaluation  Progress toward goals will be monitored and evaluated per protocol.

## 2023-12-18 NOTE — PLAN OF CARE
Problem: Adult Inpatient Plan of Care  Goal: Absence of Hospital-Acquired Illness or Injury  Intervention: Prevent Infection  Recent Flowsheet Documentation  Taken 12/17/2023 1624 by Komal Adams, RN  Infection Prevention: rest/sleep promoted  Taken 12/17/2023 0855 by Komal Adams, RN  Infection Prevention: rest/sleep promoted   Goal Outcome Evaluation:         Pt's pain was managed with prn Oxy X 1, effective. Pt's wound soaked and dressing changed, not much drainage noted. Pt's BG were within limits, no coverage needed, carb counts covered. Pt staying NPO after midnights for possible I&D tomorrow.

## 2023-12-18 NOTE — PLAN OF CARE
Problem: Adult Inpatient Plan of Care  Goal: Plan of Care Review  Description: The Plan of Care Review/Shift note should be completed every shift.  The Outcome Evaluation is a brief statement about your assessment that the patient is improving, declining, or no change.  This information will be displayed automatically on your shift  note.  Outcome: Progressing   Goal Outcome Evaluation:    Patient is neuro intact. Up with standby assist. Hand is elevated with dressing on, fingers warm with some numbness, brachial pulse palpable. No surgery today as WBC improved and wound healing is improving per surgery. Awaiting ID recs for transitioning to oral abx versus long term IV abx and necessity of PICC. PO oxy for pain control. Good urine output. BP yesterday. VSS. RA.

## 2023-12-18 NOTE — PROGRESS NOTES
"Orthopedic Progress Note      Assessment: 5 Days Post-Op  S/P Procedure(s):  INCISION AND DRAINAGE, RIGHT UPPER FOREARM     Plan:   - Continue PT/OT  - Weightbearing status: WBAT RUE  - Activity: Up with assist and assistive device until independent.  - Anticoagulation: Prophylaxis per primary in addition to SCDs, randal stockings and early ambulation.  - Antibiotics: Per ID  - Pain Management; continue current regimen  - Diet: progress diet as tolerated  - Dressing: Keep dry and intact, may remove for warm soapy water soaks TID  - Elevation: Elevate RUE on pillow to keep above the level of the heart as much as possible  - Will remove drains tomorrow if improving  - Follow-up: Outpatient follow up in 2 weeks  - No plan for surgical intervention at this time.  He has made good improvement over the weekend.  From an orthopedic standpoint he is okay to discharge on antibiotics with a close follow up with Dr. Leyva.  Antibiotic recs per ID      Subjective:  Pain: moderate  Nausea, Vomiting:  No  Lightheadedness, Dizziness:  No  Neuro:  Patient denies new onset numbness or paresthesias  Fever, chills: No  Chest pain: No  SOB: No    Patient reports feeling well today. Patient reports pain is tolerable with current pain regimen. Patient eating and drinking well. Patient voiding and passing gas however no BM. Denies fevers/chills  All questions/concerns answered.      Objective:  BP (!) 146/70 (BP Location: Left arm)   Pulse 57   Temp 98.4  F (36.9  C) (Oral)   Resp 20   Wt 97.7 kg (215 lb 6.2 oz)   SpO2 96%   BMI 29.21 kg/m      The patient is A&Ox3. Appears comfortable, sitting up at bedside.  Sensation is intact.  AIN, median, and motor nerve intact.   Wrist ROM and  strength is intact.  Radial pulse intact.  Incision draining serosanguinous fluid.  Erythema surrounding incision, overall much improved erythema and swelling        Pertinent Labs   Lab Results: personally reviewed.   No results found for: \"INR\", " "\"PROTIME\"  Lab Results   Component Value Date    WBC 11.7 (H) 12/18/2023    HGB 12.1 (L) 12/18/2023    HCT 36.1 (L) 12/18/2023    MCV 95 12/18/2023     12/18/2023     Lab Results   Component Value Date     12/13/2023    CO2 23 12/13/2023         Report completed by:  FLAVIO GRAYSON PA-C  Date: 12/18/2023    [facility]              "

## 2023-12-19 ENCOUNTER — APPOINTMENT (OUTPATIENT)
Dept: OCCUPATIONAL THERAPY | Facility: HOSPITAL | Age: 78
DRG: 853 | End: 2023-12-19
Payer: COMMERCIAL

## 2023-12-19 ENCOUNTER — APPOINTMENT (OUTPATIENT)
Dept: PHYSICAL THERAPY | Facility: HOSPITAL | Age: 78
DRG: 853 | End: 2023-12-19
Payer: COMMERCIAL

## 2023-12-19 LAB
ANION GAP SERPL CALCULATED.3IONS-SCNC: 9 MMOL/L (ref 7–15)
BUN SERPL-MCNC: 18.6 MG/DL (ref 8–23)
CALCIUM SERPL-MCNC: 8.1 MG/DL (ref 8.8–10.2)
CHLORIDE SERPL-SCNC: 102 MMOL/L (ref 98–107)
CREAT SERPL-MCNC: 1.19 MG/DL (ref 0.67–1.17)
DEPRECATED HCO3 PLAS-SCNC: 22 MMOL/L (ref 22–29)
EGFRCR SERPLBLD CKD-EPI 2021: 63 ML/MIN/1.73M2
ERYTHROCYTE [DISTWIDTH] IN BLOOD BY AUTOMATED COUNT: 12.9 % (ref 10–15)
GLUCOSE BLDC GLUCOMTR-MCNC: 116 MG/DL (ref 70–99)
GLUCOSE BLDC GLUCOMTR-MCNC: 145 MG/DL (ref 70–99)
GLUCOSE BLDC GLUCOMTR-MCNC: 158 MG/DL (ref 70–99)
GLUCOSE BLDC GLUCOMTR-MCNC: 236 MG/DL (ref 70–99)
GLUCOSE SERPL-MCNC: 120 MG/DL (ref 70–99)
HCT VFR BLD AUTO: 34.3 % (ref 40–53)
HGB BLD-MCNC: 11.3 G/DL (ref 13.3–17.7)
MAGNESIUM SERPL-MCNC: 1.9 MG/DL (ref 1.7–2.3)
MCH RBC QN AUTO: 31.5 PG (ref 26.5–33)
MCHC RBC AUTO-ENTMCNC: 32.9 G/DL (ref 31.5–36.5)
MCV RBC AUTO: 96 FL (ref 78–100)
PLATELET # BLD AUTO: 375 10E3/UL (ref 150–450)
POTASSIUM SERPL-SCNC: 4.2 MMOL/L (ref 3.4–5.3)
RBC # BLD AUTO: 3.59 10E6/UL (ref 4.4–5.9)
SODIUM SERPL-SCNC: 133 MMOL/L (ref 135–145)
WBC # BLD AUTO: 10.9 10E3/UL (ref 4–11)

## 2023-12-19 PROCEDURE — 97110 THERAPEUTIC EXERCISES: CPT | Mod: GP

## 2023-12-19 PROCEDURE — 250N000013 HC RX MED GY IP 250 OP 250 PS 637

## 2023-12-19 PROCEDURE — 97116 GAIT TRAINING THERAPY: CPT | Mod: GP

## 2023-12-19 PROCEDURE — 82310 ASSAY OF CALCIUM: CPT | Performed by: HOSPITALIST

## 2023-12-19 PROCEDURE — 85027 COMPLETE CBC AUTOMATED: CPT | Performed by: HOSPITALIST

## 2023-12-19 PROCEDURE — 120N000001 HC R&B MED SURG/OB

## 2023-12-19 PROCEDURE — 99232 SBSQ HOSP IP/OBS MODERATE 35: CPT

## 2023-12-19 PROCEDURE — 99233 SBSQ HOSP IP/OBS HIGH 50: CPT | Performed by: INTERNAL MEDICINE

## 2023-12-19 PROCEDURE — 250N000013 HC RX MED GY IP 250 OP 250 PS 637: Performed by: PHYSICIAN ASSISTANT

## 2023-12-19 PROCEDURE — 258N000003 HC RX IP 258 OP 636: Performed by: INTERNAL MEDICINE

## 2023-12-19 PROCEDURE — 97110 THERAPEUTIC EXERCISES: CPT | Mod: GO

## 2023-12-19 PROCEDURE — 36415 COLL VENOUS BLD VENIPUNCTURE: CPT | Performed by: HOSPITALIST

## 2023-12-19 PROCEDURE — 97535 SELF CARE MNGMENT TRAINING: CPT | Mod: GO

## 2023-12-19 PROCEDURE — 250N000013 HC RX MED GY IP 250 OP 250 PS 637: Performed by: INTERNAL MEDICINE

## 2023-12-19 PROCEDURE — 83735 ASSAY OF MAGNESIUM: CPT | Performed by: HOSPITALIST

## 2023-12-19 RX ORDER — METRONIDAZOLE 500 MG/1
500 TABLET ORAL 3 TIMES DAILY
Qty: 21 TABLET | Refills: 0 | Status: SHIPPED | OUTPATIENT
Start: 2023-12-19 | End: 2023-12-20

## 2023-12-19 RX ORDER — ACETAMINOPHEN 650 MG
TABLET, EXTENDED RELEASE ORAL PRN
Qty: 14.8 ML | Refills: 0 | Status: SHIPPED | OUTPATIENT
Start: 2023-12-19 | End: 2024-02-06

## 2023-12-19 RX ADMIN — SENNOSIDES AND DOCUSATE SODIUM 1 TABLET: 8.6; 5 TABLET ORAL at 08:28

## 2023-12-19 RX ADMIN — OXYCODONE HYDROCHLORIDE 5 MG: 5 TABLET ORAL at 10:12

## 2023-12-19 RX ADMIN — ASPIRIN 325 MG ORAL TABLET 325 MG: 325 PILL ORAL at 08:28

## 2023-12-19 RX ADMIN — SENNOSIDES AND DOCUSATE SODIUM 1 TABLET: 8.6; 5 TABLET ORAL at 20:05

## 2023-12-19 RX ADMIN — METRONIDAZOLE 500 MG: 500 TABLET ORAL at 20:05

## 2023-12-19 RX ADMIN — INSULIN GLARGINE 64 UNITS: 100 INJECTION, SOLUTION SUBCUTANEOUS at 08:30

## 2023-12-19 RX ADMIN — Medication 15 ML: at 20:05

## 2023-12-19 RX ADMIN — OXYCODONE HYDROCHLORIDE 10 MG: 5 TABLET ORAL at 00:32

## 2023-12-19 RX ADMIN — AMOXICILLIN AND CLAVULANATE POTASSIUM 1 TABLET: 875; 125 TABLET, FILM COATED ORAL at 08:29

## 2023-12-19 RX ADMIN — PANTOPRAZOLE SODIUM 40 MG: 40 TABLET, DELAYED RELEASE ORAL at 06:59

## 2023-12-19 RX ADMIN — Medication 15 ML: at 08:28

## 2023-12-19 RX ADMIN — METRONIDAZOLE 500 MG: 500 TABLET ORAL at 08:29

## 2023-12-19 RX ADMIN — AMOXICILLIN AND CLAVULANATE POTASSIUM 1 TABLET: 875; 125 TABLET, FILM COATED ORAL at 20:05

## 2023-12-19 RX ADMIN — OXYCODONE HYDROCHLORIDE 5 MG: 5 TABLET ORAL at 05:18

## 2023-12-19 RX ADMIN — SODIUM CHLORIDE: 9 INJECTION, SOLUTION INTRAVENOUS at 03:17

## 2023-12-19 RX ADMIN — METRONIDAZOLE 500 MG: 500 TABLET ORAL at 14:22

## 2023-12-19 RX ADMIN — AMLODIPINE BESYLATE 2.5 MG: 2.5 TABLET ORAL at 08:31

## 2023-12-19 RX ADMIN — ATORVASTATIN CALCIUM 10 MG: 10 TABLET, FILM COATED ORAL at 20:05

## 2023-12-19 ASSESSMENT — ACTIVITIES OF DAILY LIVING (ADL)
ADLS_ACUITY_SCORE: 30
ADLS_ACUITY_SCORE: 28
ADLS_ACUITY_SCORE: 30
ADLS_ACUITY_SCORE: 30
ADLS_ACUITY_SCORE: 28
ADLS_ACUITY_SCORE: 30
ADLS_ACUITY_SCORE: 28
ADLS_ACUITY_SCORE: 30
ADLS_ACUITY_SCORE: 28
ADLS_ACUITY_SCORE: 28

## 2023-12-19 NOTE — PROGRESS NOTES
[facility]    SUBJECTIVE: feels better. Tolerating po antibiotics without difficulty.       REVIEW OF SYSTEMS:  Negative unless as listed above.  Social history, Family history, Medications: reviewed.    OBJECTIVE:  BP (!) 151/70 (BP Location: Left arm)   Pulse 59   Temp 98.7  F (37.1  C) (Oral)   Resp 18   Wt 98.7 kg (217 lb 9.5 oz)   SpO2 92%   BMI 29.51 kg/m        PHYSICAL EXAM:  Alert, awake  Vitals tabulated above, reviewed  Sclera normal color, not injected  CARDIOVASCULAR regular rate and rhythm, no murmur  Lungs CLEAR TO AUSCULTATION   Abdomen soft, NT/ND, absent HEPATOSPLENOMEGALY  Skin normal  Joints normal  Neurologic exam non focal  Took down dressing--somewhat macerated appearance over incision.      Antibiotics:Augmentin and metronidazole.    Pertinent labs:    Recent Labs   Lab Test 12/19/23  0652 12/18/23  0724 12/17/23  0945   WBC 10.9 11.7* 13.5*   HGB 11.3* 12.1* 12.9*   HCT 34.3* 36.1* 37.7*   MCV 96 95 94    349 358          Last Comprehensive Metabolic Panel:  Sodium   Date Value Ref Range Status   12/19/2023 133 (L) 135 - 145 mmol/L Final     Comment:     Reference intervals for this test were updated on 09/26/2023 to more accurately reflect our healthy population. There may be differences in the flagging of prior results with similar values performed with this method. Interpretation of those prior results can be made in the context of the updated reference intervals.      Potassium   Date Value Ref Range Status   12/19/2023 4.2 3.4 - 5.3 mmol/L Final   06/28/2022 3.3 (L) 3.5 - 5.0 mmol/L Final     Chloride   Date Value Ref Range Status   12/19/2023 102 98 - 107 mmol/L Final   06/28/2022 103 98 - 107 mmol/L Final     Carbon Dioxide (CO2)   Date Value Ref Range Status   12/19/2023 22 22 - 29 mmol/L Final   06/28/2022 26 22 - 31 mmol/L Final     Anion Gap   Date Value Ref Range Status   12/19/2023 9 7 - 15 mmol/L Final   06/28/2022 13 5 - 18 mmol/L  "Final     Glucose   Date Value Ref Range Status   12/19/2023 120 (H) 70 - 99 mg/dL Final   06/28/2022 91 70 - 125 mg/dL Final     GLUCOSE BY METER POCT   Date Value Ref Range Status   12/19/2023 116 (H) 70 - 99 mg/dL Final     Urea Nitrogen   Date Value Ref Range Status   12/19/2023 18.6 8.0 - 23.0 mg/dL Final   06/28/2022 26 8 - 28 mg/dL Final     Creatinine   Date Value Ref Range Status   12/19/2023 1.19 (H) 0.67 - 1.17 mg/dL Final     GFR Estimate   Date Value Ref Range Status   12/19/2023 63 >60 mL/min/1.73m2 Final   05/19/2021 37 (L) >60 mL/min/1.73m2 Final     Calcium   Date Value Ref Range Status   12/19/2023 8.1 (L) 8.8 - 10.2 mg/dL Final              Erythrocyte Sedimentation Rate   Date Value Ref Range Status   12/11/2023 61 (H) 0 - 20 mm/hr Final       No results found for: \"CRP\"      MICROBIOLOGY DATA:    7-Day Micro Results       No results found for the last 168 hours.              IMAGING/RADIOLOGY:      ASSESSMENT:  Pasteurella cat bite infection/cellulitis    RECOMMENDATION:  continue Augmentin and metronidazole for 7 days.    Will ask surgery to look at him again.     ELÍAS WILKINS MD   Office [phone] option 2 to desk staff  "

## 2023-12-19 NOTE — PROGRESS NOTES
Care Management Discharge Note    Discharge Date: 12/19/2023       Discharge Disposition: Home, [facility]    Discharge Services: PT.OT    Discharge DME: None    Discharge Transportation: family or friend will provide    Private pay costs discussed: Not applicable    Does the patient's insurance plan have a 3 day qualifying hospital stay waiver?  No    PAS Confirmation Code: NA  Patient/family educated on Medicare website which has current facility and service quality ratings: yes    Education Provided on the Discharge Plan: Yes  Persons Notified of Discharge Plans: pt, home care, sister Penelope Shaffer  Patient/Family in Agreement with the Plan: yes    Handoff Referral Completed: Yes       Care Management Follow Up    Length of Stay (days): 8    Expected Discharge Date: 12/20/2023     Concerns to be Addressed: discharge planning     Patient plan of care discussed at interdisciplinary rounds: Yes    Anticipated Discharge Disposition: Home, [facility]     Anticipated Discharge Services: PT/OT   Anticipated Discharge DME: None    Patient/family educated on Medicare website which has current facility and service quality ratings: yes  Education Provided on the Discharge Plan: Yes  Patient/Family in Agreement with the Plan: yes    Referrals Placed by CM/SW: Homecare  Private pay costs discussed: Not applicable    Additional Information:  ROHIT met with pt in pt room, he plans to discharge home with friend Rosita for support or to his sister Penelope Giron home. He will update SW at discharge to help with home care planning. Rosita will transport   SW met with pt and sister Penelope Shaffer in pt room.     IVAN Walters

## 2023-12-19 NOTE — PROGRESS NOTES
Orthopedic Progress Note      Assessment: 6 Days Post-Op  S/P Procedure(s):  INCISION AND DRAINAGE, RIGHT UPPER FOREARM     Plan:   - Continue PT/OT  - Weightbearing status: WBAT RUE  - Activity: Up with assist and assistive device until independent.  - Anticoagulation: Prophylaxis per primary in addition to SCDs, randal stockings and early ambulation.  - Antibiotics: Per ID  - Pain Management; continue current regimen  - Diet: progress diet as tolerated  - Dressing: Keep dry and intact, may remove for warm soapy water soaks TID  - Elevation: Elevate RUE on pillow to keep above the level of the heart as much as possible  - Will remove drains tomorrow if improving  - Follow-up: Outpatient follow up in 2 weeks  - Okay to discharge from an orthopedic standpoint.  He continues to improve.  He does have some necrotic tissue at the skin edges.  Will discharge with topical iodine to put on incision.  He will follow up with Dr. Leyva in 2-3 days to check on the incision and make sure there is improvement.  Can do a incision edge debridement outpatient if not imporving.      Subjective:  Pain: moderate  Nausea, Vomiting:  No  Lightheadedness, Dizziness:  No  Neuro:  Patient denies new onset numbness or paresthesias  Fever, chills: No  Chest pain: No  SOB: No    Patient reports feeling well today. Patient reports pain is tolerable with current pain regimen. Patient eating and drinking well. Patient voiding and passing gas however no BM. Denies fevers/chills  All questions/concerns answered.      Objective:  BP (!) 151/70 (BP Location: Left arm)   Pulse 59   Temp 98.7  F (37.1  C) (Oral)   Resp 18   Wt 98.7 kg (217 lb 9.5 oz)   SpO2 92%   BMI 29.51 kg/m      The patient is A&Ox3. Appears comfortable, sitting up at bedside.  Sensation is intact.  AIN, median, and motor nerve intact.   Wrist ROM and  strength is intact.  Radial pulse intact.  Incision draining serosanguinous fluid.  Erythema surrounding incision, overall  "much improved erythema and swelling.         Pertinent Labs   Lab Results: personally reviewed.   No results found for: \"INR\", \"PROTIME\"  Lab Results   Component Value Date    WBC 10.9 12/19/2023    HGB 11.3 (L) 12/19/2023    HCT 34.3 (L) 12/19/2023    MCV 96 12/19/2023     12/19/2023     Lab Results   Component Value Date     (L) 12/19/2023    CO2 22 12/19/2023         Report completed by:  FLAVIO GRAYSON PA-C  Date: 12/19/2023    [facility]              "

## 2023-12-19 NOTE — PLAN OF CARE
Problem: Adult Inpatient Plan of Care  Goal: Plan of Care Review  Description: The Plan of Care Review/Shift note should be completed every shift.  The Outcome Evaluation is a brief statement about your assessment that the patient is improving, declining, or no change.  This information will be displayed automatically on your shift  note.  Outcome: Progressing     Problem: Adult Inpatient Plan of Care  Goal: Readiness for Transition of Care  Outcome: Progressing     Problem: Risk for Delirium  Goal: Optimal Coping  Outcome: Progressing  Intervention: Optimize Psychosocial Adjustment to Delirium  Recent Flowsheet Documentation  Taken 12/19/2023 0030 by Sharon Mariscal RN  Supportive Measures:   verbalization of feelings encouraged   active listening utilized   relaxation techniques promoted   Goal Outcome Evaluation:       A&O. Pain to RUE cellulitis/surgical site rated from 5 to 8. Received prn oxycodone which was effective. Dressing changed per order/protocol. Skin between fingers noted to be peeling. Dried well after soaking. Stitches remain intact. Changing to PO ABX in anticipation of discharge 12/19.

## 2023-12-19 NOTE — PROGRESS NOTES
[facility]    Medicine Progress Note - Hospitalist Service    Date of Admission:  12/11/2023    Assessment & Plan   Amor Zavaleta is a 78 year old male with a history of type 2 diabetes, stage III kidney disease, hypertension and LBBB admitted on 12/11/2023 with painful right upper extremity swelling and redness following a cat bite. He was assessed with right dorsal forearm abscess and possible septic arthritis of the left wrist, hyponatremia and RENO on CKDIII.  RENO and hyponatremia improved with IV hydration.     Patient underwent I&D with copious irrigation of right dorsal forearm, wrist, hand and fourth dorsal extensor compartment on 12/11/2023.  Postop findings included right dorsal forearm abscess and superior extensor tenosynovitis.  On 12/13/2023 patient underwent right wrist arthrotomy for possible septic arthritis and I&D with copious irrigation of right dorsal forearm wrist hand and fourth dorsal extensor compartment.  Cultures of surgical specimen grew Pasteurella, Staphylococcus aureus, B. Pyogenes, F. Nucleatum. Patient placed on Zosyn, cefazolin and Flagyl per ID recommendations with subsequent improvement of right upper extremity swelling and redness. Clinically improved and with improving leukocytosis. Transitioned to oral antibiotics.      Right dorsal forearm abscess and superior of extensor tenosynovitis (S/P I&D 12/11 and 12/13/2023)  Extensive right upper extremity cellulitis  Concern for right upper extremity abscess  Severe sepsis  CT scan of the right upper extremity showed multifocal tenosynovitis involving the extensor tendons in the hand hand extensive cellulitis in the forearm, wrist and hand without discrete fluid collection to suggest an abscess or soft tissue gas.  Initial abscess cultures with P. Multicoda, pan sensitive S. Aureus, B. Pyogenes and F. Nucleatum.    Blood cultures NGTD   Afebrile since admission with improving leukocytosis   Discontinued  Cefazolin, Zosyn and Flagyl, started on Augmentin and metronidazole for 7 days  Hand surgery follow up - appreciate assistance      Hyponatremia-resolved  Possibly secondary to hyperglycemia and hypovolemia  Monitor BMP     Hypokalemia-resolved  Monitor potassium and replace as per protocol     RENO on stage III CKD- resolved  Normal saline at 75 mL/h  Oral hydration as tolerated  Monitor BMP  Avoid nephrotoxins     Type 2 diabetes-  Glucose control has improved  Continue lantus 64 units daily but given lower dose today due to NPO status  Continue insulin sliding scale   Insulin carb coverage 1:5  Hypoglycemic protocol in place          Diet: Discharge Instruction - Regular Diet Adult  Snacks/Supplements Adult: Glucerna; With Meals  Moderate Consistent Carb (60 g CHO per Meal) Diet  Diet    DVT Prophylaxis: Pneumatic Compression Devices  Browning Catheter: Not present  Lines: None     Cardiac Monitoring: None  Code Status: Full Code      Clinically Significant Risk Factors              # Hypoalbuminemia: Lowest albumin = 3.4 g/dL at 12/11/2023  1:16 PM, will monitor as appropriate           # DMII: A1C = 8.9 % (Ref range: <5.7 %) within past 6 months    # Moderate Malnutrition: based on nutrition assessment    # Financial/Environmental Concerns: none         Disposition Plan      Expected Discharge Date: 12/19/2023,  3:00 PM  Discharge Delays: IV Medication - consider oral or Home Infusion  Lab Result Pending (enter specific test & time in comments)  Destination: home with help/services  Discharge Comments: PO abx  Ortho signed off            Silvia Vieira MD  Hospitalist Service  [facility]  Securely message with [employer] (more info)  Text page via [employer] Paging/Directory   ______________________________________________________________________    Interval History   I had planned to discharge Mr. Zavaleta today however him and his sister are concerned because he lives on the third floor of  an apartment building and has 3 stairs he has to go up and down.  He was supposed to go with home PT however because the intercom system does not work at his apartment complex he would have had to walk up and down the 3 flights of stairs to let physical therapy into his apartment.  He likely will not qualify for TCU as he is walking and able to do stairs however he is not physically able to do enough to go back to his home.  Discussed with social work today and will discuss again with them tomorrow.  Sister is looking into other places he could stay for a week or 2 until he is doing better.  Will discuss with them in the morning and likely discharge after that.    Physical Exam   Vital Signs: Temp: 98.7  F (37.1  C) Temp src: Oral BP: (!) 151/70 (RN NOTIFIED) Pulse: 59   Resp: 18 SpO2: 92 % O2 Device: None (Room air)    Weight: 217 lbs 9.5 oz    General Appearance: Awake, alert, in no acute distress  Respiratory: CTAB, no wheeze  Cardiovascular: RRR, no murmur noted  GI: soft, nontender, non distended, normal bowel sounds  Skin: no jaundice, right hand surgical wound healing- was soaking in soap water when seen, some sloughing       Medical Decision Making       50 MINUTES SPENT BY ME on the date of service doing chart review, history, exam, documentation & further activities per the note.      Data     I have personally reviewed the following data over the past 24 hrs:    10.9  \   11.3 (L)   / 375     133 (L) 102 18.6 /  145 (H)   4.2 22 1.19 (H) \       Imaging results reviewed over the past 24 hrs:   No results found for this or any previous visit (from the past 24 hour(s)).

## 2023-12-19 NOTE — PLAN OF CARE
Occupational Therapy Discharge Summary    Reason for therapy discharge:    Discharged to home with home therapy.    Progress towards therapy goal(s). See goals on Care Plan in [facility] electronic health record for goal details.  Goals not met.  Barriers to achieving goals:   discharge from facility.    Therapy recommendation(s):    Continued therapy is recommended.  Rationale/Recommendations:  Pt is going home w/family assist and home OT eval.

## 2023-12-19 NOTE — PLAN OF CARE
Goal Outcome Evaluation:    Pt C/o of R hand arm pain 6/10 PRN 5mg PO Oxycodone given pt reports pain went to a 3/10. BG ac 191 and hs 229. Switched to PO abx's. Pt using the urinal at bedside. IV running cont. NS @ 75mL/hr. VSS on RA A&O x4       Problem: Adult Inpatient Plan of Care  Goal: Plan of Care Review  Description: The Plan of Care Review/Shift note should be completed every shift.  The Outcome Evaluation is a brief statement about your assessment that the patient is improving, declining, or no change.  This information will be displayed automatically on your shift  note.  Outcome: Progressing  Flowsheets (Taken 12/18/2023 2333)  Plan of Care Reviewed With: patient  Overall Patient Progress: improving     Problem: Adult Inpatient Plan of Care  Goal: Absence of Hospital-Acquired Illness or Injury  Intervention: Prevent and Manage VTE (Venous Thromboembolism) Risk  Recent Flowsheet Documentation  Taken 12/18/2023 1603 by Shala Valdez, RN  VTE Prevention/Management: SCDs (sequential compression devices) off     Problem: Adult Inpatient Plan of Care  Goal: Optimal Comfort and Wellbeing  Outcome: Progressing     Problem: Adult Inpatient Plan of Care  Goal: Readiness for Transition of Care  Outcome: Progressing             Plan of Care Reviewed With: patient    Overall Patient Progress: improvingOverall Patient Progress: improving

## 2023-12-20 VITALS
OXYGEN SATURATION: 97 % | SYSTOLIC BLOOD PRESSURE: 139 MMHG | TEMPERATURE: 98.4 F | BODY MASS INDEX: 29.51 KG/M2 | WEIGHT: 217.59 LBS | DIASTOLIC BLOOD PRESSURE: 64 MMHG | HEART RATE: 63 BPM | RESPIRATION RATE: 20 BRPM

## 2023-12-20 LAB
GLUCOSE BLDC GLUCOMTR-MCNC: 113 MG/DL (ref 70–99)
GLUCOSE BLDC GLUCOMTR-MCNC: 138 MG/DL (ref 70–99)
GLUCOSE BLDC GLUCOMTR-MCNC: 237 MG/DL (ref 70–99)

## 2023-12-20 PROCEDURE — 250N000013 HC RX MED GY IP 250 OP 250 PS 637

## 2023-12-20 PROCEDURE — 99239 HOSP IP/OBS DSCHRG MGMT >30: CPT | Performed by: INTERNAL MEDICINE

## 2023-12-20 PROCEDURE — 250N000013 HC RX MED GY IP 250 OP 250 PS 637: Performed by: INTERNAL MEDICINE

## 2023-12-20 PROCEDURE — 250N000013 HC RX MED GY IP 250 OP 250 PS 637: Performed by: PHYSICIAN ASSISTANT

## 2023-12-20 PROCEDURE — 99232 SBSQ HOSP IP/OBS MODERATE 35: CPT

## 2023-12-20 RX ORDER — METRONIDAZOLE 500 MG/1
500 TABLET ORAL 3 TIMES DAILY
Qty: 18 TABLET | Refills: 0 | Status: ON HOLD | OUTPATIENT
Start: 2023-12-20 | End: 2024-01-05

## 2023-12-20 RX ADMIN — ASPIRIN 325 MG ORAL TABLET 325 MG: 325 PILL ORAL at 08:15

## 2023-12-20 RX ADMIN — METRONIDAZOLE 500 MG: 500 TABLET ORAL at 13:40

## 2023-12-20 RX ADMIN — PANTOPRAZOLE SODIUM 40 MG: 40 TABLET, DELAYED RELEASE ORAL at 06:39

## 2023-12-20 RX ADMIN — Medication 15 ML: at 08:16

## 2023-12-20 RX ADMIN — INSULIN GLARGINE 64 UNITS: 100 INJECTION, SOLUTION SUBCUTANEOUS at 08:14

## 2023-12-20 RX ADMIN — SENNOSIDES AND DOCUSATE SODIUM 1 TABLET: 8.6; 5 TABLET ORAL at 08:16

## 2023-12-20 RX ADMIN — METRONIDAZOLE 500 MG: 500 TABLET ORAL at 08:16

## 2023-12-20 RX ADMIN — AMLODIPINE BESYLATE 2.5 MG: 2.5 TABLET ORAL at 08:15

## 2023-12-20 RX ADMIN — AMOXICILLIN AND CLAVULANATE POTASSIUM 1 TABLET: 875; 125 TABLET, FILM COATED ORAL at 08:15

## 2023-12-20 ASSESSMENT — ACTIVITIES OF DAILY LIVING (ADL)
ADLS_ACUITY_SCORE: 30

## 2023-12-20 NOTE — PLAN OF CARE
Goal Outcome Evaluation:    Alert and oriented.  Up with stand by assist. Voiding freely. Hand dressing changed per NOC RN. Surgery removed dressing around 1100 and it was redressed. Pt did not want to do soak at that time. Will do soak around 1400.

## 2023-12-20 NOTE — PLAN OF CARE
Goal Outcome Evaluation:    Problem: Adult Inpatient Plan of Care  Goal: Optimal Comfort and Wellbeing  Outcome: Progressing     Problem: Risk for Delirium  Goal: Improved Behavioral Control  Outcome: Progressing     Problem: Risk for Delirium  Goal: Improved Attention and Thought Clarity  Outcome: Progressing     Problem: Pain Acute  Goal: Optimal Pain Control and Function  Outcome: Progressing        Pt aox4. Denies pain. R Hand wound soaked and bandage changed. VSS on RA. Adequate output. BG of 145 and 158.

## 2023-12-20 NOTE — PROGRESS NOTES
Care Management Discharge Note    Discharge Date: 12/20/2023       Discharge Disposition: Brother Kate  Home ( [address], [place] [address]) , [facility]    Discharge Services: PT/OT    Discharge DME: None    Discharge Transportation: family or friend will provide    Private pay costs discussed: insurance costs out of pocket expenses    Does the patient's insurance plan have a 3 day qualifying hospital stay waiver?  No      Will the waiver be used for post-acute placement? No    PAS Confirmation Code: NA  Patient/family educated on Medicare website which has current facility and service quality ratings: yes    Education Provided on the Discharge Plan: Yes  Persons Notified of Discharge Plans: Pt, nursing  Patient/Family in Agreement with the Plan: yes    Handoff Referral Completed: Yes    Additional Information:  SW met with pt in pt room. Pt stated he was going to his younger brothzay meza at discharge. Writer asked if Rosita or his sister Penelope Shaffer were going to transport him. He stated Penelope Shaffer.  A previous plan had been with sister and pt to possibly go to [facility] at discharge, yesterday, pt plan was to discharge home with home care. Pt stated his sister had concerns about the stairs as there is only one railing in the stairwell. When asked if pt has had difficulty on the stairs, pt stated only when he had hurt his knee recently and he had to go backwards due to the one railing.   Pt stated he is planning on going to the [facility], where he has stayed previously. The plan to go to Penelope Shaffer's had changed due to her going out of town.  Pt provided address for his brothers home for SW to give to home care.   Discharge orders faxed to [facility] along with phone call with brother Kate address.  SW placed call to pt sister Melba Shaffer, on pt contact list, and left a voicemail with contact number.   Pt sister called writer back, stating she was very angry that pt was being  discharge today as she didn't have time to prepare for him to go to his brothers home. She stated she did not know what clothing her brother had at his place, or if she needed to buy him new clothes or cut off the sleeves of his shirt to fit his wrapped arm. She stated that she didn't know if he had sweat pant if he needed sweat pants to wear to dress himself. SW attempted to address with her that home care OT/PT would address his home needs when they visited. Pt sister stated she wanted to talk to someone about how pt was going to get dressed, get to the bathroom, get in and out of the tub.   Melba Jo stated she was going to come to the hospital to help pt discharge along with brother Ryan and talk to nursing.   Writer provided pt sister pamphlet for Care [employer], she stated that would be helpful.       Mara Mendez, MSW     Addendum- sister called [facility] with corrected address for pt at [facility].

## 2023-12-20 NOTE — PROGRESS NOTES
PT discharged home with sister via wheelchair. All paperwork and medications gone through with patient and family, questions answered. All IV removed. PT took all belongings.

## 2023-12-20 NOTE — PROGRESS NOTES
[facility] Progress Note    SUBJECTIVE: feels better. Tolerating po antibiotics without difficulty.       REVIEW OF SYSTEMS:  Negative unless as listed above.  Social history, Family history, Medications: reviewed.    OBJECTIVE:  /71 (BP Location: Left arm)   Pulse 64   Temp 98.5  F (36.9  C) (Oral)   Resp 20   Wt 98.7 kg (217 lb 9.5 oz)   SpO2 93%   BMI 29.51 kg/m        PHYSICAL EXAM:  Alert, awake  Vitals tabulated above, reviewed  Sclera normal color, not injected  CARDIOVASCULAR regular rate and rhythm, no murmur  Lungs CLEAR TO AUSCULTATION   Abdomen soft, NT/ND, absent HEPATOSPLENOMEGALY  Skin normal  Joints normal  Neurologic exam non focal  Took down dressing--somewhat macerated appearance over incision.  Appears a bit better today.     Antibiotics:Augmentin and metronidazole.    Pertinent labs:    Recent Labs   Lab Test 12/19/23  0652 12/18/23  0724 12/17/23  0945   WBC 10.9 11.7* 13.5*   HGB 11.3* 12.1* 12.9*   HCT 34.3* 36.1* 37.7*   MCV 96 95 94    349 358          Last Comprehensive Metabolic Panel:  Sodium   Date Value Ref Range Status   12/19/2023 133 (L) 135 - 145 mmol/L Final     Comment:     Reference intervals for this test were updated on 09/26/2023 to more accurately reflect our healthy population. There may be differences in the flagging of prior results with similar values performed with this method. Interpretation of those prior results can be made in the context of the updated reference intervals.      Potassium   Date Value Ref Range Status   12/19/2023 4.2 3.4 - 5.3 mmol/L Final   06/28/2022 3.3 (L) 3.5 - 5.0 mmol/L Final     Chloride   Date Value Ref Range Status   12/19/2023 102 98 - 107 mmol/L Final   06/28/2022 103 98 - 107 mmol/L Final     Carbon Dioxide (CO2)   Date Value Ref Range Status   12/19/2023 22 22 - 29 mmol/L Final   06/28/2022 26 22 - 31 mmol/L Final     Anion Gap   Date Value Ref Range Status   12/19/2023 9 7 - 15 mmol/L Final  "  06/28/2022 13 5 - 18 mmol/L Final     Glucose   Date Value Ref Range Status   06/28/2022 91 70 - 125 mg/dL Final     GLUCOSE BY METER POCT   Date Value Ref Range Status   12/20/2023 138 (H) 70 - 99 mg/dL Final     Urea Nitrogen   Date Value Ref Range Status   12/19/2023 18.6 8.0 - 23.0 mg/dL Final   06/28/2022 26 8 - 28 mg/dL Final     Creatinine   Date Value Ref Range Status   12/19/2023 1.19 (H) 0.67 - 1.17 mg/dL Final     GFR Estimate   Date Value Ref Range Status   12/19/2023 63 >60 mL/min/1.73m2 Final   05/19/2021 37 (L) >60 mL/min/1.73m2 Final     Calcium   Date Value Ref Range Status   12/19/2023 8.1 (L) 8.8 - 10.2 mg/dL Final              Erythrocyte Sedimentation Rate   Date Value Ref Range Status   12/11/2023 61 (H) 0 - 20 mm/hr Final       No results found for: \"CRP\"      MICROBIOLOGY DATA:    7-Day Micro Results       No results found for the last 168 hours.              IMAGING/RADIOLOGY:      ASSESSMENT:  Pasteurella cat bite infection/cellulitis    RECOMMENDATION:  continue Augmentin and metronidazole for 7 days.     OK to discharge to home anytime from Infectious Disease standpoint.      ELÍAS WILKINS MD   Office [phone] option 2 to desk staff  "

## 2023-12-20 NOTE — PLAN OF CARE
Problem: Adult Inpatient Plan of Care  Goal: Plan of Care Review  Description: The Plan of Care Review/Shift note should be completed every shift.  The Outcome Evaluation is a brief statement about your assessment that the patient is improving, declining, or no change.  This information will be displayed automatically on your shift  note.  Outcome: Progressing   Goal Outcome Evaluation:       Alert and oriented x4. Right arm continuous to be swollen but improving as per pt. Denies any pain.  Right hand soaked and dressing changed.  mg/dl. VSS.

## 2023-12-20 NOTE — PLAN OF CARE
Physical Therapy Discharge Summary    Reason for therapy discharge:    Discharged to home with home therapy.    Progress towards therapy goal(s). See goals on Care Plan in [facility] electronic health record for goal details.  Goals partially met.  Barriers to achieving goals:   Pt was working the goals.  .    Therapy recommendation(s):    Continued therapy is recommended.  Rationale/Recommendations:  Pt will have assist on the steps from his younger brother to assist the pt.  .

## 2023-12-22 ENCOUNTER — ANESTHESIA EVENT (OUTPATIENT)
Dept: SURGERY | Facility: CLINIC | Age: 78
DRG: 464 | End: 2023-12-22
Payer: COMMERCIAL

## 2023-12-22 ENCOUNTER — ANESTHESIA (OUTPATIENT)
Dept: SURGERY | Facility: CLINIC | Age: 78
DRG: 464 | End: 2023-12-22
Payer: COMMERCIAL

## 2023-12-22 ENCOUNTER — HOSPITAL ENCOUNTER (INPATIENT)
Facility: CLINIC | Age: 78
LOS: 15 days | Discharge: NURSING, REHAB OR LONG-TERM CARE | DRG: 464 | End: 2024-01-06
Attending: EMERGENCY MEDICINE | Admitting: INTERNAL MEDICINE
Payer: COMMERCIAL

## 2023-12-22 ENCOUNTER — HOSPITAL ENCOUNTER (INPATIENT)
Facility: CLINIC | Age: 78
Setting detail: SURGERY ADMIT
End: 2023-12-22
Attending: STUDENT IN AN ORGANIZED HEALTH CARE EDUCATION/TRAINING PROGRAM | Admitting: STUDENT IN AN ORGANIZED HEALTH CARE EDUCATION/TRAINING PROGRAM
Payer: COMMERCIAL

## 2023-12-22 DIAGNOSIS — E11.9 TYPE 2 DIABETES MELLITUS WITHOUT COMPLICATION, WITH LONG-TERM CURRENT USE OF INSULIN (H): ICD-10-CM

## 2023-12-22 DIAGNOSIS — B99.9 INFECTION: Primary | ICD-10-CM

## 2023-12-22 DIAGNOSIS — Z79.4 TYPE 2 DIABETES MELLITUS WITHOUT COMPLICATION, WITH LONG-TERM CURRENT USE OF INSULIN (H): ICD-10-CM

## 2023-12-22 DIAGNOSIS — L08.9 INFECTION OF RIGHT HAND DUE TO BITE, INITIAL ENCOUNTER: ICD-10-CM

## 2023-12-22 DIAGNOSIS — L03.113 CELLULITIS OF RIGHT UPPER EXTREMITY: Primary | ICD-10-CM

## 2023-12-22 DIAGNOSIS — S61.451A INFECTION OF RIGHT HAND DUE TO BITE, INITIAL ENCOUNTER: ICD-10-CM

## 2023-12-22 LAB
ANION GAP SERPL CALCULATED.3IONS-SCNC: 11 MMOL/L (ref 7–15)
BASOPHILS # BLD AUTO: 0.1 10E3/UL (ref 0–0.2)
BASOPHILS NFR BLD AUTO: 1 %
BUN SERPL-MCNC: 32.6 MG/DL (ref 8–23)
CALCIUM SERPL-MCNC: 9 MG/DL (ref 8.8–10.2)
CHLORIDE SERPL-SCNC: 102 MMOL/L (ref 98–107)
CREAT SERPL-MCNC: 1.61 MG/DL (ref 0.67–1.17)
DEPRECATED HCO3 PLAS-SCNC: 23 MMOL/L (ref 22–29)
EGFRCR SERPLBLD CKD-EPI 2021: 44 ML/MIN/1.73M2
EOSINOPHIL # BLD AUTO: 0.1 10E3/UL (ref 0–0.7)
EOSINOPHIL NFR BLD AUTO: 1 %
ERYTHROCYTE [DISTWIDTH] IN BLOOD BY AUTOMATED COUNT: 13 % (ref 10–15)
GLUCOSE BLDC GLUCOMTR-MCNC: 46 MG/DL (ref 70–99)
GLUCOSE BLDC GLUCOMTR-MCNC: 65 MG/DL (ref 70–99)
GLUCOSE BLDC GLUCOMTR-MCNC: 68 MG/DL (ref 70–99)
GLUCOSE BLDC GLUCOMTR-MCNC: 94 MG/DL (ref 70–99)
GLUCOSE SERPL-MCNC: 98 MG/DL (ref 70–99)
GRAM STAIN RESULT: NORMAL
HCT VFR BLD AUTO: 35.1 % (ref 40–53)
HGB BLD-MCNC: 11.6 G/DL (ref 13.3–17.7)
HOLD SPECIMEN: NORMAL
HOLD SPECIMEN: NORMAL
IMM GRANULOCYTES # BLD: 0.1 10E3/UL
IMM GRANULOCYTES NFR BLD: 1 %
LYMPHOCYTES # BLD AUTO: 1.6 10E3/UL (ref 0.8–5.3)
LYMPHOCYTES NFR BLD AUTO: 15 %
MCH RBC QN AUTO: 32 PG (ref 26.5–33)
MCHC RBC AUTO-ENTMCNC: 33 G/DL (ref 31.5–36.5)
MCV RBC AUTO: 97 FL (ref 78–100)
MONOCYTES # BLD AUTO: 1 10E3/UL (ref 0–1.3)
MONOCYTES NFR BLD AUTO: 9 %
NEUTROPHILS # BLD AUTO: 8.2 10E3/UL (ref 1.6–8.3)
NEUTROPHILS NFR BLD AUTO: 73 %
NRBC # BLD AUTO: 0 10E3/UL
NRBC BLD AUTO-RTO: 0 /100
PLATELET # BLD AUTO: 459 10E3/UL (ref 150–450)
POTASSIUM SERPL-SCNC: 4 MMOL/L (ref 3.4–5.3)
RBC # BLD AUTO: 3.62 10E6/UL (ref 4.4–5.9)
SARS-COV-2 RNA RESP QL NAA+PROBE: NEGATIVE
SODIUM SERPL-SCNC: 136 MMOL/L (ref 135–145)
WBC # BLD AUTO: 11.1 10E3/UL (ref 4–11)

## 2023-12-22 PROCEDURE — 250N000011 HC RX IP 250 OP 636: Performed by: PHYSICIAN ASSISTANT

## 2023-12-22 PROCEDURE — 250N000011 HC RX IP 250 OP 636: Performed by: INTERNAL MEDICINE

## 2023-12-22 PROCEDURE — 999N000141 HC STATISTIC PRE-PROCEDURE NURSING ASSESSMENT: Performed by: STUDENT IN AN ORGANIZED HEALTH CARE EDUCATION/TRAINING PROGRAM

## 2023-12-22 PROCEDURE — 87205 SMEAR GRAM STAIN: CPT | Performed by: STUDENT IN AN ORGANIZED HEALTH CARE EDUCATION/TRAINING PROGRAM

## 2023-12-22 PROCEDURE — 36415 COLL VENOUS BLD VENIPUNCTURE: CPT | Performed by: EMERGENCY MEDICINE

## 2023-12-22 PROCEDURE — 370N000017 HC ANESTHESIA TECHNICAL FEE, PER MIN: Performed by: STUDENT IN AN ORGANIZED HEALTH CARE EDUCATION/TRAINING PROGRAM

## 2023-12-22 PROCEDURE — 272N000001 HC OR GENERAL SUPPLY STERILE: Performed by: STUDENT IN AN ORGANIZED HEALTH CARE EDUCATION/TRAINING PROGRAM

## 2023-12-22 PROCEDURE — 250N000011 HC RX IP 250 OP 636: Performed by: EMERGENCY MEDICINE

## 2023-12-22 PROCEDURE — 87635 SARS-COV-2 COVID-19 AMP PRB: CPT | Performed by: STUDENT IN AN ORGANIZED HEALTH CARE EDUCATION/TRAINING PROGRAM

## 2023-12-22 PROCEDURE — 258N000003 HC RX IP 258 OP 636: Performed by: EMERGENCY MEDICINE

## 2023-12-22 PROCEDURE — 99223 1ST HOSP IP/OBS HIGH 75: CPT | Performed by: INTERNAL MEDICINE

## 2023-12-22 PROCEDURE — 87070 CULTURE OTHR SPECIMN AEROBIC: CPT | Performed by: STUDENT IN AN ORGANIZED HEALTH CARE EDUCATION/TRAINING PROGRAM

## 2023-12-22 PROCEDURE — 250N000009 HC RX 250: Performed by: NURSE ANESTHETIST, CERTIFIED REGISTERED

## 2023-12-22 PROCEDURE — G0463 HOSPITAL OUTPT CLINIC VISIT: HCPCS

## 2023-12-22 PROCEDURE — 99285 EMERGENCY DEPT VISIT HI MDM: CPT | Mod: 25

## 2023-12-22 PROCEDURE — 87075 CULTR BACTERIA EXCEPT BLOOD: CPT | Performed by: STUDENT IN AN ORGANIZED HEALTH CARE EDUCATION/TRAINING PROGRAM

## 2023-12-22 PROCEDURE — 250N000011 HC RX IP 250 OP 636: Performed by: ANESTHESIOLOGY

## 2023-12-22 PROCEDURE — 258N000003 HC RX IP 258 OP 636: Performed by: NURSE ANESTHETIST, CERTIFIED REGISTERED

## 2023-12-22 PROCEDURE — 87040 BLOOD CULTURE FOR BACTERIA: CPT | Performed by: EMERGENCY MEDICINE

## 2023-12-22 PROCEDURE — 0LB50ZZ EXCISION OF RIGHT LOWER ARM AND WRIST TENDON, OPEN APPROACH: ICD-10-PCS | Performed by: STUDENT IN AN ORGANIZED HEALTH CARE EDUCATION/TRAINING PROGRAM

## 2023-12-22 PROCEDURE — 360N000075 HC SURGERY LEVEL 2, PER MIN: Performed by: STUDENT IN AN ORGANIZED HEALTH CARE EDUCATION/TRAINING PROGRAM

## 2023-12-22 PROCEDURE — 85025 COMPLETE CBC W/AUTO DIFF WBC: CPT | Performed by: EMERGENCY MEDICINE

## 2023-12-22 PROCEDURE — 258N000003 HC RX IP 258 OP 636: Performed by: ANESTHESIOLOGY

## 2023-12-22 PROCEDURE — 250N000013 HC RX MED GY IP 250 OP 250 PS 637: Performed by: PHYSICIAN ASSISTANT

## 2023-12-22 PROCEDURE — 96374 THER/PROPH/DIAG INJ IV PUSH: CPT

## 2023-12-22 PROCEDURE — 80048 BASIC METABOLIC PNL TOTAL CA: CPT | Performed by: EMERGENCY MEDICINE

## 2023-12-22 PROCEDURE — 250N000013 HC RX MED GY IP 250 OP 250 PS 637: Performed by: INTERNAL MEDICINE

## 2023-12-22 PROCEDURE — 258N000003 HC RX IP 258 OP 636: Performed by: INTERNAL MEDICINE

## 2023-12-22 PROCEDURE — 120N000001 HC R&B MED SURG/OB

## 2023-12-22 PROCEDURE — 250N000011 HC RX IP 250 OP 636: Performed by: NURSE ANESTHETIST, CERTIFIED REGISTERED

## 2023-12-22 PROCEDURE — 0RJN0ZZ INSPECTION OF RIGHT WRIST JOINT, OPEN APPROACH: ICD-10-PCS | Performed by: STUDENT IN AN ORGANIZED HEALTH CARE EDUCATION/TRAINING PROGRAM

## 2023-12-22 RX ORDER — GLYCOPYRROLATE 0.2 MG/ML
INJECTION, SOLUTION INTRAMUSCULAR; INTRAVENOUS PRN
Status: DISCONTINUED | OUTPATIENT
Start: 2023-12-22 | End: 2023-12-22

## 2023-12-22 RX ORDER — NALOXONE HYDROCHLORIDE 0.4 MG/ML
0.2 INJECTION, SOLUTION INTRAMUSCULAR; INTRAVENOUS; SUBCUTANEOUS
Status: DISCONTINUED | OUTPATIENT
Start: 2023-12-22 | End: 2024-01-06 | Stop reason: HOSPADM

## 2023-12-22 RX ORDER — SODIUM CHLORIDE, SODIUM LACTATE, POTASSIUM CHLORIDE, CALCIUM CHLORIDE 600; 310; 30; 20 MG/100ML; MG/100ML; MG/100ML; MG/100ML
INJECTION, SOLUTION INTRAVENOUS CONTINUOUS
Status: CANCELLED | OUTPATIENT
Start: 2023-12-22

## 2023-12-22 RX ORDER — ASPIRIN 325 MG
325 TABLET ORAL DAILY
Status: DISCONTINUED | OUTPATIENT
Start: 2023-12-22 | End: 2024-01-06 | Stop reason: HOSPADM

## 2023-12-22 RX ORDER — LIDOCAINE HYDROCHLORIDE 10 MG/ML
INJECTION, SOLUTION INFILTRATION; PERINEURAL PRN
Status: DISCONTINUED | OUTPATIENT
Start: 2023-12-22 | End: 2023-12-22

## 2023-12-22 RX ORDER — BUPIVACAINE HYDROCHLORIDE 5 MG/ML
INJECTION, SOLUTION EPIDURAL; INTRACAUDAL
Status: COMPLETED | OUTPATIENT
Start: 2023-12-22 | End: 2023-12-22

## 2023-12-22 RX ORDER — AMOXICILLIN 250 MG
2 CAPSULE ORAL 2 TIMES DAILY PRN
Status: DISCONTINUED | OUTPATIENT
Start: 2023-12-22 | End: 2024-01-06 | Stop reason: HOSPADM

## 2023-12-22 RX ORDER — ENOXAPARIN SODIUM 100 MG/ML
40 INJECTION SUBCUTANEOUS EVERY 24 HOURS
Status: DISCONTINUED | OUTPATIENT
Start: 2023-12-23 | End: 2024-01-06 | Stop reason: HOSPADM

## 2023-12-22 RX ORDER — OXYCODONE HYDROCHLORIDE 5 MG/1
5 TABLET ORAL EVERY 4 HOURS PRN
Status: DISCONTINUED | OUTPATIENT
Start: 2023-12-22 | End: 2024-01-06 | Stop reason: HOSPADM

## 2023-12-22 RX ORDER — PIPERACILLIN SODIUM, TAZOBACTAM SODIUM 3; .375 G/15ML; G/15ML
3.38 INJECTION, POWDER, LYOPHILIZED, FOR SOLUTION INTRAVENOUS EVERY 8 HOURS
Status: DISCONTINUED | OUTPATIENT
Start: 2023-12-22 | End: 2023-12-22

## 2023-12-22 RX ORDER — PIPERACILLIN SODIUM, TAZOBACTAM SODIUM 3; .375 G/15ML; G/15ML
3.38 INJECTION, POWDER, LYOPHILIZED, FOR SOLUTION INTRAVENOUS ONCE
Status: DISCONTINUED | OUTPATIENT
Start: 2023-12-22 | End: 2023-12-22

## 2023-12-22 RX ORDER — ACETAMINOPHEN 650 MG/1
650 SUPPOSITORY RECTAL EVERY 4 HOURS PRN
Status: DISCONTINUED | OUTPATIENT
Start: 2023-12-22 | End: 2023-12-22

## 2023-12-22 RX ORDER — CEFAZOLIN SODIUM/WATER 2 G/20 ML
2 SYRINGE (ML) INTRAVENOUS SEE ADMIN INSTRUCTIONS
Status: DISCONTINUED | OUTPATIENT
Start: 2023-12-22 | End: 2023-12-22 | Stop reason: HOSPADM

## 2023-12-22 RX ORDER — CALCIUM CARBONATE 500 MG/1
1000 TABLET, CHEWABLE ORAL 4 TIMES DAILY PRN
Status: DISCONTINUED | OUTPATIENT
Start: 2023-12-22 | End: 2024-01-06 | Stop reason: HOSPADM

## 2023-12-22 RX ORDER — DEXTROSE MONOHYDRATE 25 G/50ML
25-50 INJECTION, SOLUTION INTRAVENOUS
Status: DISCONTINUED | OUTPATIENT
Start: 2023-12-22 | End: 2024-01-06 | Stop reason: HOSPADM

## 2023-12-22 RX ORDER — PROPOFOL 10 MG/ML
INJECTION, EMULSION INTRAVENOUS CONTINUOUS PRN
Status: DISCONTINUED | OUTPATIENT
Start: 2023-12-22 | End: 2023-12-22

## 2023-12-22 RX ORDER — PROPOFOL 10 MG/ML
INJECTION, EMULSION INTRAVENOUS PRN
Status: DISCONTINUED | OUTPATIENT
Start: 2023-12-22 | End: 2023-12-22

## 2023-12-22 RX ORDER — NALOXONE HYDROCHLORIDE 0.4 MG/ML
0.4 INJECTION, SOLUTION INTRAMUSCULAR; INTRAVENOUS; SUBCUTANEOUS
Status: DISCONTINUED | OUTPATIENT
Start: 2023-12-22 | End: 2024-01-06 | Stop reason: HOSPADM

## 2023-12-22 RX ORDER — ATORVASTATIN CALCIUM 10 MG/1
10 TABLET, FILM COATED ORAL DAILY
Status: DISCONTINUED | OUTPATIENT
Start: 2023-12-23 | End: 2024-01-06 | Stop reason: HOSPADM

## 2023-12-22 RX ORDER — ACETAMINOPHEN 325 MG/1
650 TABLET ORAL EVERY 4 HOURS PRN
Status: DISCONTINUED | OUTPATIENT
Start: 2023-12-22 | End: 2023-12-22

## 2023-12-22 RX ORDER — FENTANYL CITRATE 50 UG/ML
25-100 INJECTION, SOLUTION INTRAMUSCULAR; INTRAVENOUS
Status: DISCONTINUED | OUTPATIENT
Start: 2023-12-22 | End: 2023-12-22 | Stop reason: HOSPADM

## 2023-12-22 RX ORDER — PIPERACILLIN SODIUM, TAZOBACTAM SODIUM 3; .375 G/15ML; G/15ML
3.38 INJECTION, POWDER, LYOPHILIZED, FOR SOLUTION INTRAVENOUS ONCE
Status: COMPLETED | OUTPATIENT
Start: 2023-12-22 | End: 2023-12-22

## 2023-12-22 RX ORDER — ACETAMINOPHEN 325 MG/1
975 TABLET ORAL EVERY 8 HOURS
Qty: 27 TABLET | Refills: 0 | Status: COMPLETED | OUTPATIENT
Start: 2023-12-22 | End: 2023-12-25

## 2023-12-22 RX ORDER — ONDANSETRON 4 MG/1
4 TABLET, ORALLY DISINTEGRATING ORAL EVERY 6 HOURS PRN
Status: DISCONTINUED | OUTPATIENT
Start: 2023-12-22 | End: 2024-01-06 | Stop reason: HOSPADM

## 2023-12-22 RX ORDER — ONDANSETRON 2 MG/ML
4 INJECTION INTRAMUSCULAR; INTRAVENOUS EVERY 6 HOURS PRN
Status: DISCONTINUED | OUTPATIENT
Start: 2023-12-22 | End: 2023-12-22

## 2023-12-22 RX ORDER — AMLODIPINE BESYLATE 2.5 MG/1
2.5 TABLET ORAL DAILY
Status: DISCONTINUED | OUTPATIENT
Start: 2023-12-22 | End: 2024-01-06 | Stop reason: HOSPADM

## 2023-12-22 RX ORDER — SODIUM CHLORIDE, SODIUM LACTATE, POTASSIUM CHLORIDE, CALCIUM CHLORIDE 600; 310; 30; 20 MG/100ML; MG/100ML; MG/100ML; MG/100ML
INJECTION, SOLUTION INTRAVENOUS CONTINUOUS
Status: DISCONTINUED | OUTPATIENT
Start: 2023-12-22 | End: 2023-12-22 | Stop reason: HOSPADM

## 2023-12-22 RX ORDER — LIDOCAINE 40 MG/G
CREAM TOPICAL
Status: DISCONTINUED | OUTPATIENT
Start: 2023-12-22 | End: 2023-12-22 | Stop reason: HOSPADM

## 2023-12-22 RX ORDER — ONDANSETRON 2 MG/ML
4 INJECTION INTRAMUSCULAR; INTRAVENOUS EVERY 6 HOURS PRN
Status: DISCONTINUED | OUTPATIENT
Start: 2023-12-22 | End: 2024-01-06 | Stop reason: HOSPADM

## 2023-12-22 RX ORDER — ONDANSETRON 2 MG/ML
4 INJECTION INTRAMUSCULAR; INTRAVENOUS EVERY 30 MIN PRN
Status: DISCONTINUED | OUTPATIENT
Start: 2023-12-22 | End: 2023-12-22

## 2023-12-22 RX ORDER — AMOXICILLIN 250 MG
1 CAPSULE ORAL 2 TIMES DAILY PRN
Status: DISCONTINUED | OUTPATIENT
Start: 2023-12-22 | End: 2024-01-06 | Stop reason: HOSPADM

## 2023-12-22 RX ORDER — ONDANSETRON 2 MG/ML
INJECTION INTRAMUSCULAR; INTRAVENOUS PRN
Status: DISCONTINUED | OUTPATIENT
Start: 2023-12-22 | End: 2023-12-22

## 2023-12-22 RX ORDER — ACETAMINOPHEN 325 MG/1
650 TABLET ORAL EVERY 4 HOURS PRN
Status: DISCONTINUED | OUTPATIENT
Start: 2023-12-25 | End: 2024-01-06 | Stop reason: HOSPADM

## 2023-12-22 RX ORDER — ONDANSETRON 4 MG/1
4 TABLET, ORALLY DISINTEGRATING ORAL EVERY 6 HOURS PRN
Status: DISCONTINUED | OUTPATIENT
Start: 2023-12-22 | End: 2023-12-22

## 2023-12-22 RX ORDER — LIDOCAINE 40 MG/G
CREAM TOPICAL
Status: DISCONTINUED | OUTPATIENT
Start: 2023-12-22 | End: 2024-01-06 | Stop reason: HOSPADM

## 2023-12-22 RX ORDER — OXYCODONE HYDROCHLORIDE 5 MG/1
5 TABLET ORAL EVERY 4 HOURS PRN
Status: DISCONTINUED | OUTPATIENT
Start: 2023-12-22 | End: 2023-12-22

## 2023-12-22 RX ORDER — NICOTINE POLACRILEX 4 MG
15-30 LOZENGE BUCCAL
Status: DISCONTINUED | OUTPATIENT
Start: 2023-12-22 | End: 2024-01-06 | Stop reason: HOSPADM

## 2023-12-22 RX ORDER — LIDOCAINE 40 MG/G
CREAM TOPICAL
Status: CANCELLED | OUTPATIENT
Start: 2023-12-22

## 2023-12-22 RX ORDER — CEFAZOLIN SODIUM/WATER 2 G/20 ML
2 SYRINGE (ML) INTRAVENOUS
Status: COMPLETED | OUTPATIENT
Start: 2023-12-22 | End: 2023-12-22

## 2023-12-22 RX ORDER — FAMOTIDINE 20 MG/1
20 TABLET, FILM COATED ORAL DAILY
Status: DISCONTINUED | OUTPATIENT
Start: 2023-12-23 | End: 2024-01-06 | Stop reason: HOSPADM

## 2023-12-22 RX ORDER — OXYCODONE HYDROCHLORIDE 5 MG/1
10 TABLET ORAL
Status: DISCONTINUED | OUTPATIENT
Start: 2023-12-22 | End: 2023-12-22

## 2023-12-22 RX ORDER — FENTANYL CITRATE 50 UG/ML
25-100 INJECTION, SOLUTION INTRAMUSCULAR; INTRAVENOUS
Status: CANCELLED | OUTPATIENT
Start: 2023-12-22

## 2023-12-22 RX ORDER — HYDROXYZINE HYDROCHLORIDE 10 MG/1
10 TABLET, FILM COATED ORAL EVERY 6 HOURS PRN
Status: DISCONTINUED | OUTPATIENT
Start: 2023-12-22 | End: 2024-01-06 | Stop reason: HOSPADM

## 2023-12-22 RX ORDER — LISINOPRIL AND HYDROCHLOROTHIAZIDE 12.5; 2 MG/1; MG/1
1 TABLET ORAL DAILY
Status: DISCONTINUED | OUTPATIENT
Start: 2023-12-22 | End: 2024-01-01

## 2023-12-22 RX ORDER — OXYCODONE HYDROCHLORIDE 5 MG/1
5 TABLET ORAL
Status: DISCONTINUED | OUTPATIENT
Start: 2023-12-22 | End: 2023-12-22

## 2023-12-22 RX ORDER — ONDANSETRON 4 MG/1
4 TABLET, ORALLY DISINTEGRATING ORAL EVERY 30 MIN PRN
Status: DISCONTINUED | OUTPATIENT
Start: 2023-12-22 | End: 2023-12-22

## 2023-12-22 RX ORDER — PIPERACILLIN SODIUM, TAZOBACTAM SODIUM 3; .375 G/15ML; G/15ML
3.38 INJECTION, POWDER, LYOPHILIZED, FOR SOLUTION INTRAVENOUS EVERY 8 HOURS
Status: DISCONTINUED | OUTPATIENT
Start: 2023-12-22 | End: 2023-12-30

## 2023-12-22 RX ORDER — LIDOCAINE 40 MG/G
CREAM TOPICAL
Status: DISCONTINUED | OUTPATIENT
Start: 2023-12-22 | End: 2023-12-23

## 2023-12-22 RX ADMIN — PROPOFOL 40 MG: 10 INJECTION, EMULSION INTRAVENOUS at 17:28

## 2023-12-22 RX ADMIN — SODIUM CHLORIDE, POTASSIUM CHLORIDE, SODIUM LACTATE AND CALCIUM CHLORIDE: 600; 310; 30; 20 INJECTION, SOLUTION INTRAVENOUS at 16:23

## 2023-12-22 RX ADMIN — OXYCODONE HYDROCHLORIDE 5 MG: 5 TABLET ORAL at 23:36

## 2023-12-22 RX ADMIN — PIPERACILLIN AND TAZOBACTAM 3.38 G: 3; .375 INJECTION, POWDER, LYOPHILIZED, FOR SOLUTION INTRAVENOUS at 16:03

## 2023-12-22 RX ADMIN — VANCOMYCIN HYDROCHLORIDE 1500 MG: 5 INJECTION, POWDER, LYOPHILIZED, FOR SOLUTION INTRAVENOUS at 13:38

## 2023-12-22 RX ADMIN — PHENYLEPHRINE HYDROCHLORIDE 25 MCG: 10 INJECTION INTRAVENOUS at 17:51

## 2023-12-22 RX ADMIN — PROPOFOL 50 MCG/KG/MIN: 10 INJECTION, EMULSION INTRAVENOUS at 17:29

## 2023-12-22 RX ADMIN — BUPIVACAINE HYDROCHLORIDE 30 ML: 5 INJECTION, SOLUTION EPIDURAL; INTRACAUDAL at 16:34

## 2023-12-22 RX ADMIN — ONDANSETRON 4 MG: 2 INJECTION INTRAMUSCULAR; INTRAVENOUS at 17:26

## 2023-12-22 RX ADMIN — Medication 2 G: at 17:45

## 2023-12-22 RX ADMIN — LIDOCAINE HYDROCHLORIDE 10 MG: 10 INJECTION, SOLUTION INFILTRATION; PERINEURAL at 17:27

## 2023-12-22 RX ADMIN — GLYCOPYRROLATE 0.1 MG: 0.2 INJECTION INTRAMUSCULAR; INTRAVENOUS at 17:45

## 2023-12-22 RX ADMIN — PIPERACILLIN AND TAZOBACTAM 3.38 G: 3; .375 INJECTION, POWDER, LYOPHILIZED, FOR SOLUTION INTRAVENOUS at 22:24

## 2023-12-22 RX ADMIN — MIDAZOLAM 2 MG: 1 INJECTION INTRAMUSCULAR; INTRAVENOUS at 16:33

## 2023-12-22 RX ADMIN — ACETAMINOPHEN 975 MG: 325 TABLET ORAL at 20:48

## 2023-12-22 RX ADMIN — FENTANYL CITRATE 100 MCG: 50 INJECTION, SOLUTION INTRAMUSCULAR; INTRAVENOUS at 16:33

## 2023-12-22 RX ADMIN — DEXTROSE 30 G: 15 GEL ORAL at 22:11

## 2023-12-22 RX ADMIN — SODIUM CHLORIDE 1000 ML: 9 INJECTION, SOLUTION INTRAVENOUS at 13:44

## 2023-12-22 RX ADMIN — PHENYLEPHRINE HYDROCHLORIDE 25 MCG: 10 INJECTION INTRAVENOUS at 17:54

## 2023-12-22 ASSESSMENT — LIFESTYLE VARIABLES: TOBACCO_USE: 0

## 2023-12-22 ASSESSMENT — ACTIVITIES OF DAILY LIVING (ADL)
ADLS_ACUITY_SCORE: 37
ADLS_ACUITY_SCORE: 35

## 2023-12-22 NOTE — PHARMACY-VANCOMYCIN DOSING SERVICE
"Pharmacy Vancomycin Initial Note  Date of Service 2023  Patient's  1945  78 year old, male    Indication: Skin and Soft Tissue Infection    Current estimated CrCl = Estimated Creatinine Clearance: 46.2 mL/min (A) (based on SCr of 1.61 mg/dL (H)).    Creatinine for last 3 days  2023:  1:27 PM Creatinine 1.61 mg/dL    Recent Vancomycin Level(s) for last 3 days  No results found for requested labs within last 3 days.      Vancomycin IV Administrations (past 72 hours)                     vancomycin (VANCOCIN) 1,500 mg in 0.9% NaCl 250 mL intermittent infusion (mg) 1,500 mg New Bag 23 1338                    Nephrotoxins and other renal medications (From now, onward)      Start     Dose/Rate Route Frequency Ordered Stop    23 1300  vancomycin (VANCOCIN) 1,250 mg in 0.9% NaCl 250 mL intermittent infusion         1,250 mg  over 90 Minutes Intravenous EVERY 24 HOURS 23 1539      23 2130  piperacillin-tazobactam (ZOSYN) 3.375 g vial to attach to  mL bag        Note to Pharmacy: For [facility], [facility] and [facility]: For Zosyn-naive patients, use the \"Zosyn initial dose + extended infusion\" order panel.    3.375 g  over 30 Minutes Intravenous EVERY 8 HOURS 23 1259      23 1530  piperacillin-tazobactam (ZOSYN) 3.375 g vial to attach to  mL bag         3.375 g  over 30 Minutes Intravenous ONCE 23 1529              Contrast Orders - past 72 hours (72h ago, onward)      None            [employer] Prediction of Planned Initial Vancomycin Regimen  Regimen: 1250 mg IV every 24 hours.  Start time: 01:38 on 2023  Exposure target: AUC24 (range)400-600 mg/L.hr   AUC24,ss: 518 mg/L.hr  Probability of AUC24 > 400: 77 %  Ctrough,ss: 16.7 mg/L  Probability of Ctrough,ss > 20: 33 %  Probability of nephrotoxicity (Lodise FREDDY ): 12 %        Plan:  Start vancomycin  1250 mg IV q24h.   Vancomycin monitoring method: AUC  Vancomycin therapeutic monitoring goal: 400-600 " mg*h/L  Pharmacy will check vancomycin levels as appropriate in 1-3 Days.    Serum creatinine levels will be ordered daily for the first week of therapy and at least twice weekly for subsequent weeks.      Chao Chaparro RPH

## 2023-12-22 NOTE — OP NOTE
Date of Procedure: 12/22/23      Surgeon: Carla Austin MD      Assistant: Cathy Verdugo PA-C.  DIONNE assist required for patient positioning, soft tissue retraction, instrumentation assist, and patient's safety.     Preoperative diagnosis:   1.  Right dorsal forearm suppurative fascitis/abscess and extensor tenosynovitis.  2.  Rule out septic right wrist joint.     Postoperative diagnosis:   As above     Operation/procedures performed:   1.  Right wrist arthrotomy for possible septic arthritis  2.  Irrigation and sharp debridement of wound of right dorsal forearm, wrist, hand and fourth dorsal extensor compartment.    Findings:  No purulence in the right wrist joint. Purulence noted superficial to all extensor tendons. Some mild fraying noted of the second extensor compartment.     Sedation/anesthesia: MAC/block     Complications: None     Drains: 2 segments of a Penrose drain were placed to allow for drainage postoperatively     Estimated blood loss: 10 cc     Implants: None.    Tourniquet time: Per anesthesia record     Specimens: Cultures obtained for Gram stain, anaerobic and aerobic cultures.     Indications for Surgery: The patient is a 78-year-old male with a history of type 2 diabetes who sustained multiple cat scratche/bites on 12/2/23. He was seen at his PCP and ultimately was admitted at [facility] on 12/11/2023.  He underwent irrigation and debridement on 12/11/2023 and 12/13/2023 with Dr. Leyva.  Cultures were positive for a polymicrobial infection.  He eventually was transitioned from IV antibiotics and discharged to home with p.o. antibiotics on 12/20/2023.      He presented to my clinic this morning on 12/22/2023 with increased redness, pain and significant purulence noted about the forearm.  He also endorsed streaking of the erythema up into the elbow.  He has very minimal elbow pain and no pain with range of motion.  Therefore, I do recommend repeat irrigation, debridement and  "readmission for IV antibiotics.    The risks, benefits, and alternatives of this surgical procedure were thoroughly discussed with the patient.  I outlined the risks of surgery which included, but are not limited to: Infection, recurrence, incisional pain, the development of scar tissue, and/or neurovascular injury. The consequences of such risks could include hospital admission, additional surgery, chronic pain, loss of strength, loss of sensation, loss of motion and/or loss of function. I explained that although these risks are low they are real. The patient then had opportunity to ask questions which I answered. After thorough discussion, the patient verbalized understanding and stated that they wished proceed with operative intervention.    Operation description: The patient was identified, informed consent was obtained, and the surgical site was marked in the preop area.  A regional block was performed.  The patient was then brought into the operating room and placed supine with the right upper extremity on an arm table.  Sedation was administered by the department of anesthesia.     The patient's right upper extremity was prepped and draped in standard surgical fashion.  This included placing a well-padded tourniquet, upper arm.  Prior to beginning the case,  a \"timeout\" was performed by the surgical team to confirm surgical site, side, and procedure.  The right upper extremity was elevated and exsanguinated with gravity.  The tourniquet was then inflated to 250 mmHg.     I reopened the 12 cm incision over the midline of the dorsum of the wrist/hand.  The incision went from 4 cm proximal to the wrist crease to over the metacarpal.  I used tenotomy scissors to just dissect through the subcutaneous tissue.      There was purulence in the subcutaneous tissue over the wrist and fourth dorsal extensor compartment.  Cultures were obtained for Gram stain, aerobic and anaerobic culture.  The remainder of his " antibiotics were then given.  Using a curette, a rongeur, a tenotomy scissors and a 15 blade, the skin down to the level of the fascia was sequentially debrided.  There was some friable tissue and some fraying of the second extensor compartment tendons.  This was also debrided.  After this debridement, there was no obvious necrotic or friable tissue.    In addition, I did make a small incision in the wrist capsule and there was no obvious purulence within the wrist joint.     The wrist and soft tissues were copiously irrigated with a 3 L bag of normal saline.      2 segments of 1/4 inch Penrose drain placed in the wound to allow for drainage.     The surgical incision was loosely closed with 3-0 nylon placed in a simple fashion.  A sterile soft dressing was applied.  The patient was then awoken from anesthesia and brought to recovery in good condition.  There were no complications.     Postoperative plan:  1.  Pain control.  2.  Continue IV Abx  per ID recommendation.  4.  Elevate right upper extremity.  5.  Warm soaks to right upper extremity incision, starting POD1  6.  Pull drains on POD2  7. Social work consult/Care coordination consult, please coordinate with sister Penelope Shaffer [phone]      Carla Austin MD

## 2023-12-22 NOTE — PROVIDER NOTIFICATION
Dr Dalal MDA notified that pts blood sugar in pre op is 68, per Dr Dalal give 1/2 glass of apple juice.

## 2023-12-22 NOTE — H&P
ADMISSION HISTORY & PHYSICAL      Farzaneh Sandy, [phone]  ASSESSMENT AND PLAN:  78 year old male with a history of insulin-dependent diabetes type 2, CKD stage IV, HTN, LBBB who presents with continued pain and swelling to right wrist following a cat bite.      Septic joint right wrist with likely extension to right elbow secondary to cat bite 12/2  Patient was evaluated at [facility] and admitted 12/11.  Received IV antibiotics along with washouts from orthopedic surgery.  Was discharged 12/20 with 7 days of Augmentin and metronidazole per infectious disease recommendations  Some orthopedic requested admission today 12/22 for washout and IV antibiotics due to continued infection  Appreciate orthopedic recommendations regarding right elbow pain and edema-likely extension of septic joint.  Vancomycin and Zosyn initiated in the ER  Blood cultures, CBC and BMP pending     Concern for possible severe sepsis with low blood pressure  Blood pressure 99/59 on admission.  Afebrile.  CBC pending  Initiate sodium chloride bolus 1000 mL    Decreased mobility secondary to long hospitalization  PT/OT-appreciate discharge recommendations for placement.    Left hand wounds from cat attack  Wound care appreciated.    Diabetes type 2-insulin-dependent  Insulin pump at home  States home dose is Lantus 56 units at night and 16 units NovoLog with meals  A1c of 8.9 12/11/2023  Lantus 56 units ordered to begin 12/23 in the AM due to planned OR procedure today 12/22   NPO diabetes order set- q4h check with very high insulin SSI  Plan to transition to home dosing when he has resumed eating     HTN  Currently hypotensive at 99/57  Hold home antihypertensives  Receiving fluid bolus    CKD stage IV  Baseline creatinine appears to be 1.2  BMP pending    Patient with no current signs of heart failure or acute coronary syndrome.  Patient with no significant cardiac risk factors for needed right wrist washout per orthopedic  surgery.    Code Status: Full Code  DVT prophylaxis: Lovenox     Disposition:  -Anticipated Length of Stay in midnights and medical necessity (including a midnight in the Emergency Department after triage if applicable): >2 midnights   -Discharge barriers: Orthopedic intervention, IV abx, PT/OT and placement.    Clinically Significant Risk Factors Present on Admission                # Drug Induced Platelet Defect: home medication list includes an antiplatelet medication   # Hypertension: Home medication list includes antihypertensive(s)     # DMII: A1C = 8.9 % (Ref range: <5.7 %) within past 6 months   # Overweight: Estimated body mass index is 29.7 kg/m  as calculated from the following:    Height as of this encounter: 1.829 m (6').    Weight as of this encounter: 99.3 kg (219 lb).       # Financial/Environmental Concerns:               CHIEF COMPLAINT:  Right wrist pain     HISTORY OF PRESENTING ILLNESS:  Patient is a 78 year old male with history significant for insulin-dependent diabetes type 2, CKD stage IV, HTN, LBBB.  On December 2 patient was watching someone's cat and received bites and scratches from the cat.  He presented to the emergency department at [facility] 12/11 after being evaluated in outpatient clinic.  He was admitted at that time for IV antibiotics and washout per orthopedic surgery.  He was discharged 12/20 with 7 days of metronidazole and Augmentin per ID recommendations.  Patient continued to have pain and erythema to his right wrist which acutely worsened over the past 2 to 3 days prior to arrival at [facility].  He was evaluated by [facility] surgery and told to present to the emergency department for washout.  Patient notes chills at home but no recorded fevers.  States his swelling has extended up his right arm past his elbow along with pain with movement of his right elbow.  He denies any chest pain, shortness of breath, urinary symptoms or bowel issues.  He  notes it has been difficult to get around his brother's house with his cane which he uses at baseline.  He has been using a 4 wheeled walker with better mobility.  No other complaints at this time.    PMH/PSH:  Patient Active Problem List   Diagnosis    Diabetes mellitus, type 2 (H)    Stage 3 chronic kidney disease, unspecified whether stage 3a or 3b CKD (H)    Sinus bradycardia    LBBB (left bundle branch block)    Cellulitis of right upper extremity    Infection of right hand due to bite, initial encounter       ALLERGIES:  Allergies   Allergen Reactions    Sulfa Antibiotics Other (See Comments)       MEDICATIONS:  Reviewed.  Current Facility-Administered Medications   Medication    piperacillin-tazobactam (ZOSYN) 3.375 g vial to attach to  mL bag    sodium chloride 0.9% BOLUS 1,000 mL    vancomycin (VANCOCIN) 1,500 mg in 0.9% NaCl 250 mL intermittent infusion     Current Outpatient Medications   Medication    acetaminophen (TYLENOL) 325 MG tablet    amLODIPine (NORVASC) 2.5 MG tablet    amoxicillin-clavulanate (AUGMENTIN) 875-125 MG tablet    aspirin (ASA) 325 MG tablet    famotidine (PEPCID) 20 MG tablet    insulin aspart (NOVOLOG PEN) 100 UNIT/ML pen    insulin glargine (LANTUS PEN) 100 UNIT/ML pen    lisinopril-hydrochlorothiazide (ZESTORETIC) 20-12.5 MG tablet    lovastatin (MEVACOR) 40 MG tablet    metroNIDAZOLE (FLAGYL) 500 MG tablet    Multiple Vitamin (MULTIVITAMIN PO)    oxyCODONE (ROXICODONE) 5 MG tablet    povidone-iodine (BETADINE) 10 % topical solution    senna-docusate (SENOKOT-S/PERICOLACE) 8.6-50 MG tablet       SOCIAL HISTORY:  Social History     Socioeconomic History    Marital status: Single     Spouse name: Not on file    Number of children: Not on file    Years of education: Not on file    Highest education level: Not on file   Occupational History    Not on file   Tobacco Use    Smoking status: Never    Smokeless tobacco: Never   Substance and Sexual Activity    Alcohol use: Not  Currently    Drug use: Never    Sexual activity: Not on file   Other Topics Concern    Not on file   Social History Narrative    Not on file     Social Determinants of Health     Financial Resource Strain: Not on file   Food Insecurity: Not on file   Transportation Needs: Not on file   Physical Activity: Not on file   Stress: Not on file   Social Connections: Not on file   Interpersonal Safety: Not on file   Housing Stability: Not on file       FAMILY HISTORY:    Reviewed and non contributory     ROS:  12 point review of systems is reviewed and is negative except for what has already been mention in the history of presenting illness.     PHYSICAL EXAM:  BP 99/57   Pulse 66   Temp 97.7  F (36.5  C) (Oral)   Resp 18   Ht 1.829 m (6')   Wt 99.3 kg (219 lb)   SpO2 99%   BMI 29.70 kg/m    No intake/output data recorded.  No intake/output data recorded.  GENRL: Alert and answering questions appropriately. . Not in acute distress. Sitting in ER waiting room   HEENT: no lymphadenopathy   CHEST: Clear to auscultation bilaterally. No wheezes, rhonchi or crackles. Breathing easily   HEART: Regular rate and rhythm  EXTRM: Right upper extremity with wrapping over right wrist.  Tight edema extending from upper right arm to fingertips.  Difficulty moving fingertips.  Pain with movement of fingers, right wrist and right elbow.  Erythema and edema noted to right elbow with decreased movement.    NEURO: Able to move all extremities-right upper extremity with some difficulty in movement due to edema.    PSYCH: Normal affect and mood.   INTGM: Erythema noted to right upper extremity.      Medical Decision Making       75 MINUTES SPENT BY ME on the date of service doing chart review, history, exam, documentation & further activities per the note.        DIAGNOSTIC DATA:  No results found for this or any previous visit (from the past 24 hour(s)).  All lab studies reviewed personally  Radiology report reviewed.      Annmarie  DO Jose  Hospitalist Service  [facility]  Text page via [facility] Paging/Directory

## 2023-12-22 NOTE — ANESTHESIA PROCEDURE NOTES
Brachial plexus Procedure Note    Pre-Procedure   Staff -        Anesthesiologist:  Darrian Dalal MD       Performed By: anesthesiologist       Location: pre-op       Procedure Start/Stop Times: 12/22/2023 4:34 PM and 12/22/2023 4:36 PM       Pre-Anesthestic Checklist: patient identified, IV checked, site marked, risks and benefits discussed, informed consent, monitors and equipment checked, pre-op evaluation, at physician/surgeon's request and post-op pain management  Timeout:       Correct Patient: Yes        Correct Procedure: Yes        Correct Site: Yes        Correct Position: Yes        Correct Laterality: Yes        Site Marked: Yes  Procedure Documentation  Procedure: Brachial plexus       Diagnosis: INFECTED R HAND       Laterality: right       Patient Position: sitting       Patient Prep/Sterile Barriers: sterile gloves, mask       Skin prep: Chloraprep       Local skin infiltrated with mL of 1% lidocaine.  (supraclavicular approach).       Needle Type: short bevel       Needle Gauge: 20.        Needle Length (Inches): 4        Ultrasound guided       1. Ultrasound was used to identify targeted nerve, plexus, vascular marker, or fascial plane and place a needle adjacent to it in real-time.       2. Ultrasound was used to visualize the spread of anesthetic in close proximity to the above referenced structure.       3. A permanent image is entered into the patient's record.       4. The visualized anatomic structures appeared normal.       5. There were no apparent abnormal pathologic findings.    Assessment/Narrative         The placement was negative for: blood aspirated, painful injection and site bleeding       Paresthesias: No.       Bolus given via needle..        Secured via.        Insertion/Infusion Method: Single Shot       Complications: none    Medication(s) Administered   Bupivacaine 0.5% PF (Infiltration) - Infiltration   30 mL - 12/22/2023 4:34:00 PM  Medication Administration Time:  "12/22/2023 4:34 PM      FOR [facility] ONLY:   Pain Team Contact information: please page the Pain Team Via [employer]. Search \"Pain\". During daytime hours, please page the attending first. At night please page the resident first.      "

## 2023-12-22 NOTE — ANESTHESIA PREPROCEDURE EVALUATION
Anesthesia Pre-Procedure Evaluation    Patient: Amor Zavaleta   MRN: [id number] : 1945        Procedure : Procedure(s):  INCISION AND DRAINAGE, UPPER EXTREMITY          Past Medical History:   Diagnosis Date    Diabetes (H)       Past Surgical History:   Procedure Laterality Date    INCISION AND DRAINAGE UPPER EXTREMITY, COMBINED Right 2023    Procedure: INCISION AND DRAINAGE, RIGHT UPPER FOREARM;  Surgeon: Leah Leyva MD;  Location: [facility] OR    INCISION AND DRAINAGE UPPER EXTREMITY, COMBINED Right 2023    Procedure: INCISION AND DRAINAGE, UPPER EXTREMITY;  Surgeon: Leah Leyva MD;  Location: [facility] OR      Allergies   Allergen Reactions    Sulfa Antibiotics Hives      Social History     Tobacco Use    Smoking status: Never    Smokeless tobacco: Never   Substance Use Topics    Alcohol use: Not Currently      Wt Readings from Last 1 Encounters:   23 99.3 kg (219 lb)        Anesthesia Evaluation   Pt has had prior anesthetic.     No history of anesthetic complications       ROS/MED HX  ENT/Pulmonary:     (+) sleep apnea,                                    (-) tobacco use   Neurologic:  - neg neurologic ROS   (+)    peripheral neuropathy,                            Cardiovascular: Comment: LBBB.    (+)  hypertension- -   -  - -                          Irregular Heartbeat/Palpitations,            METS/Exercise Tolerance:     Hematologic:     (+)      anemia,          Musculoskeletal:  - neg musculoskeletal ROS     GI/Hepatic:     (+) GERD, Asymptomatic on medication,               (-) hiatal hernia and esophageal disease   Renal/Genitourinary:     (+) renal disease, type: CRI,            Endo:     (+)  type II DM,       Diabetic complications: nephropathy neuropathy retinopathy.             Psychiatric/Substance Use:  - neg psychiatric ROS     Infectious Disease: Comment: UE infection.    (+) Recent Fever,           Malignancy:  - neg malignancy ROS     Other:  - neg  "other ROS          Physical Exam    Airway        Mallampati: II   TM distance: > 3 FB   Neck ROM: full   Mouth opening: > 3 cm    Respiratory Devices and Support         Dental           Cardiovascular   cardiovascular exam normal          Pulmonary                   OUTSIDE LABS:  CBC:   Lab Results   Component Value Date    WBC 11.1 (H) 12/22/2023    WBC 10.9 12/19/2023    HGB 11.6 (L) 12/22/2023    HGB 11.3 (L) 12/19/2023    HCT 35.1 (L) 12/22/2023    HCT 34.3 (L) 12/19/2023     (H) 12/22/2023     12/19/2023     BMP:   Lab Results   Component Value Date     12/22/2023     (L) 12/19/2023    POTASSIUM 4.0 12/22/2023    POTASSIUM 4.2 12/19/2023    CHLORIDE 102 12/22/2023    CHLORIDE 102 12/19/2023    CO2 23 12/22/2023    CO2 22 12/19/2023    BUN 32.6 (H) 12/22/2023    BUN 18.6 12/19/2023    CR 1.61 (H) 12/22/2023    CR 1.19 (H) 12/19/2023    GLC 98 12/22/2023     (H) 12/20/2023     COAGS: No results found for: \"PTT\", \"INR\", \"FIBR\"  POC: No results found for: \"BGM\", \"HCG\", \"HCGS\"  HEPATIC:   Lab Results   Component Value Date    ALBUMIN 3.4 (L) 12/11/2023    PROTTOTAL 7.5 12/11/2023    ALT 45 12/11/2023    AST 27 12/11/2023    ALKPHOS 120 12/11/2023    BILITOTAL 1.1 12/11/2023     OTHER:   Lab Results   Component Value Date    PH 7.46 (H) 12/11/2023    LACT 1.2 12/12/2023    A1C 8.9 (H) 12/11/2023    VLADIMIR 9.0 12/22/2023    PHOS 2.7 07/07/2023    MAG 1.9 12/19/2023    SED 61 (H) 12/11/2023       Anesthesia Plan    ASA Status:  3    NPO Status:  NPO Appropriate    Anesthesia Type: General.     - Airway: Mask Only      Maintenance: TIVA.        Consents    Anesthesia Plan(s) and associated risks, benefits, and realistic alternatives discussed. Questions answered and patient/representative(s) expressed understanding.     - Discussed:     - Discussed with:  Patient      - Extended Intubation/Ventilatory Support Discussed: No.      - Patient is DNR/DNI Status: No     Use of blood products " discussed: No .     Postoperative Care    Pain management: Peripheral nerve block (Single Shot), IV analgesics, Multi-modal analgesia, Oral pain medications.   PONV prophylaxis: Ondansetron (or other 5HT-3), Dexamethasone or Solumedrol     Comments:    Other Comments: Decadron, Zofran.  Diprivan infusion.  Ketamine (0.5 mg/kg).             Darrian Dalal MD    I have reviewed the pertinent notes and labs in the chart from the past 30 days and (re)examined the patient.  Any updates or changes from those notes are reflected in this note.    # Hypokalemia: Lowest K = 2.5 mmol/L in last 30 days, will replace as needed  # Hyponatremia: Lowest Na = 130 mmol/L in last 30 days, will monitor as appropriate   # Hypocalcemia: Lowest Ca = 7.5 mg/dL in last 30 days, will monitor and replace as appropriate   # Anion Gap Metabolic Acidosis: Highest Anion Gap = 19 mmol/L in last 30 days, will monitor and treat as appropriate  # Hypoalbuminemia: Lowest albumin = 3.4 g/dL in the past 30 days , will monitor as appropriate   # Drug Induced Platelet Defect: home medication list includes an antiplatelet medication  # DMII: A1C = 8.9 % (Ref range: <5.7 %) within past 6 months  # Overweight: Estimated body mass index is 29.7 kg/m  as calculated from the following:    Height as of this encounter: 1.829 m (6').    Weight as of this encounter: 99.3 kg (219 lb).

## 2023-12-22 NOTE — INTERVAL H&P NOTE
I have reviewed the surgical (or preoperative) H&P that is linked to this encounter, and examined the patient. There are no significant changes    Clinical Conditions Present on Arrival:  Clinically Significant Risk Factors Present on Admission        # Hypokalemia: Lowest K = 2.5 mmol/L in last 30 days, will replace as needed  # Hyponatremia: Lowest Na = 130 mmol/L in last 30 days, will monitor as appropriate   # Hypocalcemia: Lowest Ca = 7.5 mg/dL in last 30 days, will monitor and replace as appropriate   # Anion Gap Metabolic Acidosis: Highest Anion Gap = 19 mmol/L in last 30 days, will monitor and treat as appropriate  # Hypoalbuminemia: Lowest albumin = 3.4 g/dL in the past 30 days , will monitor as appropriate    # Drug Induced Platelet Defect: home medication list includes an antiplatelet medication  # DMII: A1C = 8.9 % (Ref range: <5.7 %) within past 6 months  # Overweight: Estimated body mass index is 29.7 kg/m  as calculated from the following:    Height as of this encounter: 1.829 m (6').    Weight as of this encounter: 99.3 kg (219 lb).

## 2023-12-22 NOTE — CONSULTS
[facility]  WOC Nurse Inpatient Assessment     Consulted for: Wounds, L hand/R wrist    Summary: 12/22 Assessed patient at bedside.  Removed dressings and cleaned wound to r wrist.  Sister at bedside shared he was going to surgery at 1700 same day.  We will defer to surgery until we can see patient next week as needed to develop a plan.    Patient History (according to provider note(s):      Septic joint right wrist with likely extension to right elbow secondary to cat bite 12/2  Patient was evaluated at [facility] and admitted 12/11.  Received IV antibiotics along with washouts from orthopedic surgery.  Was discharged 12/20 with 7 days of Augmentin and metronidazole per infectious disease recommendations  Some orthopedic requested admission today 12/22 for washout and IV antibiotics due to continued infection    Assessment:      Areas visualized during today's visit: Focused: and L hand/ R wrist    Skin Injury Location: r wrist      Last photo: 12/22  Skin injury due to: Trauma  Skin history and plan of care:   cat bite, going to surgery.  WOC will reassess next week if patient is still in hospital.  Affected area:      Skin assessment: Edema, Erythema, and infection     Measurements (length x width x depth, in cm) 12 cm  x 0.5 cm      Color: pink     Temperature  warm     Drainage: moderate .      Color: serous      Odor: none  Pain: moderate, throbbing and sharp  Pain interventions prior to dressing change: patient tolerated well, slow and gentle cares , and distraction  Treatment goal: Heal   STATUS: initial assessment  Supplies ordered: gathered       Treatment Plan:     Patient due for surgery at 1700 12/22.  Will support surgery post op as needed.    RECOMMEND PRIMARY TEAM ORDER: None, at this time  Education provided: plan of care  Discussed plan of care with: Patient, Family, and Nurse  WOC nurse follow-up plan:  Per surgery needs  Notify WOC if wound(s)  deteriorate.  Nursing to notify the Provider(s) and re-consult the [facility] Nurse if new skin concern.    DATA:     Current support surface: Standard  Standard gel/foam mattress (IsoFlex, Atmos air, etc)  Containment of urine/stool: Continent of bladder and Continent of bowel  BMI: Body mass index is 29.7 kg/m .   Active diet order: Orders Placed This Encounter      NPO per Anesthesia Guidelines for Procedure/Surgery Except for: Meds     Output: No intake/output data recorded.     Labs:   Recent Labs   Lab 12/22/23  1327   HGB 11.6*   WBC 11.1*                            Zeyad Chin, DERRICKN RN [facility] services  Pager no longer in use, please contact through [employer] group: [facility]

## 2023-12-22 NOTE — ED PROVIDER NOTES
EMERGENCY DEPARTMENT ENCOUNTER      NAME: Amor Zavaleta  AGE: 78 year old male  YOB: 1945  MRN: [id number]  EVALUATION DATE & TIME: No admission date for patient encounter.    PCP: Farzaneh Sandy    ED PROVIDER: Juan Pablo Desir D.O.      Chief Complaint   Patient presents with    Post-op Problem       FINAL IMPRESSION:  1. Infection of right hand due to bite, initial encounter        ED COURSE & MEDICAL DECISION MAKIN:59 AM I met with the patient to gather history and to perform my initial exam. I discussed the plan for care while in the Emergency Department.  12:14 PM Spoke with Dr. Austin, [facility].  12:29 PM Spoke with Dr. Garcia, Hospitalist. Patient was admitted to hospital.         Pertinent Labs & Imaging studies reviewed. (See chart for details)  78 year old male presents to the Emergency Department for evaluation of right hand swelling and erythema status post bite with known infection.  Was seen by [facility], believed to represent severe infection, with potential for septic arthritis.  They do have plan for surgical washout today, and requested we get started on IV antibiotics.  He is not show any signs of sepsis.  Will require admission after the surgical intervention is performed, therefore I did discuss with the inpatient team who agreed to the admission.    Medical Decision Making    History:  Supplemental history from: Documented in chart, if applicable  External Record(s) reviewed: Outpatient Record: Procedure at [facility] [place] on right hand on 2023    Work Up:  Chart documentation includes differential considered and any EKGs or imaging independently interpreted by provider, where specified.  In additional to work up documented, I considered the following work up: Documented in chart, if applicable.    External consultation:  Discussion of management with another provider: Documented in chart, if applicable    Complicating factors:  Care impacted  by chronic illness: Chronic Kidney Disease, Diabetes, and Hypertension  Care affected by social determinants of health: N/A    Disposition considerations: Admit.        At the conclusion of the encounter I discussed the results of all of the tests and the disposition. The questions were answered. The patient or family acknowledged understanding and was agreeable with the care plan.        HPI    Patient information was obtained from: Patient and patient's     Use of : N/A       Amor Zavaleta is a 78 year old male who presents to the ED via private car for evaluation of post-op problem.    Per patient's family member, patient was bitten by cat 3 times, was found to have pasteurella infection, and was sent to ER to receive IV and antibiotics prior to surgery on right hand to drain abscess. They were told that surgery will be done by [facility] at [facility]. Patient was discharged on Wednesday (12/13) and family member didn't feel like he was prepared to be discharged. He was seen earlier today for post-op follow up and doctor advised patient to go to OR. Patient didn't notice his right hand re-abscessed post-op since last week, stating swelling of hand was consistent. No other reported complaints or concerns at this time.    Per Chart Review: Patient presented to [facility] [place] [facility] for procedure on right hand on 12/13/2023. Patient had right wrist arthrotomy for possible septic arthritis and incision and drainage with copious irrigation of right dorsal forearm, wrist, hand and fourth dorsal extensor compartment. Findings indicated no purulence in the right wrist joint. Postoperative plan included pain control, continue IV Abx (Vanco and Unasyn) per ID recommendation, elevate right upper extremity, warm soaks to right upper extremity incision, and pull drains on Friday, 12/15/2023.    REVIEW OF SYSTEMS  Constitutional:  Denies fever, chills, weight loss or weakness.  Eyes:  No pain, discharge,  redness  HENT:  Denies sore throat, ear pain, congestion  Respiratory: No SOB, wheeze or cough  Cardiovascular:  No CP, palpitations  GI:  Denies abdominal pain, nausea, vomiting, diarrhea  : Denies dysuria, hematuria  Musculoskeletal:  Denies any new muscle/joint pain, swelling or loss of function.  Skin:  Denies rash, pallor. Positive for swelling of right hand and wrist.  Neurologic:  Denies headache, focal weakness or sensory changes  Lymph: Denies swollen nodes    All other systems negative unless noted in HPI.    PAST MEDICAL HISTORY:  No past medical history on file.    PAST SURGICAL HISTORY:  Past Surgical History:   Procedure Laterality Date    INCISION AND DRAINAGE UPPER EXTREMITY, COMBINED Right 12/11/2023    Procedure: INCISION AND DRAINAGE, RIGHT UPPER FOREARM;  Surgeon: Leah Leyva MD;  [place] [facility] [place]    INCISION AND DRAINAGE UPPER EXTREMITY, COMBINED Right 12/13/2023    Procedure: INCISION AND DRAINAGE, UPPER EXTREMITY;  Surgeon: Leah Leyva MD;  [place] [facility] [place]         CURRENT MEDICATIONS:    Current Facility-Administered Medications   Medication    acetaminophen (TYLENOL) tablet 650 mg    Or    acetaminophen (TYLENOL) Suppository 650 mg    benzocaine-menthol (CEPACOL) 15-3.6 MG lozenge 1 lozenge    calcium carbonate (TUMS) chewable tablet 1,000 mg    glucose gel 15-30 g    Or    dextrose 50 % injection 25-50 mL    Or    glucagon injection 1 mg    [START ON 12/23/2023] enoxaparin ANTICOAGULANT (LOVENOX) injection 40 mg    insulin aspart (NovoLOG) injection (RAPID ACTING)    [START ON 12/23/2023] insulin glargine (LANTUS PEN) injection 56 Units    lidocaine (LMX4) cream    lidocaine 1 % 0.1-1 mL    melatonin tablet 1 mg    ondansetron (ZOFRAN ODT) ODT tab 4 mg    Or    ondansetron (ZOFRAN) injection 4 mg    oxyCODONE (ROXICODONE) tablet 5 mg    oxyCODONE IR (ROXICODONE) half-tab 2.5 mg    piperacillin-tazobactam (ZOSYN) 3.375 g vial to attach to  mL bag     piperacillin-tazobactam (ZOSYN) 3.375 g vial to attach to  mL bag    senna-docusate (SENOKOT-S/PERICOLACE) 8.6-50 MG per tablet 1 tablet    Or    senna-docusate (SENOKOT-S/PERICOLACE) 8.6-50 MG per tablet 2 tablet    sodium chloride (PF) 0.9% PF flush 3 mL    sodium chloride (PF) 0.9% PF flush 3 mL    sodium chloride 0.9% BOLUS 1,000 mL    vancomycin (VANCOCIN) 1,500 mg in 0.9% NaCl 250 mL intermittent infusion     Current Outpatient Medications   Medication    acetaminophen (TYLENOL) 325 MG tablet    amLODIPine (NORVASC) 2.5 MG tablet    amoxicillin-clavulanate (AUGMENTIN) 875-125 MG tablet    aspirin (ASA) 325 MG tablet    famotidine (PEPCID) 20 MG tablet    insulin aspart (NOVOLOG PEN) 100 UNIT/ML pen    insulin glargine (LANTUS PEN) 100 UNIT/ML pen    lisinopril-hydrochlorothiazide (ZESTORETIC) 20-12.5 MG tablet    lovastatin (MEVACOR) 40 MG tablet    metroNIDAZOLE (FLAGYL) 500 MG tablet    Multiple Vitamin (MULTIVITAMIN PO)    oxyCODONE (ROXICODONE) 5 MG tablet    povidone-iodine (BETADINE) 10 % topical solution    senna-docusate (SENOKOT-S/PERICOLACE) 8.6-50 MG tablet         ALLERGIES:  Allergies   Allergen Reactions    Sulfa Antibiotics Other (See Comments)       FAMILY HISTORY:  No family history on file.    SOCIAL HISTORY:  Social History     Socioeconomic History    Marital status: Single   Tobacco Use    Smoking status: Never    Smokeless tobacco: Never   Substance and Sexual Activity    Alcohol use: Not Currently    Drug use: Never       VITALS:  Patient Vitals for the past 24 hrs:   BP Temp Temp src Pulse Resp SpO2 Height Weight   12/22/23 1141 99/57 97.7  F (36.5  C) Oral 66 18 99 % 1.829 m (6') 99.3 kg (219 lb)       PHYSICAL EXAM    VITAL SIGNS: BP 99/57   Pulse 66   Temp 97.7  F (36.5  C) (Oral)   Resp 18   Ht 1.829 m (6')   Wt 99.3 kg (219 lb)   SpO2 99%   BMI 29.70 kg/m      General: Appears in no acute distress, awake, alert, interactive.  Eyes: Conjunctivae non-injected. Sclera  anicteric.  HENT: Atraumatic, normocephalic  Neck: Supple, normal ROM  Respiratory/Chest: Respiration unlabored, no tachypnea  Abdomen: non distended  Musculoskeletal: Normal extremities. No edema or erythema.  Skin: Normal color. No rash or diaphoresis. Swelling and erythema of the right hand and wrist.  Neurologic: Alert and oriented, face symmetric, moves all extremities spontaneously. Speech clear.  Psychiatric:  Affect normal, Judgment normal, Mood normal.    LABS  No results found for this or any previous visit (from the past 24 hour(s)).      RADIOLOGY  No orders to display          MEDICATIONS GIVEN IN THE EMERGENCY:  Medications   piperacillin-tazobactam (ZOSYN) 3.375 g vial to attach to  mL bag (has no administration in time range)   vancomycin (VANCOCIN) 1,500 mg in 0.9% NaCl 250 mL intermittent infusion (has no administration in time range)   sodium chloride 0.9% BOLUS 1,000 mL (has no administration in time range)   lidocaine 1 % 0.1-1 mL (has no administration in time range)   lidocaine (LMX4) cream (has no administration in time range)   sodium chloride (PF) 0.9% PF flush 3 mL (has no administration in time range)   sodium chloride (PF) 0.9% PF flush 3 mL (has no administration in time range)   senna-docusate (SENOKOT-S/PERICOLACE) 8.6-50 MG per tablet 1 tablet (has no administration in time range)     Or   senna-docusate (SENOKOT-S/PERICOLACE) 8.6-50 MG per tablet 2 tablet (has no administration in time range)   calcium carbonate (TUMS) chewable tablet 1,000 mg (has no administration in time range)   enoxaparin ANTICOAGULANT (LOVENOX) injection 40 mg (has no administration in time range)   acetaminophen (TYLENOL) tablet 650 mg (has no administration in time range)     Or   acetaminophen (TYLENOL) Suppository 650 mg (has no administration in time range)   oxyCODONE IR (ROXICODONE) half-tab 2.5 mg (has no administration in time range)   oxyCODONE (ROXICODONE) tablet 5 mg (has no administration in  time range)   melatonin tablet 1 mg (has no administration in time range)   ondansetron (ZOFRAN ODT) ODT tab 4 mg (has no administration in time range)     Or   ondansetron (ZOFRAN) injection 4 mg (has no administration in time range)   benzocaine-menthol (CEPACOL) 15-3.6 MG lozenge 1 lozenge (has no administration in time range)   piperacillin-tazobactam (ZOSYN) 3.375 g vial to attach to  mL bag (has no administration in time range)   glucose gel 15-30 g (has no administration in time range)     Or   dextrose 50 % injection 25-50 mL (has no administration in time range)     Or   glucagon injection 1 mg (has no administration in time range)   insulin glargine (LANTUS PEN) injection 56 Units (has no administration in time range)   insulin aspart (NovoLOG) injection (RAPID ACTING) (has no administration in time range)       NEW PRESCRIPTIONS STARTED AT TODAY'S ER VISIT  New Prescriptions    No medications on file        I, Yulissa Frazier, am serving as a scribe to document services personally performed by Juan Pablo Desir D.O., based on my observations and the provider's statements to me.  I, Juan Pablo Desir D.O., attest that Yulissa Frazier is acting in a scribe capacity, has observed my performance of the services and has documented them in accordance with my direction.     Juan Pablo Desir D.O.  Emergency Medicine  [facility] EMERGENCY ROOM  [address] [id number] [phone]  Dept: [phone]       Juan Pablo Desir,   12/22/23 1115

## 2023-12-22 NOTE — PHARMACY-ADMISSION MEDICATION HISTORY
Pharmacist Admission Medication History    Admission medication history is complete. The information provided in this note is only as accurate as the sources available at the time of the update.    Medication reconciliation/reorder completed by provider prior to medication history? No    Information Source(s): Patient and CareEverywhere/SureScripts via in-person    Pertinent Information: Patient reports taking 64 units of Lantus this morning. Per H&P, Lantus 56 units ordered to begin 12/23 in the AM due to planned OR procedure today 12/22.     Changes made to PTA medication list:  Added: None  Deleted: None  Changed: None    Medication Affordability:       Allergies reviewed with patient and updates made in EHR: yes    Medications available for use during hospital stay: NONE.     Medication History Completed By: Rosio Elizabeth RPH 12/22/2023 4:01 PM    PTA Med List   Medication Sig Last Dose    acetaminophen (TYLENOL) 325 MG tablet Take 2 tablets (650 mg) by mouth every 4 hours as needed for mild pain or other (and adjunct with moderate or severe pain or per patient request) Unknown at prn    amLODIPine (NORVASC) 2.5 MG tablet Take 2.5 mg by mouth daily 12/22/2023 at am    amoxicillin-clavulanate (AUGMENTIN) 875-125 MG tablet Take 1 tablet by mouth every 12 hours for 6 days 12/22/2023 at am x1    aspirin (ASA) 325 MG tablet Take 325 mg by mouth daily 12/22/2023 at am    famotidine (PEPCID) 20 MG tablet Take 20 mg by mouth daily 12/22/2023 at am    insulin aspart (NOVOLOG PEN) 100 UNIT/ML pen Inject 16 Units Subcutaneous 3 times daily (with meals) 12/22/2023 at am    insulin glargine (LANTUS PEN) 100 UNIT/ML pen Inject 56 Units Subcutaneous every morning 12/22/2023 at 64 units on 12/22 am    lisinopril-hydrochlorothiazide (ZESTORETIC) 20-12.5 MG tablet Take 1 tablet by mouth daily 12/22/2023 at am    lovastatin (MEVACOR) 40 MG tablet Take 1 tablet by mouth 2 times daily 12/22/2023 at am    metroNIDAZOLE  (FLAGYL) 500 MG tablet Take 1 tablet (500 mg) by mouth 3 times daily for 6 days 12/22/2023 at am x1    Multiple Vitamin (MULTIVITAMIN PO) Take 0.5 tablets by mouth 2 times daily 12/22/2023 at am    oxyCODONE (ROXICODONE) 5 MG tablet Take 1 tablet (5 mg) by mouth every 4 hours as needed for moderate pain Past Week    povidone-iodine (BETADINE) 10 % topical solution Apply topically as needed for wound care Apply topically to incision for wound care Past Week    senna-docusate (SENOKOT-S/PERICOLACE) 8.6-50 MG tablet Take 2 tablets by mouth 2 times daily as needed for constipation Unknown at prn

## 2023-12-22 NOTE — ED TRIAGE NOTES
Patient sent here by [facility] to get IV ABX and fluids prior to the surgery that he is going to be having here by [facility], it will be the 3rd surgery to R hand abscess that pt has had since he was bitten on the R hand by a cat on Dec. 2nd, last time pt had food or drink was 0830 this morning.  Melanie Asif RN.......12/22/2023 11:45 AM     Triage Assessment (Adult)       Row Name 12/22/23 1143          Triage Assessment    Airway WDL WDL        Respiratory WDL    Respiratory WDL WDL        Skin Circulation/Temperature WDL    Skin Circulation/Temperature WDL WDL        Cardiac WDL    Cardiac WDL WDL        Peripheral/Neurovascular WDL    Peripheral Neurovascular WDL WDL        Cognitive/Neuro/Behavioral WDL    Cognitive/Neuro/Behavioral WDL WDL

## 2023-12-23 ENCOUNTER — APPOINTMENT (OUTPATIENT)
Dept: ULTRASOUND IMAGING | Facility: CLINIC | Age: 78
DRG: 464 | End: 2023-12-23
Attending: INTERNAL MEDICINE
Payer: COMMERCIAL

## 2023-12-23 ENCOUNTER — APPOINTMENT (OUTPATIENT)
Dept: PHYSICAL THERAPY | Facility: CLINIC | Age: 78
DRG: 464 | End: 2023-12-23
Attending: INTERNAL MEDICINE
Payer: COMMERCIAL

## 2023-12-23 LAB
ALBUMIN SERPL BCG-MCNC: 2.5 G/DL (ref 3.5–5.2)
ALP SERPL-CCNC: 41 U/L (ref 40–150)
ALT SERPL W P-5'-P-CCNC: 9 U/L (ref 0–70)
ANION GAP SERPL CALCULATED.3IONS-SCNC: 7 MMOL/L (ref 7–15)
AST SERPL W P-5'-P-CCNC: 23 U/L (ref 0–45)
BASOPHILS # BLD AUTO: 0.1 10E3/UL (ref 0–0.2)
BASOPHILS NFR BLD AUTO: 0 %
BILIRUB DIRECT SERPL-MCNC: 0.21 MG/DL (ref 0–0.3)
BILIRUB SERPL-MCNC: 0.5 MG/DL
BUN SERPL-MCNC: 23.2 MG/DL (ref 8–23)
CALCIUM SERPL-MCNC: 8 MG/DL (ref 8.8–10.2)
CHLORIDE SERPL-SCNC: 106 MMOL/L (ref 98–107)
CREAT SERPL-MCNC: 1.41 MG/DL (ref 0.67–1.17)
DEPRECATED HCO3 PLAS-SCNC: 23 MMOL/L (ref 22–29)
EGFRCR SERPLBLD CKD-EPI 2021: 51 ML/MIN/1.73M2
EOSINOPHIL # BLD AUTO: 0.1 10E3/UL (ref 0–0.7)
EOSINOPHIL NFR BLD AUTO: 1 %
ERYTHROCYTE [DISTWIDTH] IN BLOOD BY AUTOMATED COUNT: 13.1 % (ref 10–15)
GLUCOSE BLDC GLUCOMTR-MCNC: 110 MG/DL (ref 70–99)
GLUCOSE BLDC GLUCOMTR-MCNC: 114 MG/DL (ref 70–99)
GLUCOSE BLDC GLUCOMTR-MCNC: 132 MG/DL (ref 70–99)
GLUCOSE BLDC GLUCOMTR-MCNC: 133 MG/DL (ref 70–99)
GLUCOSE BLDC GLUCOMTR-MCNC: 140 MG/DL (ref 70–99)
GLUCOSE BLDC GLUCOMTR-MCNC: 85 MG/DL (ref 70–99)
GLUCOSE SERPL-MCNC: 130 MG/DL (ref 70–99)
HCT VFR BLD AUTO: 32.3 % (ref 40–53)
HGB BLD-MCNC: 10.5 G/DL (ref 13.3–17.7)
IMM GRANULOCYTES # BLD: 0.1 10E3/UL
IMM GRANULOCYTES NFR BLD: 1 %
LYMPHOCYTES # BLD AUTO: 1 10E3/UL (ref 0.8–5.3)
LYMPHOCYTES NFR BLD AUTO: 8 %
MCH RBC QN AUTO: 31.2 PG (ref 26.5–33)
MCHC RBC AUTO-ENTMCNC: 32.5 G/DL (ref 31.5–36.5)
MCV RBC AUTO: 96 FL (ref 78–100)
MONOCYTES # BLD AUTO: 1.2 10E3/UL (ref 0–1.3)
MONOCYTES NFR BLD AUTO: 10 %
NEUTROPHILS # BLD AUTO: 10.1 10E3/UL (ref 1.6–8.3)
NEUTROPHILS NFR BLD AUTO: 80 %
NRBC # BLD AUTO: 0 10E3/UL
NRBC BLD AUTO-RTO: 0 /100
PLATELET # BLD AUTO: 395 10E3/UL (ref 150–450)
POTASSIUM SERPL-SCNC: 4.3 MMOL/L (ref 3.4–5.3)
PROT SERPL-MCNC: 6.1 G/DL (ref 6.4–8.3)
RBC # BLD AUTO: 3.37 10E6/UL (ref 4.4–5.9)
SODIUM SERPL-SCNC: 136 MMOL/L (ref 135–145)
WBC # BLD AUTO: 12.5 10E3/UL (ref 4–11)

## 2023-12-23 PROCEDURE — 80053 COMPREHEN METABOLIC PANEL: CPT | Performed by: INTERNAL MEDICINE

## 2023-12-23 PROCEDURE — 250N000013 HC RX MED GY IP 250 OP 250 PS 637: Performed by: PHYSICIAN ASSISTANT

## 2023-12-23 PROCEDURE — 120N000001 HC R&B MED SURG/OB

## 2023-12-23 PROCEDURE — 250N000012 HC RX MED GY IP 250 OP 636 PS 637: Performed by: HOSPITALIST

## 2023-12-23 PROCEDURE — 250N000011 HC RX IP 250 OP 636: Performed by: INTERNAL MEDICINE

## 2023-12-23 PROCEDURE — 97530 THERAPEUTIC ACTIVITIES: CPT | Mod: GP

## 2023-12-23 PROCEDURE — 258N000003 HC RX IP 258 OP 636: Performed by: INTERNAL MEDICINE

## 2023-12-23 PROCEDURE — 250N000013 HC RX MED GY IP 250 OP 250 PS 637: Performed by: INTERNAL MEDICINE

## 2023-12-23 PROCEDURE — 99222 1ST HOSP IP/OBS MODERATE 55: CPT | Performed by: INTERNAL MEDICINE

## 2023-12-23 PROCEDURE — 97161 PT EVAL LOW COMPLEX 20 MIN: CPT | Mod: GP

## 2023-12-23 PROCEDURE — 93971 EXTREMITY STUDY: CPT | Mod: RT

## 2023-12-23 PROCEDURE — 82248 BILIRUBIN DIRECT: CPT | Performed by: INTERNAL MEDICINE

## 2023-12-23 PROCEDURE — 99233 SBSQ HOSP IP/OBS HIGH 50: CPT | Performed by: FAMILY MEDICINE

## 2023-12-23 PROCEDURE — 36415 COLL VENOUS BLD VENIPUNCTURE: CPT | Performed by: INTERNAL MEDICINE

## 2023-12-23 PROCEDURE — 85025 COMPLETE CBC W/AUTO DIFF WBC: CPT | Performed by: INTERNAL MEDICINE

## 2023-12-23 RX ADMIN — PIPERACILLIN AND TAZOBACTAM 3.38 G: 3; .375 INJECTION, POWDER, LYOPHILIZED, FOR SOLUTION INTRAVENOUS at 06:27

## 2023-12-23 RX ADMIN — INSULIN GLARGINE 35 UNITS: 100 INJECTION, SOLUTION SUBCUTANEOUS at 09:49

## 2023-12-23 RX ADMIN — ACETAMINOPHEN 975 MG: 325 TABLET ORAL at 19:16

## 2023-12-23 RX ADMIN — ATORVASTATIN CALCIUM 10 MG: 10 TABLET, FILM COATED ORAL at 09:49

## 2023-12-23 RX ADMIN — ACETAMINOPHEN 975 MG: 325 TABLET ORAL at 12:17

## 2023-12-23 RX ADMIN — PIPERACILLIN AND TAZOBACTAM 3.38 G: 3; .375 INJECTION, POWDER, LYOPHILIZED, FOR SOLUTION INTRAVENOUS at 17:58

## 2023-12-23 RX ADMIN — VANCOMYCIN HYDROCHLORIDE 1250 MG: 5 INJECTION, POWDER, LYOPHILIZED, FOR SOLUTION INTRAVENOUS at 12:19

## 2023-12-23 RX ADMIN — HYDROXYZINE HYDROCHLORIDE 10 MG: 10 TABLET ORAL at 02:17

## 2023-12-23 RX ADMIN — ACETAMINOPHEN 975 MG: 325 TABLET ORAL at 04:59

## 2023-12-23 RX ADMIN — ENOXAPARIN SODIUM 40 MG: 100 INJECTION SUBCUTANEOUS at 06:27

## 2023-12-23 RX ADMIN — FAMOTIDINE 20 MG: 20 TABLET ORAL at 09:49

## 2023-12-23 ASSESSMENT — ACTIVITIES OF DAILY LIVING (ADL)
ADLS_ACUITY_SCORE: 25
ADLS_ACUITY_SCORE: 25
DEPENDENT_IADLS:: INDEPENDENT
ADLS_ACUITY_SCORE: 38
ADLS_ACUITY_SCORE: 25

## 2023-12-23 NOTE — PROGRESS NOTES
12/23/23 4730   Appointment Info   Signing Clinician's Name / Credentials (PT) Piero Vann, PT, DPT   Living Environment   People in Home alone   Current Living Arrangements apartment   Home Accessibility stairs to enter home   Number of Stairs, Within Home, Primary greater than 10 stairs   Self-Care   Usual Activity Tolerance good   Current Activity Tolerance moderate   Equipment Currently Used at Home none   Fall history within last six months no   General Information   Onset of Illness/Injury or Date of Surgery 12/22/23   Referring Physician Dr. Delatorre   Patient/Family Therapy Goals Statement (PT) Improve overall mobility   Pertinent History of Current Problem (include personal factors and/or comorbidities that impact the POC) R hand infection due to bite, I & D   Weight-Bearing Status - LUE full weight-bearing   Weight-Bearing Status - RUE weight-bearing as tolerated   Range of Motion (ROM)   ROM Comment decreased rom/strength R hand/wrist due to ACE wrap and pain   Strength (Manual Muscle Testing)   Strength Comments WFL   Bed Mobility   Bed Mobility supine-sit;sit-supine   Supine-Sit Lares (Bed Mobility) modified independence   Sit-Supine Lares (Bed Mobility) modified independence   Transfers   Transfers sit-stand transfer   Sit-Stand Transfer   Sit-Stand Lares (Transfers) supervision   Assistive Device (Sit-Stand Transfers) walker, 4-wheeled   Gait/Stairs (Locomotion)   Lares Level (Gait) supervision;verbal cues;nonverbal cues (demo/gesture)   Assistive Device (Gait) walker, front-wheeled   Distance in Feet (Gait) 10'   Pattern (Gait) step-through   Deviations/Abnormal Patterns (Gait) sulaiman decreased;stride length decreased   Clinical Impression   Criteria for Skilled Therapeutic Intervention Yes, treatment indicated   PT Diagnosis (PT) impaired functional mobility   Influenced by the following impairments weakness, pain   Functional limitations due to impairments gait,  stairs, transfers   Clinical Presentation (PT Evaluation Complexity) stable   Clinical Presentation Rationale pt presents as medically diagnosed   Clinical Decision Making (Complexity) low complexity   Planned Therapy Interventions (PT) gait training;home exercise program;patient/family education;stair training;strengthening;transfer training   Risk & Benefits of therapy have been explained care plan/treatment goals reviewed;patient   PT Total Evaluation Time   PT Eval, Low Complexity Minutes (97916) 10   Physical Therapy Goals   PT Frequency Daily   PT Predicted Duration/Target Date for Goal Attainment 12/26/23   PT Goals Transfers;Gait;Stairs   PT: Transfers Modified independent;Sit to/from stand;Assistive device   PT: Gait Modified independent;Assistive device;150 feet   PT: Stairs Supervision/stand-by assist;8 stairs;Rail on left;Rail on right   Interventions   Interventions Quick Adds Gait Training;Therapeutic Activity   Therapeutic Activity   Therapeutic Activities: dynamic activities to improve functional performance Minutes (55552) 12   Symptoms Noted During/After Treatment None   Treatment Detail/Skilled Intervention Supine<>sit Mod I with HOB elevated, increased time for set up, donned and doffed new brief with Min A in standing at EOB and after using bathroom, able to complete other toileting duties and hand washing independently.  Sit<>stand x 2 with FWW SBA. Pt moving well.   Gait Training   Gait Training Minutes (67624) 5   Symptoms Noted During/After Treatment (Gait Training) fatigue   Treatment Detail/Skilled Intervention Pt amb well with the FWW CGA in the halls after using bathroom with no LOB or adverse s/s. Encouraged mobility as able with nursing. Educated on AD use, POC, role of therapies.   Distance in Feet 100'   Leighton Level (Gait Training) contact guard   Physical Assistance Level (Gait Training) supervision;verbal cues   Weight Bearing (Gait Training) weight-bearing as tolerated    Assistive Device (Gait Training) rolling walker   Pattern Analysis (Gait Training) swing-through gait   Gait Analysis Deviations decreased sulaiman;decreased step length   Impairments (Gait Analysis/Training) strength decreased   PT Discharge Planning   PT Plan stairs, amb with FWW   PT Discharge Recommendation (DC Rec) (S)  Transitional Care Facility   PT Rationale for DC Rec Pt's family member is concerned he cannot get up his steps at home. He did get up and down 8 steps with the left rail with CGA last hospital stay. If the family members cannot assist the pt or manage the pt on the pt on the steps, than TCU is recommended for improving overall strength and function prior to return home. Pt mentioned being able to go to [facility], this could be safe option instead of home as there are no stairs.   PT Brief overview of current status SBA transfers and amb 100' with FWW   PT Equipment Needed at Discharge   (Has 4WW)

## 2023-12-23 NOTE — SIGNIFICANT EVENT
Remote Cross Cover Note      Notified that glucose was 46 at 10 PM and then after treatment including glucose gel recheck only up to 65.  Patient is going to drink some more juice and crackers.    Typically gets glargine 56 units nightly - which wisely was already on hold for this evening.     It has been ordered for the morning however with being so low this evening I am going to decrease the dose for the morning down to 35 units.  If glucose rising further in the morning, they can give additional glargine.    Artis Sanderson MD  [facility] Medicine Service

## 2023-12-23 NOTE — PROGRESS NOTES
"Orthopedic Progress Note      Assessment: 1 Day Post-Op  S/P Procedure(s):  IRRIGATION AND DEBRIDEMENT, RIGHT WRIST     Plan:   - Begin soaks 2-3x per day in warm water with Hibiclens   - soak 15min at a time   - redress after soaks with gauze, ABD, kerlex, ACE   - drains to be removed POD 2  - Continue with current pain medication regimen   - PRN oxycodone, scheduled Tylenol, and ice  - Continue to ice and elevate  - Weightbearing status: WBAT to right wrist/hand; encourage finger and hand range of motion  - ID consult placed, appreciate abx recommendations  - intraoperative cultures pending  - Discharge planning: pending cultures and improvement      Subjective:  Pain: moderate  Nausea, Vomiting:  No  Lightheadedness, Dizziness:  No  Neuro:  Patient denies new onset numbness or paresthesias    Patient is resting comfortable in bed. Rates pain at a 3/10 currently, pain appears to be well controlled at this time. Will begin soaks at beside.       Objective:  BP (!) 147/75 (BP Location: Left arm)   Pulse 62   Temp 98.4  F (36.9  C) (Oral)   Resp 16   Ht 1.829 m (6')   Wt 102.5 kg (226 lb)   SpO2 98%   BMI 30.65 kg/m    The patient is A&Ox3. Appears comfortable.   Post op dressing to right hand removed bedside  2 drains are in place  Slight erythema surrounding the incision, improved since yesterday  No obvious pus-like drainage; serosanguinous drainage noted on bandages  Able to gently move the hand and flex digits; unable to make composite fist secondary to swelling   Fingers warm and well perfused    Pertinent Labs   Lab Results: personally reviewed.   No results found for: \"INR\", \"PROTIME\"  Lab Results   Component Value Date    WBC 12.5 (H) 12/23/2023    HGB 10.5 (L) 12/23/2023    HCT 32.3 (L) 12/23/2023    MCV 96 12/23/2023     12/23/2023     Lab Results   Component Value Date     12/23/2023    CO2 23 12/23/2023         Report completed by:  CARY STRICKLAND PA-C  Date: 12/23/2023  Time: 8:25 " AM

## 2023-12-23 NOTE — CONSULTS
Consultation - Infectious Disease  [facility]  Amor Zavaleta,  1945, MRN [id number]    Admitting Dx: Infection of right hand due to bite, initial encounter [S61.451A, L08.9]    PCP: Farzaneh Sandy, [phone]       ASSESSMENT   78-year-old man with a history of diabetes readmitted for right wrist infection secondary to cat bite.    Right wrist infection.  Hospitalized recently for wrist infection following cat bite/scratch.  Debridement on  and .  Polymicrobial cultures with MSSA, Pasteurella, and several anaerobic bacteria.  Discharged on Augmentin and Flagyl, but readmitted 2 days later after signs of persistent infection  Persistent right wrist infection.  Seen in follow-up in orthopedics clinic on .  Due to appearance of wound, patient was sent to the hospital for debridement which occurred on .  Cultures collected and pending.  Remains on broad-spectrum antibiotics.  Leukocytosis.    Principal Problem:    Infection of right hand due to bite, initial encounter       PLAN   -Continue vancomycin and Zosyn  -Follow-up on new intraoperative cultures, narrow antibiotics pending results  -Trend WBC  -right upper ext u/s   -Final antibiotic plan will depend on clinical improvement  -wound care per orthopedics    Thank you for this consult. Will follow.    Jacinto Vasquez MD  [facility]  Direct messaging: [employer] Paging  On-Call ID provider: [phone], option: [phone]      ===========================================      Chief Complaint   Infection of right hand due to bite, initial encounter       HPI     We have been requested by Hakan Delatorre MD to evaluate Amor Zavaleta for the above.    History obtained by patient    Amor Zavaleta is a 78 year old man with a history of hypertension and diabetes.  He was hospitalized at [facility] from  to  for right wrist infection.  He was seen by ID.  He was caring for a new cat  (Tiger) who began fighting with the patient's current cat (Dayo).  This resulted in injury to the right wrist (bites and or scratches).  He suffered severe swelling.  He had 2 debridements on 12/11 and again on 12/13.  He was on IV antibiotics in the hospital for polymicrobial infection.  WBC normalized and the patient was discharged on oral antibiotics.  He continued to soak his hand at home.  He had a follow-up appointment yesterday at [facility] and there was concern for persistent infection, therefore it was recommended he come to the hospital for debridement.  Purulence was noted superficial to the extensor tendons.  No purulence was seen in the wrist joint.          Review of Systems   Ten systems reviewed and negative except for what is noted in the HPI       Medical History  Past Medical History:   Diagnosis Date    Diabetes (H)     Sleep apnea     Surgical History  He  has a past surgical history that includes Incision and drainage upper extremity, combined (Right, 12/11/2023) and Incision and drainage upper extremity, combined (Right, 12/13/2023).     Social History  Reviewed, and he  reports that he has never smoked. He has never used smokeless tobacco. He reports that he does not currently use alcohol. He reports that he does not use drugs.  Social History     Social History Narrative    Not on file     Family History  family history is not on file.  family history reviewed and is not pertinent to the presenting problem.            Allergies     Allergies   Allergen Reactions    Sulfa Antibiotics Hives         Antibiotics   Vancomycin 12/22-  Zosyn 12/22-  Perioperative cefazolin    Previous:  Augmentin and Flagyl prior to arrival      Physical Exam     Temp:  [97.3  F (36.3  C)-98.4  F (36.9  C)] 98.4  F (36.9  C)  Pulse:  [52-71] 62  Resp:  [14-20] 16  BP: ()/(56-75) 106/59  SpO2:  [92 %-100 %] 93 %    /59 (BP Location: Left arm)   Pulse 62   Temp 98.4  F (36.9  C) (Oral)   Resp  16   Ht 1.829 m (6')   Wt 102.5 kg (226 lb)   SpO2 93%   BMI 30.65 kg/m      GENERAL:  well-developed, well-nourished, lying in bed in no acute distress.   HENT:  Head is normocephalic, atraumatic. Oropharynx is moist without exudates or ulcers. poor dentition, missing teeth   EYES:  Eyes have anicteric sclerae without conjunctival injection or stigmata of endocarditis.   NECK:  Supple.  LUNGS:  Clear to auscultation.  CARDIOVASCULAR:  Regular rate and rhythm with no murmurs, gallops or rubs.  ABDOMEN:  Normal bowel sounds, soft, nontender. No appreciable hepatosplenomegaly  EXT: Extremities warm.  Right arm swelling.  Swelling around the left olecranon process, chronic.  Right wrist and hand wrapped in surgical dressing.  SKIN:  No acute rashes. No stigmata of endocarditis.  NEUROLOGIC:  Grossly nonfocal.      Cultures   Previous cultures reviewed  12/22 volar and dorsal right wrist cultures: No growth to date; no organisms on Gram stain    Susceptibility data from last 90 days.  Collected Specimen Info Organism Ampicillin Azithromycin Ceftriaxone Clindamycin Daptomycin Doxycycline Erythromycin Gentamicin Levofloxacin Oxacillin Penicillin Tetracycline Trimethoprim/Sulfamethoxazole  Vancomycin   12/11/23 Tissue from Wrist, Right Bacteroides pyogenes                 12/11/23 Tissue from Wrist, Right Staphylococcus aureus     S  S  S  S  S   S   S  S  S     Pasteurella multocida                 12/11/23 Tissue from Hand, Right Fusobacterium nucleatum                   Bacteroides pyogenes                 12/11/23 Tissue from Hand, Right Pasteurella multocida                   Staphylococcus aureus                 12/11/23 Abscess from Hand, Right Fusobacterium nucleatum                   Bacteroides pyogenes                 12/11/23 Abscess from Hand, Right Pasteurella multocida  S  S  S       S   S   S           Laboratory results     Recent Labs   Lab 12/23/23  0617 12/22/23  1327 12/19/23  0652   WBC 12.5*  "11.1* 10.9   HGB 10.5* 11.6* 11.3*    459* 375       Recent Labs   Lab 12/23/23  0617 12/22/23  1327 12/19/23  0652    136 133*   CO2 23 23 22   BUN 23.2* 32.6* 18.6   ALBUMIN 2.5*  --   --    ALKPHOS 41  --   --    ALT 9  --   --    AST 23  --   --        No results for input(s): \"CRPI\", \"SED\" in the last 168 hours.        Imaging   Radiology results reviewed    POC US Guidance Needle Placement    Result Date: 12/22/2023  Ultrasound was performed as guidance to an anesthesia procedure.  Click \"PACS images\" hyperlink below to view any stored images.  For specific procedure details, view procedure note authored by anesthesia.      Data reviewed today: I reviewed all medications, new labs and imaging results over the last 24 hours. I personally reviewed no images or EKG's today.  The patient's care was discussed with the Bedside Nurse and Patient.    "

## 2023-12-23 NOTE — ANESTHESIA POSTPROCEDURE EVALUATION
Patient: Amor Zavaleta    Procedure: Procedure(s):  IRRIGATION AND DEBRIDEMENT, RIGHT WRIST       Anesthesia Type:  General    Note:     Postop Pain Control: Uneventful            Sign Out: Well controlled pain   PONV: No   Neuro/Psych: Uneventful            Sign Out: Acceptable/Baseline neuro status   Airway/Respiratory: Uneventful            Sign Out: Acceptable/Baseline resp. status   CV/Hemodynamics: Uneventful            Sign Out: Acceptable CV status; No obvious hypovolemia; No obvious fluid overload   Other NRE: NONE   DID A NON-ROUTINE EVENT OCCUR? No           Last vitals:  Vitals:    12/22/23 1822 12/22/23 1830 12/22/23 1900   BP: 117/66 127/71 (!) 143/70   Pulse: 57 65 65   Resp: 14 16 16   Temp: 36.3  C (97.3  F) 36.6  C (97.8  F) 36.5  C (97.7  F)   SpO2: 100% 95% 96%       Electronically Signed By: Darrian Haney MD  December 22, 2023  8:32 PM

## 2023-12-23 NOTE — PROGRESS NOTES
[facility] MEDICINE PROGRESS NOTE      Identification/Summary: Amor Zavlaeta is a 78 year old male with a history of insulin-dependent diabetes type 2, CKD stage IV, HTN, LBBB who presents on 12/22/2023 with continued pain and swelling to right wrist following a cat bite.  He was seen by orthopedic surgeon in his office 12/22 and thought that the infection was worsening on oral antibiotics.  Taken to surgery for irrigation and debridement the evening of 12/22.  He reports she is doing better today.  He lives alone, in an apartment with 3 flights of stairs.    Septic joint right wrist with likely extension to right elbow secondary to cat bite 12/2  Patient was evaluated at [facility] and admitted 12/11.  Received IV antibiotics along with washouts from orthopedic surgery.  Was discharged 12/20 with 7 days of Augmentin and metronidazole per infectious disease recommendations  [facility] requested admission today 12/22 for washout and IV antibiotics due to continued infection  Appreciate orthopedic recommendations regarding right elbow pain and edema-likely extension of septic joint.  Vancomycin and Zosyn initiated in the ER  Blood cultures, CBC and BMP pending  Has been seen by infectious disease today and IV antibiotics to continue pending cultures.  Plan: Patient would likely benefit from TCU so we will start working on that and get him to see PT and OT    Concern for possible severe sepsis with low blood pressure at time of admission but now vitals are normal.     Decreased mobility secondary to long hospitalization  PT/OT-appreciate discharge recommendations for placement.     Right hand wounds from cat attack  Wound care appreciated.     Diabetes type 2-insulin-dependent  Insulin pump at home  States home dose is Lantus 56 units at night and 16 units NovoLog with meals  A1c of 8.9 12/11/2023  Home dose of Lantus 56 units ordered but was some hypoglycemia last night  it was decreased to 35 units.  Continue home very high insulin sliding scale with meals no insulin at bedtime       HTN  Currently normotensive at 106/59 with heart rate of 64.  Hold home antihypertensives for now    CKD stage IV  Baseline creatinine appears to be 1.2  Creatinine 1.6 at admission now 1.4 this morning  Plan: Check BMP in the morning    Code Status: Full Code  DVT prophylaxis: Lovenox      Disposition:  -Anticipated Length of Stay in midnights and medical necessity (including a midnight in the Emergency Department after triage if applicable): >2 midnights   -Discharge barriers: Orthopedic intervention, IV abx, PT/OT and placement to TCU.       Assessment and Plan:  Patient feels like he is doing much better.  Pain is improved.  No nausea.  No chest pain or shortness of breath or lightheadedness.  Lives in an apartment by himself.  Has 3 flights of steps to climb.          The patient's care was discussed with the Bedside Nurse, Care Coordinator/, and Patient.    Hakan Delatorre MD  [facility]  Phone: #[phone]  Securely message with the Vocera Web Console (learn more here)  Text page via [employer] Paging/Directory     Physical Exam/Objective:  Temp:  [97.3  F (36.3  C)-98.4  F (36.9  C)] 98.4  F (36.9  C)  Pulse:  [52-71] 62  Resp:  [14-20] 16  BP: ()/(56-75) 106/59  SpO2:  [92 %-100 %] 93 %  Body mass index is 30.65 kg/m .    GENERAL:  Alert, appears comfortable, in no acute distress, appears stated age   HEAD:  Normocephalic, without obvious abnormality, atraumatic   LUNGS:   Clear to auscultation bilaterally, no rales, rhonchi, or wheezing, symmetric chest rise on inhalation, respirations unlabored   HEART:  Regular rate and rhythm, S1 and S2 normal, no murmur, rub, or gallop    ABDOMEN:   Soft, non-tender   EXTREMITIES: No ankle edema.  Bulky dressing on right upper extremity from mid forearm down.   SKIN: Some  "erythema to his elbow.   NEURO: Alert, appears to be cognitively intact, able to move his fingers slowly.  Sensation intact in his fingers   PSYCH: Cooperative, behavior is appropriate      Data reviewed today: I personally reviewed all new medications, labs, imaging/diagnostics reports over the past 24 hours. Pertinent findings include:    Imaging:   Recent Results (from the past 24 hour(s))   POC US Guidance Needle Placement    Narrative    Ultrasound was performed as guidance to an anesthesia procedure.  Click   \"PACS images\" hyperlink below to view any stored images.  For specific   procedure details, view procedure note authored by anesthesia.       Labs:  Most Recent 3 CBC's:  Recent Labs   Lab Test 12/23/23  0617 12/22/23  1327 12/19/23  0652   WBC 12.5* 11.1* 10.9   HGB 10.5* 11.6* 11.3*   MCV 96 97 96    459* 375     Most Recent 3 BMP's:  Recent Labs   Lab Test 12/23/23  0904 12/23/23  0632 12/23/23  0617 12/22/23  1640 12/22/23  1327 12/19/23  0734 12/19/23  0652   NA  --   --  136  --  136  --  133*   POTASSIUM  --   --  4.3  --  4.0  --  4.2   CHLORIDE  --   --  106  --  102  --  102   CO2  --   --  23  --  23  --  22   BUN  --   --  23.2*  --  32.6*  --  18.6   CR  --   --  1.41*  --  1.61*  --  1.19*   ANIONGAP  --   --  7  --  11  --  9   VLADIMIR  --   --  8.0*  --  9.0  --  8.1*   * 140* 130*   < > 98   < > 120*    < > = values in this interval not displayed.     Most Recent 2 LFT's:  Recent Labs   Lab Test 12/23/23  0617 12/11/23  1316   AST 23 27   ALT 9 45   ALKPHOS 41 120   BILITOTAL 0.5 1.1       Medications:   Personally Reviewed.  Medications      acetaminophen  975 mg Oral Q8H    [Held by provider] amLODIPine  2.5 mg Oral Daily    [Held by provider] aspirin  325 mg Oral Daily    atorvastatin  10 mg Oral Daily    enoxaparin ANTICOAGULANT  40 mg Subcutaneous Q24H    famotidine  20 mg Oral Daily    insulin aspart  1-22 Units Subcutaneous Q4H    insulin glargine  35 Units Subcutaneous " QAM AC    [Held by provider] lisinopril-hydrochlorothiazide  1 tablet Oral Daily    piperacillin-tazobactam  3.375 g Intravenous Q8H    sodium chloride (PF)  3 mL Intracatheter Q8H    sodium chloride (PF)  3 mL Intracatheter Q8H    vancomycin  1,250 mg Intravenous Q24H

## 2023-12-23 NOTE — PLAN OF CARE
Problem: Adult Inpatient Plan of Care  Goal: Optimal Comfort and Wellbeing  Outcome: Progressing  Intervention: Monitor Pain and Promote Comfort   Goal Outcome Evaluation:         Pt A/Ox4. VSS, on room air. Pt having moderate pain in R extremity, PRN oxycodone, scheduled tylenol, and ice given for pain control. Q4 blood sugar checks per order and as needed done, BS was 46 at 2200. Asymptomatic. Notified provider and pt was given juice and glucose gel. Recheck blood sugar of 65, given juice/crackers and notified provider. BS at [phone]. On tele, Sinus rhythm w/ 1st degree AVB and BBB. Baseline neuropathy in feet. Numbness/tingling improving in R extremity. Assist of 1 with walker and gb. Voiding adequately.

## 2023-12-23 NOTE — PROVIDER NOTIFICATION
On call Dr. Guevara notified of pt telemetry showing ST elevation. Pt asymptomatic and vitally stable. No new orders placed per provider.

## 2023-12-23 NOTE — PLAN OF CARE
A/O x 4. VSS on RA. Pain managed with scheduled Tylenol. Voiding via urinal. Tele: SR with 1st degree AVB. R hand dressing is CDI, dressing changed this AM after soaking. Able to make needs known. Calls appropriately.    Goal Outcome Evaluation:    Problem: Adult Inpatient Plan of Care  Goal: Absence of Hospital-Acquired Illness or Injury  Intervention: Prevent Skin Injury  Recent Flowsheet Documentation  Taken 12/23/2023 0830 by Karie Yung RN  Body Position: position changed independently     Problem: Adult Inpatient Plan of Care  Goal: Optimal Comfort and Wellbeing  Outcome: Progressing  Intervention: Monitor Pain and Promote Comfort  Recent Flowsheet Documentation  Taken 12/23/2023 0830 by Karie Yung RN  Pain Management Interventions: medication (see MAR)     Problem: Pain Acute  Goal: Optimal Pain Control and Function  Outcome: Progressing  Intervention: Develop Pain Management Plan  Recent Flowsheet Documentation  Taken 12/23/2023 0830 by Karie Yung RN  Pain Management Interventions: medication (see MAR)  Intervention: Prevent or Manage Pain  Recent Flowsheet Documentation  Taken 12/23/2023 0830 by Karie Yung RN  Medication Review/Management: medications reviewed

## 2023-12-23 NOTE — CONSULTS
Care Management Initial Consult    General Information  Assessment completed with: Patient,    Type of CM/SW Visit: Initial Assessment    Primary Care Provider verified and updated as needed: Yes   Readmission within the last 30 days: unable to assess      Reason for Consult: discharge planning  Advance Care Planning:            Communication Assessment  Patient's communication style: spoken language (English or Bilingual)    Hearing Difficulty or Deaf: no   Wear Glasses or Blind: yes    Cognitive  Cognitive/Neuro/Behavioral: WDL                      Living Environment:   People in home: alone     Current living Arrangements: apartment      Able to return to prior arrangements:         Family/Social Support:  Care provided by: self  Provides care for: no one                Description of Support System:           Current Resources:   Patient receiving home care services: Yes  Skilled Home Care Services: Skilled Nursing  Community Resources: None  Equipment currently used at home: none  Supplies currently used at home: Wound Care Supplies    Employment/Financial:  Employment Status: retired        Financial Concerns: none   Referral to Financial Worker: No       Does the patient's insurance plan have a 3 day qualifying hospital stay waiver?  No    Lifestyle & Psychosocial Needs:  Social Determinants of Health     Food Insecurity: Not on file   Depression: Not on file   Housing Stability: Not on file   Tobacco Use: Low Risk  (12/22/2023)    Patient History     Smoking Tobacco Use: Never     Smokeless Tobacco Use: Never     Passive Exposure: Not on file   Financial Resource Strain: Not on file   Alcohol Use: Not on file   Transportation Needs: Not on file   Physical Activity: Not on file   Interpersonal Safety: Not on file   Stress: Not on file   Social Connections: Not on file       Functional Status:  Prior to admission patient needed assistance:   Dependent ADLs:: Ambulation-cane  Dependent IADLs:: Independent        Mental Health Status:  Mental Health Status: No Current Concerns       Chemical Dependency Status:  Chemical Dependency Status: No Current Concerns             Values/Beliefs:  Spiritual, Cultural Beliefs, Restoration Practices, Values that affect care:                 Additional Information:  [facility] met and introduced self and CM services to Pt.  Pt recently was discharged from [place] to his brother's house with [facility] for RN.  Pt normally lives in an apartment with 3 flights of stairs with no elevator.  Pt has pet cat Dayo.  Therapy is recommending TCU.  Pt also may have IV ABX pending cultures.  [facility] gave Pt TCU list to review.  Pt asked that [facility] call his sister Penelope Shaffer to assist.  [facility] called Penelope Shaffer and she is working with [employer] with [employer] to find Pt a different Apt.  Penelope Shaffer and her brother live in [place] and would like TCU near them.  [facility] sent referrals to [facility], [facility], [facility] and [facility].  Penelope Shaffer is going to out of town starting tomorrow for 10 days and is fine with being notified of Pt's discharge plan.  PAS needed and transport TBD.     JEFFREY Garcia

## 2023-12-23 NOTE — ANESTHESIA CARE TRANSFER NOTE
Patient: Amor Zavaleta    Procedure: Procedure(s):  IRRIGATION AND DEBRIDEMENT, RIGHT WRIST       Diagnosis: Infection [B99.9]  Diagnosis Additional Information: No value filed.    Anesthesia Type:   General     Note:    Oropharynx: oropharynx clear of all foreign objects  Level of Consciousness: awake  Oxygen Supplementation: room air    Independent Airway: airway patency satisfactory and stable  Dentition: dentition unchanged  Vital Signs Stable: post-procedure vital signs reviewed and stable  Report to RN Given: handoff report given  Patient transferred to: Medical/Surgical Unit    Handoff Report: Identifed the Patient, Identified the Reponsible Provider, Reviewed the pertinent medical history, Discussed the surgical course, Reviewed Intra-OP anesthesia mangement and issues during anesthesia, Set expectations for post-procedure period and Allowed opportunity for questions and acknowledgement of understanding      Vitals:  Vitals Value Taken Time   /66 12/22/23 1822   Temp 36.3  C (97.3  F) 12/22/23 1822   Pulse 57 12/22/23 1822   Resp 14 12/22/23 1822   SpO2 100 % 12/22/23 1822       Electronically Signed By: CHIN Barakat CRNA  December 22, 2023  6:23 PM

## 2023-12-23 NOTE — PLAN OF CARE
A/O x 4. VSS on RA. Denies pain, n/v. Numbness in R hand d/t block. Call appropriately.     Goal Outcome Evaluation:    Problem: Adult Inpatient Plan of Care  Goal: Optimal Comfort and Wellbeing  Outcome: Progressing     Problem: Pain Acute  Goal: Optimal Pain Control and Function  Outcome: Progressing  Intervention: Prevent or Manage Pain  Recent Flowsheet Documentation  Taken 12/22/2023 1830 by Karie Yung, RN  Medication Review/Management: medications reviewed     Problem: Infection  Goal: Absence of Infection Signs and Symptoms  Outcome: Progressing

## 2023-12-24 ENCOUNTER — APPOINTMENT (OUTPATIENT)
Dept: PHYSICAL THERAPY | Facility: CLINIC | Age: 78
DRG: 464 | End: 2023-12-24
Payer: COMMERCIAL

## 2023-12-24 LAB
CREAT SERPL-MCNC: 1.29 MG/DL (ref 0.67–1.17)
EGFRCR SERPLBLD CKD-EPI 2021: 57 ML/MIN/1.73M2
GLUCOSE BLDC GLUCOMTR-MCNC: 118 MG/DL (ref 70–99)
GLUCOSE BLDC GLUCOMTR-MCNC: 127 MG/DL (ref 70–99)
GLUCOSE BLDC GLUCOMTR-MCNC: 163 MG/DL (ref 70–99)
GLUCOSE BLDC GLUCOMTR-MCNC: 81 MG/DL (ref 70–99)
HOLD SPECIMEN: NORMAL
VANCOMYCIN SERPL-MCNC: 8.4 UG/ML

## 2023-12-24 PROCEDURE — 999N000111 HC STATISTIC OT IP EVAL DEFER

## 2023-12-24 PROCEDURE — 97530 THERAPEUTIC ACTIVITIES: CPT | Mod: GP

## 2023-12-24 PROCEDURE — 250N000012 HC RX MED GY IP 250 OP 636 PS 637: Performed by: FAMILY MEDICINE

## 2023-12-24 PROCEDURE — 99232 SBSQ HOSP IP/OBS MODERATE 35: CPT | Performed by: INTERNAL MEDICINE

## 2023-12-24 PROCEDURE — 250N000013 HC RX MED GY IP 250 OP 250 PS 637: Performed by: PHYSICIAN ASSISTANT

## 2023-12-24 PROCEDURE — 250N000011 HC RX IP 250 OP 636: Mod: JZ | Performed by: INTERNAL MEDICINE

## 2023-12-24 PROCEDURE — 120N000001 HC R&B MED SURG/OB

## 2023-12-24 PROCEDURE — 36415 COLL VENOUS BLD VENIPUNCTURE: CPT | Performed by: INTERNAL MEDICINE

## 2023-12-24 PROCEDURE — 82565 ASSAY OF CREATININE: CPT | Performed by: INTERNAL MEDICINE

## 2023-12-24 PROCEDURE — 99232 SBSQ HOSP IP/OBS MODERATE 35: CPT | Performed by: FAMILY MEDICINE

## 2023-12-24 PROCEDURE — 80202 ASSAY OF VANCOMYCIN: CPT | Performed by: FAMILY MEDICINE

## 2023-12-24 PROCEDURE — 250N000013 HC RX MED GY IP 250 OP 250 PS 637: Performed by: INTERNAL MEDICINE

## 2023-12-24 RX ADMIN — INSULIN GLARGINE 35 UNITS: 100 INJECTION, SOLUTION SUBCUTANEOUS at 08:13

## 2023-12-24 RX ADMIN — PIPERACILLIN AND TAZOBACTAM 3.38 G: 3; .375 INJECTION, POWDER, LYOPHILIZED, FOR SOLUTION INTRAVENOUS at 19:05

## 2023-12-24 RX ADMIN — INSULIN ASPART 3 UNITS: 100 INJECTION, SOLUTION INTRAVENOUS; SUBCUTANEOUS at 12:59

## 2023-12-24 RX ADMIN — ACETAMINOPHEN 975 MG: 325 TABLET ORAL at 20:25

## 2023-12-24 RX ADMIN — ACETAMINOPHEN 975 MG: 325 TABLET ORAL at 12:56

## 2023-12-24 RX ADMIN — FAMOTIDINE 20 MG: 20 TABLET ORAL at 08:13

## 2023-12-24 RX ADMIN — PIPERACILLIN AND TAZOBACTAM 3.38 G: 3; .375 INJECTION, POWDER, LYOPHILIZED, FOR SOLUTION INTRAVENOUS at 02:54

## 2023-12-24 RX ADMIN — ENOXAPARIN SODIUM 40 MG: 100 INJECTION SUBCUTANEOUS at 06:23

## 2023-12-24 RX ADMIN — INSULIN ASPART 3 UNITS: 100 INJECTION, SOLUTION INTRAVENOUS; SUBCUTANEOUS at 17:34

## 2023-12-24 RX ADMIN — PIPERACILLIN AND TAZOBACTAM 3.38 G: 3; .375 INJECTION, POWDER, LYOPHILIZED, FOR SOLUTION INTRAVENOUS at 12:52

## 2023-12-24 RX ADMIN — ACETAMINOPHEN 975 MG: 325 TABLET ORAL at 03:03

## 2023-12-24 RX ADMIN — ATORVASTATIN CALCIUM 10 MG: 10 TABLET, FILM COATED ORAL at 08:13

## 2023-12-24 ASSESSMENT — ACTIVITIES OF DAILY LIVING (ADL)
ADLS_ACUITY_SCORE: 32
ADLS_ACUITY_SCORE: 25
ADLS_ACUITY_SCORE: 32
ADLS_ACUITY_SCORE: 25
ADLS_ACUITY_SCORE: 25
ADLS_ACUITY_SCORE: 32

## 2023-12-24 NOTE — PHARMACY-VANCOMYCIN DOSING SERVICE
"Pharmacy Vancomycin Note  Date of Service 2023  Patient's  1945   78 year old, male    Indication: Skin and Soft Tissue Infection  Day of Therapy: 3  Current vancomycin regimen:  1250 mg IV q24h  Current vancomycin monitoring method: AUC  Current vancomycin therapeutic monitoring goal: 400-600 mg*h/L    InsightRX Prediction of Current Vancomycin Regimen  Regimen: 1250 mg IV every 24 hours.  Start time: 12:19 on 2023  Exposure target: AUC24 (range)400-600 mg/L.hr   AUC24,ss: 398 mg/L.hr  Probability of AUC24 > 400: 49 %  Ctrough,ss: 12.5 mg/L  Probability of Ctrough,ss > 20: 11 %    Current estimated CrCl = Estimated Creatinine Clearance: 56.4 mL/min (A) (based on SCr of 1.29 mg/dL (H)).    Creatinine for last 3 days  2023:  1:27 PM Creatinine 1.61 mg/dL  2023:  6:17 AM Creatinine 1.41 mg/dL  2023:  6:15 AM Creatinine 1.29 mg/dL    Recent Vancomycin Levels (past 3 days)  2023:  6:15 AM Vancomycin 8.4 ug/mL    Vancomycin IV Administrations (past 72 hours)                     vancomycin (VANCOCIN) 1,250 mg in 0.9% NaCl 250 mL intermittent infusion (mg) 1,250 mg New Bag 23 1219    vancomycin (VANCOCIN) 1,500 mg in 0.9% NaCl 250 mL intermittent infusion (mg) 1,500 mg New Bag 23 1338                    Nephrotoxins and other renal medications (From now, onward)      Start     Dose/Rate Route Frequency Ordered Stop    23 1300  vancomycin (VANCOCIN) 1,500 mg in 0.9% NaCl 250 mL intermittent infusion         1,500 mg  over 90 Minutes Intravenous EVERY 24 HOURS 23 0851      23  piperacillin-tazobactam (ZOSYN) 3.375 g vial to attach to  mL bag        Note to Pharmacy: For [facility], [facility] and [facility]: For Zosyn-naive patients, use the \"Zosyn initial dose + extended infusion\" order panel.    3.375 g  over 240 Minutes Intravenous EVERY 8 HOURS 23      23 1630  [Held by provider]  lisinopril-hydrochlorothiazide (ZESTORETIC) 20-12.5 " MG per tablet 1 tablet        (On hold since Fri 12/22/2023 at 1615 until manually unheld; held by Annmarie Garcia DOHold Reason: Change in Vitals)   Note to Pharmacy: PTA Sig:Take 1 tablet by mouth daily      1 tablet Oral DAILY 12/22/23 1615                 Contrast Orders - past 72 hours (72h ago, onward)      None            Interpretation of levels and current regimen:  Vancomycin level is reflective of AUC less than 400    Has serum creatinine changed greater than 50% in last 72 hours: No    Urine output:  good urine output    Renal Function: Improving    InsightRX Prediction of Planned New Vancomycin Regimen  Regimen: 1500 mg IV every 24 hours.  Start time: 12:19 on 12/24/2023  Exposure target: AUC24 (range)400-600 mg/L.hr   AUC24,ss: 475 mg/L.hr  Probability of AUC24 > 400: 76 %  Ctrough,ss: 14.9 mg/L  Probability of Ctrough,ss > 20: 22 %  Plan:  Increase Dose to 1500mg q24h  Vancomycin monitoring method: AUC  Vancomycin therapeutic monitoring goal: 400-600 mg*h/L  Pharmacy will check vancomycin levels as appropriate in 1-3 Days.  Serum creatinine levels will be ordered daily for the first week of therapy and at least twice weekly for subsequent weeks.    Ashtyn Barraza RP

## 2023-12-24 NOTE — PROGRESS NOTES
Infectious Diseases Progress Note  [facility]    Date of visit: 12/24/2023     ASSESSMENT   78-year-old man with a history of diabetes readmitted for right wrist infection secondary to cat bite.    Right wrist infection.  Hospitalized recently for wrist infection following cat bite/scratch.  Debridement on 12/11 and 12/13.  Polymicrobial cultures with MSSA, Pasteurella, and several anaerobic bacteria.  Discharged on Augmentin and Flagyl, but readmitted 2 days later after signs of persistent infection (see next)  Persistent right wrist infection.  Seen in follow-up in orthopedics clinic on 12/22.  Due to appearance of wound, patient was sent to the hospital for debridement which occurred on 12/22.  Cultures collected and pending.  Remains on broad-spectrum antibiotics.  Leukocytosis.    Principal Problem:    Infection of right hand due to bite, initial encounter       PLAN   -Discontinue vancomycin  -continue and Zosyn  -Follow-up on new intraoperative cultures, narrow antibiotics pending results  -Trend WBC  -Final antibiotic plan will depend on clinical improvement  -wound care per orthopedics      Jacinto Vasquez MD  [facility]  Direct messaging: [employer] Paging  On-Call ID provider: [phone], option: 9      ===========================================      SUBJECTIVE / INTERVAL HISTORY:     No events. Feels well. Tolerating antibiotics. TCU placement planned      Antibiotics   Vancomycin 12/22-  Zosyn 12/22-  Perioperative cefazolin    Previous:  Augmentin and Flagyl prior to arrival      Physical Exam     Temp:  [97.9  F (36.6  C)-98.4  F (36.9  C)] 98.1  F (36.7  C)  Pulse:  [61-68] 66  Resp:  [16-18] 18  BP: (124-148)/(62-73) 139/73  SpO2:  [92 %-98 %] 98 %    /73 (BP Location: Left arm)   Pulse 66   Temp 98.1  F (36.7  C) (Oral)   Resp 18   Ht 1.829 m (6')   Wt 94.8 kg (208 lb 14.4 oz)   SpO2 98%   BMI 28.33 kg/m      GENERAL:  well-developed, well-nourished,  "lying in bed in no acute distress.   HENT:  Head is normocephalic, atraumatic.   EYES:  Eyes have anicteric sclerae without conjunctival injection   LUNGS:  normal resp pattern  EXT: Extremities warm.  Right arm swelling.  Swelling around the left olecranon process, chronic.  Right wrist and hand wrapped in surgical dressing.  SKIN:  No acute rashes.   NEUROLOGIC:  Grossly nonfocal.      Cultures   Previous cultures reviewed  12/22 right wrist cultures x 2: No growth to date; no organisms on Gram stain    Susceptibility data from last 90 days.  Collected Specimen Info Organism Ampicillin Azithromycin Ceftriaxone Clindamycin Daptomycin Doxycycline Erythromycin Gentamicin Levofloxacin Oxacillin Penicillin Tetracycline Trimethoprim/Sulfamethoxazole  Vancomycin   12/11/23 Tissue from Wrist, Right Bacteroides pyogenes                 12/11/23 Tissue from Wrist, Right Staphylococcus aureus     S  S  S  S  S   S   S  S  S     Pasteurella multocida                 12/11/23 Tissue from Hand, Right Fusobacterium nucleatum                   Bacteroides pyogenes                 12/11/23 Tissue from Hand, Right Pasteurella multocida                   Staphylococcus aureus                 12/11/23 Abscess from Hand, Right Fusobacterium nucleatum                   Bacteroides pyogenes                 12/11/23 Abscess from Hand, Right Pasteurella multocida  S  S  S       S   S   S               Pertinent Labs:     Recent Labs   Lab 12/23/23  0617 12/22/23  1327 12/19/23  0652   WBC 12.5* 11.1* 10.9   HGB 10.5* 11.6* 11.3*    459* 375       Recent Labs   Lab 12/23/23  0617 12/22/23  1327 12/19/23  0652    136 133*   CO2 23 23 22   BUN 23.2* 32.6* 18.6   ALBUMIN 2.5*  --   --    ALKPHOS 41  --   --    ALT 9  --   --    AST 23  --   --        No results for input(s): \"CRPI\", \"SED\" in the last 168 hours.        Imaging:     US Upper Extremity Venous Duplex Right    Result Date: 12/23/2023  EXAM: US UPPER EXTREMITY VENOUS " "DUPLEX RIGHT LOCATION: [facility] DATE: 12/23/2023 INDICATION: swelling COMPARISON: None. TECHNIQUE: Venous Duplex ultrasound of the right upper extremity with (when possible) and without compression, augmentation, and duplex. Color flow and spectral Doppler with waveform analysis performed. FINDINGS: Ultrasound includes evaluation of the internal jugular vein, innominate vein, subclavian vein, axillary vein, and brachial vein. The superficial cephalic and basilic veins were also evaluated where seen. RIGHT: No deep venous thrombosis. No superficial thrombophlebitis.     IMPRESSION: 1.  No deep venous thrombosis in the right upper extremity.    POC US Guidance Needle Placement    Result Date: 12/22/2023  Ultrasound was performed as guidance to an anesthesia procedure.  Click \"PACS images\" hyperlink below to view any stored images.  For specific procedure details, view procedure note authored by anesthesia.        Data reviewed today: I reviewed all medications, new labs and imaging results over the last 24 hours. I personally reviewed no images or EKG's today.  The patient's care was discussed with the Patient and Ortho Team.    "

## 2023-12-24 NOTE — PLAN OF CARE
Goal Outcome Evaluation:  Patient vital signs are at baseline: Yes  Patient able to ambulate as they were prior to admission or with assist devices provided by therapies during their stay:  Yes  Patient MUST void prior to discharge:  Yes  Patient able to tolerate oral intake:  Yes  Pain has adequate pain control using Oral analgesics:  Yes  Does patient have an identified :  Yes  Has goal D/C date and time been discussed with patient:  Yes  Patient is alert and oriented X 4. Wound cares provided to right hand. Patient denied pain. Voided. Tolerated IV antibiotics. VSS on RA. Bed alarms activated for safety.

## 2023-12-24 NOTE — PROGRESS NOTES
OT: Orders received. Chart reviewed and discussed with patient and PT. OT not indicated due to pt completing ADLs Mod I and he is getting all necessary DME from family. His only concern is completing stairs. Defer discharge recommendations to PT and rest of care team. Will complete orders.    -Nikos Bettencourt OTR/L

## 2023-12-24 NOTE — PROGRESS NOTES
"Orthopedic Progress Note      Assessment: 2 Days Post-Op  S/P Procedure(s):  IRRIGATION AND DEBRIDEMENT, RIGHT WRIST     Plan:   - NPO at midnight for repeat I&D 12/25 8am   - continue soaks 2-3x per day in warm water with Hibiclens              - soak 15min at a time              - redress after soaks with gauze, ABD, kerlex, ACE              - drains to be removed POD 2  - Continue with current pain medication regimen              - PRN oxycodone, scheduled Tylenol, and ice  - Continue to ice and elevate  - Weightbearing status: WBAT to right wrist/hand; encourage finger and hand range of motion  - ID consult placed, appreciate abx recommendations  - intraoperative cultures pending  - Discharge planning: TCU upon discharge      Subjective:  Pain: mild  Nausea, Vomiting:  No  Lightheadedness, Dizziness:  No  Neuro:  Patient denies new onset numbness or paresthesias    Patient resting comfortable at bedside. Pain manageable. Dressing removed. Slight cloudy drainage noted to the incision. Drains removed at bedside; drainage noted after drain removed. Erythema surrounding incision.    Objective:  /73 (BP Location: Left arm)   Pulse 66   Temp 98.1  F (36.7  C) (Oral)   Resp 18   Ht 1.829 m (6')   Wt 94.8 kg (208 lb 14.4 oz)   SpO2 98%   BMI 28.33 kg/m    The patient is A&Ox3. Appears comfortable.   Dressing removed bedside.   Erythema surrounding incision, appears to be slightly worse than yesterday   Able to tolerate gentle passive motion of the wrist with slight discomfort  Cloudy drainage noted during palpation around the incision, and once remaining drain was removed.     Pertinent Labs   Lab Results: personally reviewed.   No results found for: \"INR\", \"PROTIME\"  Lab Results   Component Value Date    WBC 12.5 (H) 12/23/2023    HGB 10.5 (L) 12/23/2023    HCT 32.3 (L) 12/23/2023    MCV 96 12/23/2023     12/23/2023     Lab Results   Component Value Date     12/23/2023    CO2 23 12/23/2023 "         Report completed by:  CARY STRICKLAND PA-C  Date: 12/24/2023  Time: 10:05 AM

## 2023-12-24 NOTE — PROGRESS NOTES
[facility] MEDICINE PROGRESS NOTE      Identification/Summary: Amor Zavaleta is a 78 year old male with a history of insulin-dependent diabetes type 2, CKD stage IV, HTN, LBBB who presents on 12/22/2023 with continued pain and swelling to right wrist following a cat bite.  He was seen by orthopedic surgeon in his office 12/22 and thought that the infection was worsening on oral antibiotics.  Taken to surgery for irrigation and debridement the evening of 12/22.  He reports she is doing better today.  He lives alone, in an apartment with 3 flights of stairs.    Assessment/plan:     Septic joint right wrist with likely extension to right elbow secondary to cat bite 12/2  Patient was evaluated at [facility] and admitted 12/11.  Received IV antibiotics along with washouts from orthopedic surgery.  Was discharged 12/20 with 7 days of Augmentin and metronidazole per infectious disease recommendations  [facility] requested admission today 12/22 for washout and IV antibiotics due to continued infection  Appreciate orthopedic recommendations regarding right elbow pain and edema-likely extension of septic joint.  Vancomycin and Zosyn initiated in the ER  Blood cultures, CBC and BMP pending  Has been seen by infectious disease today and IV antibiotics to continue pending cultures.  Plan: Patient is going to go back to surgery for additional debridement tomorrow.    Patient would likely benefit from TCU so we will start working on that and get him to see PT and OT  Patient continues to be followed by infectious disease     Concern for possible severe sepsis with low blood pressure at time of admission but now vitals are normal.     Decreased mobility secondary to long hospitalization  PT/OT-appreciate discharge recommendations for placement.     Right hand wounds from cat attack  Wound care appreciated.     Diabetes type 2-insulin-dependent  Insulin pump at home  States home dose  is Lantus 56 units at night and 16 units NovoLog with meals  A1c of 8.9 12/11/2023  Home dose of Lantus 56 units ordered but was some hypoglycemia last night it was decreased to 35 units.  Continue home very high insulin sliding scale with meals no insulin at bedtime       HTN  Currently normotensive at 106/59 with heart rate of 64.  Hold home antihypertensives for now     CKD stage IV  Baseline creatinine appears to be 1.2  Creatinine 1.6 at admission now 1.4 this morning  Plan: Check BMP in the morning     Code Status: Full Code  DVT prophylaxis: Lovenox      Disposition:  -Anticipated Length of Stay in midnights and medical necessity (including a midnight in the Emergency Department after triage if applicable): >2 midnights   -Discharge barriers: Orthopedic intervention, IV abx, PT/OT and placement to TCU.        Assessment and Plan:  Patient feels like he is doing much better.  Pain is improved.  No nausea.  No chest pain or shortness of breath or lightheadedness.  Lives in an apartment by himself.  Has 3 flights of steps to climb.       The patient's care was discussed with the Care Coordinator/, and Patient.    Hakan Delatorre MD  [facility]  [facility]  Phone: #[phone]  Securely message with the Vocera Web Console (learn more here)  Text page via [employer] Paging/Directory     Interval History/Subjective:  Patient reports she is feeling fine.  No significant pain currently.  Eating well.  No nausea.  No chest pain or shortness of breath or lightheadedness.    Physical Exam/Objective:  Temp:  [97.9  F (36.6  C)-98.1  F (36.7  C)] 98.1  F (36.7  C)  Pulse:  [61-68] 66  Resp:  [16-18] 18  BP: (128-148)/(62-73) 139/73  SpO2:  [92 %-98 %] 98 %  Body mass index is 28.33 kg/m .    GENERAL:  Alert, appears comfortable, in no acute distress, appears stated age   HEAD:  Normocephalic, without obvious abnormality, atraumatic   LUNGS:   Clear to  auscultation bilaterally, no rales, rhonchi, or wheezing, symmetric chest rise on inhalation, respirations unlabored   HEART:  Regular rate and rhythm, S1 and S2 normal, no murmur, rub, or gallop    ABDOMEN:   Soft, non-tender   EXTREMITIES: No ankle edema.  Bulky dressing on right upper extremity from mid forearm down.   SKIN: Some erythema to his elbow.   NEURO: Alert, appears to be cognitively intact, able to move his fingers slowly.  Sensation intact in his fingers   PSYCH: Cooperative, behavior is appropriate      Data reviewed today: I personally reviewed all new medications, labs, imaging/diagnostics reports over the past 24 hours. Pertinent findings include:    Imaging:   Recent Results (from the past 24 hour(s))   US Upper Extremity Venous Duplex Right    Narrative    EXAM: US UPPER EXTREMITY VENOUS DUPLEX RIGHT  LOCATION: [facility]  DATE: 12/23/2023    INDICATION: swelling  COMPARISON: None.  TECHNIQUE: Venous Duplex ultrasound of the right upper extremity with (when possible) and without compression, augmentation, and duplex. Color flow and spectral Doppler with waveform analysis performed.    FINDINGS: Ultrasound includes evaluation of the internal jugular vein, innominate vein, subclavian vein, axillary vein, and brachial vein. The superficial cephalic and basilic veins were also evaluated where seen.     RIGHT: No deep venous thrombosis. No superficial thrombophlebitis.      Impression    IMPRESSION:  1.  No deep venous thrombosis in the right upper extremity.       Labs:  Most Recent 3 CBC's:  Recent Labs   Lab Test 12/23/23  0617 12/22/23  1327 12/19/23  0652   WBC 12.5* 11.1* 10.9   HGB 10.5* 11.6* 11.3*   MCV 96 97 96    459* 375     Most Recent 3 BMP's:  Recent Labs   Lab Test 12/24/23  1147 12/24/23  0701 12/24/23  0615 12/24/23  0308 12/23/23  0632 12/23/23  0617 12/22/23  1640 12/22/23  1327 12/19/23  0734 12/19/23  0652   NA  --   --   --   --   --  136  --   136  --  133*   POTASSIUM  --   --   --   --   --  4.3  --  4.0  --  4.2   CHLORIDE  --   --   --   --   --  106  --  102  --  102   CO2  --   --   --   --   --  23  --  23  --  22   BUN  --   --   --   --   --  23.2*  --  32.6*  --  18.6   CR  --   --  1.29*  --   --  1.41*  --  1.61*  --  1.19*   ANIONGAP  --   --   --   --   --  7  --  11  --  9   VLADIMIR  --   --   --   --   --  8.0*  --  9.0  --  8.1*   * 127*  --  118*   < > 130*   < > 98   < > 120*    < > = values in this interval not displayed.     Most Recent 2 LFT's:  Recent Labs   Lab Test 12/23/23  0617 12/11/23  1316   AST 23 27   ALT 9 45   ALKPHOS 41 120   BILITOTAL 0.5 1.1       Medications:   Personally Reviewed.  Medications      acetaminophen  975 mg Oral Q8H    [Held by provider] amLODIPine  2.5 mg Oral Daily    [Held by provider] aspirin  325 mg Oral Daily    atorvastatin  10 mg Oral Daily    enoxaparin ANTICOAGULANT  40 mg Subcutaneous Q24H    famotidine  20 mg Oral Daily    insulin aspart  1-22 Units Subcutaneous TID w/meals    insulin glargine  35 Units Subcutaneous QAM AC    [Held by provider] lisinopril-hydrochlorothiazide  1 tablet Oral Daily    piperacillin-tazobactam  3.375 g Intravenous Q8H    sodium chloride (PF)  3 mL Intracatheter Q8H    sodium chloride (PF)  3 mL Intracatheter Q8H

## 2023-12-24 NOTE — PROGRESS NOTES
Patient vital signs are at baseline: Yes  Patient able to ambulate as they were prior to admission or with assist devices provided by therapies during their stay:  Yes  Patient MUST void prior to discharge:  Yes  voided  Patient able to tolerate oral intake:  Yes  Pain has adequate pain control using Oral analgesics:  Yes  Scheduled tylenol  Does patient have an identified :  Yes  Has goal D/C date and time been discussed with patient:  Yes; potentially TCU    Wound care orders for hand dressing

## 2023-12-24 NOTE — CONSULTS
Care Management Follow Up    Length of Stay (days): 2    Expected Discharge Date: 12/25/2023     Concerns to be Addressed: discharge planning     Patient plan of care discussed at interdisciplinary rounds: Yes    Anticipated Discharge Disposition: Transitional Care     Anticipated Discharge Services: None  Anticipated Discharge DME: None    Patient/family educated on Medicare website which has current facility and service quality ratings: yes  Education Provided on the Discharge Plan: Yes  Patient/Family in Agreement with the Plan: yes    Referrals Placed by CM/SW:    Private pay costs discussed: Not applicable    Additional Information:    CM consulted for discharge planning.    TCU referrals pending.    Transportation TBD.    CM to keep sister Penelope Shaffer updated on discharge plans.    PAS done - [id number] - does not require an OBRA level 2 assessment.  CM to update [facility] on name of accepting TCU.    CM will continue to follow.    Modesto Ariza RN

## 2023-12-25 ENCOUNTER — ANESTHESIA EVENT (OUTPATIENT)
Dept: SURGERY | Facility: CLINIC | Age: 78
DRG: 464 | End: 2023-12-25
Payer: COMMERCIAL

## 2023-12-25 ENCOUNTER — ANESTHESIA (OUTPATIENT)
Dept: SURGERY | Facility: CLINIC | Age: 78
DRG: 464 | End: 2023-12-25
Payer: COMMERCIAL

## 2023-12-25 LAB
BASOPHILS # BLD AUTO: 0.1 10E3/UL (ref 0–0.2)
BASOPHILS NFR BLD AUTO: 1 %
CREAT SERPL-MCNC: 1.27 MG/DL (ref 0.67–1.17)
EGFRCR SERPLBLD CKD-EPI 2021: 58 ML/MIN/1.73M2
EOSINOPHIL # BLD AUTO: 0.2 10E3/UL (ref 0–0.7)
EOSINOPHIL NFR BLD AUTO: 2 %
ERYTHROCYTE [DISTWIDTH] IN BLOOD BY AUTOMATED COUNT: 12.9 % (ref 10–15)
GLUCOSE BLDC GLUCOMTR-MCNC: 116 MG/DL (ref 70–99)
GLUCOSE BLDC GLUCOMTR-MCNC: 125 MG/DL (ref 70–99)
GLUCOSE BLDC GLUCOMTR-MCNC: 174 MG/DL (ref 70–99)
GLUCOSE BLDC GLUCOMTR-MCNC: 175 MG/DL (ref 70–99)
GLUCOSE BLDC GLUCOMTR-MCNC: 175 MG/DL (ref 70–99)
GLUCOSE BLDC GLUCOMTR-MCNC: 177 MG/DL (ref 70–99)
GLUCOSE BLDC GLUCOMTR-MCNC: 245 MG/DL (ref 70–99)
GRAM STAIN RESULT: NORMAL
GRAM STAIN RESULT: NORMAL
HCT VFR BLD AUTO: 32.7 % (ref 40–53)
HGB BLD-MCNC: 10.7 G/DL (ref 13.3–17.7)
IMM GRANULOCYTES # BLD: 0 10E3/UL
IMM GRANULOCYTES NFR BLD: 0 %
LYMPHOCYTES # BLD AUTO: 2.1 10E3/UL (ref 0.8–5.3)
LYMPHOCYTES NFR BLD AUTO: 27 %
MCH RBC QN AUTO: 31.3 PG (ref 26.5–33)
MCHC RBC AUTO-ENTMCNC: 32.7 G/DL (ref 31.5–36.5)
MCV RBC AUTO: 96 FL (ref 78–100)
MONOCYTES # BLD AUTO: 0.8 10E3/UL (ref 0–1.3)
MONOCYTES NFR BLD AUTO: 10 %
NEUTROPHILS # BLD AUTO: 4.6 10E3/UL (ref 1.6–8.3)
NEUTROPHILS NFR BLD AUTO: 60 %
NRBC # BLD AUTO: 0 10E3/UL
NRBC BLD AUTO-RTO: 0 /100
PLATELET # BLD AUTO: 360 10E3/UL (ref 150–450)
RBC # BLD AUTO: 3.42 10E6/UL (ref 4.4–5.9)
WBC # BLD AUTO: 7.7 10E3/UL (ref 4–11)

## 2023-12-25 PROCEDURE — 85004 AUTOMATED DIFF WBC COUNT: CPT | Performed by: INTERNAL MEDICINE

## 2023-12-25 PROCEDURE — 370N000017 HC ANESTHESIA TECHNICAL FEE, PER MIN: Performed by: STUDENT IN AN ORGANIZED HEALTH CARE EDUCATION/TRAINING PROGRAM

## 2023-12-25 PROCEDURE — 258N000001 HC RX 258: Performed by: STUDENT IN AN ORGANIZED HEALTH CARE EDUCATION/TRAINING PROGRAM

## 2023-12-25 PROCEDURE — 99232 SBSQ HOSP IP/OBS MODERATE 35: CPT | Performed by: INTERNAL MEDICINE

## 2023-12-25 PROCEDURE — 250N000013 HC RX MED GY IP 250 OP 250 PS 637: Performed by: INTERNAL MEDICINE

## 2023-12-25 PROCEDURE — 87205 SMEAR GRAM STAIN: CPT | Performed by: STUDENT IN AN ORGANIZED HEALTH CARE EDUCATION/TRAINING PROGRAM

## 2023-12-25 PROCEDURE — 87102 FUNGUS ISOLATION CULTURE: CPT | Performed by: STUDENT IN AN ORGANIZED HEALTH CARE EDUCATION/TRAINING PROGRAM

## 2023-12-25 PROCEDURE — 87070 CULTURE OTHR SPECIMN AEROBIC: CPT | Performed by: STUDENT IN AN ORGANIZED HEALTH CARE EDUCATION/TRAINING PROGRAM

## 2023-12-25 PROCEDURE — 250N000011 HC RX IP 250 OP 636: Mod: JZ | Performed by: ANESTHESIOLOGY

## 2023-12-25 PROCEDURE — 272N000001 HC OR GENERAL SUPPLY STERILE: Performed by: STUDENT IN AN ORGANIZED HEALTH CARE EDUCATION/TRAINING PROGRAM

## 2023-12-25 PROCEDURE — 82565 ASSAY OF CREATININE: CPT | Performed by: INTERNAL MEDICINE

## 2023-12-25 PROCEDURE — 258N000003 HC RX IP 258 OP 636: Performed by: NURSE ANESTHETIST, CERTIFIED REGISTERED

## 2023-12-25 PROCEDURE — 87075 CULTR BACTERIA EXCEPT BLOOD: CPT | Performed by: STUDENT IN AN ORGANIZED HEALTH CARE EDUCATION/TRAINING PROGRAM

## 2023-12-25 PROCEDURE — 36415 COLL VENOUS BLD VENIPUNCTURE: CPT | Performed by: INTERNAL MEDICINE

## 2023-12-25 PROCEDURE — 250N000011 HC RX IP 250 OP 636: Performed by: INTERNAL MEDICINE

## 2023-12-25 PROCEDURE — 99232 SBSQ HOSP IP/OBS MODERATE 35: CPT | Performed by: FAMILY MEDICINE

## 2023-12-25 PROCEDURE — 360N000075 HC SURGERY LEVEL 2, PER MIN: Performed by: STUDENT IN AN ORGANIZED HEALTH CARE EDUCATION/TRAINING PROGRAM

## 2023-12-25 PROCEDURE — 120N000001 HC R&B MED SURG/OB

## 2023-12-25 PROCEDURE — 250N000011 HC RX IP 250 OP 636: Performed by: NURSE ANESTHETIST, CERTIFIED REGISTERED

## 2023-12-25 PROCEDURE — 250N000013 HC RX MED GY IP 250 OP 250 PS 637: Performed by: PHYSICIAN ASSISTANT

## 2023-12-25 PROCEDURE — 0JBG0ZZ EXCISION OF RIGHT LOWER ARM SUBCUTANEOUS TISSUE AND FASCIA, OPEN APPROACH: ICD-10-PCS | Performed by: STUDENT IN AN ORGANIZED HEALTH CARE EDUCATION/TRAINING PROGRAM

## 2023-12-25 RX ORDER — FENTANYL CITRATE 50 UG/ML
25-100 INJECTION, SOLUTION INTRAMUSCULAR; INTRAVENOUS
Status: DISCONTINUED | OUTPATIENT
Start: 2023-12-25 | End: 2023-12-25

## 2023-12-25 RX ORDER — ONDANSETRON 2 MG/ML
INJECTION INTRAMUSCULAR; INTRAVENOUS PRN
Status: DISCONTINUED | OUTPATIENT
Start: 2023-12-25 | End: 2023-12-25

## 2023-12-25 RX ORDER — SODIUM CHLORIDE, SODIUM LACTATE, POTASSIUM CHLORIDE, CALCIUM CHLORIDE 600; 310; 30; 20 MG/100ML; MG/100ML; MG/100ML; MG/100ML
INJECTION, SOLUTION INTRAVENOUS CONTINUOUS PRN
Status: DISCONTINUED | OUTPATIENT
Start: 2023-12-25 | End: 2023-12-25

## 2023-12-25 RX ORDER — LIDOCAINE 40 MG/G
CREAM TOPICAL
Status: DISCONTINUED | OUTPATIENT
Start: 2023-12-25 | End: 2023-12-25 | Stop reason: HOSPADM

## 2023-12-25 RX ORDER — SODIUM CHLORIDE, SODIUM LACTATE, POTASSIUM CHLORIDE, CALCIUM CHLORIDE 600; 310; 30; 20 MG/100ML; MG/100ML; MG/100ML; MG/100ML
INJECTION, SOLUTION INTRAVENOUS CONTINUOUS
Status: DISCONTINUED | OUTPATIENT
Start: 2023-12-25 | End: 2023-12-25

## 2023-12-25 RX ORDER — PROPOFOL 10 MG/ML
INJECTION, EMULSION INTRAVENOUS CONTINUOUS PRN
Status: DISCONTINUED | OUTPATIENT
Start: 2023-12-25 | End: 2023-12-25

## 2023-12-25 RX ORDER — FENTANYL CITRATE 50 UG/ML
INJECTION, SOLUTION INTRAMUSCULAR; INTRAVENOUS PRN
Status: DISCONTINUED | OUTPATIENT
Start: 2023-12-25 | End: 2023-12-25

## 2023-12-25 RX ORDER — BUPIVACAINE HYDROCHLORIDE 5 MG/ML
INJECTION, SOLUTION EPIDURAL; INTRACAUDAL
Status: COMPLETED | OUTPATIENT
Start: 2023-12-25 | End: 2023-12-25

## 2023-12-25 RX ORDER — SODIUM CHLORIDE, SODIUM LACTATE, POTASSIUM CHLORIDE, CALCIUM CHLORIDE 600; 310; 30; 20 MG/100ML; MG/100ML; MG/100ML; MG/100ML
INJECTION, SOLUTION INTRAVENOUS CONTINUOUS
Status: DISCONTINUED | OUTPATIENT
Start: 2023-12-25 | End: 2023-12-25 | Stop reason: HOSPADM

## 2023-12-25 RX ORDER — FENTANYL CITRATE 50 UG/ML
25-100 INJECTION, SOLUTION INTRAMUSCULAR; INTRAVENOUS
Status: DISCONTINUED | OUTPATIENT
Start: 2023-12-25 | End: 2023-12-25 | Stop reason: HOSPADM

## 2023-12-25 RX ORDER — LIDOCAINE 40 MG/G
CREAM TOPICAL
Status: DISCONTINUED | OUTPATIENT
Start: 2023-12-25 | End: 2023-12-25

## 2023-12-25 RX ADMIN — MEPIVACAINE HYDROCHLORIDE 10 ML: 15 INJECTION, SOLUTION EPIDURAL; INFILTRATION at 08:13

## 2023-12-25 RX ADMIN — INSULIN ASPART 4 UNITS: 100 INJECTION, SOLUTION INTRAVENOUS; SUBCUTANEOUS at 16:49

## 2023-12-25 RX ADMIN — ACETAMINOPHEN 975 MG: 325 TABLET ORAL at 03:04

## 2023-12-25 RX ADMIN — INSULIN GLARGINE 35 UNITS: 100 INJECTION, SOLUTION SUBCUTANEOUS at 09:56

## 2023-12-25 RX ADMIN — ACETAMINOPHEN 975 MG: 325 TABLET ORAL at 11:16

## 2023-12-25 RX ADMIN — HYDROXYZINE HYDROCHLORIDE 10 MG: 10 TABLET ORAL at 01:36

## 2023-12-25 RX ADMIN — FENTANYL CITRATE 50 MCG: 50 INJECTION INTRAMUSCULAR; INTRAVENOUS at 08:25

## 2023-12-25 RX ADMIN — PROPOFOL 100 MCG/KG/MIN: 10 INJECTION, EMULSION INTRAVENOUS at 08:28

## 2023-12-25 RX ADMIN — ONDANSETRON 4 MG: 2 INJECTION INTRAMUSCULAR; INTRAVENOUS at 08:29

## 2023-12-25 RX ADMIN — PIPERACILLIN AND TAZOBACTAM 3.38 G: 3; .375 INJECTION, POWDER, LYOPHILIZED, FOR SOLUTION INTRAVENOUS at 02:53

## 2023-12-25 RX ADMIN — BUPIVACAINE HYDROCHLORIDE 20 ML: 5 INJECTION, SOLUTION EPIDURAL; INTRACAUDAL; PERINEURAL at 08:13

## 2023-12-25 RX ADMIN — SODIUM CHLORIDE, POTASSIUM CHLORIDE, SODIUM LACTATE AND CALCIUM CHLORIDE: 600; 310; 30; 20 INJECTION, SOLUTION INTRAVENOUS at 07:55

## 2023-12-25 RX ADMIN — PIPERACILLIN AND TAZOBACTAM 3.38 G: 3; .375 INJECTION, POWDER, LYOPHILIZED, FOR SOLUTION INTRAVENOUS at 11:16

## 2023-12-25 RX ADMIN — OXYCODONE HYDROCHLORIDE 5 MG: 5 TABLET ORAL at 01:36

## 2023-12-25 RX ADMIN — PIPERACILLIN AND TAZOBACTAM 3.38 G: 3; .375 INJECTION, POWDER, LYOPHILIZED, FOR SOLUTION INTRAVENOUS at 18:12

## 2023-12-25 RX ADMIN — FENTANYL CITRATE 50 MCG: 50 INJECTION INTRAMUSCULAR; INTRAVENOUS at 08:15

## 2023-12-25 ASSESSMENT — ACTIVITIES OF DAILY LIVING (ADL)
ADLS_ACUITY_SCORE: 32

## 2023-12-25 ASSESSMENT — LIFESTYLE VARIABLES: TOBACCO_USE: 0

## 2023-12-25 NOTE — PLAN OF CARE
A/O x 4. VSS on RA. I&D of R hand completed this AM; dressing is CDI; arm in sling. Block was given during surgery; currently no pain, some tingling and sensation coming back, denies numbness. Able to make needs known.    Goal Outcome Evaluation:    Problem: Adult Inpatient Plan of Care  Goal: Absence of Hospital-Acquired Illness or Injury  Intervention: Identify and Manage Fall Risk  Recent Flowsheet Documentation  Taken 12/25/2023 8716 by Karie Yung, RN  Safety Promotion/Fall Prevention:   safety round/check completed   supervised activity   lighting adjusted   mobility aid in reach   nonskid shoes/slippers when out of bed     Problem: Adult Inpatient Plan of Care  Goal: Optimal Comfort and Wellbeing  Outcome: Progressing

## 2023-12-25 NOTE — PLAN OF CARE
Patient vital signs are at baseline: Yes  Patient able to ambulate as they were prior to admission or with assist devices provided by therapies during their stay:  Yes  Patient MUST void prior to discharge:  Yes  Patient able to tolerate oral intake:  Yes  Pain has adequate pain control using Oral analgesics:  Yes  Does patient have an identified :  Yes  Has goal D/C date and time been discussed with patient:  Yes     Goal Outcome Evaluation:      Plan of Care Reviewed With: patient    Overall Patient Progress: improvingOverall Patient Progress: improving    Alert and oriented x4, able to make needs known, on telemetry, read SR with 1st degree AVB and BBB then BR with 1st degree AVB and BBB x 2, reported full sensations to RUE, able to move hand, good perfusion noted, soak and dressing changed per order, extremity elevated, pre-op checklist done; CHG bath completed, Nozin nasal spray completed, voided, and on CART, zosyn currently running, report given to oncoming RN.

## 2023-12-25 NOTE — ANESTHESIA POSTPROCEDURE EVALUATION
Patient: Amor Zavaleta    Procedure: Procedure(s):  IRRIGATION AND DEBRIDEMENT, UPPER EXTREMITY       Anesthesia Type:  No value filed.    Note:  Disposition: Inpatient   Postop Pain Control: Uneventful            Sign Out: Well controlled pain   PONV: No   Neuro/Psych: Uneventful            Sign Out: Acceptable/Baseline neuro status   Airway/Respiratory: Uneventful            Sign Out: Acceptable/Baseline resp. status   CV/Hemodynamics: Uneventful            Sign Out: Acceptable CV status; No obvious hypovolemia; No obvious fluid overload   Other NRE: NONE   DID A NON-ROUTINE EVENT OCCUR? No       Last vitals:  Vitals:    12/25/23 0935 12/25/23 0940 12/25/23 1005   BP: 139/69 139/69 (!) 192/83   Pulse: 57 50    Resp: 16 22 18   Temp: 36.4  C (97.5  F)  36.4  C (97.5  F)   SpO2: 95% 95% 100%       Electronically Signed By: Ramana Valdez MD  December 25, 2023  10:25 AM

## 2023-12-25 NOTE — ANESTHESIA PROCEDURE NOTES
Supraclavicular Procedure Note    Pre-Procedure   Staff -        Anesthesiologist:  Ramana Valdez MD       Performed By: anesthesiologist       Location: pre-op       Procedure Start/Stop Times: 12/25/2023 8:13 AM and 12/25/2023 8:21 AM       Pre-Anesthestic Checklist: patient identified, IV checked, site marked, risks and benefits discussed, informed consent, monitors and equipment checked, pre-op evaluation, at physician/surgeon's request and post-op pain management  Timeout:       Correct Patient: Yes        Correct Procedure: Yes        Correct Site: Yes        Correct Position: Yes        Correct Laterality: Yes        Site Marked: Yes  Procedure Documentation  Procedure: Supraclavicular       Diagnosis: INFECTION/CAT BITE RIGHT HAND       Laterality: right       Patient Position: supine       Patient Prep/Sterile Barriers: sterile gloves, mask       Skin prep: Chloraprep       Local skin infiltrated with 3 mL of 1% lidocaine.        Needle Type: short bevel       Needle Gauge: 20.        Needle Length (Inches): 4        Ultrasound guided       1. Ultrasound was used to identify targeted nerve, plexus, vascular marker, or fascial plane and place a needle adjacent to it in real-time.       2. Ultrasound was used to visualize the spread of anesthetic in close proximity to the above referenced structure.       3. A permanent image is entered into the patient's record.       4. The visualized anatomic structures appeared normal.       5. There were no apparent abnormal pathologic findings.    Assessment/Narrative         The placement was negative for: blood aspirated, painful injection and site bleeding       Paresthesias: No.       Bolus given via needle. no blood aspirated via catheter.        Secured via.        Insertion/Infusion Method: Single Shot       Complications: none       Injection made incrementally with aspirations every 5 mL.    Medication(s) Administered   Bupivacaine 0.5% PF (Infiltration) -  "Infiltration   20 mL - 12/25/2023 8:13:00 AM  Mepivacaine 1.5% PF (Perineural) - Perineural   10 mL - 12/25/2023 8:13:00 AM  Medication Administration Time: 12/25/2023 8:13 AM      FOR [facility] ([facility]) ONLY:   Pain Team Contact information: please page the Pain Team Via [employer]. Search \"Pain\". During daytime hours, please page the attending first. At night please page the resident first.      "

## 2023-12-25 NOTE — ANESTHESIA PREPROCEDURE EVALUATION
Anesthesia Pre-Procedure Evaluation    Patient: Amor Zavaleta   MRN: [id number] : 1945        Procedure : Procedure(s):  INCISION AND DRAINAGE, UPPER EXTREMITY          Past Medical History:   Diagnosis Date     Diabetes (H)      Sleep apnea       Past Surgical History:   Procedure Laterality Date     INCISION AND DRAINAGE UPPER EXTREMITY, COMBINED Right 2023    Procedure: INCISION AND DRAINAGE, RIGHT UPPER FOREARM;  Surgeon: Leah Leyva MD;  Location: [facility] OR     INCISION AND DRAINAGE UPPER EXTREMITY, COMBINED Right 2023    Procedure: INCISION AND DRAINAGE, UPPER EXTREMITY;  Surgeon: Leah Leyva MD;  Location: [facility] OR      Allergies   Allergen Reactions     Sulfa Antibiotics Hives      Social History     Tobacco Use     Smoking status: Never     Smokeless tobacco: Never   Substance Use Topics     Alcohol use: Not Currently      Wt Readings from Last 1 Encounters:   23 94.3 kg (208 lb)        Anesthesia Evaluation   Pt has had prior anesthetic.     No history of anesthetic complications       ROS/MED HX  ENT/Pulmonary:     (+) sleep apnea,                                    (-) tobacco use   Neurologic:  - neg neurologic ROS   (+)    peripheral neuropathy,                            Cardiovascular: Comment: LBBB.    (+)  hypertension- -   -  - -                          Irregular Heartbeat/Palpitations,            METS/Exercise Tolerance:     Hematologic:     (+)      anemia,          Musculoskeletal:  - neg musculoskeletal ROS     GI/Hepatic:     (+) GERD, Asymptomatic on medication,               (-) hiatal hernia and esophageal disease   Renal/Genitourinary:     (+) renal disease, type: CRI,            Endo:     (+)  type II DM,       Diabetic complications: nephropathy neuropathy retinopathy.             Psychiatric/Substance Use:  - neg psychiatric ROS     Infectious Disease: Comment: UE infection.    (+) Recent Fever,           Malignancy:  - neg  "malignancy ROS     Other:  - neg other ROS          Physical Exam    Airway        Mallampati: I   TM distance: > 3 FB   Neck ROM: full   Mouth opening: > 3 cm    Respiratory Devices and Support         Dental       (+) Multiple visibly decayed, broken teeth      Cardiovascular   cardiovascular exam normal       Rhythm and rate: regular and normal     Pulmonary   pulmonary exam normal        breath sounds clear to auscultation         OUTSIDE LABS:  CBC:   Lab Results   Component Value Date    WBC 7.7 12/25/2023    WBC 12.5 (H) 12/23/2023    HGB 10.7 (L) 12/25/2023    HGB 10.5 (L) 12/23/2023    HCT 32.7 (L) 12/25/2023    HCT 32.3 (L) 12/23/2023     12/25/2023     12/23/2023     BMP:   Lab Results   Component Value Date     12/23/2023     12/22/2023    POTASSIUM 4.3 12/23/2023    POTASSIUM 4.0 12/22/2023    CHLORIDE 106 12/23/2023    CHLORIDE 102 12/22/2023    CO2 23 12/23/2023    CO2 23 12/22/2023    BUN 23.2 (H) 12/23/2023    BUN 32.6 (H) 12/22/2023    CR 1.27 (H) 12/25/2023    CR 1.29 (H) 12/24/2023     (H) 12/25/2023     (H) 12/25/2023     COAGS: No results found for: \"PTT\", \"INR\", \"FIBR\"  POC: No results found for: \"BGM\", \"HCG\", \"HCGS\"  HEPATIC:   Lab Results   Component Value Date    ALBUMIN 2.5 (L) 12/23/2023    PROTTOTAL 6.1 (L) 12/23/2023    ALT 9 12/23/2023    AST 23 12/23/2023    ALKPHOS 41 12/23/2023    BILITOTAL 0.5 12/23/2023     OTHER:   Lab Results   Component Value Date    PH 7.46 (H) 12/11/2023    LACT 1.2 12/12/2023    A1C 8.9 (H) 12/11/2023    VLADIMIR 8.0 (L) 12/23/2023    PHOS 2.7 07/07/2023    MAG 1.9 12/19/2023    SED 61 (H) 12/11/2023       Anesthesia Plan    ASA Status:  3    NPO Status:  NPO Appropriate    Anesthesia Type: General.     - Airway: Mask Only      Maintenance: TIVA.        Consents    Anesthesia Plan(s) and associated risks, benefits, and realistic alternatives discussed. Questions answered and patient/representative(s) expressed " understanding.     - Discussed:     - Discussed with:  Patient      - Extended Intubation/Ventilatory Support Discussed: No.      - Patient is DNR/DNI Status: No     Use of blood products discussed: No .     Postoperative Care    Pain management: Peripheral nerve block (Single Shot), Multi-modal analgesia.   PONV prophylaxis: Ondansetron (or other 5HT-3)     Comments:    Other Comments: Decadron, Zofran.  Diprivan infusion.  Ketamine (0.5 mg/kg).             Ramana Valdez MD    I have reviewed the pertinent notes and labs in the chart from the past 30 days and (re)examined the patient.  Any updates or changes from those notes are reflected in this note.

## 2023-12-25 NOTE — OP NOTE
Date of Procedure: 12/25/23      Surgeon: Carla Austin MD      Assistant: Cathy Verdugo PA-C.  DIONNE assist required for patient positioning, soft tissue retraction, instrumentation assist, and patient's safety.     Preoperative diagnosis:   1.  Right dorsal forearm polymicrobial infection with extensor tenosynovitis.    Postoperative diagnosis:   1.  Right dorsal forearm polymicrobial infection with extensor tenosynovitis  2.  Right ECRL and ECRB tendon ruptures  3.  Right EPL tendon rupture     Operation/procedures performed:   1.  Irrigation and sharp, excisional debridement of wound of right dorsal forearm, wrist, hand and extensor compartments 1, 2, 3, 4     Findings:  Obvious purulence noted in the dorsal forearm with ruptures of the ECRL, ECRB and EPL tendons.  First dorsal compartment and fourth dorsal compartment appeared to be intact but had some fraying.  Fifth and sixth dorsal compartments without infection or fraying.     Sedation/anesthesia: MAC/block     Complications: None     Drains: 2 segments of a Penrose drain were placed to allow for drainage postoperatively     Estimated blood loss: 5 cc     Implants: None.    Tourniquet time: Per anesthesia record     Specimens: Cultures obtained for Gram stain, anaerobic/aerobic and fungal      Indications for Surgery: The patient is a 78-year-old male with a history of type 2 diabetes who sustained multiple cat scratche/bites to his right forearm on 12/2/23. He was seen at his PCP and ultimately was admitted at [facility] on 12/11/2023.  He underwent irrigation and debridement on 12/11/2023 and 12/13/2023 with Dr. Leyva.  Cultures were positive for a polymicrobial infection.  He eventually was transitioned from IV antibiotics and discharged to home with p.o. antibiotics on 12/20/2023.      He presented to my clinic on the morning morning of 12/22/2023 with increased redness, pain and significant purulence noted about the forearm.  He also  "endorsed streaking of the erythema up into the elbow.  He was readmitted to [facility] on 12/22/2023 and underwent irrigation and debridement.  He was undergoing daily dressing changes and soaking.  Unfortunately, there was concern for persistent infection and the decision was made to take him back to the operating room for irrigation and debridement of the right forearm.    The risks, benefits, and alternatives of this surgical procedure were thoroughly discussed with the patient.  I outlined the risks of surgery which included, but are not limited to: Infection, recurrence, incisional pain, the development of scar tissue, and/or neurovascular injury. The consequences of such risks could include hospital admission, additional surgery, chronic pain, loss of strength, loss of sensation, loss of motion and/or loss of function. I explained that although these risks are low they are real. The patient then had opportunity to ask questions which I answered. After thorough discussion, the patient verbalized understanding and stated that they wished proceed with operative intervention.    Operation description: The patient was identified, informed consent was obtained, and the surgical site was marked in the preop area.  A regional block was performed.  The patient was then brought into the operating room and placed supine with the right upper extremity on an arm table.  Sedation was administered by the department of anesthesia.     The patient's right upper extremity was prepped and draped in standard surgical fashion.  This included placing a well-padded tourniquet, upper arm.  Prior to beginning the case,  a \"timeout\" was performed by the surgical team to confirm surgical site, side, and procedure.  The right upper extremity was elevated and exsanguinated with gravity.  The tourniquet was then inflated to 250 mmHg.     I reopened the 12 cm incision over the midline of the dorsum of the wrist/hand.  There was " gross purulence noted of the wrist.  Unfortunately, there was attritional ruptures of the second and third dorsal compartments.  I opened up the first and fourth dorsal compartments and there was some fraying but they were intact.  I also opened up the fifth and sixth dorsal compartments and the tendons were pristine.      Tissue specimens were obtained for Gram stain, aerobic, anaerobic and fungal cultures.  Using a curette, a rongeur, a tenotomy scissors and a 15 blade, the skin down to the level of the fascia was sequentially debrided.  After debridement, there was no gross purulence and all friable tissue had been removed.      The wrist and soft tissues were copiously irrigated with a 3 L bag of normal saline.      2 segments of 1/4 inch Penrose drain placed in the wound to allow for drainage.     The surgical incision was loosely closed with 3-0 nylon placed in a simple fashion.  A sterile soft dressing was applied.  The patient was then awoken from anesthesia and brought to recovery in good condition.  There were no complications.     Postoperative plan:  1.  Pain control.  2.  Continue IV Abx per ID recommendation.  4.  Elevate right upper extremity.  5.  Warm soaks to right upper extremity incision, starting POD1  6.  Pull drains on POD2  7. Social work consult/Care coordination consult, please coordinate with sister Penelope Shaffer [phone]  8.  Likely every other day washouts until infection appears to be controlled      Carla Austin MD

## 2023-12-25 NOTE — PROGRESS NOTES
Infectious Diseases Progress Note  [facility]    Date of visit: 12/25/2023     ASSESSMENT   78-year-old man with a history of diabetes readmitted for right wrist infection secondary to cat bite.    Right wrist infection.  Hospitalized recently for wrist infection following cat bite/scratch.  Debridement on 12/11 and 12/13.  Polymicrobial cultures with MSSA, Pasteurella, and several anaerobic bacteria.  Discharged on Augmentin and Flagyl, but readmitted 2 days later after signs of persistent infection (see next)  Persistent right wrist infection.  Seen in follow-up in orthopedics clinic on 12/22.  Due to appearance of wound, patient was sent to the hospital for debridement which occurred on 12/22 - no growth on cultures. Repeat debridement on 12/25 with purulence and ruptured tendons, new cultures collected.  Wbc improved     Principal Problem:    Infection of right hand due to bite, initial encounter       PLAN   -continue and Pipercillin/tazobactam  -follow-up on cultures from surgery  -Final antibiotic plan will depend on clinical improvement  -wound care per orthopedics      Jacinto Vasquez MD  [facility]  Direct messaging: "[employer] Paging  On-Call ID provider: [phone], option: 9      ===========================================      SUBJECTIVE / INTERVAL HISTORY:     Surgery today. More purulence debrided. Feeling well.       Antibiotics   Zosyn 12/22-    Previous:  Perioperative cefazolin  Augmentin and Flagyl prior to arrival  Vancomycin 12/22-12/23    Physical Exam     Temp:  [97.5  F (36.4  C)-98.3  F (36.8  C)] 97.5  F (36.4  C)  Pulse:  [50-61] 50  Resp:  [16-22] 18  BP: (126-192)/(66-89) 192/83  SpO2:  [92 %-100 %] 100 %    BP (!) 192/83 (BP Location: Left arm)   Pulse 50   Temp 97.5  F (36.4  C) (Oral)   Resp 18   Ht 1.829 m (6')   Wt 94.3 kg (208 lb)   SpO2 100%   BMI 28.21 kg/m      GENERAL:  well-developed, well-nourished, lying in bed in no acute  "distress.   HENT:  Head is normocephalic, atraumatic.   EYES:  Eyes have anicteric sclerae without conjunctival injection   LUNGS:  normal resp pattern  EXT: Extremities warm.  Right arm swelling.  Swelling around the left olecranon process, chronic.  Right wrist and hand wrapped in surgical dressing.  SKIN:  No acute rashes.   NEUROLOGIC:  Grossly nonfocal.      Cultures   Previous cultures reviewed  12/22 right wrist cultures x 2: No growth to date; no organisms on Gram stain  12/25 wrist cultures pending    Susceptibility data from last 90 days.  Collected Specimen Info Organism Ampicillin Azithromycin Ceftriaxone Clindamycin Daptomycin Doxycycline Erythromycin Gentamicin Levofloxacin Oxacillin Penicillin Tetracycline Trimethoprim/Sulfamethoxazole  Vancomycin   12/11/23 Tissue from Wrist, Right Bacteroides pyogenes                 12/11/23 Tissue from Wrist, Right Staphylococcus aureus     S  S  S  S  S   S   S  S  S     Pasteurella multocida                 12/11/23 Tissue from Hand, Right Fusobacterium nucleatum                   Bacteroides pyogenes                 12/11/23 Tissue from Hand, Right Pasteurella multocida                   Staphylococcus aureus                 12/11/23 Abscess from Hand, Right Fusobacterium nucleatum                   Bacteroides pyogenes                 12/11/23 Abscess from Hand, Right Pasteurella multocida  S  S  S       S   S   S               Pertinent Labs:     Recent Labs   Lab 12/25/23  0550 12/23/23  0617 12/22/23  1327   WBC 7.7 12.5* 11.1*   HGB 10.7* 10.5* 11.6*    395 459*       Recent Labs   Lab 12/23/23  0617 12/22/23  1327 12/19/23  0652    136 133*   CO2 23 23 22   BUN 23.2* 32.6* 18.6   ALBUMIN 2.5*  --   --    ALKPHOS 41  --   --    ALT 9  --   --    AST 23  --   --        No results for input(s): \"CRPI\", \"SED\" in the last 168 hours.        Imaging:     US Upper Extremity Venous Duplex Right    Result Date: 12/23/2023  EXAM: US UPPER EXTREMITY " "VENOUS DUPLEX RIGHT LOCATION: [facility] DATE: 12/23/2023 INDICATION: swelling COMPARISON: None. TECHNIQUE: Venous Duplex ultrasound of the right upper extremity with (when possible) and without compression, augmentation, and duplex. Color flow and spectral Doppler with waveform analysis performed. FINDINGS: Ultrasound includes evaluation of the internal jugular vein, innominate vein, subclavian vein, axillary vein, and brachial vein. The superficial cephalic and basilic veins were also evaluated where seen. RIGHT: No deep venous thrombosis. No superficial thrombophlebitis.     IMPRESSION: 1.  No deep venous thrombosis in the right upper extremity.    POC US Guidance Needle Placement    Result Date: 12/22/2023  Ultrasound was performed as guidance to an anesthesia procedure.  Click \"PACS images\" hyperlink below to view any stored images.  For specific procedure details, view procedure note authored by anesthesia.        Data reviewed today: I reviewed all medications, new labs and imaging results over the last 24 hours. I personally reviewed no images or EKG's today.  The patient's care was discussed with the Patient and Primary team.    "

## 2023-12-25 NOTE — PROGRESS NOTES
[facility] MEDICINE PROGRESS NOTE      Identification/Summary: Amor Zavaleta is a 78 year old male with a history of insulin-dependent diabetes type 2, CKD stage IV, HTN, LBBB who presents on 12/22/2023 with continued pain and swelling to right wrist following a cat bite.  He was seen by orthopedic surgeon in his office 12/22 and thought that the infection was worsening on oral antibiotics.  Taken to surgery for irrigation and debridement the evening of 12/22.  He reports she is doing better today.  He lives alone, in an apartment with 3 flights of stairs.     Assessment/plan:     Septic joint right wrist with likely extension to right elbow secondary to cat bite 12/2  Patient was evaluated at [facility] and admitted 12/11.  Received IV antibiotics along with washouts from orthopedic surgery.  Was discharged 12/20 with 7 days of Augmentin and metronidazole per infectious disease recommendations  [facility] requested [id number] for washout and IV antibiotics due to continued infection  Appreciate orthopedic recommendations regarding right elbow pain and edema-likely extension of septic joint.  Vancomycin and Zosyn initiated in the ER  Blood cultures,   Has been seen by infectious disease and IV antibiotics to continue pending cultures.  Patient went back to surgery for washout today and it is reported that he had 3 ruptured tendons.    Patient would likely benefit from TCU and this was recommended by therapies.  Plan: Per orthopedic surgery and infectious disease     Decreased mobility secondary to long hospitalization  PT/OT-appreciate discharge recommendations for placement.     Right hand wounds from cat attack  Wound care appreciated.     Diabetes type 2-insulin-dependent, blood sugars have been good  Insulin pump at home  States home dose is Lantus 56 units at night and 16 units NovoLog with meals  A1c of 8.9 12/11/2023  Home dose of Lantus 56 units ordered but  was some hypoglycemia last night it was decreased to 35 units.  Continue home very high insulin sliding scale with meals no insulin at bedtime       HTN  Currently normotensive at 106/59 with heart rate of 64.  Hold home antihypertensives for now     CKD stage IV  Baseline creatinine appears to be 1.2  Creatinine 1.6 at admission now 1.3 this morning  Plan: Check BMP in the morning     Code Status: Full Code  DVT prophylaxis: Lovenox      Disposition:  -Discharge barriers: Orthopedic intervention, IV abx, PT/OT and placement to TCU.               The patient's care was discussed with the Care Coordinator/, infectious disease and Patient.    Hakan Delatorre MD  [facility]  Phone: #[phone]  Securely message with the Vocera Web Console (learn more here)  Text page via [employer] Paging/Directory     Interval History/Subjective:  Patient feels like he is doing fine.  Pain is controlled.  No nausea.  No chest pain or shortness of breath or lightheadedness.  Lives in an apartment by himself.  Has 3 flights of steps to climb.    Physical Exam/Objective:  Temp:  [97.5  F (36.4  C)-98.3  F (36.8  C)] 97.5  F (36.4  C)  Pulse:  [50-61] 50  Resp:  [16-22] 18  BP: (126-192)/(61-89) 133/61  SpO2:  [92 %-100 %] 95 %  Body mass index is 28.21 kg/m .    GENERAL:  Alert, appears comfortable, in no acute distress, appears stated age   LUNGS:   Clear to auscultation bilaterally, no rales, rhonchi, or wheezing, symmetric chest rise on inhalation, respirations unlabored   HEART:  Regular rate and rhythm, no murmur   ABDOMEN:   Soft, non-tender   EXTREMITIES: Right fingers and distal hand appear moderately swollen   SKIN: Bulky dressing right distal hand to right mid forearm   NEURO: Alert, appears cognitively intact moves all four extremities freely   PSYCH: Cooperative, behavior is appropriate      Data reviewed today: I personally reviewed all new medications,  "labs, imaging/diagnostics reports over the past 24 hours. Pertinent findings include:    Imaging:   Recent Results (from the past 24 hour(s))   POC US Guidance Needle Placement    Narrative    Ultrasound was performed as guidance to an anesthesia procedure.  Click   \"PACS images\" hyperlink below to view any stored images.  For specific   procedure details, view procedure note authored by anesthesia.   POC US Guidance Needle Placement    Narrative    Ultrasound was performed as guidance to an anesthesia procedure.  Click   \"PACS images\" hyperlink below to view any stored images.  For specific   procedure details, view procedure note authored by anesthesia.       Labs:  Most Recent 3 CBC's:  Recent Labs   Lab Test 12/25/23  0550 12/23/23  0617 12/22/23  1327   WBC 7.7 12.5* 11.1*   HGB 10.7* 10.5* 11.6*   MCV 96 96 97    395 459*     Most Recent 3 BMP's:  Recent Labs   Lab Test 12/25/23  0633 12/25/23  0550 12/25/23  0259 12/24/23  2110 12/24/23  0701 12/24/23  0615 12/23/23  0632 12/23/23  0617 12/22/23  1640 12/22/23  1327 12/19/23  0734 12/19/23  0652   NA  --   --   --   --   --   --   --  136  --  136  --  133*   POTASSIUM  --   --   --   --   --   --   --  4.3  --  4.0  --  4.2   CHLORIDE  --   --   --   --   --   --   --  106  --  102  --  102   CO2  --   --   --   --   --   --   --  23  --  23  --  22   BUN  --   --   --   --   --   --   --  23.2*  --  32.6*  --  18.6   CR  --  1.27*  --   --   --  1.29*  --  1.41*  --  1.61*  --  1.19*   ANIONGAP  --   --   --   --   --   --   --  7  --  11  --  9   VLADIMIR  --   --   --   --   --   --   --  8.0*  --  9.0  --  8.1*   *  --  177* 175*   < >  --    < > 130*   < > 98   < > 120*    < > = values in this interval not displayed.     Most Recent 2 LFT's:  Recent Labs   Lab Test 12/23/23  0617 12/11/23  1316   AST 23 27   ALT 9 45   ALKPHOS 41 120   BILITOTAL 0.5 1.1       Medications:   Personally Reviewed.  Medications      acetaminophen  975 mg Oral Q8H "    [Held by provider] amLODIPine  2.5 mg Oral Daily    [Held by provider] aspirin  325 mg Oral Daily    atorvastatin  10 mg Oral Daily    enoxaparin ANTICOAGULANT  40 mg Subcutaneous Q24H    famotidine  20 mg Oral Daily    insulin aspart  1-22 Units Subcutaneous TID w/meals    insulin glargine  35 Units Subcutaneous QAM AC    [Held by provider] lisinopril-hydrochlorothiazide  1 tablet Oral Daily    piperacillin-tazobactam  3.375 g Intravenous Q8H    sodium chloride (PF)  3 mL Intracatheter Q8H    sodium chloride (PF)  3 mL Intracatheter Q8H

## 2023-12-25 NOTE — PROGRESS NOTES
Care Management Follow Up    Length of Stay (days): 3    Expected Discharge Date: 12/26/2023     Concerns to be Addressed: discharge planning     Patient plan of care discussed at interdisciplinary rounds: Yes    Anticipated Discharge Disposition: Transitional Care     Anticipated Discharge Services: None  Anticipated Discharge DME: None    Patient/family educated on Medicare website which has current facility and service quality ratings: yes  Education Provided on the Discharge Plan: Yes  Patient/Family in Agreement with the Plan: yes    Referrals Placed by CM/SW:    Private pay costs discussed: Not applicable    Additional Information:  Chart reviewed. OR this morning. TCU referrals pending.     Rosio Berman RN

## 2023-12-25 NOTE — PLAN OF CARE
A/O x 4. VSS on RA. Dressing change completed at 1400.     Goal Outcome Evaluation:    Problem: Adult Inpatient Plan of Care  Goal: Absence of Hospital-Acquired Illness or Injury  Intervention: Prevent Skin Injury  Recent Flowsheet Documentation  Taken 12/24/2023 1615 by Karie Yung, RN  Body Position: position changed independently  Taken 12/24/2023 0813 by Karie Yung, RN  Body Position: position changed independently     Problem: Adult Inpatient Plan of Care  Goal: Optimal Comfort and Wellbeing  Outcome: Progressing     Problem: Pain Acute  Goal: Optimal Pain Control and Function  Outcome: Progressing  Intervention: Prevent or Manage Pain  Recent Flowsheet Documentation  Taken 12/24/2023 1615 by Karie Yung RN  Medication Review/Management: medications reviewed  Taken 12/24/2023 0813 by Karie Yung RN  Medication Review/Management: medications reviewed     Problem: Infection  Goal: Absence of Infection Signs and Symptoms  Outcome: Progressing

## 2023-12-25 NOTE — PLAN OF CARE
Hospitalist and surgeon consulted on Lovenox. Both are okay with holding lovenox for today; will continue tomorrow AM as scheduled.  Dilshad Mccullough RN

## 2023-12-25 NOTE — DISCHARGE SUMMARY
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    Weight as of this encounter: 98.7 kg (217 lb 9.5 oz).  # Moderate Malnutrition: based on nutrition assessment      Follow-ups Needed After Discharge   Follow-up Appointments     Follow Up Care      Please follow-up with Dr. Leyva's team in 2-3 days at [facility]. Call our scheduling line at [phone] to make an   appointment, if you do not already have one scheduled.        Follow-up and recommended labs and tests       Follow up with primary care provider, Farzaneh Sandy, within 7 days to   evaluate medication change, to evaluate after surgery, and for hospital   follow- up.  No follow up labs or test are needed.            Unresulted Labs Ordered in the Past 30 Days of this Admission       Date and Time Order Name Status Description    12/11/2023  8:02 PM Fungal or Yeast Culture Routine Preliminary     12/11/2023  7:57 PM Fungal or Yeast Culture Routine Preliminary     12/11/2023  7:53 PM Fungal or Yeast Culture Routine Preliminary         These results will be followed up by Silvia Vieira    Discharge Disposition   Discharged to home  Condition at discharge: Stable    Hospital Course   Amor Zavaleta is a 78 year old male with a history of type 2 diabetes, stage III kidney disease, hypertension and  MADELINBB admitted on 12/11/2023 with painful right upper extremity swelling and redness following a cat bite. He was assessed with right dorsal forearm abscess and possible septic arthritis of the left wrist, hyponatremia and RENO on CKDIII.  RENO and hyponatremia improved with IV hydration.     Patient underwent I&D with copious irrigation of right dorsal forearm, wrist, hand and fourth dorsal extensor compartment on 12/11/2023.  Postop findings included right dorsal forearm abscess and superior extensor tenosynovitis.  On 12/13/2023 patient underwent right wrist arthrotomy for possible septic arthritis and I&D with copious irrigation of right dorsal forearm wrist hand and fourth dorsal extensor compartment.  Cultures of surgical specimen grew Pasteurella, Staphylococcus aureus, B. Pyogenes, F. Nucleatum. Patient placed on Zosyn, cefazolin and Flagyl per ID recommendations with subsequent improvement of right upper extremity swelling and redness. Clinically improved and with improving leukocytosis. Transitioned to oral antibiotics.      Right dorsal forearm abscess and superior of extensor tenosynovitis (S/P I&D 12/11 and 12/13/2023)  Extensive right upper extremity cellulitis  Concern for right upper extremity abscess  Severe sepsis  CT scan of the right upper extremity showed multifocal tenosynovitis involving the extensor tendons in the hand hand extensive cellulitis in the forearm, wrist and hand without discrete fluid collection to suggest an abscess or soft tissue gas.  Initial abscess cultures with P. Multicoda, pan sensitive S. Aureus, B. Pyogenes and F. Nucleatum.    Blood cultures NGTD   Afebrile since admission with improving leukocytosis   Discontinued Cefazolin, Zosyn and Flagyl, started on Augmentin and metronidazole for 7 days  Hand surgery follow up - appreciate assistance      Hyponatremia-resolved  Possibly secondary to hyperglycemia and hypovolemia     Hypokalemia-resolved  Monitor potassium and  "replace as per protocol     RENO on stage III CKD- resolved  Resolved with normal saline at 75 mL/h  Encourage oral hydration as tolerated     Type 2 diabetes on long-term insulin with diabetic nephropathy  Continue lantus 64 units daily   Continue insulin sliding scale   Insulin carb coverage 1:5    Consultations This Hospital Stay   PHARMACY TO DOSE VANCO  INFECTIOUS DISEASES IP CONSULT  PHARMACY TO DOSE VANCO  PHYSICAL THERAPY ADULT IP CONSULT  OCCUPATIONAL THERAPY ADULT IP CONSULT  CARE MANAGEMENT / SOCIAL WORK IP CONSULT    Code Status   Full Code    Time Spent on this Encounter   I, Silvia Vieira MD, personally saw the patient today and spent greater than 30 minutes discharging this patient.       Silvia Vieira MD  [address] [address]  Phone: [phone]  Fax: [phone]  ______________________________________________________________________    Physical Exam   Vital Signs:                    Weight: 217 lbs 9.5 oz  General Appearance: Awake, alert, in no acute distress  Respiratory: CTAB, no wheeze  Cardiovascular: RRR, no murmur noted  GI: soft, nontender, non distended, normal bowel sounds  Skin: no jaundice, no rash       Primary Care Physician   Farzaneh Sandy    Discharge Orders      Home Care Referral      Reason for your hospital stay    S/p upper extremity incision and drainage     When to call - Contact Surgeon Team    You may experience symptoms that require follow-up before your scheduled appointment. Refer to the \"Stoplight Tool\" for instructions on when to contact your Surgeon Team if you are concerned about pain control, blood clots, constipation, or if you are unable to urinate.     When to call - Reach out to Urgent Care    If you are not able to reach your Surgeon Team and you need immediate care, go to the Orthopedic Walk-in Clinic or Urgent Care at your Surgeon's office.  Do NOT go to the Emergency Room unless you " have shortness of breath, chest pain, or other signs of a medical emergency.     When to call - Reasons to Call 911    Call 911 immediately if you experience sudden-onset chest pain, arm weakness/numbness, slurred speech, or shortness of breath     Discharge Instruction - Breathing exercises    Perform breathing exercises using your Incentive Spirometer 10 times per hour while awake for 2 weeks.     Symptoms - Fever Management    A low grade fever can be expected after surgery.  Use acetaminophen (TYLENOL) as needed for fever management.  Contact your Surgeon Team if you have a fever greater than 101.5 F, chills, and/or night sweats.     Symptoms - Constipation management    Constipation (hard, dry bowel movements) is expected after surgery due to the combination of being less active, the anesthetic, and the opioid pain medication.  You can do the following to help reduce constipation:  ~  FLUIDS:  Drink clear liquids (water or Gatorade), or juice (apple/prune).  ~  DIET:  Eat a fiber rich diet.    ~  ACTIVITY:  Get up and move around several times a day.  Increase your activity as you are able.  MEDICATIONS:  Reduce the risk of constipation by starting medications before you are constipated.  You can take Miralax   (1 packet as directed) and/or a stool softener (Senokot 1-2 tablets 1-2 times a day).  If you already have constipation and these medications are not working, you can get magnesium citrate and use as directed.  If you continue to have constipation you can try an over the counter suppository or enema.  Call your Surgeon Team if it has been greater than 3 days since your last bowel movement.     Symptoms - Reduced Urine Output    Changes in the amount of fluids you drank before and after surgery may result in problems urinating.  It is important to stay well-hydrated after surgery and drink plenty of water. If it has been greater than 8 hours since you have urinated despite drinking plenty of water, call  your Surgeon Team.     Activity - Exercises to prevent blood clots    Unless otherwise directed by your Surgeon team, perform the following exercises at least three times per day for the first four weeks after surgery to prevent blood clots in your legs: 1) Point and flex your feet (Ankle Pumps), 2) Move your ankle around in big circles, 3) Wiggle your toes, 4) Walk, even for short distances, several times a day, will help decrease the risk of blood clots.     Comfort and Pain Management - Pain after Surgery    Pain after surgery is normal and expected.  You will have some amount of pain for several weeks after surgery.  Your pain will improve with time.  There are several things you can do to help reduce your pain including: rest, ice, elevation, and using pain medications as needed. Contact your Surgeon Team if you have pain that persists or worsens after surgery despite rest, ice, elevation, and taking your medication(s) as prescribed. Contact your Surgeon Team if you have new numbness, tingling, or weakness in your operative extremity.     Comfort and Pain Management - Swelling after Surgery    Swelling and/or bruising of the surgical extremity is common and may persist for several months after surgery. In addition to frequent icing and elevation, gentle compressive support with an ACE wrap or tubigrip may help with swelling. Apply compression regularly, removing at least twice daily to perform skin checks. Contact your Surgeon Team if your swelling increases and is NOT associated with an increase in your activity level, or if your swelling increases and is associated with redness and pain.     Comfort and Pain Management - UPPER extremity Elevation    Swelling is expected for several months after surgery. This type of swelling is usually associated with gravity and activity, and can be improved with elevation.   The best way to do this is to get your hand above your heart by sitting down, resting your elbow on  a pillow or arm rest, with your hand in the air. Perform this elevation as often as possible especially for the first two weeks after surgery     Comfort and Pain Management - Cold therapy    Ice can be used to control swelling and discomfort after surgery. Place a thin towel over your operative site and apply the ice pack overtop. Leave ice pack in place for 20 minutes, then remove for 20 minutes. Repeat this 20 minutes on/20 minutes off routine as often as tolerated.     Medication Instructions - Acetaminophen (TYLENOL) Instructions    You were discharged with acetaminophen (TYLENOL) for pain management after surgery. Acetaminophen most effectively manages pain symptoms when it is taken on a schedule without missing doses (every four, six, or eight hours). Your Provider will prescribe a safe daily dose between 3000 - 4000 mg.  Do NOT exceed this daily dose. Most patients use acetaminophen for pain control for the first four weeks after surgery.  You can wean from this medication as your pain decreases.     Medication Instructions - Opioid Instructions (1 - 2 tablets Q 4-6 hours, MAX 6 tablets)    You were discharged with an opioid medication (hydromorphone, oxycodone, hydrocodone, or tramadol). This medication should only be taken for breakthrough pain that is not controlled with acetaminophen (TYLENOL). If you rate your pain less than 3 you do not need this medication.  Pain rating 0-3:  You do not need this medication.  Pain rating 4-6:  Take 1 tablet every 4-6 hours as needed  Pain rating 7-10:  Take 2 tablets every 4-6 hours as needed.  Do not exceed 6 tablets per day     Medication Instructions - Opioids - Tapering Instructions    In the first three days following surgery, your symptoms may warrant use of the narcotic pain medication every four to six hours as prescribed. This is normal. As your pain symptoms improve, focus your efforts on decreasing (tapering) use of narcotic medications. The most successful  tapering strategy is to first, decrease the number of tablets you take every 4-6 hours to the minimum prescribed. Then, increase the amount of time between doses.  For example:  First, taper to   or 1 tablet every 4-6 hours.  Then, taper to   or 1 tablet every 6-8 hours.  Then, taper to   or 1 tablet every 8-10 hours.  Then, taper to   or 1 tablet every 10-12 hours.  Then, taper to   or 1 tablet at bedtime.  The bedtime dose can help with comfort during sleep and is typically the last dose to be discontinued after surgery.     Activity - Other    Encourage gentle hand and finger ROM     Return to Driving    Return to driving - Driving is NOT permitted until directed by your provider. Under no circumstance are you permitted to drive while using narcotic pain medications.     NO Precautions    No precautions directed by your Provider.  You may perform range of motion activities as tolerated.     Weight bearing as tolerated    Weight bearing as tolerated on your operative extremity.     Splint / Cast    Splint may be used for comfort as needed     Dressing / Wound Care - Wound    Bulky gauze dressing to be removed three times daily for warm soapy water soaks 15-20 minutes at a time.  After soaking, replace bulky gauze dressing.     Dressing Wound Care - Shower with wound/dressing NOT covered    Dressing may be removed for showering on POD2     Follow Up Care    Please follow-up with Dr. Leyva's team in 2-3 days at [facility]. Call our scheduling line at [phone] to make an appointment, if you do not already have one scheduled.     Reason for your hospital stay    Cat bite with right upper extremity cellulitis from Pasturella     Follow-up and recommended labs and tests     Follow up with primary care provider, Farzaneh Sandy, within 7 days to evaluate medication change, to evaluate after surgery, and for hospital follow- up.  No follow up labs or test are needed.     Activity    Your activity upon  discharge: activity as tolerated     Discharge Instruction - Regular Diet Adult    Return to your pre-surgery diet unless instructed otherwise     Diet    Follow this diet upon discharge: Orders Placed This Encounter      Snacks/Supplements Adult: Glucerna; With Meals      Discharge Instruction - Regular Diet Adult      Moderate Consistent Carb (60 g CHO per Meal) Diet       Significant Results and Procedures   Most Recent 3 CBC's:  Recent Labs   Lab Test 12/25/23  0550 12/23/23  0617 12/22/23  1327   WBC 7.7 12.5* 11.1*   HGB 10.7* 10.5* 11.6*   MCV 96 96 97    395 459*     Most Recent 3 BMP's:  Recent Labs   Lab Test 12/25/23  1110 12/25/23  0633 12/25/23  0550 12/25/23  0259 12/24/23  0701 12/24/23  0615 12/23/23  0632 12/23/23  0617 12/22/23  1640 12/22/23  1327 12/19/23  0734 12/19/23  0652   NA  --   --   --   --   --   --   --  136  --  136  --  133*   POTASSIUM  --   --   --   --   --   --   --  4.3  --  4.0  --  4.2   CHLORIDE  --   --   --   --   --   --   --  106  --  102  --  102   CO2  --   --   --   --   --   --   --  23  --  23  --  22   BUN  --   --   --   --   --   --   --  23.2*  --  32.6*  --  18.6   CR  --   --  1.27*  --   --  1.29*  --  1.41*  --  1.61*  --  1.19*   ANIONGAP  --   --   --   --   --   --   --  7  --  11  --  9   VLADIMIR  --   --   --   --   --   --   --  8.0*  --  9.0  --  8.1*   * 116*  --  177*   < >  --    < > 130*   < > 98   < > 120*    < > = values in this interval not displayed.     Most Recent 2 LFT's:  Recent Labs   Lab Test 12/23/23  0617 12/11/23  1316   AST 23 27   ALT 9 45   ALKPHOS 41 120   BILITOTAL 0.5 1.1   ,   Results for orders placed or performed during the hospital encounter of 12/11/23   CT Forearm Right w/o Contrast    Narrative    EXAM: CT FOREARM RIGHT W/O CONTRAST, CT HAND RIGHT W/O CONTRAST  LOCATION: [facility]  DATE: 12/11/2023    INDICATION: Infection sepsis cat bite, forearm and hand pain.  COMPARISON:  None.  TECHNIQUE: Noncontrast. Axial, sagittal and coronal thin-section reconstruction. Dose reduction techniques were used.     FINDINGS:     BONES:  -No fracture. No degenerative changes. No evidence of osteomyelitis.    SOFT TISSUES:  -There is moderate circumferential subcutaneous cellulitis throughout the forearm extending into the wrist and hand. Distally, in the hand and wrist these changes are more marked within the dorsal soft tissues of the wrist and hand including likely   tenosynovitis of the compartment 2, 3 and 4 extensor tendons as seen on series 5 image 16 of the hand study. No soft tissue gas. No discrete fluid collection to suggest an abscess. No deep fascial fluid. The forearm musculature is normal in appearance as   is the musculature of the hand.      Impression    IMPRESSION:  1.  Extensive cellulitis in the forearm, wrist and hand. No discrete fluid collection to suggest an abscess. No soft tissue gas.    2.  There is likely multifocal tenosynovitis involving the extensor tendons in the hand, as described. MRI may be helpful in further evaluation.    3.  No fracture or foreign body.     CT Hand Right w/o Contrast    Narrative    EXAM: CT FOREARM RIGHT W/O CONTRAST, CT HAND RIGHT W/O CONTRAST  LOCATION: [facility]  DATE: 12/11/2023    INDICATION: Infection sepsis cat bite, forearm and hand pain.  COMPARISON: None.  TECHNIQUE: Noncontrast. Axial, sagittal and coronal thin-section reconstruction. Dose reduction techniques were used.     FINDINGS:     BONES:  -No fracture. No degenerative changes. No evidence of osteomyelitis.    SOFT TISSUES:  -There is moderate circumferential subcutaneous cellulitis throughout the forearm extending into the wrist and hand. Distally, in the hand and wrist these changes are more marked within the dorsal soft tissues of the wrist and hand including likely   tenosynovitis of the compartment 2, 3 and 4 extensor tendons as seen on series 5  "image 16 of the hand study. No soft tissue gas. No discrete fluid collection to suggest an abscess. No deep fascial fluid. The forearm musculature is normal in appearance as   is the musculature of the hand.      Impression    IMPRESSION:  1.  Extensive cellulitis in the forearm, wrist and hand. No discrete fluid collection to suggest an abscess. No soft tissue gas.    2.  There is likely multifocal tenosynovitis involving the extensor tendons in the hand, as described. MRI may be helpful in further evaluation.    3.  No fracture or foreign body.     POC US Guidance Needle Placement    Narrative    Ultrasound was performed as guidance to an anesthesia procedure.  Click   \"PACS images\" hyperlink below to view any stored images.  For specific   procedure details, view procedure note authored by anesthesia.       Discharge Medications   Discharge Medication List as of 12/20/2023  2:38 PM        START taking these medications    Details   acetaminophen (TYLENOL) 325 MG tablet Take 2 tablets (650 mg) by mouth every 4 hours as needed for mild pain or other (and adjunct with moderate or severe pain or per patient request), Disp-100 tablet, R-0, E-Prescribe      oxyCODONE (ROXICODONE) 5 MG tablet Take 1 tablet (5 mg) by mouth every 4 hours as needed for moderate pain, Disp-20 tablet, R-0, E-Prescribe      povidone-iodine (BETADINE) 10 % topical solution Apply topically as needed for wound care Apply topically to incision for wound careDisp-14.8 mL, [id number]      senna-docusate (SENOKOT-S/PERICOLACE) 8.6-50 MG tablet Take 2 tablets by mouth 2 times daily as needed for constipation, Disp-30 tablet, R-0, E-Prescribe           CONTINUE these medications which have CHANGED    Details   amoxicillin-clavulanate (AUGMENTIN) 875-125 MG tablet Take 1 tablet by mouth every 12 hours for 6 days, Disp-12 tablet, R-0, E-Prescribe      metroNIDAZOLE (FLAGYL) 500 MG tablet Take 1 tablet (500 mg) by mouth 3 times daily for 6 days, " Disp-18 tablet, R-0, E-Prescribe           CONTINUE these medications which have NOT CHANGED    Details   amLODIPine (NORVASC) 2.5 MG tablet Take 2.5 mg by mouth daily, Historical      aspirin (ASA) 325 MG tablet Take 1 tablet by mouth, Historical      famotidine (PEPCID) 20 MG tablet Take 20 mg by mouth daily, Historical      insulin aspart (NOVOLOG PEN) 100 UNIT/ML pen Inject 16 Units Subcutaneous, Historical      insulin glargine (LANTUS PEN) 100 UNIT/ML pen Inject 56 Units Subcutaneous every 24 hours, Historical      lisinopril-hydrochlorothiazide (ZESTORETIC) 20-12.5 MG tablet Take 1 tablet by mouth, Historical      lovastatin (MEVACOR) 40 MG tablet Take 1 tablet by mouth 2 times daily, Historical      Multiple Vitamin (MULTIVITAMIN PO) Take 1 tablet by mouth 2 times daily, Historical           STOP taking these medications       aspirin (ASA) 81 MG chewable tablet Comments:   Reason for Stopping:             Allergies   Allergies   Allergen Reactions    Sulfa Antibiotics Hives

## 2023-12-25 NOTE — INTERVAL H&P NOTE
I have reviewed the surgical (or preoperative) H&P that is linked to this encounter, and examined the patient. There are no significant changes. Patient continues to have erythema and purulence from wound. Recommend repeat I&D right wrist.     Clinical Conditions Present on Arrival:  Clinically Significant Risk Factors Present on Admission

## 2023-12-25 NOTE — ANESTHESIA CARE TRANSFER NOTE
Patient: Amor Zavaleta    Procedure: Procedure(s):  IRRIGATION AND DEBRIDEMENT, UPPER EXTREMITY       Diagnosis: Infection of right hand due to bite, initial encounter [S61.451A, L08.9]  Diagnosis Additional Information: No value filed.    Anesthesia Type:   No value filed.     Note:    Oropharynx: oropharynx clear of all foreign objects and spontaneously breathing  Level of Consciousness: awake  Oxygen Supplementation: room air    Independent Airway: airway patency satisfactory and stable  Dentition: dentition unchanged  Vital Signs Stable: post-procedure vital signs reviewed and stable  Report to RN Given: handoff report given  Patient transferred to: Telemetry/Step Down Unit    Handoff Report: Identifed the Patient, Identified the Reponsible Provider, Reviewed the pertinent medical history, Discussed the surgical course, Reviewed Intra-OP anesthesia mangement and issues during anesthesia, Set expectations for post-procedure period and Allowed opportunity for questions and acknowledgement of understanding      Vitals:  Vitals Value Taken Time   /69 12/25/23 0940   Temp 36.4  C (97.5  F) 12/25/23 0935   Pulse 50 12/25/23 0940   Resp 22 12/25/23 0940   SpO2 95 % 12/25/23 0940       Electronically Signed By: CHIN Myers CRNA  December 25, 2023  9:41 AM

## 2023-12-26 ENCOUNTER — APPOINTMENT (OUTPATIENT)
Dept: PHYSICAL THERAPY | Facility: CLINIC | Age: 78
DRG: 464 | End: 2023-12-26
Payer: COMMERCIAL

## 2023-12-26 LAB
GLUCOSE BLDC GLUCOMTR-MCNC: 130 MG/DL (ref 70–99)
GLUCOSE BLDC GLUCOMTR-MCNC: 133 MG/DL (ref 70–99)
GLUCOSE BLDC GLUCOMTR-MCNC: 188 MG/DL (ref 70–99)
GLUCOSE BLDC GLUCOMTR-MCNC: 213 MG/DL (ref 70–99)
GLUCOSE BLDC GLUCOMTR-MCNC: 216 MG/DL (ref 70–99)

## 2023-12-26 PROCEDURE — 99232 SBSQ HOSP IP/OBS MODERATE 35: CPT | Performed by: INTERNAL MEDICINE

## 2023-12-26 PROCEDURE — 99232 SBSQ HOSP IP/OBS MODERATE 35: CPT | Performed by: FAMILY MEDICINE

## 2023-12-26 PROCEDURE — 97530 THERAPEUTIC ACTIVITIES: CPT | Mod: GP

## 2023-12-26 PROCEDURE — 250N000013 HC RX MED GY IP 250 OP 250 PS 637: Performed by: INTERNAL MEDICINE

## 2023-12-26 PROCEDURE — 250N000013 HC RX MED GY IP 250 OP 250 PS 637: Performed by: PHYSICIAN ASSISTANT

## 2023-12-26 PROCEDURE — 97116 GAIT TRAINING THERAPY: CPT | Mod: GP

## 2023-12-26 PROCEDURE — 120N000001 HC R&B MED SURG/OB

## 2023-12-26 PROCEDURE — 250N000011 HC RX IP 250 OP 636: Mod: JZ | Performed by: INTERNAL MEDICINE

## 2023-12-26 PROCEDURE — 999N000197 HC STATISTIC WOC PT EDUCATION, 0-15 MIN

## 2023-12-26 RX ADMIN — INSULIN ASPART 8 UNITS: 100 INJECTION, SOLUTION INTRAVENOUS; SUBCUTANEOUS at 17:29

## 2023-12-26 RX ADMIN — PIPERACILLIN AND TAZOBACTAM 3.38 G: 3; .375 INJECTION, POWDER, LYOPHILIZED, FOR SOLUTION INTRAVENOUS at 11:38

## 2023-12-26 RX ADMIN — ACETAMINOPHEN 650 MG: 325 TABLET ORAL at 23:08

## 2023-12-26 RX ADMIN — ATORVASTATIN CALCIUM 10 MG: 10 TABLET, FILM COATED ORAL at 07:57

## 2023-12-26 RX ADMIN — PIPERACILLIN AND TAZOBACTAM 3.38 G: 3; .375 INJECTION, POWDER, LYOPHILIZED, FOR SOLUTION INTRAVENOUS at 02:52

## 2023-12-26 RX ADMIN — ENOXAPARIN SODIUM 40 MG: 100 INJECTION SUBCUTANEOUS at 05:56

## 2023-12-26 RX ADMIN — INSULIN GLARGINE 35 UNITS: 100 INJECTION, SOLUTION SUBCUTANEOUS at 06:59

## 2023-12-26 RX ADMIN — ACETAMINOPHEN 650 MG: 325 TABLET ORAL at 00:48

## 2023-12-26 RX ADMIN — PIPERACILLIN AND TAZOBACTAM 3.38 G: 3; .375 INJECTION, POWDER, LYOPHILIZED, FOR SOLUTION INTRAVENOUS at 18:16

## 2023-12-26 RX ADMIN — INSULIN ASPART 5 UNITS: 100 INJECTION, SOLUTION INTRAVENOUS; SUBCUTANEOUS at 11:44

## 2023-12-26 RX ADMIN — FAMOTIDINE 20 MG: 20 TABLET ORAL at 07:57

## 2023-12-26 ASSESSMENT — ACTIVITIES OF DAILY LIVING (ADL)
ADLS_ACUITY_SCORE: 30
ADLS_ACUITY_SCORE: 29
ADLS_ACUITY_SCORE: 32
ADLS_ACUITY_SCORE: 29
ADLS_ACUITY_SCORE: 29
ADLS_ACUITY_SCORE: 30
ADLS_ACUITY_SCORE: 32
ADLS_ACUITY_SCORE: 29

## 2023-12-26 NOTE — PLAN OF CARE
Problem: Pain Acute  Goal: Optimal Pain Control and Function  Outcome: Progressing  Intervention: Prevent or Manage Pain  Recent Flowsheet Documentation  Taken 12/25/2023 1550 by Aneudy Durbin, RN  Medication Review/Management: medications reviewed   Goal Outcome Evaluation:       Patient alert and oriented. VSS. Saline locked in between antibiotics. RA. Able to make needs known. Tolerating Regular diet. ACHS. Bedtime blood glucose was 245. Page was sent out to provider who ordered one time dose of 7units insulin, given. No complaints of pain.

## 2023-12-26 NOTE — PROGRESS NOTES
[facility]  WO Nurse Inpatient Assessment     Consulted for: Wounds, L hand/R wrist    Summary: 12/26: Reviewed surgical notes, patient may be going back for additional OR washouts. Defer to Ortho team for continued wound care. WO team will sign off and will gladly reengage if a new consult is placed.   DERRICK ZunigaN RN CWOCN  Pager no longer in use, please contact through V[employer] group: [facility] Vocera Group      Patient History (according to provider note(s):      Septic joint right wrist with likely extension to right elbow secondary to cat bite 12/2  Patient was evaluated at [facility] and admitted 12/11.  Received IV antibiotics along with washouts from orthopedic surgery.  Was discharged 12/20 with 7 days of Augmentin and metronidazole per infectious disease recommendations  Some orthopedic requested admission today 12/22 for washout and IV antibiotics due to continued infection    Assessment:      Areas visualized during today's visit: Focused: and L hand/ R wrist    Skin Injury Location: r wrist      Last photo: 12/22  Skin injury due to: Trauma  Skin history and plan of care:   cat bite, going to surgery.  [facility] will reassess next week if patient is still in hospital.  Affected area:      Skin assessment: Edema, Erythema, and infection     Measurements (length x width x depth, in cm) 12 cm  x 0.5 cm      Color: pink     Temperature  warm     Drainage: moderate .      Color: serous      Odor: none  Pain: moderate, throbbing and sharp  Pain interventions prior to dressing change: patient tolerated well, slow and gentle cares , and distraction  Treatment goal: Heal   STATUS: initial assessment  Supplies ordered: gathered       Treatment Plan:     Patient due for surgery at 1700 12/22.  Will support surgery post op as needed.    RECOMMEND PRIMARY TEAM ORDER: None, at this time  Education provided: plan of care  Discussed plan of care  with: Patient, Family, and Nurse  [facility] nurse follow-up plan: signing off  Notify [facility] if wound(s) deteriorate.  Nursing to notify the Provider(s) and re-consult the [facility] Nurse if new skin concern.    DATA:     Current support surface: Standard  Standard gel/foam mattress (IsoFlex, Atmos air, etc)  Containment of urine/stool: Continent of bladder and Continent of bowel  BMI: Body mass index is 28.21 kg/m .   Active diet order: Orders Placed This Encounter      Advance Diet as Tolerated: Regular Diet Adult     Output: I/O last 3 completed shifts:  In: 300 [I.V.:300]  Out: 1975 [Urine:1970; Blood:5]     Labs:   Recent Labs   Lab 12/25/23  0550 12/23/23  0617   ALBUMIN  --  2.5*   HGB 10.7* 10.5*   WBC 7.7 12.5*       Sensory Perception: 3-->slightly limited  Moisture: 4-->rarely moist  Activity: 3-->walks occasionally  Mobility: 3-->slightly limited  Nutrition: 3-->adequate  Friction and Shear: 3-->no apparent problem  Bakari Score: 19  DERRICK De La RosaN RN [facility] services  Pager no longer in use, please contact through [employer] group: [facility] [employer] Group

## 2023-12-26 NOTE — PROGRESS NOTES
Care Management Follow Up    Length of Stay (days): 4    Expected Discharge Date: 12/28/2023     Concerns to be Addressed: discharge planning     Patient plan of care discussed at interdisciplinary rounds: Yes    Anticipated Discharge Disposition: Transitional Care     Anticipated Discharge Services: None  Anticipated Discharge DME: None    Patient/family educated on Medicare website which has current facility and service quality ratings: yes  Education Provided on the Discharge Plan: Yes  Patient/Family in Agreement with the Plan: yes    Referrals Placed by CM/SW:    Private pay costs discussed: transportation costs    Additional Information:  See below     Rosio Berman RN        Spoke to Nina at [facility], she accepted patient for admission pending bed availability when medically ready.     Called sister Melba Shaffer. She is really happy with this placement. Discussed transport, would like wheelchair transport and is aware of potential cost.     Updated Sharon at [facility] line

## 2023-12-26 NOTE — PLAN OF CARE
Problem: Adult Inpatient Plan of Care  Goal: Optimal Comfort and Wellbeing  Outcome: Progressing  Intervention: Monitor Pain and Promote Comfort     Goal Outcome Evaluation:       Pt A/Ox4, able to make needs known. Reports of moderate pain in R arm and headache, managed with PRN tylenol. Numbness in R arm improving. IV abx given, tolerated well. Ace wrap in sling in place. VSS, on room air.

## 2023-12-26 NOTE — PROGRESS NOTES
Infectious Diseases Progress Note  [facility]    Date of visit: 12/26/2023     ASSESSMENT   78-year-old man with a history of diabetes readmitted for right wrist infection secondary to cat bite.    Right wrist infection.  Hospitalized recently for wrist infection following cat bite/scratch.  Debridement on 12/11 and 12/13.  Polymicrobial cultures with MSSA, Pasteurella, and several anaerobic bacteria.  Discharged on Augmentin and Flagyl, but readmitted 2 days later after signs of persistent infection (see next)  Persistent right wrist infection.  Seen in follow-up in orthopedics clinic on 12/22.  Due to appearance of wound, patient was sent to the hospital for debridement which occurred on 12/22 - no growth on cultures. Repeat debridement on 12/25 with purulence and ruptured tendons, new cultures collected.  Wbc improved     Principal Problem:    Infection of right hand due to bite, initial encounter       PLAN   -continue and Pipercillin/tazobactam  -follow-up on cultures from surgery - so far cultures have been neg  -Final antibiotic plan will depend on clinical improvement  -wound care per orthopedics      Jacinto Vasquez MD  [facility]  Direct messaging: [employer]  On-Call ID provider: [phone], option: [phone]      ===========================================      SUBJECTIVE / INTERVAL HISTORY:     Seen briefly today while soaking his wrist.      Antibiotics   Zosyn 12/22-    Previous:  Perioperative cefazolin  Augmentin and Flagyl prior to arrival  Vancomycin 12/22-12/23    Physical Exam     Temp:  [97.4  F (36.3  C)-98.2  F (36.8  C)] 98  F (36.7  C)  Pulse:  [55-92] 66  Resp:  [17-18] 18  BP: (116-192)/(61-83) 116/68  SpO2:  [93 %-100 %] 94 %    /68 (BP Location: Left arm)   Pulse 66   Temp 98  F (36.7  C) (Oral)   Resp 18   Ht 1.829 m (6')   Wt 94.3 kg (208 lb)   SpO2 94%   BMI 28.21 kg/m      GENERAL:  well-developed, well-nourished, lying in bed in no  "acute distress.   LUNGS:  normal resp pattern  EXT: Extremities warm.  Right arm swelling.  Right wrist incision intact, large penrose drain in place. Significant swelling all throughout wrist.   SKIN:  No acute rashes.       Cultures   Previous cultures reviewed  12/22 right wrist cultures x 2: No growth to date; no organisms on Gram stain  12/25 wrist cultures no growth to date     Susceptibility data from last 90 days.  Collected Specimen Info Organism Ampicillin Azithromycin Ceftriaxone Clindamycin Daptomycin Doxycycline Erythromycin Gentamicin Levofloxacin Oxacillin Penicillin Tetracycline Trimethoprim/Sulfamethoxazole  Vancomycin   12/11/23 Tissue from Wrist, Right Bacteroides pyogenes                 12/11/23 Tissue from Wrist, Right Staphylococcus aureus     S  S  S  S  S   S   S  S  S     Pasteurella multocida                 12/11/23 Tissue from Hand, Right Fusobacterium nucleatum                   Bacteroides pyogenes                 12/11/23 Tissue from Hand, Right Pasteurella multocida                   Staphylococcus aureus                 12/11/23 Abscess from Hand, Right Fusobacterium nucleatum                   Bacteroides pyogenes                 12/11/23 Abscess from Hand, Right Pasteurella multocida  S  S  S       S   S   S               Pertinent Labs:     Recent Labs   Lab 12/25/23  0550 12/23/23  0617 12/22/23  1327   WBC 7.7 12.5* 11.1*   HGB 10.7* 10.5* 11.6*    395 459*       Recent Labs   Lab 12/23/23  0617 12/22/23  1327    136   CO2 23 23   BUN 23.2* 32.6*   ALBUMIN 2.5*  --    ALKPHOS 41  --    ALT 9  --    AST 23  --        No results for input(s): \"CRPI\", \"SED\" in the last 168 hours.        Imaging:     US Upper Extremity Venous Duplex Right    Result Date: 12/23/2023  EXAM: US UPPER EXTREMITY VENOUS DUPLEX RIGHT LOCATION: [facility] DATE: 12/23/2023 INDICATION: swelling COMPARISON: None. TECHNIQUE: Venous Duplex ultrasound of the right upper " "extremity with (when possible) and without compression, augmentation, and duplex. Color flow and spectral Doppler with waveform analysis performed. FINDINGS: Ultrasound includes evaluation of the internal jugular vein, innominate vein, subclavian vein, axillary vein, and brachial vein. The superficial cephalic and basilic veins were also evaluated where seen. RIGHT: No deep venous thrombosis. No superficial thrombophlebitis.     IMPRESSION: 1.  No deep venous thrombosis in the right upper extremity.    POC US Guidance Needle Placement    Result Date: 12/22/2023  Ultrasound was performed as guidance to an anesthesia procedure.  Click \"PACS images\" hyperlink below to view any stored images.  For specific procedure details, view procedure note authored by anesthesia.        Data reviewed today: I reviewed all medications, new labs and imaging results over the last 24 hours. I personally reviewed no images or EKG's today.  The patient's care was discussed with the Patient and Primary team.    "

## 2023-12-26 NOTE — PLAN OF CARE
Patient states pain is minimal, states URE is mainly just tingling.  Soaks to be done TID. First soak completed and dressing change done by ortho PA.  IV ABX infusing. Pt is A1 with walker. Uses call light appropriately.

## 2023-12-26 NOTE — PROGRESS NOTES
"Orthopedic Progress Note      Assessment: 1 Day Post-Op  S/P Procedure(s):  IRRIGATION AND DEBRIDEMENT, UPPER EXTREMITY     Plan:   - Weightbearing status: light use of the RUE as tolerated, sling for comfort at least until block wears off  - Elevate Right upper extremity  - Continue IV antibiotics per Infectious Disease recommendations   - Warm water Hibiclens soaks 10-15 min 2-3 times daily starting on POD#1.    After each soak, redress with Adaptic, gauze, ABD, kerlix, and ACE bandage.   - Drains to be removed on POD#2  - Anticoagulation: Lovenox in addition to SCDs, randal stockings and early ambulation.  - Discharge planning: pending cultures, and improvement of wound, likely TCU      Subjective:  Pain: mild  Chest pain, SOB: no  Nausea, Vomiting:  no  Lightheadedness, Dizziness:  no  Neuro:  Patient denies new onset numbness or paresthesias    Amor is doing well this morning, is sore from surgery but feels pain is currently manageable. Denies chills, body aches.     Objective:  /68 (BP Location: Left arm)   Pulse 66   Temp 98  F (36.7  C) (Oral)   Resp 18   Ht 1.829 m (6')   Wt 94.3 kg (208 lb)   SpO2 94%   BMI 28.21 kg/m    The patient is A&Ox3. Appears comfortable sitting up in bed.   Sensation is intact to distal right upper extremity.   Fingers are swollen, but soft.   Sling in place.   Dressings in place, no strikethrough.   2 drains in place.     First soak started, dressings removed and wound inspected.   Surgical incision over dorsum of right hand, nylon sutures in place.   Distal drain was adhered to dressing and incidentally removed from wound. Proximal drain in place.   No katie purulence appreciated or expressed after soak. Serosanguinous drainage present. Mild erythema at wound edges, no extension distally or proximally from wound.       Pertinent Labs   Lab Results: personally reviewed.   No results found for: \"INR\", \"PROTIME\"  Lab Results   Component Value Date    WBC 7.7 12/25/2023    " HGB 10.7 (L) 12/25/2023    HCT 32.7 (L) 12/25/2023    MCV 96 12/25/2023     12/25/2023     Lab Results   Component Value Date     12/23/2023    CO2 23 12/23/2023         Report completed by:  Sylvia Mooney PA-C/Dr. Austin  [facility]    Date: 12/26/2023  Time: 9:32 AM

## 2023-12-26 NOTE — PROGRESS NOTES
[facility] MEDICINE PROGRESS NOTE      Identification/Summary: Amor Zavaleta is a 78 year old male with a history of insulin-dependent diabetes type 2, CKD stage IV, HTN, LBBB who presents on 12/22/2023 with continued pain and swelling to right wrist following a cat bite.  He was seen by orthopedic surgeon in his office 12/22 and thought that the infection was worsening on oral antibiotics.  Taken to surgery for irrigation and debridement the evening of 12/22.  He reports she is doing better today.  He lives alone, in an apartment with 3 flights of stairs.  He feels things are going well.  Pain 3/10.     Assessment/plan:     Septic joint right wrist with likely extension to right elbow secondary to cat bite 12/2  Patient was evaluated at [facility] and admitted 12/11.  Received IV antibiotics along with washouts from orthopedic surgery.  Was discharged 12/20 with 7 days of Augmentin and metronidazole per infectious disease recommendations  [facility] requested [id number] for washout and IV antibiotics due to continued infection  Appreciate orthopedic recommendations regarding right elbow pain and edema-likely extension of septic joint.  Vancomycin and Zosyn initiated in the ER  Blood cultures,   Has been seen by infectious disease and IV antibiotics to continue pending cultures.  Patient went back to surgery for washout 12/25 and it is reported that he had 3 ruptured tendons.    Patient would likely benefit from TCU and this was recommended by therapies.  Plan: Per orthopedic surgery and infectious disease     Decreased mobility secondary to long hospitalization  PT/OT-appreciate discharge recommendations for placement.     Right hand wounds from cat attack  Wound care appreciated.     Diabetes type 2-insulin-dependent, blood sugars have been good  Insulin pump at home  States home dose is Lantus 56 units at night and 16 units NovoLog with meals  A1c of 8.9  12/11/2023  Home dose of Lantus 56 units ordered but was some hypoglycemia last night it was decreased to 35 units.  Continue home very high insulin sliding scale with meals no insulin at bedtime       HTN  Currently normotensive at 106/59 with heart rate of 64.  Hold home antihypertensives for now     CKD stage IV  Baseline creatinine appears to be 1.2  Creatinine 1.6 at admission now 1.3 this morning  Plan: Check BMP in the morning     Code Status: Full Code  DVT prophylaxis: Lovenox      Disposition:  -Discharge barriers: Orthopedic intervention, IV abx, PT/OT and placement to TCU.       The patient's care was discussed with the Care Coordinator/, infectious disease, orthopedic surgery PA and Patient.       Hakan Delatorre MD  [facility]  Phone: #[phone]  Securely message with the Vocera Web Console (learn more here)  Text page via [employer] Paging/Directory     Interval History/Subjective:  Patient feels like he is doing well overall.  Currently soaking his hand.  Pain 3/10.  Eating well.  No chest pain or shortness of breath or lightheadedness    Physical Exam/Objective:  Temp:  [97.5  F (36.4  C)-98.2  F (36.8  C)] 97.9  F (36.6  C)  Pulse:  [55-92] 71  Resp:  [17-18] 18  BP: (116-158)/(68-78) 135/70  SpO2:  [93 %-96 %] 96 %  Body mass index is 28.21 kg/m .    GENERAL:  Alert, appears comfortable, in no acute distress, appears stated age   HEAD:  Normocephalic, without obvious abnormality, atraumatic   LUNGS:   Clear to auscultation bilaterally, no rales, rhonchi, or wheezing, symmetric chest rise on inhalation, respirations unlabored   HEART:  Regular rate and rhythm, no murmur   ABDOMEN:   Soft, non-tender   EXTREMITIES: Mild to moderate swelling of right hand and wrist with intact incision with drain in place   SKIN: Area of erythema around the wound to about 6 cm proximal to the incision   NEURO: Alert, appears cognitively intact,  moves all four extremities freely   PSYCH: Cooperative, behavior is appropriate      Data reviewed today: I personally reviewed all new medications, labs, imaging/diagnostics reports over the past 24 hours. Pertinent findings include:    Imaging:   No results found for this or any previous visit (from the past 24 hour(s)).    Labs:  Most Recent 3 CBC's:  Recent Labs   Lab Test 12/25/23  0550 12/23/23  0617 12/22/23  1327   WBC 7.7 12.5* 11.1*   HGB 10.7* 10.5* 11.6*   MCV 96 96 97    395 459*     Most Recent 3 BMP's:  Recent Labs   Lab Test 12/26/23  1132 12/26/23  0623 12/26/23  0218 12/25/23  0633 12/25/23  0550 12/24/23  0701 12/24/23  0615 12/23/23  0632 12/23/23  0617 12/22/23  1640 12/22/23  1327 12/19/23  0734 12/19/23  0652   NA  --   --   --   --   --   --   --   --  136  --  136  --  133*   POTASSIUM  --   --   --   --   --   --   --   --  4.3  --  4.0  --  4.2   CHLORIDE  --   --   --   --   --   --   --   --  106  --  102  --  102   CO2  --   --   --   --   --   --   --   --  23  --  23  --  22   BUN  --   --   --   --   --   --   --   --  23.2*  --  32.6*  --  18.6   CR  --   --   --   --  1.27*  --  1.29*  --  1.41*  --  1.61*  --  1.19*   ANIONGAP  --   --   --   --   --   --   --   --  7  --  11  --  9   VLADIMIR  --   --   --   --   --   --   --   --  8.0*  --  9.0  --  8.1*   * 130* 133*   < >  --    < >  --    < > 130*   < > 98   < > 120*    < > = values in this interval not displayed.     Most Recent 2 LFT's:  Recent Labs   Lab Test 12/23/23  0617 12/11/23  1316   AST 23 27   ALT 9 45   ALKPHOS 41 120   BILITOTAL 0.5 1.1       Medications:   Personally Reviewed.  Medications      [Held by provider] amLODIPine  2.5 mg Oral Daily    [Held by provider] aspirin  325 mg Oral Daily    atorvastatin  10 mg Oral Daily    enoxaparin ANTICOAGULANT  40 mg Subcutaneous Q24H    famotidine  20 mg Oral Daily    insulin aspart  1-22 Units Subcutaneous TID w/meals    insulin glargine  35 Units  Subcutaneous QAM AC    [Held by provider] lisinopril-hydrochlorothiazide  1 tablet Oral Daily    piperacillin-tazobactam  3.375 g Intravenous Q8H    sodium chloride (PF)  3 mL Intracatheter Q8H

## 2023-12-27 ENCOUNTER — ANESTHESIA (OUTPATIENT)
Dept: SURGERY | Facility: CLINIC | Age: 78
DRG: 464 | End: 2023-12-27
Payer: COMMERCIAL

## 2023-12-27 ENCOUNTER — ANESTHESIA EVENT (OUTPATIENT)
Dept: SURGERY | Facility: CLINIC | Age: 78
DRG: 464 | End: 2023-12-27
Payer: COMMERCIAL

## 2023-12-27 LAB
BACTERIA BLD CULT: NO GROWTH
BACTERIA BLD CULT: NO GROWTH
BACTERIA TISS BX CULT: NO GROWTH
BACTERIA TISS BX CULT: NO GROWTH
GLUCOSE BLDC GLUCOMTR-MCNC: 140 MG/DL (ref 70–99)
GLUCOSE BLDC GLUCOMTR-MCNC: 147 MG/DL (ref 70–99)
GLUCOSE BLDC GLUCOMTR-MCNC: 168 MG/DL (ref 70–99)
GLUCOSE BLDC GLUCOMTR-MCNC: 168 MG/DL (ref 70–99)
GLUCOSE BLDC GLUCOMTR-MCNC: 240 MG/DL (ref 70–99)
GRAM STAIN RESULT: NORMAL
GRAM STAIN RESULT: NORMAL

## 2023-12-27 PROCEDURE — 87075 CULTR BACTERIA EXCEPT BLOOD: CPT | Performed by: ORTHOPAEDIC SURGERY

## 2023-12-27 PROCEDURE — 370N000017 HC ANESTHESIA TECHNICAL FEE, PER MIN: Performed by: ORTHOPAEDIC SURGERY

## 2023-12-27 PROCEDURE — 250N000011 HC RX IP 250 OP 636: Performed by: ANESTHESIOLOGY

## 2023-12-27 PROCEDURE — 272N000001 HC OR GENERAL SUPPLY STERILE: Performed by: ORTHOPAEDIC SURGERY

## 2023-12-27 PROCEDURE — 360N000075 HC SURGERY LEVEL 2, PER MIN: Performed by: ORTHOPAEDIC SURGERY

## 2023-12-27 PROCEDURE — 250N000011 HC RX IP 250 OP 636: Mod: JZ | Performed by: ANESTHESIOLOGY

## 2023-12-27 PROCEDURE — 99232 SBSQ HOSP IP/OBS MODERATE 35: CPT | Performed by: INTERNAL MEDICINE

## 2023-12-27 PROCEDURE — 87102 FUNGUS ISOLATION CULTURE: CPT | Performed by: ORTHOPAEDIC SURGERY

## 2023-12-27 PROCEDURE — 87070 CULTURE OTHR SPECIMN AEROBIC: CPT | Performed by: ORTHOPAEDIC SURGERY

## 2023-12-27 PROCEDURE — 250N000009 HC RX 250: Performed by: ANESTHESIOLOGY

## 2023-12-27 PROCEDURE — 999N000141 HC STATISTIC PRE-PROCEDURE NURSING ASSESSMENT: Performed by: ORTHOPAEDIC SURGERY

## 2023-12-27 PROCEDURE — 0LB50ZZ EXCISION OF RIGHT LOWER ARM AND WRIST TENDON, OPEN APPROACH: ICD-10-PCS | Performed by: ORTHOPAEDIC SURGERY

## 2023-12-27 PROCEDURE — 250N000013 HC RX MED GY IP 250 OP 250 PS 637: Performed by: ORTHOPAEDIC SURGERY

## 2023-12-27 PROCEDURE — 250N000011 HC RX IP 250 OP 636: Performed by: INTERNAL MEDICINE

## 2023-12-27 PROCEDURE — 250N000011 HC RX IP 250 OP 636: Performed by: NURSE ANESTHETIST, CERTIFIED REGISTERED

## 2023-12-27 PROCEDURE — 120N000001 HC R&B MED SURG/OB

## 2023-12-27 PROCEDURE — 250N000011 HC RX IP 250 OP 636: Performed by: ORTHOPAEDIC SURGERY

## 2023-12-27 PROCEDURE — 250N000013 HC RX MED GY IP 250 OP 250 PS 637: Performed by: PHYSICIAN ASSISTANT

## 2023-12-27 PROCEDURE — 258N000003 HC RX IP 258 OP 636: Performed by: ANESTHESIOLOGY

## 2023-12-27 PROCEDURE — 87205 SMEAR GRAM STAIN: CPT | Performed by: ORTHOPAEDIC SURGERY

## 2023-12-27 PROCEDURE — 99232 SBSQ HOSP IP/OBS MODERATE 35: CPT | Performed by: FAMILY MEDICINE

## 2023-12-27 RX ORDER — PROPOFOL 10 MG/ML
INJECTION, EMULSION INTRAVENOUS CONTINUOUS PRN
Status: DISCONTINUED | OUTPATIENT
Start: 2023-12-27 | End: 2023-12-27

## 2023-12-27 RX ORDER — SODIUM CHLORIDE 9 MG/ML
INJECTION, SOLUTION INTRAVENOUS CONTINUOUS PRN
Status: DISCONTINUED | OUTPATIENT
Start: 2023-12-27 | End: 2023-12-27

## 2023-12-27 RX ORDER — FENTANYL CITRATE 50 UG/ML
25-100 INJECTION, SOLUTION INTRAMUSCULAR; INTRAVENOUS
Status: DISCONTINUED | OUTPATIENT
Start: 2023-12-27 | End: 2023-12-27 | Stop reason: HOSPADM

## 2023-12-27 RX ORDER — SODIUM CHLORIDE, SODIUM LACTATE, POTASSIUM CHLORIDE, CALCIUM CHLORIDE 600; 310; 30; 20 MG/100ML; MG/100ML; MG/100ML; MG/100ML
INJECTION, SOLUTION INTRAVENOUS CONTINUOUS
Status: DISCONTINUED | OUTPATIENT
Start: 2023-12-27 | End: 2023-12-27 | Stop reason: HOSPADM

## 2023-12-27 RX ORDER — ONDANSETRON 2 MG/ML
INJECTION INTRAMUSCULAR; INTRAVENOUS PRN
Status: DISCONTINUED | OUTPATIENT
Start: 2023-12-27 | End: 2023-12-27

## 2023-12-27 RX ORDER — LIDOCAINE HYDROCHLORIDE 10 MG/ML
INJECTION, SOLUTION INFILTRATION; PERINEURAL PRN
Status: DISCONTINUED | OUTPATIENT
Start: 2023-12-27 | End: 2023-12-27

## 2023-12-27 RX ORDER — LIDOCAINE 40 MG/G
CREAM TOPICAL
Status: DISCONTINUED | OUTPATIENT
Start: 2023-12-27 | End: 2023-12-27 | Stop reason: HOSPADM

## 2023-12-27 RX ORDER — PROPOFOL 10 MG/ML
INJECTION, EMULSION INTRAVENOUS PRN
Status: DISCONTINUED | OUTPATIENT
Start: 2023-12-27 | End: 2023-12-27

## 2023-12-27 RX ORDER — BUPIVACAINE HYDROCHLORIDE 5 MG/ML
INJECTION, SOLUTION EPIDURAL; INTRACAUDAL
Status: COMPLETED | OUTPATIENT
Start: 2023-12-27 | End: 2023-12-27

## 2023-12-27 RX ADMIN — PROPOFOL 50 MG: 10 INJECTION, EMULSION INTRAVENOUS at 15:09

## 2023-12-27 RX ADMIN — PIPERACILLIN AND TAZOBACTAM 3.38 G: 3; .375 INJECTION, POWDER, LYOPHILIZED, FOR SOLUTION INTRAVENOUS at 02:57

## 2023-12-27 RX ADMIN — ACETAMINOPHEN 650 MG: 325 TABLET ORAL at 09:55

## 2023-12-27 RX ADMIN — BUPIVACAINE HYDROCHLORIDE 15 ML: 5 INJECTION, SOLUTION EPIDURAL; INTRACAUDAL; PERINEURAL at 14:54

## 2023-12-27 RX ADMIN — ONDANSETRON 4 MG: 2 INJECTION INTRAMUSCULAR; INTRAVENOUS at 15:40

## 2023-12-27 RX ADMIN — SODIUM CHLORIDE: 9 INJECTION, SOLUTION INTRAVENOUS at 15:04

## 2023-12-27 RX ADMIN — MEPIVACAINE HYDROCHLORIDE 10 ML: 15 INJECTION, SOLUTION EPIDURAL; INFILTRATION at 14:54

## 2023-12-27 RX ADMIN — ATORVASTATIN CALCIUM 10 MG: 10 TABLET, FILM COATED ORAL at 17:58

## 2023-12-27 RX ADMIN — FENTANYL CITRATE 50 MCG: 50 INJECTION, SOLUTION INTRAMUSCULAR; INTRAVENOUS at 14:50

## 2023-12-27 RX ADMIN — FAMOTIDINE 20 MG: 20 TABLET ORAL at 17:58

## 2023-12-27 RX ADMIN — PIPERACILLIN AND TAZOBACTAM 3.38 G: 3; .375 INJECTION, POWDER, LYOPHILIZED, FOR SOLUTION INTRAVENOUS at 20:34

## 2023-12-27 RX ADMIN — INSULIN ASPART 1 UNITS: 100 INJECTION, SOLUTION INTRAVENOUS; SUBCUTANEOUS at 17:56

## 2023-12-27 RX ADMIN — PROPOFOL 150 MCG/KG/MIN: 10 INJECTION, EMULSION INTRAVENOUS at 15:07

## 2023-12-27 RX ADMIN — PIPERACILLIN AND TAZOBACTAM 3.38 G: 3; .375 INJECTION, POWDER, LYOPHILIZED, FOR SOLUTION INTRAVENOUS at 11:07

## 2023-12-27 RX ADMIN — ACETAMINOPHEN 650 MG: 325 TABLET ORAL at 20:37

## 2023-12-27 RX ADMIN — PROPOFOL 30 MG: 10 INJECTION, EMULSION INTRAVENOUS at 15:11

## 2023-12-27 RX ADMIN — LIDOCAINE HYDROCHLORIDE 3 ML: 10 INJECTION, SOLUTION INFILTRATION; PERINEURAL at 15:08

## 2023-12-27 ASSESSMENT — ACTIVITIES OF DAILY LIVING (ADL)
ADLS_ACUITY_SCORE: 27
ADLS_ACUITY_SCORE: 27
ADLS_ACUITY_SCORE: 25
ADLS_ACUITY_SCORE: 27
ADLS_ACUITY_SCORE: 29
ADLS_ACUITY_SCORE: 25
ADLS_ACUITY_SCORE: 25
ADLS_ACUITY_SCORE: 31
ADLS_ACUITY_SCORE: 29
ADLS_ACUITY_SCORE: 29
ADLS_ACUITY_SCORE: 31
ADLS_ACUITY_SCORE: 27

## 2023-12-27 ASSESSMENT — LIFESTYLE VARIABLES: TOBACCO_USE: 0

## 2023-12-27 NOTE — PLAN OF CARE
Goal Outcome Evaluation:  Patient is alert and oriented X4. Had PRN Tylenol for C/O headache and patient reported relief. IV antibiotics infusing. Voided adequate amount. Lovenox not given per Dr. Campuzano. NPO status since midnight for a procedure today. Call light within reach. Bed alarms activated for safety.

## 2023-12-27 NOTE — PROGRESS NOTES
[facility] MEDICINE PROGRESS NOTE      Identification/Summary: Amor Zavaleta is a 78 year old male with a history of insulin-dependent diabetes type 2, CKD stage IV, HTN, LBBB who presents on 12/22/2023 with continued pain and swelling to right wrist following a cat bite.  He was seen by orthopedic surgeon in his office 12/22 and thought that the infection was worsening on oral antibiotics.  Taken to surgery for irrigation and debridement the evening of 12/22.  He reports she is doing better today.  He lives alone, in an apartment with 3 flights of stairs.  He feels things are going well.  Decreased redness over the dorsum of the hand wrist and distal forearm today compared with yesterday.  Going back for irrigation/washout this afternoon     Assessment/plan:     Septic joint right wrist with likely extension to right elbow secondary to cat bite 12/2  Patient was evaluated at [facility] and admitted 12/11.  Received IV antibiotics along with washouts from orthopedic surgery.  Was discharged 12/20 with 7 days of Augmentin and metronidazole per infectious disease recommendations  [facility] requested [id number] for washout and IV antibiotics due to continued infection  Appreciate orthopedic recommendations regarding right elbow pain and edema-likely extension of septic joint.  Vancomycin and Zosyn initiated in the ER  Blood cultures,   Has been seen by infectious disease and IV antibiotics to continue pending cultures.  Patient went back to surgery for washout 12/25 and it is reported that he had 3 ruptured tendons.    Patient would likely benefit from TCU and this was recommended by therapies.  Plan: Per orthopedic surgery and infectious disease.  Back to surgery for washout and debridement this afternoon.     Decreased mobility secondary to long hospitalization  PT/OT-appreciate discharge recommendations for placement.     Right hand wounds from cat attack  Wound  care appreciated.     Diabetes type 2-insulin-dependent, blood sugars have been good  Insulin pump at home  States home dose is Lantus 56 units at night and 16 units NovoLog with meals  A1c of 8.9 12/11/2023  Home dose of Lantus 56 units ordered but was some hypoglycemia the -12/23 it was decreased to 35 units.  Continue home very high insulin sliding scale with meals no insulin at bedtime   Plan: Because he is going to surgery this afternoon will give him 10 units of Lantus this morning instead of 35    HTN  Currently normotensive at 106/59 with heart rate of 64.  Hold home antihypertensives for now     CKD stage IV  Baseline creatinine appears to be 1.2  Creatinine 1.6 at admission now 1.3 on 12/25  Plan: Check BMP in the morning     Code Status: Full Code  DVT prophylaxis: Lovenox      Disposition:  -Discharge barriers: Orthopedic intervention, IV abx, PT/OT and placement to TCU.       The patient's care was discussed with the Care Coordinator/, orthopedic surgery PA and Patient.      Hakan Delatorre MD  [facility]  Phone: #[phone]  Securely message with the Vocera Web Console (learn more here)  Text page via [employer] Paging/Directory     Interval History/Subjective:  Patient feels he is doing bit better today.  No concerns or issues    Physical Exam/Objective:  Temp:  [97.5  F (36.4  C)-98.3  F (36.8  C)] 98.1  F (36.7  C)  Pulse:  [58-70] 58  Resp:  [17-18] 17  BP: (127-151)/(63-85) 133/68  SpO2:  [90 %-98 %] 90 %  Body mass index is 28.21 kg/m .    GENERAL:  Alert, appears comfortable, in no acute distress, appears stated age   HEAD:  Normocephalic, without obvious abnormality, atraumatic   LUNGS:   Clear to auscultation bilaterally, no rales, rhonchi, or wheezing, symmetric chest rise on inhalation, respirations unlabored   HEART:  Regular rate and rhythm, S1 and S2 normal, no murmur, rub, or gallop    ABDOMEN:   Soft, non-tender    EXTREMITIES: Right hand incision appears clean and intact.  Definitely some decreased redness from yesterday   SKIN: Only minimal redness now around the incision   NEURO: Alert, appears cognitively intact, moves all four extremities freely   PSYCH: Cooperative, behavior is appropriate      Data reviewed today: I personally reviewed all new medications, labs, imaging/diagnostics reports over the past 24 hours. Pertinent findings include:    Imaging:   No results found for this or any previous visit (from the past 24 hour(s)).    Labs:  Most Recent 3 CBC's:  Recent Labs   Lab Test 12/25/23  0550 12/23/23  0617 12/22/23  1327   WBC 7.7 12.5* 11.1*   HGB 10.7* 10.5* 11.6*   MCV 96 96 97    395 459*     Most Recent 3 BMP's:  Recent Labs   Lab Test 12/27/23  1233 12/27/23  0626 12/27/23  0209 12/25/23  0633 12/25/23  0550 12/24/23  0701 12/24/23  0615 12/23/23  0632 12/23/23  0617 12/22/23  1640 12/22/23  1327 12/19/23  0734 12/19/23  0652   NA  --   --   --   --   --   --   --   --  136  --  136  --  133*   POTASSIUM  --   --   --   --   --   --   --   --  4.3  --  4.0  --  4.2   CHLORIDE  --   --   --   --   --   --   --   --  106  --  102  --  102   CO2  --   --   --   --   --   --   --   --  23  --  23  --  22   BUN  --   --   --   --   --   --   --   --  23.2*  --  32.6*  --  18.6   CR  --   --   --   --  1.27*  --  1.29*  --  1.41*  --  1.61*  --  1.19*   ANIONGAP  --   --   --   --   --   --   --   --  7  --  11  --  9   VLADIMIR  --   --   --   --   --   --   --   --  8.0*  --  9.0  --  8.1*   * 147* 168*   < >  --    < >  --    < > 130*   < > 98   < > 120*    < > = values in this interval not displayed.     Most Recent 2 LFT's:  Recent Labs   Lab Test 12/23/23  0617 12/11/23  1316   AST 23 27   ALT 9 45   ALKPHOS 41 120   BILITOTAL 0.5 1.1       Medications:   Personally Reviewed.  Medications      [Held by provider] amLODIPine  2.5 mg Oral Daily    [Held by provider] aspirin  325 mg Oral Daily     atorvastatin  10 mg Oral Daily    enoxaparin ANTICOAGULANT  40 mg Subcutaneous Q24H    famotidine  20 mg Oral Daily    insulin aspart  1-22 Units Subcutaneous TID w/meals    insulin glargine  35 Units Subcutaneous QAM AC    [Held by provider] lisinopril-hydrochlorothiazide  1 tablet Oral Daily    piperacillin-tazobactam  3.375 g Intravenous Q8H    sodium chloride (PF)  3 mL Intracatheter Q8H

## 2023-12-27 NOTE — ANESTHESIA CARE TRANSFER NOTE
Patient: Amor Zavaleta    Procedure: Procedure(s):  IRRIGATION AND DEBRIDEMENT, RIGHT WRIST       Diagnosis: Infection of right hand due to bite, initial encounter [S61.451A, L08.9]  Diagnosis Additional Information: No value filed.    Anesthesia Type:   General     Note:    Oropharynx: oropharynx clear of all foreign objects  Level of Consciousness: awake  Oxygen Supplementation: room air    Independent Airway: airway patency satisfactory and stable  Dentition: dentition unchanged  Vital Signs Stable: post-procedure vital signs reviewed and stable  Report to RN Given: handoff report given  Patient transferred to: Telemetry/Step Down Unit    Handoff Report: Identifed the Patient, Identified the Reponsible Provider, Reviewed the pertinent medical history, Discussed the surgical course, Reviewed Intra-OP anesthesia mangement and issues during anesthesia, Set expectations for post-procedure period and Allowed opportunity for questions and acknowledgement of understanding      Vitals:  Vitals Value Taken Time   /69 12/27/23 1627   Temp 36.4  C (97.6  F) 12/27/23 1625   Pulse 56 12/27/23 1627   Resp 20 12/27/23 1627   SpO2 96 % 12/27/23 1627       Electronically Signed By: CHIN Myers CRNA  December 27, 2023  4:28 PM

## 2023-12-27 NOTE — ANESTHESIA PROCEDURE NOTES
Supraclavicular Procedure Note    Pre-Procedure   Staff -        Anesthesiologist:  Willie Young MD       Performed By: anesthesiologist       Location: pre-op       Procedure Start/Stop Times: 12/27/2023 2:50 PM and 12/27/2023 2:54 PM       Pre-Anesthestic Checklist: patient identified, IV checked, site marked, risks and benefits discussed, informed consent, monitors and equipment checked, pre-op evaluation, at physician/surgeon's request and post-op pain management  Timeout:       Correct Patient: Yes        Correct Procedure: Yes        Correct Site: Yes        Correct Position: Yes        Correct Laterality: Yes        Site Marked: Yes  Procedure Documentation  Procedure: Supraclavicular       Laterality: right       Patient Position: supine       Patient Prep/Sterile Barriers: sterile gloves, mask       Skin prep: Chloraprep       Needle Type: other       Needle Gauge: 20.        Needle Length (Inches): 4        Ultrasound guided       1. Ultrasound was used to identify targeted nerve, plexus, vascular marker, or fascial plane and place a needle adjacent to it in real-time.       2. Ultrasound was used to visualize the spread of anesthetic in close proximity to the above referenced structure.       3. A permanent image is entered into the patient's record.       4. The visualized anatomic structures appeared normal.       5. There were no apparent abnormal pathologic findings.    Assessment/Narrative         The placement was negative for: blood aspirated, painful injection and site bleeding       Paresthesias: No.       Bolus given via needle..        Secured via.        Insertion/Infusion Method: Single Shot       Complications: none    Medication(s) Administered   Bupivacaine 0.5% PF (Infiltration) - Infiltration   15 mL - 12/27/2023 2:54:00 PM  Mepivacaine 1.5% PF (Perineural) - Perineural   10 mL - 12/27/2023 2:54:00 PM  Medication Administration Time: 12/27/2023 2:50 PM      FOR [facility]) ONLY:   Pain Team Contact information: please page the Pain Team Via [facility]. Search \"Pain\". During daytime hours, please page the attending first. At night please page the resident first.      "

## 2023-12-27 NOTE — PROGRESS NOTES
Care Management Follow Up    Length of Stay (days): 5    Expected Discharge Date: 12/28/2023     Concerns to be Addressed: discharge planning     Patient plan of care discussed at interdisciplinary rounds: Yes    Anticipated Discharge Disposition: Transitional Care     Anticipated Discharge Services: None  Anticipated Discharge DME: None    Patient/family educated on Medicare website which has current facility and service quality ratings: yes  Education Provided on the Discharge Plan: Yes  Patient/Family in Agreement with the Plan: yes    Referrals Placed by CM/SW:    Private pay costs discussed: Not applicable    Additional Information:    Per direct report from Orthopedics PA, pt has repeat I&D today, potentially may need I&D every other day for infection control.    [facility] confirms they can accept this pt to TCU.  Update given to Devora: pt not ready for discharge today.  CM to keep Diontrae updated regarding discharge plans/check on TCU bed availability.    PAS done 12/24.    Likely needs [facility] transport.    CM to keep sister Melba Shaffer updated on discharge plans.    CM will continue to follow.    2:18 PM  [facility] - Confirmed with Adrien, they had accepted pt for RN services after his recent discharge from [facility], but pt was not yet opened with their agency.  They would need new orders at discharge if pt discharge home with homecare.  Updated Adrien - planning discharge to TCU.    Modesto Ariza RN

## 2023-12-27 NOTE — ANESTHESIA PREPROCEDURE EVALUATION
Anesthesia Pre-Procedure Evaluation    Patient: Amor Zavaleta   MRN: [id number] : 1945                  Past Medical History:   Diagnosis Date    Diabetes (H)     Sleep apnea       Past Surgical History:   Procedure Laterality Date    INCISION AND DRAINAGE UPPER EXTREMITY, COMBINED Right 2023    Procedure: INCISION AND DRAINAGE, RIGHT UPPER FOREARM;  Surgeon: Leah Leyva MD;  Location: [facility] OR    INCISION AND DRAINAGE UPPER EXTREMITY, COMBINED Right 2023    Procedure: INCISION AND DRAINAGE, UPPER EXTREMITY;  Surgeon: Leah Leyva MD;  Location: [facility] OR    IRRIGATION AND DEBRIDEMENT UPPER EXTREMITY, COMBINED Right 2023    Procedure: IRRIGATION AND DEBRIDEMENT, RIGHT WRIST;  Surgeon: Carla Austin MD;  Location: [facility] OR    IRRIGATION AND DEBRIDEMENT UPPER EXTREMITY, COMBINED Right 2023    Procedure: IRRIGATION AND DEBRIDEMENT, RIGHT WRIST;  Surgeon: Carla Austin MD;  Location: [facility] Main OR      Allergies   Allergen Reactions    Sulfa Antibiotics Hives      Social History     Tobacco Use    Smoking status: Never    Smokeless tobacco: Never   Substance Use Topics    Alcohol use: Not Currently      Wt Readings from Last 1 Encounters:   23 94.3 kg (208 lb)        Anesthesia Evaluation   Pt has had prior anesthetic.     No history of anesthetic complications       ROS/MED HX  ENT/Pulmonary:     (+) sleep apnea,                                    (-) tobacco use   Neurologic:  - neg neurologic ROS   (+)    peripheral neuropathy,                            Cardiovascular: Comment: LBBB.    (+)  hypertension- -   -  - -                          Irregular Heartbeat/Palpitations,            METS/Exercise Tolerance:     Hematologic:     (+)      anemia,          Musculoskeletal:  - neg musculoskeletal ROS     GI/Hepatic:     (+) GERD, Asymptomatic on medication,               (-) hiatal hernia and esophageal disease   Renal/Genitourinary:  "    (+) renal disease, type: CRI,            Endo:     (+)  type II DM,       Diabetic complications: nephropathy neuropathy retinopathy.             Psychiatric/Substance Use:  - neg psychiatric ROS     Infectious Disease: Comment: UE infection.    (+) Recent Fever,           Malignancy:  - neg malignancy ROS     Other:  - neg other ROS          Physical Exam    Airway        Mallampati: I   TM distance: > 3 FB   Neck ROM: full   Mouth opening: > 3 cm    Respiratory Devices and Support         Dental       (+) Multiple visibly decayed, broken teeth      Cardiovascular   cardiovascular exam normal       Rhythm and rate: regular and normal     Pulmonary   pulmonary exam normal        breath sounds clear to auscultation           OUTSIDE LABS:  CBC:   Lab Results   Component Value Date    WBC 7.7 12/25/2023    WBC 12.5 (H) 12/23/2023    HGB 10.7 (L) 12/25/2023    HGB 10.5 (L) 12/23/2023    HCT 32.7 (L) 12/25/2023    HCT 32.3 (L) 12/23/2023     12/25/2023     12/23/2023     BMP:   Lab Results   Component Value Date     12/23/2023     12/22/2023    POTASSIUM 4.3 12/23/2023    POTASSIUM 4.0 12/22/2023    CHLORIDE 106 12/23/2023    CHLORIDE 102 12/22/2023    CO2 23 12/23/2023    CO2 23 12/22/2023    BUN 23.2 (H) 12/23/2023    BUN 32.6 (H) 12/22/2023    CR 1.27 (H) 12/25/2023    CR 1.29 (H) 12/24/2023     (H) 12/27/2023     (H) 12/27/2023     COAGS: No results found for: \"PTT\", \"INR\", \"FIBR\"  POC: No results found for: \"BGM\", \"HCG\", \"HCGS\"  HEPATIC:   Lab Results   Component Value Date    ALBUMIN 2.5 (L) 12/23/2023    PROTTOTAL 6.1 (L) 12/23/2023    ALT 9 12/23/2023    AST 23 12/23/2023    ALKPHOS 41 12/23/2023    BILITOTAL 0.5 12/23/2023     OTHER:   Lab Results   Component Value Date    PH 7.46 (H) 12/11/2023    LACT 1.2 12/12/2023    A1C 8.9 (H) 12/11/2023    VLADIMIR 8.0 (L) 12/23/2023    PHOS 2.7 07/07/2023    MAG 1.9 12/19/2023    SED 61 (H) 12/11/2023       Anesthesia Plan    ASA " Status:  3    NPO Status:  NPO Appropriate    Anesthesia Type: General.     - Airway: Mask Only      Maintenance: TIVA.        Consents    Anesthesia Plan(s) and associated risks, benefits, and realistic alternatives discussed. Questions answered and patient/representative(s) expressed understanding.     - Discussed:     - Discussed with:  Patient      - Extended Intubation/Ventilatory Support Discussed: No.      - Patient is DNR/DNI Status: No     Use of blood products discussed: No .     Postoperative Care    Pain management: Peripheral nerve block (Single Shot), Multi-modal analgesia.   PONV prophylaxis: Ondansetron (or other 5HT-3)     Comments:               Darrian Haney MD    I have reviewed the pertinent notes and labs in the chart from the past 30 days and (re)examined the patient.  Any updates or changes from those notes are reflected in this note.

## 2023-12-27 NOTE — OP NOTE
Date of Surgery  12/27/2023    Preoperative diagnosis:   1.  Right dorsal forearm polymicrobial infection with septic extensor tenosynovitis.   2.  Right ECRL and ECRB tendon ruptures  3.  Right EPL tendon rupture    Postoperative diagnosis:   1.  Right dorsal forearm polymicrobial infection with extensor tenosynovitis  2.  Right ECRL and ECRB tendon ruptures  3.  Right EPL tendon rupture     Operation/procedures performed:   1.  Irrigation and sharp, excisional debridement of wound of right dorsal forearm, wrist, hand and extensor compartments 1, 2, 3, 4      Findings:  Ongoing purulence noted in the dorsal forearm.  Secondary ruptures of the ECRL, ECRB and EPL tendons.  First extensor compartment muscles remain intact and viable.  Fourth extensor compartment muscles show some slight myositis but no obvious necrosis or infection.  Fifth extensor compartment intact without purulence    Surgeon:   Dr. ZELALEM Burkett    Assistant:   Hayley Parry PA-C was required for intraoperative patient positioning, soft tissue retraction, instrument assist, closure, splinting, and patient safety.      Anesthesia:   MAC and regional block    History of present illness and indications:   78-year-old male with history of type 2 diabetes sustained cat bites and scratches to his right dorsal forearm on 12/2/2023.  Unfortunately he developed septic extensor tenosynovitis.  Initially was treated at [facility] and underwent irrigation debridement in the operating room with Dr. Leyva on 12/11/2023 and 12/13/2023.  Cultures grew polymicrobial infection.  He was treated on IV antibiotics and discharged home on p.o. antibiotics on 12/20/2023.  He returned to clinic with Dr. Carla Austin who noted recurrence of infection.  He was taken back to the operating room on 12/22/2023.  Repeat irrigation debridement was performed on 12/25/2023.  At that time it was noted that he had had infection related attritional ruptures of the EPL, ECRL,  and ECRB.  He has been in the hospital since on IV antibiotics.  I was asked to perform repeat irrigation debridement given his ongoing infection.    We discussed the risks and benefits of the procedure which include but are not limited to need for more surgery, need for skin grafting, need for reconstruction, ongoing pain, infection, bleeding, injury to blood vessels, tendons, nerves, scarring, and stiffness.  All questions were answered and operative consents were signed preoperatively.    Procedure note:  Patient was greeted in preoperative hold.  Consent was obtained.  Surgical site was marked.  He was brought to the operating room.  Anesthesia was administered.  Right upper extremity had a tourniquet applied.  It was then prepped and draped in standard sterile fashion.  Surgical timeout was performed.    Previous sutures were removed and discarded along with the instruments.  Right upper extremity was elevated and exsanguinated Esmarch.  Tourniquet was inflated 250 mmHg.    There was gross purulence noted from the incision mostly at the mid and proximal aspect.  The incision was bluntly opened.  Skin flaps were developed with blunt dissection radially and ulnarly.  The radial aspect of the sensory branch of the radial nerve was identified and protected for the remainder of the case.  Majority of purulence was noted within the defect where the attritional ruptures had occurred in the second and third compartment.  Culture swabs were obtained and sent for anaerobic and aerobic tissue.    Incision was extended 2 cm proximally.  Debridement was performed next.  There was fascial thickening of the extensor fascia that was sharply excised over the second and third compartments.  The first extensor compartment was intact without any signs of purulence or necrosis.  Second compartment had ruptures of both ECRL and ECRB.  There was breakdown of the tendon stump with purulent material around it.  The stump was then  grasped and pulled distally while retracting the first extensor compartment.  Normal appearing tendon was identified and a 15 blade was used to sharply excise the abnormal tendon.  This was done for both the ECRL and ECRB and EPL.  Distally the same was done at the distal stumps over the dorsum of the hand.  The fourth extensor compartment muscle showed some myositis reaction but no necrosis and no purulence within the compartment.  The extensor retinaculum appeared intact over the top.  There was no purulence distal to the extensor retinaculum.  Fifth extensor compartment was opened for a short distance and the tendon found to be intact with no obvious purulence.  The phlegmon on the dorsal aspect of the distal radius was debrided with a rongeur.  Skin edges were debrided.  At this point there was no further obvious devitalized or infected tissue.      Of note there was no obvious signs of pressure or swelling at the wrist joint itself.    The wound was then thoroughly irrigated with 3000 cc normal saline.  Sponge was used to perform mechanical debridement with rubbing on the skin and fascia muscle and bone.    The tourniquet was left down.  Sponge was placed in the wound with a towel and the arm was wrapped for 5 minutes for hemostasis.  This was then removed and remaining hemostasis was achieved with bipolar cautery.  2 segments of quarter inch Penrose drain were placed in the wound.  1 comes out the distal aspect the incision and one comes out centrally.  The incision was closed with 3-0 nylon interrupted simple fashion.  Sterile soft dressing was applied.  There were no complications.  He was taken to PACU without any issues.      Post Op Plan  1.  Pain control.  2.  Continue IV Abx per ID recommendation  -Currently on Zosyn  -Follow-up on today's intraoperative cultures  4.  Elevate right upper extremity.  5.  Warm soaks to right upper extremity incision, starting POD1 tomorrow morning  6.  Pull drains on  POD2  7. Social work consult/Care coordination consult, please coordinate with sister Penelope Shaffer [phone]  8.  Likely will require at least 1 more trip to the operating room in 2 to 3 days for repeat irrigation debridement given the ongoing gross purulence encountered today.    Erik Burkett MD  12/27/2023  4:03 PM

## 2023-12-27 NOTE — BRIEF OP NOTE
[facility]     Brief Operative Note    Pre-operative diagnosis: Right wrist extensor septic tenosynovitis, infection of right hand due to bite, initial encounter [S61.451A, L08.9]  Post-operative diagnosis Same as pre-operative diagnosis    Procedure: IRRIGATION AND DEBRIDEMENT, RIGHT WRIST, Right - Arm    Surgeon: Surgeon(s) and Role:     * Erik Burkett MD - Primary     * Hayley Parry PA-C - Assisting  Anesthesia: MAC with Block   Estimated Blood Loss: Less than 10 ml    Drains: 2 Penrose drains coming out of the incision  Specimens:   ID Type Source Tests Collected by Time Destination   A : Right Wrist Swab Tissue Wrist, Right ANAEROBIC BACTERIAL CULTURE ROUTINE, GRAM STAIN, FUNGAL OR YEAST CULTURE ROUTINE, AEROBIC BACTERIAL CULTURE ROUTINE Erik Burkett MD 12/27/2023  3:21 PM      Findings:   Ongoing signs of infection with purulent material about the second and third compartments and ruptured tendon stumps .  Complications: None.  Implants: * No implants in log *

## 2023-12-28 LAB
CREAT SERPL-MCNC: 1.25 MG/DL (ref 0.67–1.17)
EGFRCR SERPLBLD CKD-EPI 2021: 59 ML/MIN/1.73M2
GLUCOSE BLDC GLUCOMTR-MCNC: 137 MG/DL (ref 70–99)
GLUCOSE BLDC GLUCOMTR-MCNC: 163 MG/DL (ref 70–99)
GLUCOSE BLDC GLUCOMTR-MCNC: 191 MG/DL (ref 70–99)
GLUCOSE BLDC GLUCOMTR-MCNC: 195 MG/DL (ref 70–99)
GLUCOSE BLDC GLUCOMTR-MCNC: 241 MG/DL (ref 70–99)
PLATELET # BLD AUTO: 339 10E3/UL (ref 150–450)

## 2023-12-28 PROCEDURE — 85049 AUTOMATED PLATELET COUNT: CPT | Performed by: ORTHOPAEDIC SURGERY

## 2023-12-28 PROCEDURE — 36415 COLL VENOUS BLD VENIPUNCTURE: CPT | Performed by: ORTHOPAEDIC SURGERY

## 2023-12-28 PROCEDURE — 99232 SBSQ HOSP IP/OBS MODERATE 35: CPT | Performed by: FAMILY MEDICINE

## 2023-12-28 PROCEDURE — 250N000013 HC RX MED GY IP 250 OP 250 PS 637: Performed by: ORTHOPAEDIC SURGERY

## 2023-12-28 PROCEDURE — 82565 ASSAY OF CREATININE: CPT | Performed by: ORTHOPAEDIC SURGERY

## 2023-12-28 PROCEDURE — 120N000001 HC R&B MED SURG/OB

## 2023-12-28 PROCEDURE — 250N000011 HC RX IP 250 OP 636: Performed by: ORTHOPAEDIC SURGERY

## 2023-12-28 PROCEDURE — 99232 SBSQ HOSP IP/OBS MODERATE 35: CPT | Performed by: INTERNAL MEDICINE

## 2023-12-28 RX ADMIN — ATORVASTATIN CALCIUM 10 MG: 10 TABLET, FILM COATED ORAL at 08:25

## 2023-12-28 RX ADMIN — ENOXAPARIN SODIUM 40 MG: 100 INJECTION SUBCUTANEOUS at 06:38

## 2023-12-28 RX ADMIN — FAMOTIDINE 20 MG: 20 TABLET ORAL at 08:25

## 2023-12-28 RX ADMIN — HYDROXYZINE HYDROCHLORIDE 10 MG: 10 TABLET ORAL at 03:57

## 2023-12-28 RX ADMIN — PIPERACILLIN AND TAZOBACTAM 3.38 G: 3; .375 INJECTION, POWDER, LYOPHILIZED, FOR SOLUTION INTRAVENOUS at 02:11

## 2023-12-28 RX ADMIN — ACETAMINOPHEN 650 MG: 325 TABLET ORAL at 13:41

## 2023-12-28 RX ADMIN — OXYCODONE HYDROCHLORIDE 5 MG: 5 TABLET ORAL at 03:57

## 2023-12-28 RX ADMIN — PIPERACILLIN AND TAZOBACTAM 3.38 G: 3; .375 INJECTION, POWDER, LYOPHILIZED, FOR SOLUTION INTRAVENOUS at 19:10

## 2023-12-28 RX ADMIN — INSULIN GLARGINE 35 UNITS: 100 INJECTION, SOLUTION SUBCUTANEOUS at 06:38

## 2023-12-28 RX ADMIN — INSULIN ASPART 11 UNITS: 100 INJECTION, SOLUTION INTRAVENOUS; SUBCUTANEOUS at 13:19

## 2023-12-28 RX ADMIN — ACETAMINOPHEN 650 MG: 325 TABLET ORAL at 03:57

## 2023-12-28 RX ADMIN — INSULIN ASPART 3 UNITS: 100 INJECTION, SOLUTION INTRAVENOUS; SUBCUTANEOUS at 07:47

## 2023-12-28 RX ADMIN — PIPERACILLIN AND TAZOBACTAM 3.38 G: 3; .375 INJECTION, POWDER, LYOPHILIZED, FOR SOLUTION INTRAVENOUS at 11:17

## 2023-12-28 ASSESSMENT — ACTIVITIES OF DAILY LIVING (ADL)
ADLS_ACUITY_SCORE: 25

## 2023-12-28 NOTE — PROGRESS NOTES
"Orthopedic Progress Note      Assessment: 1 Day Post-Op  S/P Procedure(s):  IRRIGATION AND DEBRIDEMENT, RIGHT WRIST @    Plan:   - Pain control  - Continue IV antibiotics per ID  - elevate right upper extremity  - warm soaks started this morning will start another this afternoon  - Pull drains On POD2  - likely will need one more surgery that will likely happen Friday afternoon or this weekend. Pending scheduling     - Surgery scheduled for Saturday morning.      Subjective:  Pain: mild to moderate  Chest pain, SOB: no  Nausea, Vomiting:  no  Lightheadedness, Dizziness:  no  Neuro:  Patient denies new onset numbness or paresthesias    Patient is doing well this morning notes that he had a soak this morning which was somewhat painful.  They rewrapped his dressing I opted to not undress and redress it due to his pain that he was occurring this morning.  I will reevaluate him this afternoon and undress start a soak and evaluate the wound at that time.  Overall doing well    Objective:  /61 (BP Location: Left arm)   Pulse 58   Temp 98  F (36.7  C) (Oral)   Resp 16   Ht 1.829 m (6')   Wt 94.3 kg (208 lb)   SpO2 93%   BMI 28.21 kg/m    The patient is A&Ox3. Appears comfortable.   Sensation is intact.  Able to wiggle his fingers  Radial pulse intact.  Calves are soft and non-tender. Negative Keya's.  The incision is covered. Dressing C/D/I.    Pertinent Labs   Lab Results: personally reviewed.   No results found for: \"INR\", \"PROTIME\"  Lab Results   Component Value Date    WBC 7.7 12/25/2023    HGB 10.7 (L) 12/25/2023    HCT 32.7 (L) 12/25/2023    MCV 96 12/25/2023     12/28/2023     Lab Results   Component Value Date     12/23/2023    CO2 23 12/23/2023         Report completed by:  Socorro Shabazz PA-C/Dr. [facility]    Date: 12/28/2023  Time: 9:01 AM    "

## 2023-12-28 NOTE — PROGRESS NOTES
[facility] MEDICINE PROGRESS NOTE      Identification/Summary: Amor Zavaleta is a 78 year old male with a history of insulin-dependent diabetes type 2, CKD stage IV, HTN, LBBB who presents on 12/22/2023 with continued pain and swelling to right wrist following a cat bite.  He was seen by orthopedic surgeon in his office 12/22 and thought that the infection was worsening on oral antibiotics.  Taken to surgery for irrigation and debridement the evening of 12/22.  He reports she is doing better today.  He lives alone, in an apartment with 3 flights of stairs.  He feels things are going well.  Decreased redness over the dorsum of the hand wrist and distal forearm 12/27 compared with 12/26.  Going back for irrigation/washout probably tomorrow.     Assessment/plan:     Septic joint right wrist with likely extension to right elbow secondary to cat bite 12/2  Patient was evaluated at [facility] and admitted 12/11.  Received IV antibiotics along with washouts from orthopedic surgery.  Was discharged 12/20 with 7 days of Augmentin and metronidazole per infectious disease recommendations  [facility] orthopedic requested [id number] for washout and IV antibiotics due to continued infection  Appreciate orthopedic recommendations regarding right elbow pain and edema-likely extension of septic joint.  Vancomycin and Zosyn initiated in the ER  Blood cultures,   Has been seen by infectious disease and IV antibiotics to continue pending cultures.  Patient went back to surgery for washout 12/25 and it is reported that he had 3 ruptured tendons.    Patient would likely benefit from TCU and this was recommended by therapies.  Plan: Per orthopedic surgery and infectious disease.  Back to surgery for washout and debridement this likely tomorrow.  Things moving ahead and reasonable chance in the next 2 to 3 days he could go to the TCU     Decreased mobility secondary to long  hospitalization  PT/OT-appreciate discharge recommendations for placement.     Right hand wounds from cat attack  Wound care appreciated.     Diabetes type 2-insulin-dependent, blood sugars have been good  Insulin pump at home  States home dose is Lantus 56 units at night and 16 units NovoLog with meals  A1c of 8.9 12/11/2023  Home dose of Lantus 56 units ordered but was some hypoglycemia the -12/23 it was decreased to 35 units.  Continue home very high insulin sliding scale with meals no insulin at bedtime   Plan: Because he is going to surgery this afternoon will give him 10 units of Lantus this morning instead of 35     HTN  Currently normotensive at 106/59 with heart rate of 64.  Hold home antihypertensives for now     CKD stage IV  Baseline creatinine appears to be 1.2  Creatinine 1.6 at admission now 1.3 on 12/25  Plan: Check BMP in the morning     Code Status: Full Code  DVT prophylaxis: Lovenox      Disposition:  -Discharge barriers: Orthopedic intervention, IV abx, PT/OT and placement to TCU.       The patient's care was discussed with the Care Coordinator/, orthopedic surgery PA and Patient.      The patient's care was discussed with the Bedside Nurse, Care Coordinator/, Patient, and orthopedic PA .    Hakan Delatorre MD  [facility]  Phone: #[phone]  Securely message with the Vocera Web Console (learn more here)  Text page via [employer] Paging/Directory     Interval History/Subjective:  Patient feels like he is doing fine with less pain in his hand and he thinks it looks better after soaking this morning.  No other concerns or issues    Physical Exam/Objective:  Temp:  [97.3  F (36.3  C)-98.2  F (36.8  C)] 98  F (36.7  C)  Pulse:  [54-66] 58  Resp:  [12-20] 16  BP: (122-167)/(61-77) 122/61  SpO2:  [92 %-100 %] 93 %  Body mass index is 28.21 kg/m .    GENERAL:  Alert, appears comfortable, in no acute distress, appears  "stated age   HEAD:  Normocephalic, without obvious abnormality, atraumatic   LUNGS:   Clear to auscultation bilaterally, no rales, rhonchi, or wheezing, symmetric chest rise on inhalation, respirations unlabored   HEART:  Regular rate and rhythm, S1 and S2 normal, no murmur, rub, or gallop    ABDOMEN:   Soft, non-tender   EXTREMITIES: Right hand covered with dressing   NEURO: Alert, appears cognitively intact, moves all four extremities freely   PSYCH: Cooperative, behavior is appropriate         Data reviewed today: I personally reviewed all new medications, labs, imaging/diagnostics reports over the past 24 hours. Pertinent findings include:    Imaging:   Recent Results (from the past 24 hour(s))   POC US Guidance Needle Placement    Narrative    Ultrasound was performed as guidance to an anesthesia procedure.  Click   \"PACS images\" hyperlink below to view any stored images.  For specific   procedure details, view procedure note authored by anesthesia.       Labs:  Most Recent 3 CBC's:  Recent Labs   Lab Test 12/28/23  0553 12/25/23  0550 12/23/23  0617 12/22/23  1327   WBC  --  7.7 12.5* 11.1*   HGB  --  10.7* 10.5* 11.6*   MCV  --  96 96 97    360 395 459*     Most Recent 3 BMP's:  Recent Labs   Lab Test 12/28/23  1318 12/28/23  1131 12/28/23  0632 12/28/23  0553 12/25/23  0633 12/25/23  0550 12/24/23  0701 12/24/23  0615 12/23/23  0632 12/23/23  0617 12/22/23  1640 12/22/23  1327 12/19/23  0734 12/19/23  0652   NA  --   --   --   --   --   --   --   --   --  136  --  136  --  133*   POTASSIUM  --   --   --   --   --   --   --   --   --  4.3  --  4.0  --  4.2   CHLORIDE  --   --   --   --   --   --   --   --   --  106  --  102  --  102   CO2  --   --   --   --   --   --   --   --   --  23  --  23  --  22   BUN  --   --   --   --   --   --   --   --   --  23.2*  --  32.6*  --  18.6   CR  --   --   --  1.25*  --  1.27*  --  1.29*  --  1.41*  --  1.61*  --  1.19*   ANIONGAP  --   --   --   --   --   --   --  "  --   --  7  --  11  --  9   VLADIMIR  --   --   --   --   --   --   --   --   --  8.0*  --  9.0  --  8.1*   * 195* 163*  --    < >  --    < >  --    < > 130*   < > 98   < > 120*    < > = values in this interval not displayed.       Medications:   Personally Reviewed.  Medications      [Held by provider] amLODIPine  2.5 mg Oral Daily    [Held by provider] aspirin  325 mg Oral Daily    atorvastatin  10 mg Oral Daily    enoxaparin ANTICOAGULANT  40 mg Subcutaneous Q24H    famotidine  20 mg Oral Daily    insulin aspart  1-22 Units Subcutaneous TID w/meals    insulin glargine  35 Units Subcutaneous QAM AC    [Held by provider] lisinopril-hydrochlorothiazide  1 tablet Oral Daily    piperacillin-tazobactam  3.375 g Intravenous Q8H    sodium chloride (PF)  3 mL Intracatheter Q8H

## 2023-12-28 NOTE — PLAN OF CARE
Goal Outcome Evaluation:      Plan of Care Reviewed With: patient    Overall Patient Progress: improvingOverall Patient Progress: improving    Outcome Evaluation: Pt alert and oriented x 4. Dressing removed for hipclense soap x 2 on the day shift. Then redressed per ortho's order. Pain controlled with PRN tylenol. Blood sugars 163, 241 this shift, a high insulin sliding scale is being utlized. Plan for another wash out 12/30 at 8AM.      Problem: Infection  Goal: Absence of Infection Signs and Symptoms  Outcome: Progressing     Problem: Pain Acute  Goal: Optimal Pain Control and Function  Outcome: Progressing  Intervention: Develop Pain Management Plan  Recent Flowsheet Documentation  Taken 12/28/2023 1341 by Be Meneses, RN  Pain Management Interventions:   medication (see MAR)   rest  Taken 12/28/2023 1120 by Be Meneses RN  Pain Management Interventions: medication offered but refused  Intervention: Prevent or Manage Pain  Recent Flowsheet Documentation  Taken 12/28/2023 0800 by Be Meneses, RN  Medication Review/Management: medications reviewed     Be Meneses RN,ONC

## 2023-12-28 NOTE — ADDENDUM NOTE
Addendum  created 12/28/23 0728 by Jovanna Henry MD    Attestation recorded in Intraprocedure, Intraprocedure Attestations filed

## 2023-12-28 NOTE — PROGRESS NOTES
Care Management Follow Up    Length of Stay (days): 6    Expected Discharge Date: 12/28/2023     Concerns to be Addressed: discharge planning     Patient plan of care discussed at interdisciplinary rounds: Yes    Anticipated Discharge Disposition: Transitional Care     Anticipated Discharge Services: None  Anticipated Discharge DME: None    Patient/family educated on Medicare website which has current facility and service quality ratings: yes  Education Provided on the Discharge Plan: Yes  Patient/Family in Agreement with the Plan: yes    Referrals Placed by CM/SW: Post Acute Facilities  Private pay costs discussed: transportation costs    Additional Information:    Per direct report from Orthopedics PA, anticipate repeat I&D tomorrow or weekend, pending scheduling.    ID is following.    Anticipate discharge to TCU.    [facility] - [facility] confirms they can accept this pt to TCU.  Update given to Devora: pt not ready for discharge today.  CM to keep Dion[facility] updated regarding discharge plans/check on TCU bed availability.    [facility] (had accepted this pt for RN services prior to admit) - Notified [facility] 12/27 - planning discharge to TCU.    PAS done 12/24.    Update given to pt, who confirms he is agreeable to [facility]CU at discharge.  Sister Melba Shaffer - Updated on discharge plans.    Pt and sister Melba Shaffer authorize [facility] wheelchair transport at discharge.     CM will continue to follow.    Modesto Ariza RN

## 2023-12-28 NOTE — ANESTHESIA POSTPROCEDURE EVALUATION
Patient: Amor Zavaleta    Procedure: Procedure(s):  IRRIGATION AND DEBRIDEMENT, RIGHT WRIST       Anesthesia Type:  General    Note:  Disposition: Inpatient   Postop Pain Control:             Sign Out: Well controlled pain   PONV: No   Neuro/Psych:             Sign Out: Acceptable/Baseline neuro status   Airway/Respiratory:             Sign Out: Acceptable/Baseline resp. status   CV/Hemodynamics:             Sign Out: Acceptable CV status   Other NRE: NONE   DID A NON-ROUTINE EVENT OCCUR?            Last vitals:  Vitals:    12/27/23 1627 12/27/23 1655 12/27/23 1725   BP: (!) 143/69 139/73 (!) 167/77   Pulse: 56 56 54   Resp: 20 18 18   Temp:  36.3  C (97.4  F) 36.3  C (97.4  F)   SpO2: 96% 92% 92%       Electronically Signed By: Yasmany Ayala MD  December 27, 2023  6:03 PM

## 2023-12-28 NOTE — PROGRESS NOTES
Infectious Diseases Progress Note  [facility]     12/28/2023     ASSESSMENT   78-year-old man with a history of diabetes readmitted for right wrist infection secondary to cat bite.    Right wrist infection.  Active issue, hospitalized recently for wrist infection following cat bite/scratch.  Debridement on 12/11 and 12/13.  Polymicrobial cultures with MSSA, Pasteurella, and several anaerobic bacteria.  Discharged on Augmentin and Flagyl, but readmitted 2 days later after signs of persistent infection (see next)  Persistent right wrist infection.  Seen in follow-up in orthopedics clinic on 12/22.  Due to appearance of wound, patient was sent to the hospital for debridement which occurred on 12/22 - no growth on cultures. Repeat debridement on 12/25 with purulence and ruptured tendons, new cultures collected.  Wbc improved   Debridement 12-27 2023: Ongoing signs of infection with purulent material about the second and third compartments and ruptured tendon stumps .     Principal Problem:    Infection of right hand due to bite, initial encounter       PLAN   -continue and Pipercillin/tazobactam  -follow-up on cultures from surgery - so far cultures have been neg  -Final antibiotic plan will depend on clinical improvement  -wound care per orthopedics  -Plan for repeat debridement on 12/30/2023  -please see previous notes by Jacinto Vasquez MD. Patient new to me on 12/27/2023  -Updated patient    Jyoti Decker MD  [facility]  Answering Service: [phone]  On-Call ID provider: [phone], option: 9    ===========================================  Previous photo from 12/22/2023:    SUBJECTIVE / INTERVAL HISTORY:   Doing better, several questions  Previous notes reviewed  Soaking hand  Previous note:  Seen briefly today while soaking his wrist.      Antibiotics   Zosyn 12/22-    Previous:  Perioperative cefazolin  Augmentin and Flagyl prior to arrival  Vancomycin  12/22-12/23    Physical Exam     Temp:  [97.3  F (36.3  C)-98.2  F (36.8  C)] 98  F (36.7  C)  Pulse:  [54-66] 58  Resp:  [12-20] 16  BP: (122-167)/(61-77) 122/61  SpO2:  [92 %-100 %] 93 %    /61 (BP Location: Left arm)   Pulse 58   Temp 98  F (36.7  C) (Oral)   Resp 16   Ht 1.829 m (6')   Wt 94.3 kg (208 lb)   SpO2 93%   BMI 28.21 kg/m      GENERAL:  well-developed, well-nourished, lying in bed in no acute distress.   LUNGS:  normal resp pattern  EXT: Extremities warm.  Right arm swelling.  Right wrist incision intact, large penrose drain in place. Significant swelling all throughout wrist.   SKIN:  No acute rashes.       Cultures   Previous cultures reviewed  12/22 right wrist cultures x 2: No growth to date; no organisms on Gram stain  12/25 wrist cultures no growth to date     Susceptibility data from last 90 days.  Collected Specimen Info Organism Ampicillin Azithromycin Ceftriaxone Clindamycin Daptomycin Doxycycline Erythromycin Gentamicin Levofloxacin Oxacillin Penicillin Tetracycline Trimethoprim/Sulfamethoxazole  Vancomycin   12/11/23 Tissue from Wrist, Right Bacteroides pyogenes                 12/11/23 Tissue from Wrist, Right Staphylococcus aureus     S  S  S  S  S   S   S  S  S     Pasteurella multocida                 12/11/23 Tissue from Hand, Right Fusobacterium nucleatum                   Bacteroides pyogenes                 12/11/23 Tissue from Hand, Right Pasteurella multocida                   Staphylococcus aureus                 12/11/23 Abscess from Hand, Right Fusobacterium nucleatum                   Bacteroides pyogenes                 12/11/23 Abscess from Hand, Right Pasteurella multocida  S  S  S       S   S   S               Pertinent Labs:     Recent Labs   Lab 12/28/23  0553 12/25/23  0550 12/23/23  0617 12/22/23  1327   WBC  --  7.7 12.5* 11.1*   HGB  --  10.7* 10.5* 11.6*    360 395 459*       Recent Labs   Lab 12/23/23  0617 12/22/23  1327    136   CO2 23  "23   BUN 23.2* 32.6*   ALBUMIN 2.5*  --    ALKPHOS 41  --    ALT 9  --    AST 23  --        No results for input(s): \"CRPI\", \"SED\" in the last 168 hours.        Imaging:     US Upper Extremity Venous Duplex Right    Result Date: 12/23/2023  EXAM: US UPPER EXTREMITY VENOUS DUPLEX RIGHT LOCATION: [facility] DATE: 12/23/2023 INDICATION: swelling COMPARISON: None. TECHNIQUE: Venous Duplex ultrasound of the right upper extremity with (when possible) and without compression, augmentation, and duplex. Color flow and spectral Doppler with waveform analysis performed. FINDINGS: Ultrasound includes evaluation of the internal jugular vein, innominate vein, subclavian vein, axillary vein, and brachial vein. The superficial cephalic and basilic veins were also evaluated where seen. RIGHT: No deep venous thrombosis. No superficial thrombophlebitis.     IMPRESSION: 1.  No deep venous thrombosis in the right upper extremity.    POC US Guidance Needle Placement    Result Date: 12/22/2023  Ultrasound was performed as guidance to an anesthesia procedure.  Click \"PACS images\" hyperlink below to view any stored images.  For specific procedure details, view procedure note authored by anesthesia.    Medical Decision Making       MANAGEMENT DISCUSSED with the following over the past 24 hours: Patient   NOTE(S)/MEDICAL RECORDS REVIEWED over the past 24 hours: Reviewed  Tests ORDERED & REVIEWED in the past 24 hours:  - See lab/imaging results included in the data section of the note  Medical complexity over the past 24 hours:  - Prescription DRUG MANAGEMENT performed            "

## 2023-12-29 ENCOUNTER — ANESTHESIA EVENT (OUTPATIENT)
Dept: SURGERY | Facility: CLINIC | Age: 78
DRG: 464 | End: 2023-12-29
Payer: COMMERCIAL

## 2023-12-29 ENCOUNTER — APPOINTMENT (OUTPATIENT)
Dept: MRI IMAGING | Facility: CLINIC | Age: 78
DRG: 464 | End: 2023-12-29
Payer: COMMERCIAL

## 2023-12-29 ENCOUNTER — ANESTHESIA (OUTPATIENT)
Dept: SURGERY | Facility: CLINIC | Age: 78
DRG: 464 | End: 2023-12-29
Payer: COMMERCIAL

## 2023-12-29 LAB
BACTERIA TISS BX CULT: NORMAL
BACTERIA TISS BX CULT: NORMAL
GLUCOSE BLDC GLUCOMTR-MCNC: 103 MG/DL (ref 70–99)
GLUCOSE BLDC GLUCOMTR-MCNC: 138 MG/DL (ref 70–99)
GLUCOSE BLDC GLUCOMTR-MCNC: 157 MG/DL (ref 70–99)
GLUCOSE BLDC GLUCOMTR-MCNC: 162 MG/DL (ref 70–99)
GLUCOSE BLDC GLUCOMTR-MCNC: 164 MG/DL (ref 70–99)
GLUCOSE BLDC GLUCOMTR-MCNC: 199 MG/DL (ref 70–99)
GLUCOSE BLDC GLUCOMTR-MCNC: 93 MG/DL (ref 70–99)

## 2023-12-29 PROCEDURE — 73223 MRI JOINT UPR EXTR W/O&W/DYE: CPT | Mod: RT

## 2023-12-29 PROCEDURE — 258N000001 HC RX 258: Performed by: STUDENT IN AN ORGANIZED HEALTH CARE EDUCATION/TRAINING PROGRAM

## 2023-12-29 PROCEDURE — 250N000009 HC RX 250: Performed by: ANESTHESIOLOGY

## 2023-12-29 PROCEDURE — 250N000011 HC RX IP 250 OP 636: Performed by: ANESTHESIOLOGY

## 2023-12-29 PROCEDURE — 255N000002 HC RX 255 OP 636: Performed by: FAMILY MEDICINE

## 2023-12-29 PROCEDURE — 0PBH0ZZ EXCISION OF RIGHT RADIUS, OPEN APPROACH: ICD-10-PCS | Performed by: STUDENT IN AN ORGANIZED HEALTH CARE EDUCATION/TRAINING PROGRAM

## 2023-12-29 PROCEDURE — 120N000001 HC R&B MED SURG/OB

## 2023-12-29 PROCEDURE — 370N000017 HC ANESTHESIA TECHNICAL FEE, PER MIN: Performed by: STUDENT IN AN ORGANIZED HEALTH CARE EDUCATION/TRAINING PROGRAM

## 2023-12-29 PROCEDURE — 87075 CULTR BACTERIA EXCEPT BLOOD: CPT | Performed by: STUDENT IN AN ORGANIZED HEALTH CARE EDUCATION/TRAINING PROGRAM

## 2023-12-29 PROCEDURE — 87070 CULTURE OTHR SPECIMN AEROBIC: CPT | Performed by: STUDENT IN AN ORGANIZED HEALTH CARE EDUCATION/TRAINING PROGRAM

## 2023-12-29 PROCEDURE — 250N000013 HC RX MED GY IP 250 OP 250 PS 637: Performed by: ORTHOPAEDIC SURGERY

## 2023-12-29 PROCEDURE — 87102 FUNGUS ISOLATION CULTURE: CPT | Performed by: STUDENT IN AN ORGANIZED HEALTH CARE EDUCATION/TRAINING PROGRAM

## 2023-12-29 PROCEDURE — A9585 GADOBUTROL INJECTION: HCPCS | Performed by: FAMILY MEDICINE

## 2023-12-29 PROCEDURE — 99232 SBSQ HOSP IP/OBS MODERATE 35: CPT | Performed by: FAMILY MEDICINE

## 2023-12-29 PROCEDURE — 250N000011 HC RX IP 250 OP 636: Performed by: ORTHOPAEDIC SURGERY

## 2023-12-29 PROCEDURE — 999N000141 HC STATISTIC PRE-PROCEDURE NURSING ASSESSMENT: Performed by: STUDENT IN AN ORGANIZED HEALTH CARE EDUCATION/TRAINING PROGRAM

## 2023-12-29 PROCEDURE — 360N000075 HC SURGERY LEVEL 2, PER MIN: Performed by: STUDENT IN AN ORGANIZED HEALTH CARE EDUCATION/TRAINING PROGRAM

## 2023-12-29 PROCEDURE — 258N000003 HC RX IP 258 OP 636: Performed by: ANESTHESIOLOGY

## 2023-12-29 PROCEDURE — 272N000001 HC OR GENERAL SUPPLY STERILE: Performed by: STUDENT IN AN ORGANIZED HEALTH CARE EDUCATION/TRAINING PROGRAM

## 2023-12-29 RX ORDER — GADOBUTROL 604.72 MG/ML
9.5 INJECTION INTRAVENOUS ONCE
Status: COMPLETED | OUTPATIENT
Start: 2023-12-29 | End: 2023-12-29

## 2023-12-29 RX ORDER — ROPIVACAINE HYDROCHLORIDE 5 MG/ML
INJECTION, SOLUTION EPIDURAL; INFILTRATION; PERINEURAL
Status: COMPLETED | OUTPATIENT
Start: 2023-12-29 | End: 2023-12-29

## 2023-12-29 RX ORDER — FENTANYL CITRATE 50 UG/ML
50 INJECTION, SOLUTION INTRAMUSCULAR; INTRAVENOUS
Status: DISCONTINUED | OUTPATIENT
Start: 2023-12-29 | End: 2023-12-29 | Stop reason: HOSPADM

## 2023-12-29 RX ORDER — CEFAZOLIN SODIUM 1 G/3ML
INJECTION, POWDER, FOR SOLUTION INTRAMUSCULAR; INTRAVENOUS PRN
Status: DISCONTINUED | OUTPATIENT
Start: 2023-12-29 | End: 2023-12-29

## 2023-12-29 RX ORDER — PROPOFOL 10 MG/ML
INJECTION, EMULSION INTRAVENOUS CONTINUOUS PRN
Status: DISCONTINUED | OUTPATIENT
Start: 2023-12-29 | End: 2023-12-29

## 2023-12-29 RX ORDER — LIDOCAINE HYDROCHLORIDE 10 MG/ML
INJECTION, SOLUTION INFILTRATION; PERINEURAL PRN
Status: DISCONTINUED | OUTPATIENT
Start: 2023-12-29 | End: 2023-12-29

## 2023-12-29 RX ORDER — SODIUM CHLORIDE, SODIUM LACTATE, POTASSIUM CHLORIDE, CALCIUM CHLORIDE 600; 310; 30; 20 MG/100ML; MG/100ML; MG/100ML; MG/100ML
INJECTION, SOLUTION INTRAVENOUS CONTINUOUS PRN
Status: DISCONTINUED | OUTPATIENT
Start: 2023-12-29 | End: 2023-12-29

## 2023-12-29 RX ORDER — FENTANYL CITRATE 50 UG/ML
INJECTION, SOLUTION INTRAMUSCULAR; INTRAVENOUS PRN
Status: DISCONTINUED | OUTPATIENT
Start: 2023-12-29 | End: 2023-12-29

## 2023-12-29 RX ORDER — PROPOFOL 10 MG/ML
INJECTION, EMULSION INTRAVENOUS PRN
Status: DISCONTINUED | OUTPATIENT
Start: 2023-12-29 | End: 2023-12-29

## 2023-12-29 RX ORDER — FENTANYL CITRATE 50 UG/ML
100 INJECTION, SOLUTION INTRAMUSCULAR; INTRAVENOUS
Status: DISCONTINUED | OUTPATIENT
Start: 2023-12-29 | End: 2023-12-29 | Stop reason: HOSPADM

## 2023-12-29 RX ORDER — ONDANSETRON 2 MG/ML
INJECTION INTRAMUSCULAR; INTRAVENOUS PRN
Status: DISCONTINUED | OUTPATIENT
Start: 2023-12-29 | End: 2023-12-29

## 2023-12-29 RX ADMIN — PROPOFOL 150 MCG/KG/MIN: 10 INJECTION, EMULSION INTRAVENOUS at 18:21

## 2023-12-29 RX ADMIN — CEFAZOLIN 2 G: 1 INJECTION, POWDER, FOR SOLUTION INTRAMUSCULAR; INTRAVENOUS at 18:25

## 2023-12-29 RX ADMIN — PIPERACILLIN AND TAZOBACTAM 3.38 G: 3; .375 INJECTION, POWDER, LYOPHILIZED, FOR SOLUTION INTRAVENOUS at 04:07

## 2023-12-29 RX ADMIN — ENOXAPARIN SODIUM 40 MG: 100 INJECTION SUBCUTANEOUS at 05:32

## 2023-12-29 RX ADMIN — MIDAZOLAM 1 MG: 1 INJECTION INTRAMUSCULAR; INTRAVENOUS at 16:59

## 2023-12-29 RX ADMIN — ACETAMINOPHEN 650 MG: 325 TABLET ORAL at 13:22

## 2023-12-29 RX ADMIN — PROPOFOL 20 MG: 10 INJECTION, EMULSION INTRAVENOUS at 18:21

## 2023-12-29 RX ADMIN — ONDANSETRON 4 MG: 2 INJECTION INTRAMUSCULAR; INTRAVENOUS at 18:30

## 2023-12-29 RX ADMIN — ACETAMINOPHEN 650 MG: 325 TABLET ORAL at 00:57

## 2023-12-29 RX ADMIN — SODIUM CHLORIDE, POTASSIUM CHLORIDE, SODIUM LACTATE AND CALCIUM CHLORIDE: 600; 310; 30; 20 INJECTION, SOLUTION INTRAVENOUS at 18:18

## 2023-12-29 RX ADMIN — PIPERACILLIN AND TAZOBACTAM 3.38 G: 3; .375 INJECTION, POWDER, LYOPHILIZED, FOR SOLUTION INTRAVENOUS at 12:04

## 2023-12-29 RX ADMIN — ACETAMINOPHEN 650 MG: 325 TABLET ORAL at 08:52

## 2023-12-29 RX ADMIN — ROPIVACAINE HYDROCHLORIDE 25 ML: 5 INJECTION, SOLUTION EPIDURAL; INFILTRATION; PERINEURAL at 16:59

## 2023-12-29 RX ADMIN — FENTANYL CITRATE 25 MCG: 50 INJECTION INTRAMUSCULAR; INTRAVENOUS at 18:52

## 2023-12-29 RX ADMIN — LIDOCAINE HYDROCHLORIDE 2 ML: 10 INJECTION, SOLUTION INFILTRATION; PERINEURAL at 18:21

## 2023-12-29 RX ADMIN — FENTANYL CITRATE 50 MCG: 50 INJECTION, SOLUTION INTRAMUSCULAR; INTRAVENOUS at 17:00

## 2023-12-29 RX ADMIN — INSULIN GLARGINE 35 UNITS: 100 INJECTION, SOLUTION SUBCUTANEOUS at 08:41

## 2023-12-29 RX ADMIN — GADOBUTROL 9.5 ML: 604.72 INJECTION INTRAVENOUS at 15:03

## 2023-12-29 RX ADMIN — FENTANYL CITRATE 25 MCG: 50 INJECTION INTRAMUSCULAR; INTRAVENOUS at 18:51

## 2023-12-29 RX ADMIN — ATORVASTATIN CALCIUM 10 MG: 10 TABLET, FILM COATED ORAL at 08:44

## 2023-12-29 RX ADMIN — INSULIN ASPART 3 UNITS: 100 INJECTION, SOLUTION INTRAVENOUS; SUBCUTANEOUS at 08:42

## 2023-12-29 ASSESSMENT — ACTIVITIES OF DAILY LIVING (ADL)
ADLS_ACUITY_SCORE: 25
ADLS_ACUITY_SCORE: 31
ADLS_ACUITY_SCORE: 25
ADLS_ACUITY_SCORE: 31
ADLS_ACUITY_SCORE: 25
ADLS_ACUITY_SCORE: 25

## 2023-12-29 ASSESSMENT — LIFESTYLE VARIABLES: TOBACCO_USE: 0

## 2023-12-29 NOTE — PROGRESS NOTES
CLINICAL NUTRITION SERVICES    Reviewed nutrition risk factors due to LOS. Pt is tolerating diet, eating well per nursing documentation. No nutrition issues identified at this time. RD will follow peripherally at this time, unless consulted.

## 2023-12-29 NOTE — PROGRESS NOTES
Care Management Follow Up    Length of Stay (days): 7    Expected Discharge Date: 12/31/2023     Concerns to be Addressed: discharge planning     Patient plan of care discussed at interdisciplinary rounds: Yes    Anticipated Discharge Disposition: Transitional Care     Anticipated Discharge Services: None  Anticipated Discharge DME: None    Patient/family educated on Medicare website which has current facility and service quality ratings: yes  Education Provided on the Discharge Plan: Yes  Patient/Family in Agreement with the Plan: yes    Referrals Placed by CM/SW: Post Acute Facilities  Private pay costs discussed: transportation costs    Additional Information:    4th I&D scheduled for 12/30.    ID is following.     Anticipate discharge to TCU.     [facility]  [facility] today.  CM to keep [facility] updated regarding discharge plans/check on TCU bed availability.     [facility] had accepted this pt for RN services prior to admit - Notified [facility] on 12/27 - planning discharge to TCU.     PAS done 12/24.     Patient and Sister Melba Shaffer - are both aware pt will need surgery over the weekend and will discharge to [facility] when ready for discharge.  CM to keep sister Melba Shaffer updated on discharge plans.     Pt and sister Melba Shaffer authorize [facility] wheelchair transport at discharge.     CM will continue to follow.    Modesto Ariza RN

## 2023-12-29 NOTE — PROGRESS NOTES
Infectious Diseases Progress Note  [facility]     12/29/2023   Chart reviewed  Patient in OR 12/29/2023 4:11 PM   Previous note below   ASSESSMENT   78-year-old man with a history of diabetes readmitted for right wrist infection secondary to cat bite.    Right wrist infection.  Active issue, hospitalized recently for wrist infection following cat bite/scratch.  Debridement on 12/11 and 12/13.  Polymicrobial cultures with MSSA, Pasteurella, and several anaerobic bacteria.  Discharged on Augmentin and Flagyl, but readmitted 2 days later after signs of persistent infection (see next)  Persistent right wrist infection.  Seen in follow-up in orthopedics clinic on 12/22.  Due to appearance of wound, patient was sent to the hospital for debridement which occurred on 12/22 - no growth on cultures. Repeat debridement on 12/25 with purulence and ruptured tendons, new cultures collected.  Wbc improved   Debridement 12-27 2023: Ongoing signs of infection with purulent material about the second and third compartments and ruptured tendon stumps .     Principal Problem:    Infection of right hand due to bite, initial encounter       PLAN   -continue and Pipercillin/tazobactam  -follow-up on cultures from surgery - so far cultures have been neg  -Final antibiotic plan will depend on clinical improvement  -wound care per orthopedics  -Plan for repeat debridement on 12/29/2023 and 12/30/2023  -please see previous notes by Jacinto Vasquez MD. Patient new to me on 12/27/2023  -Updated patient    Jyoti Decker MD  [facility]  Answering Service: [phone]  On-Call ID provider: [phone], option: 9    ===========================================  Previous photo from 12/22/2023:    SUBJECTIVE / INTERVAL HISTORY:   Chart reviewed    Doing better, several questions  Previous notes reviewed  Soaking hand  Previous note:  Seen briefly today while soaking his wrist.      Antibiotics   Zosyn  12/22-    Previous:  Perioperative cefazolin  Augmentin and Flagyl prior to arrival  Vancomycin 12/22-12/23    Physical Exam     Temp:  [97.6  F (36.4  C)-99.5  F (37.5  C)] 97.6  F (36.4  C)  Pulse:  [53-59] 53  Resp:  [16-17] 16  BP: (117-132)/(64-71) 117/64  SpO2:  [94 %-95 %] 95 %    /64 (BP Location: Left arm)   Pulse 53   Temp 97.6  F (36.4  C) (Oral)   Resp 16   Ht 1.829 m (6')   Wt 94.3 kg (208 lb)   SpO2 95%   BMI 28.21 kg/m      GENERAL:  well-developed, well-nourished, lying in bed in no acute distress.   LUNGS:  normal resp pattern  EXT: Extremities warm.  Right arm swelling.  Right wrist incision intact, large penrose drain in place. Significant swelling all throughout wrist.   SKIN:  No acute rashes.       Cultures   Previous cultures reviewed  12/22 right wrist cultures x 2: No growth to date; no organisms on Gram stain  12/25 wrist cultures no growth to date     Susceptibility data from last 90 days.  Collected Specimen Info Organism Ampicillin Azithromycin Ceftriaxone Clindamycin Daptomycin Doxycycline Erythromycin Gentamicin Levofloxacin Oxacillin Penicillin Tetracycline Trimethoprim/Sulfamethoxazole  Vancomycin   12/11/23 Tissue from Wrist, Right Bacteroides pyogenes                 12/11/23 Tissue from Wrist, Right Staphylococcus aureus     S  S  S  S  S   S   S  S  S     Pasteurella multocida                 12/11/23 Tissue from Hand, Right Fusobacterium nucleatum                   Bacteroides pyogenes                 12/11/23 Tissue from Hand, Right Pasteurella multocida                   Staphylococcus aureus                 12/11/23 Abscess from Hand, Right Fusobacterium nucleatum                   Bacteroides pyogenes                 12/11/23 Abscess from Hand, Right Pasteurella multocida  S  S  S       S   S   S               Pertinent Labs:     Recent Labs   Lab 12/28/23  0553 12/25/23  0550 12/23/23  0617   WBC  --  7.7 12.5*   HGB  --  10.7* 10.5*    360 395  "      Recent Labs   Lab 12/23/23  0617      CO2 23   BUN 23.2*   ALBUMIN 2.5*   ALKPHOS 41   ALT 9   AST 23       No results for input(s): \"CRPI\", \"SED\" in the last 168 hours.        Imaging:     US Upper Extremity Venous Duplex Right    Result Date: 12/23/2023  EXAM: US UPPER EXTREMITY VENOUS DUPLEX RIGHT LOCATION: [facility] DATE: 12/23/2023 INDICATION: swelling COMPARISON: None. TECHNIQUE: Venous Duplex ultrasound of the right upper extremity with (when possible) and without compression, augmentation, and duplex. Color flow and spectral Doppler with waveform analysis performed. FINDINGS: Ultrasound includes evaluation of the internal jugular vein, innominate vein, subclavian vein, axillary vein, and brachial vein. The superficial cephalic and basilic veins were also evaluated where seen. RIGHT: No deep venous thrombosis. No superficial thrombophlebitis.     IMPRESSION: 1.  No deep venous thrombosis in the right upper extremity.    POC US Guidance Needle Placement    Result Date: 12/22/2023  Ultrasound was performed as guidance to an anesthesia procedure.  Click \"PACS images\" hyperlink below to view any stored images.  For specific procedure details, view procedure note authored by anesthesia.          "

## 2023-12-29 NOTE — ANESTHESIA PROCEDURE NOTES
Brachial plexus Procedure Note    Pre-Procedure   Staff -        Anesthesiologist:  Darrian Haney MD       Performed By: anesthesiologist       Location: pre-op       Procedure Start/Stop Times: 12/29/2023 4:56 PM and 12/29/2023 4:59 PM       Pre-Anesthestic Checklist: patient identified, IV checked, site marked, risks and benefits discussed, informed consent, monitors and equipment checked, pre-op evaluation, at physician/surgeon's request and post-op pain management  Timeout:       Correct Patient: Yes        Correct Procedure: Yes        Correct Site: Yes        Correct Position: Yes        Correct Laterality: Yes        Site Marked: Yes  Procedure Documentation  Procedure: Brachial plexus       Laterality: right       Patient Position: supine       Patient Prep/Sterile Barriers: sterile gloves, mask       Skin prep: Chloraprep (infra-clavicular approach).       Needle Type: short bevel       Needle Gauge: 20.        Needle Length (Inches): 4        Ultrasound guided       1. Ultrasound was used to identify targeted nerve, plexus, vascular marker, or fascial plane and place a needle adjacent to it in real-time.       2. Ultrasound was used to visualize the spread of anesthetic in close proximity to the above referenced structure.       3. A permanent image is entered into the patient's record.       4. The visualized anatomic structures appeared normal.       5. There were no apparent abnormal pathologic findings.    Assessment/Narrative         The placement was negative for: blood aspirated, painful injection and site bleeding       Paresthesias: No.       Bolus given via needle. no blood aspirated via catheter.        Secured via.        Insertion/Infusion Method: Single Shot       Complications: none       Injection made incrementally with aspirations every 5 mL.    Medication(s) Administered   Ropivacaine 0.5% PF (Infiltration) - Infiltration   25 mL - 12/29/2023 4:59:00 PM  Medication  "Administration Time: 12/29/2023 4:56 PM      FOR [facility] ONLY:   Pain Team Contact information: please page the Pain Team Via [employer]. Search \"Pain\". During daytime hours, please page the attending first. At night please page the resident first.      "

## 2023-12-29 NOTE — INTERVAL H&P NOTE
I have reviewed the surgical (or preoperative) H&P that is linked to this encounter, and examined the patient. There are no significant changes    Briefly, the patient was evaluated in the preoperative holding area. He sustained a cat bite to right dorsal forearm on 12/2/2023.  Unfortunately he developed septic extensor tenosynovitis.  Initially was treated at [facility] and underwent irrigation debridement in the operating room with Dr. Levya on 12/11/2023 and 12/13/2023.  Cultures grew polymicrobial infection.  He was treated on IV antibiotics and discharged home on p.o. antibiotics on 12/20/2023.  He returned to clinic with Dr. Carla Austin who noted recurrence of infection.  He was taken back to the operating room on 12/22/2023.  Repeat irrigation debridement was performed on 12/25/2023.  At that time it was noted that he had had infection related attritional ruptures of the EPL, ECRL, and ECRB. He was taken back to the OR on 12/27/23 with Dr. Burkett. He has been in the hospital since on IV antibiotics with zosyn. He has recurrence of purulent drainage and is indicated for return to the OR for irrigation and debridement of the right forearm. MRI was obtained demonstrating ongoing fluid in the dorsal extensor tendons, primarily in the fourth dorsal compartment. There is also evidence of bony edema and possible disruption in the dorsal aspect of the distal radius concerning for osteomyelitis and possible ongoing source of infection. I discussed the MRI findings with the patient. We will plan for repeat I&D right forearm and possible wound vac application today. He was amenable to the plan of care and all questions were answered.    Clinical Conditions Present on Arrival:  Clinically Significant Risk Factors Present on Admission

## 2023-12-29 NOTE — PLAN OF CARE
Problem: Pain Acute  Goal: Optimal Pain Control and Function  Outcome: Progressing     Problem: Infection  Goal: Absence of Infection Signs and Symptoms  Outcome: Progressing   Goal Outcome Evaluation:    VSS on room air. Pain managed with PRN tylenol. RUE swollen and red. Dressing CDI. Voiding adequately. IV zosyn infusing. Alarms on for safety.

## 2023-12-29 NOTE — PLAN OF CARE
Goal Outcome Evaluation:      Plan of Care Reviewed With: patient    Overall Patient Progress: improvingOverall Patient Progress: improving    Outcome Evaluation: Pt alert and oriented x 4. Did 1 hipcense soak this morning and changed the dressing. The provider removed the penrose drains at that time. Pain controlled with PRN tylenol last given at 1330. NPO since breakfast which he finished at 9am. CHG bath done x 1 at 1200. Ortho wanted a hand MRI prior to surgery to check for bone involvement. MRI checklist completed. Pt went down to MRI shortly after 2. Did plan with PACU that the patient should go directly from MRI to [facility].      Problem: Pain Acute  Goal: Optimal Pain Control and Function  Outcome: Progressing  Intervention: Develop Pain Management Plan  Recent Flowsheet Documentation  Taken 12/29/2023 1322 by Be Meneses, RN  Pain Management Interventions: medication (see MAR)  Taken 12/29/2023 0849 by Be Meneses, RN  Pain Management Interventions: medication (see MAR)  Intervention: Prevent or Manage Pain  Recent Flowsheet Documentation  Taken 12/29/2023 0849 by Be Meneses, RN  Medication Review/Management: medications reviewed     Problem: Infection  Goal: Absence of Infection Signs and Symptoms  Outcome: Progressing     Be Meneses RN, ONC

## 2023-12-29 NOTE — PROGRESS NOTES
"Orthopedic Progress Note      Assessment: 2 Day Post-Op  S/P Procedure(s):  IRRIGATION AND DEBRIDEMENT, RIGHT WRIST @    Plan:   - Pain control  - Continue IV antibiotics per ID  - elevate right upper extremity  - warm soaks started this morning will start another this afternoon  - Drains pulled    - Surgery rescheduled for today and sunday      Subjective:  Pain: mild to moderate  Chest pain, SOB: no  Nausea, Vomiting:  no  Lightheadedness, Dizziness:  no  Neuro:  Patient denies new onset numbness or paresthesias    Patient is doing well this morning hand looks good today.  He notes that his pain is well-controlled.  There is more pus noted as drainage versus serosanguineous drainage.  Was starting to soak when I examined him today.  Surgery scheduled for tomorrow    Objective:  /64 (BP Location: Left arm)   Pulse 53   Temp 97.6  F (36.4  C) (Oral)   Resp 16   Ht 1.829 m (6')   Wt 94.3 kg (208 lb)   SpO2 95%   BMI 28.21 kg/m    The patient is A&Ox3. Appears comfortable.   Sensation is intact.  Able to wiggle his fingers  Radial pulse intact.  Calves are soft and non-tender. Negative Keya's.  The incision is covered. Dressing C/D/I.    Pertinent Labs   Lab Results: personally reviewed.   No results found for: \"INR\", \"PROTIME\"  Lab Results   Component Value Date    WBC 7.7 12/25/2023    HGB 10.7 (L) 12/25/2023    HCT 32.7 (L) 12/25/2023    MCV 96 12/25/2023     12/28/2023     Lab Results   Component Value Date     12/23/2023    CO2 23 12/23/2023         Report completed by:  Socorro Shabazz PA-C/Dr. Burkett   [facility]  12/29/23     "

## 2023-12-29 NOTE — PROGRESS NOTES
[facility] MEDICINE PROGRESS NOTE      Identification/Summary: Amor Zavaleta is a 78 year old male with a history of insulin-dependent diabetes type 2, CKD stage IV, HTN, LBBB who presents on 12/22/2023 with continued pain and swelling to right wrist following a cat bite.  He was seen by orthopedic surgeon in his office 12/22 and thought that the infection was worsening on oral antibiotics.  Taken to surgery for irrigation and debridement the evening of 12/22.  He reports she is doing better today.  He lives alone, in an apartment with 3 flights of stairs.  He feels things are going well.  Decreased redness over the dorsum of the hand wrist and distal forearm 12/27 compared with 12/26.  Probably needs irrigation/washout at least 1 more time.  If things look good at that point probably can be discharged shortly thereafter to TCU with IV antibiotics.     Assessment/plan:     Septic joint right wrist with likely extension to right elbow secondary to cat bite 12/2  Patient was evaluated at [facility] and admitted 12/11.  Received IV antibiotics along with washouts from orthopedic surgery.  Was discharged 12/20 with 7 days of Augmentin and metronidazole per infectious disease recommendations  [facility] requested [id number] for washout and IV antibiotics due to continued infection  Appreciate orthopedic recommendations regarding right elbow pain and edema-likely extension of septic joint.  Vancomycin and Zosyn initiated in the ER  Blood cultures,   Has been seen by infectious disease and IV antibiotics to continue pending cultures.  Patient went back to surgery for washout 12/25 and it is reported that he had 3 ruptured tendons.    Patient would likely benefit from TCU and this was recommended by therapies.  Plan: Per orthopedic surgery and infectious disease.  Probably needs irrigation/washout at least 1 more time then to TCU with IV antibiotics.     Decreased mobility  secondary to long hospitalization  PT/OT-appreciate discharge recommendations for placement.     Right hand wounds from cat attack  Wound care appreciated.     Diabetes type 2-insulin-dependent, blood sugars have been good  Insulin pump at home  States home dose is Lantus 56 units at night and 16 units NovoLog with meals  A1c of 8.9 12/11/2023  Home dose of Lantus 56 units ordered but was some hypoglycemia the -12/23 it was decreased to 35 units.  Continue home very high insulin sliding scale with meals no insulin at bedtime   Plan: Because he is going to surgery this afternoon will give him 10 units of Lantus this morning instead of 35     HTN  Currently normotensive at 106/59 with heart rate of 64.  Hold home antihypertensives for now     CKD stage IV  Baseline creatinine appears to be 1.2  Creatinine 1.6 at admission now 1.3 on 12/25  Plan: Check BMP in the morning     Code Status: Full Code  DVT prophylaxis: Lovenox      Disposition:  -Discharge barriers: Orthopedic intervention, IV abx, PT/OT and placement to TCU.       Expected Discharge Date: 12/31/2023      Destination: inpatient rehabilitation facility  Discharge Comments: IV abx, 4th I&D either 12/29 or 12/30  [facility] TCU accepted for DC        The patient's care was discussed with the Patient.    Hakan Delatorre MD  [facility] Medicine  [facility]  Phone: #[phone]  Securely message with the Vocera Web Console (learn more here)  Text page via [employer] Paging/Directory     Interval History/Subjective:  Patient feels like he is doing well today.  Eating well.  No other concerns or issues.    Physical Exam/Objective:  Temp:  [97.6  F (36.4  C)-99.5  F (37.5  C)] 97.6  F (36.4  C)  Pulse:  [53-59] 53  Resp:  [16-17] 16  BP: (117-132)/(64-71) 117/64  SpO2:  [94 %-95 %] 95 %  Body mass index is 28.21 kg/m .    GENERAL:  Alert, appears comfortable, in no acute distress, appears stated age   HEAD:  Normocephalic,  without obvious abnormality, atraumatic   LUNGS:   Clear to auscultation bilaterally, no rales, rhonchi, or wheezing, symmetric chest rise on inhalation, respirations unlabored   HEART:  Regular rate and rhythm, soft grade 2/6 systolic murmur heard at the left upper sternal margin only.  He is intermittently in the past been told he has this   ABDOMEN:   Soft, non-tender   EXTREMITIES: No ankle edema.  Right upper extremity has bulky dressing with splint from distal hand to mid forearm   SKIN: Dry to touch, no exanthems in the visualized areas   NEURO: Alert, appears cognitively intact, moves all four extremities freely   PSYCH: Cooperative, behavior is appropriate      Data reviewed today: I personally reviewed all new medications, labs, imaging/diagnostics reports over the past 24 hours. Pertinent findings include:    Labs:  Most Recent 3 CBC's:  Recent Labs   Lab Test 12/28/23  0553 12/25/23  0550 12/23/23  0617 12/22/23  1327   WBC  --  7.7 12.5* 11.1*   HGB  --  10.7* 10.5* 11.6*   MCV  --  96 96 97    360 395 459*     Most Recent 3 BMP's:  Recent Labs   Lab Test 12/29/23  0814 12/29/23  0632 12/28/23  2137 12/28/23  0632 12/28/23  0553 12/25/23  0633 12/25/23  0550 12/24/23  0701 12/24/23  0615 12/23/23  0632 12/23/23  0617 12/22/23  1640 12/22/23  1327 12/19/23  0734 12/19/23  0652   NA  --   --   --   --   --   --   --   --   --   --  136  --  136  --  133*   POTASSIUM  --   --   --   --   --   --   --   --   --   --  4.3  --  4.0  --  4.2   CHLORIDE  --   --   --   --   --   --   --   --   --   --  106  --  102  --  102   CO2  --   --   --   --   --   --   --   --   --   --  23  --  23  --  22   BUN  --   --   --   --   --   --   --   --   --   --  23.2*  --  32.6*  --  18.6   CR  --   --   --   --  1.25*  --  1.27*  --  1.29*  --  1.41*  --  1.61*  --  1.19*   ANIONGAP  --   --   --   --   --   --   --   --   --   --  7  --  11  --  9   VLADIMIR  --   --   --   --   --   --   --   --   --   --  8.0*  --   9.0  --  8.1*   * 157* 191*   < >  --    < >  --    < >  --    < > 130*   < > 98   < > 120*    < > = values in this interval not displayed.     Most Recent 2 LFT's:  Recent Labs   Lab Test 12/23/23  0617 12/11/23  1316   AST 23 27   ALT 9 45   ALKPHOS 41 120   BILITOTAL 0.5 1.1       Medications:   Personally Reviewed.  Medications      [Held by provider] amLODIPine  2.5 mg Oral Daily    [Held by provider] aspirin  325 mg Oral Daily    atorvastatin  10 mg Oral Daily    enoxaparin ANTICOAGULANT  40 mg Subcutaneous Q24H    famotidine  20 mg Oral Daily    insulin aspart  1-22 Units Subcutaneous TID w/meals    insulin glargine  35 Units Subcutaneous QAM AC    [Held by provider] lisinopril-hydrochlorothiazide  1 tablet Oral Daily    piperacillin-tazobactam  3.375 g Intravenous Q8H    sodium chloride (PF)  3 mL Intracatheter Q8H

## 2023-12-30 LAB
BACTERIA TISS BX CULT: NO GROWTH
GLUCOSE BLDC GLUCOMTR-MCNC: 126 MG/DL (ref 70–99)
GLUCOSE BLDC GLUCOMTR-MCNC: 143 MG/DL (ref 70–99)
GLUCOSE BLDC GLUCOMTR-MCNC: 155 MG/DL (ref 70–99)
GLUCOSE BLDC GLUCOMTR-MCNC: 197 MG/DL (ref 70–99)
GLUCOSE BLDC GLUCOMTR-MCNC: 220 MG/DL (ref 70–99)

## 2023-12-30 PROCEDURE — 250N000013 HC RX MED GY IP 250 OP 250 PS 637: Performed by: ORTHOPAEDIC SURGERY

## 2023-12-30 PROCEDURE — 99232 SBSQ HOSP IP/OBS MODERATE 35: CPT | Performed by: INTERNAL MEDICINE

## 2023-12-30 PROCEDURE — 272N000450 HC KIT 4FR POWER PICC SINGLE LUMEN

## 2023-12-30 PROCEDURE — 36569 INSJ PICC 5 YR+ W/O IMAGING: CPT

## 2023-12-30 PROCEDURE — 250N000011 HC RX IP 250 OP 636: Performed by: INTERNAL MEDICINE

## 2023-12-30 PROCEDURE — 93005 ELECTROCARDIOGRAM TRACING: CPT

## 2023-12-30 PROCEDURE — 93005 ELECTROCARDIOGRAM TRACING: CPT | Performed by: HOSPITALIST

## 2023-12-30 PROCEDURE — 120N000001 HC R&B MED SURG/OB

## 2023-12-30 PROCEDURE — 250N000011 HC RX IP 250 OP 636: Performed by: ORTHOPAEDIC SURGERY

## 2023-12-30 PROCEDURE — 99232 SBSQ HOSP IP/OBS MODERATE 35: CPT | Performed by: HOSPITALIST

## 2023-12-30 PROCEDURE — 93010 ELECTROCARDIOGRAM REPORT: CPT | Performed by: INTERNAL MEDICINE

## 2023-12-30 PROCEDURE — 250N000009 HC RX 250: Performed by: INTERNAL MEDICINE

## 2023-12-30 RX ORDER — AMPICILLIN AND SULBACTAM 2; 1 G/1; G/1
3 INJECTION, POWDER, FOR SOLUTION INTRAMUSCULAR; INTRAVENOUS EVERY 6 HOURS
Status: DISCONTINUED | OUTPATIENT
Start: 2023-12-30 | End: 2024-01-06 | Stop reason: HOSPADM

## 2023-12-30 RX ORDER — LIDOCAINE 40 MG/G
CREAM TOPICAL
Status: ACTIVE | OUTPATIENT
Start: 2023-12-30 | End: 2024-01-02

## 2023-12-30 RX ORDER — AMPICILLIN AND SULBACTAM 2; 1 G/1; G/1
3 INJECTION, POWDER, FOR SOLUTION INTRAMUSCULAR; INTRAVENOUS EVERY 8 HOURS
Status: DISCONTINUED | OUTPATIENT
Start: 2023-12-30 | End: 2023-12-30

## 2023-12-30 RX ADMIN — LIDOCAINE HYDROCHLORIDE 2.5 ML: 10 INJECTION, SOLUTION EPIDURAL; INFILTRATION; INTRACAUDAL; PERINEURAL at 15:35

## 2023-12-30 RX ADMIN — PIPERACILLIN AND TAZOBACTAM 3.38 G: 3; .375 INJECTION, POWDER, LYOPHILIZED, FOR SOLUTION INTRAVENOUS at 12:33

## 2023-12-30 RX ADMIN — PIPERACILLIN AND TAZOBACTAM 3.38 G: 3; .375 INJECTION, POWDER, LYOPHILIZED, FOR SOLUTION INTRAVENOUS at 03:17

## 2023-12-30 RX ADMIN — ACETAMINOPHEN 650 MG: 325 TABLET ORAL at 06:00

## 2023-12-30 RX ADMIN — FAMOTIDINE 20 MG: 20 TABLET ORAL at 17:51

## 2023-12-30 RX ADMIN — INSULIN ASPART 2 UNITS: 100 INJECTION, SOLUTION INTRAVENOUS; SUBCUTANEOUS at 12:03

## 2023-12-30 RX ADMIN — ACETAMINOPHEN 650 MG: 325 TABLET ORAL at 23:51

## 2023-12-30 RX ADMIN — OXYCODONE HYDROCHLORIDE 5 MG: 5 TABLET ORAL at 12:02

## 2023-12-30 RX ADMIN — INSULIN ASPART 1 UNITS: 100 INJECTION, SOLUTION INTRAVENOUS; SUBCUTANEOUS at 07:59

## 2023-12-30 RX ADMIN — INSULIN ASPART 9 UNITS: 100 INJECTION, SOLUTION INTRAVENOUS; SUBCUTANEOUS at 17:51

## 2023-12-30 RX ADMIN — ACETAMINOPHEN 650 MG: 325 TABLET ORAL at 12:02

## 2023-12-30 RX ADMIN — HYDROXYZINE HYDROCHLORIDE 10 MG: 10 TABLET ORAL at 23:51

## 2023-12-30 RX ADMIN — INSULIN GLARGINE 35 UNITS: 100 INJECTION, SOLUTION SUBCUTANEOUS at 07:59

## 2023-12-30 RX ADMIN — AMPICILLIN SODIUM AND SULBACTAM SODIUM 3 G: 2; 1 INJECTION, POWDER, FOR SOLUTION INTRAMUSCULAR; INTRAVENOUS at 19:57

## 2023-12-30 RX ADMIN — ENOXAPARIN SODIUM 40 MG: 100 INJECTION SUBCUTANEOUS at 06:00

## 2023-12-30 RX ADMIN — ATORVASTATIN CALCIUM 10 MG: 10 TABLET, FILM COATED ORAL at 08:02

## 2023-12-30 RX ADMIN — OXYCODONE HYDROCHLORIDE 5 MG: 5 TABLET ORAL at 17:28

## 2023-12-30 ASSESSMENT — ACTIVITIES OF DAILY LIVING (ADL)
ADLS_ACUITY_SCORE: 31
ADLS_ACUITY_SCORE: 32
ADLS_ACUITY_SCORE: 31
ADLS_ACUITY_SCORE: 32

## 2023-12-30 NOTE — PROGRESS NOTES
Infectious Diseases Progress Note  [facility]     12/30/2023   Chart reviewed  Patient in OR 12/29/2023 4:11 PM   Previous note below   ASSESSMENT   78-year-old man with a history of diabetes readmitted for right wrist infection secondary to cat bite, with septic arthritis and osteomyelitis    Right wrist infection.  Active issue, hospitalized recently for wrist infection following cat bite/scratch.  Debridement on 12/11 and 12/13.  Polymicrobial cultures with MSSA, Pasteurella, and several anaerobic bacteria.  Discharged on Augmentin and Flagyl, but readmitted 2 days later after signs of persistent infection (see next)  Persistent right wrist infection.  Seen in follow-up in orthopedics clinic on 12/22.  Due to appearance of wound, patient was sent to the hospital for debridement which occurred on 12/22 - no growth on cultures. Repeat debridement on 12/25 with purulence and ruptured tendons, new cultures collected.  Wbc improved   Debridement 12-27 2023: Ongoing signs of infection with purulent material about the second and third compartments and ruptured tendon stumps .      Multiple surgical procedures this admission  1.  Right wrist arthrotomy for possible septic arthritis  2.  Irrigation and sharp debridement of wound of right dorsal forearm, wrist, hand and fourth dorsal extensor compartment.    1.  Irrigation and sharp, excisional debridement of wound of right dorsal forearm, wrist, hand and extensor compartments 1, 2, 3, 4      Findings:  Obvious purulence noted in the dorsal forearm with ruptures of the ECRL, ECRB and EPL tendons.  First dorsal compartment and fourth dorsal compartment appeared to be intact but had some fraying.  Fifth and sixth dorsal compartments without infection or fraying.    Cx NEG  No Path        PLAN   Pt on Zosyn  Since cultures from 12/22 grew nothing, on abx, simplify to IV UNASYN based on prior cultures  6 weeks needed, ending 2/6/24  Will order PICC  Going to  TCU    Tomorrow surgeon to close wound    Priscila Bowman M.D.    Culture 1+ Pasteurella multocida Abnormal            Resulting Agency: [facility]     Susceptibility     Pasteurella multocida     ARIELLE     Ampicillin 0.19 ug/mL Susceptible     Azithromycin 0.75 ug/mL Susceptible     Ceftriaxone 0.002 ug/mL Susceptible     Levofloxacin 0.023 ug/mL Susceptible     Penicillin 0.094 ug/mL Susceptible     Trimethoprim/Sulfamethoxazole 0.047 ug/mL Susceptible                           ===========================================  Previous photo from 12/22/2023:    SUBJECTIVE / INTERVAL HISTORY:   Chart reviewed    Doing better, several questions  Previous notes reviewed  Soaking hand  Previous note:  Seen briefly today while soaking his wrist.      Antibiotics   Zosyn 12/22-    Previous:  Perioperative cefazolin  Augmentin and Flagyl prior to arrival  Vancomycin 12/22-12/23    Physical Exam     Temp:  [97.6  F (36.4  C)-98.2  F (36.8  C)] 98  F (36.7  C)  Pulse:  [50-59] 53  Resp:  [12-18] 16  BP: (108-142)/(55-69) 108/59  SpO2:  [94 %-100 %] 94 %    /59 (BP Location: Left arm)   Pulse 53   Temp 98  F (36.7  C) (Oral)   Resp 16   Ht 1.829 m (6')   Wt 94.3 kg (208 lb)   SpO2 94%   BMI 28.21 kg/m      Gen. appearance nontoxic  Eyes no conjunctivitis or icterus  Neck no stiffness or neck vein distention, no LN  Heart  No edema  Lungs breathing comfortably    Extremities no synovitis, trace edema, wrapped hand  Skin  no rash or emboli  Neurologic alert oriented no focal deficits        Cultures   Previous cultures reviewed  12/22 right wrist cultures x 2: No growth to date; no organisms on Gram stain  12/25 wrist cultures no growth to date     Susceptibility data from last 90 days.  Collected Specimen Info Organism Ampicillin Azithromycin Ceftriaxone Clindamycin Daptomycin Doxycycline Erythromycin Gentamicin Levofloxacin Oxacillin Penicillin Tetracycline Trimethoprim/Sulfamethoxazole  Vancomycin   12/11/23 Tissue from  "Wrist, Right Bacteroides pyogenes                 12/11/23 Tissue from Wrist, Right Staphylococcus aureus     S  S  S  S  S   S   S  S  S     Pasteurella multocida                 12/11/23 Tissue from Hand, Right Fusobacterium nucleatum                   Bacteroides pyogenes                 12/11/23 Tissue from Hand, Right Pasteurella multocida                   Staphylococcus aureus                 12/11/23 Abscess from Hand, Right Fusobacterium nucleatum                   Bacteroides pyogenes                 12/11/23 Abscess from Hand, Right Pasteurella multocida  S  S  S       S   S   S               Pertinent Labs:     Recent Labs   Lab 12/28/23  0553 12/25/23  0550   WBC  --  7.7   HGB  --  10.7*    360       No results for input(s): \"NA\", \"CO2\", \"BUN\", \"CREATININE\", \"ALBUMIN\", \"BILITOT\", \"ALKPHOS\", \"ALT\", \"AST\", \"GLUCOSE\" in the last 168 hours.    Invalid input(s): \"K\", \"CL\", \"CALCIUM\", \"LABALBU\", \"PROT\"      No results for input(s): \"CRPI\", \"SED\" in the last 168 hours.        Imaging:     US Upper Extremity Venous Duplex Right    Result Date: 12/23/2023  EXAM: US UPPER EXTREMITY VENOUS DUPLEX RIGHT LOCATION: [facility] DATE: 12/23/2023 INDICATION: swelling COMPARISON: None. TECHNIQUE: Venous Duplex ultrasound of the right upper extremity with (when possible) and without compression, augmentation, and duplex. Color flow and spectral Doppler with waveform analysis performed. FINDINGS: Ultrasound includes evaluation of the internal jugular vein, innominate vein, subclavian vein, axillary vein, and brachial vein. The superficial cephalic and basilic veins were also evaluated where seen. RIGHT: No deep venous thrombosis. No superficial thrombophlebitis.     IMPRESSION: 1.  No deep venous thrombosis in the right upper extremity.    POC US Guidance Needle Placement    Result Date: 12/22/2023  Ultrasound was performed as guidance to an anesthesia procedure.  Click \"PACS images\" " hyperlink below to view any stored images.  For specific procedure details, view procedure note authored by anesthesia.

## 2023-12-30 NOTE — PLAN OF CARE
Problem: Pain Acute  Goal: Optimal Pain Control and Function  Outcome: Progressing   Goal Outcome Evaluation: Patient taking Tylenol every 4 to 6 hours for moderate paing, and adding oxycodone 5mg as needed for more severe pain.  Utilizing ice and elevation for pain and swelling.      Problem: Adult Inpatient Plan of Care  Plan of Care Review: Patient aware of plan to return to surgery tomorrow morning.  Per Ortho surgeon, plan to attempt to close wound.  NPO at midnight.  PICC line ordered per Infectious Disease provider to be placed this afternoon.  Antibiotics adjusted per ID.    Katty Gould RN

## 2023-12-30 NOTE — PLAN OF CARE
Patient vital signs are at baseline: Yes  Patient able to ambulate as they were prior to admission or with assist devices provided by therapies during their stay:  Yes  Patient MUST void prior to discharge:  Yes  Patient able to tolerate oral intake:  Yes  Pain has adequate pain control using Oral analgesics:  Yes  Does patient have an identified :  Yes  Has goal D/C date and time been discussed with patient:  Yes    Pt arrived on unit at 2000.   Pt walked a short distance this shift. Pt was unsteady when up. Pts right elbow down to wrist is numb. Weak right handed .   ACHS checks continued.   Wound vac in place at 125 continuous.     Chase Figueroa RN on 12/29/2023 at 10:56 PM

## 2023-12-30 NOTE — PLAN OF CARE
Goal Outcome Evaluation:      Problem: Adult Inpatient Plan of Care  Goal: Absence of Hospital-Acquired Illness or Injury  Intervention: Prevent Skin Injury  Problem: Pain Acute  Goal: Optimal Pain Control and Function  Intervention: Develop Pain Management Plan  Intervention: Prevent or Manage Pain  Problem: Pain Acute  Goal: Optimal Pain Control and Function  Intervention: Prevent or Manage Pain  Patient vital signs are at baseline: Yes  Patient able to ambulate as they were prior to admission or with assist devices provided by therapies during their stay:  Yes  Patient MUST void prior to discharge:  Yes  Patient able to tolerate oral intake:  Yes  Pain has adequate pain control using Oral analgesics:  Yes  Does patient have an identified :  Yes  Has goal D/C date and time been discussed with patient:  Yes     A&Ox4. VSS, on RA. Pt stated of having baseline neuropathy; also said had no sensation in right hand, but later on said he could feel fingers and had some pain. Rated 5/10 on a pain scale; PRN oral pain medication administered. Arm sling/splint in place.  Wound vac at 125 continuous. Call light within reach.

## 2023-12-30 NOTE — ANESTHESIA POSTPROCEDURE EVALUATION
Patient: Amor Zavaleta    Procedure: Procedure(s):  IRRIGATION AND DEBRIDEMENT, HAND       Anesthesia Type:  General    Note:     Postop Pain Control: Uneventful            Sign Out: Well controlled pain   PONV: No   Neuro/Psych: Uneventful            Sign Out: Acceptable/Baseline neuro status   Airway/Respiratory: Uneventful            Sign Out: Acceptable/Baseline resp. status   CV/Hemodynamics: Uneventful            Sign Out: Acceptable CV status; No obvious hypovolemia; No obvious fluid overload   Other NRE: NONE   DID A NON-ROUTINE EVENT OCCUR? No           Last vitals:  Vitals:    12/29/23 1730 12/29/23 1959 12/29/23 2000   BP: 122/64 139/67 139/67   Pulse: 50 54 54   Resp: 14 16 16   Temp:  36.4  C (97.6  F) 36.4  C (97.6  F)   SpO2: 100% 96% 98%       Electronically Signed By: Darrian Haney MD  December 29, 2023  8:26 PM

## 2023-12-30 NOTE — OP NOTE
OPERATIVE REPORT   DATE OF OPERATION: 2023     Patient: Amor Zavaleta  MRN: [id number]  : 1945    SURGEON: Jak Luke MD    ASSISTANT: None    ANESTHESIOLOGIST: Anesthesiologist: Darrian Haney MD  CRNA: Eliza Oakley APRN CRNA    ANESTHESIA: Choice with Block    PREOPERATIVE DIAGNOSIS:   Right dorsal forearm polymicrobial infection with septic extensor tenosynovitis  Right ECRL and ECRB tendon ruptures  Right EPL tendon rupture    POSTOPERATIVE DIAGNOSIS:   1. Right dorsal forearm polymicrobial infection with septic extensor tenosynovitis  2. Right ECRL and ECRB tendon ruptures  3. Right EPL tendon rupture  4. Right distal radius osteomyelitis     OPERATION(S) PERFORMED:   Irrigation and sharp excisional debridement of wound of right distal forearm including extensor compartments, wrist, hand and distal radius bone   Wound vac application 5 cm x 9 cm    ESTIMATED BLOOD LOSS: 10 mL.   TOURNIQUET TIME: 26 min    IMPLANTS: None    SPECIMENS:  ID Type Source Tests Collected by Time Destination   A : Right Dorsal Forearm Tissue Forearm, Right ANAEROBIC BACTERIAL CULTURE ROUTINE, FUNGAL OR YEAST CULTURE ROUTINE, AEROBIC BACTERIAL CULTURE ROUTINE Jak Luke MD 2023  6:46 PM    B : Right distal Radius Bone Tissue Wrist, Right ANAEROBIC BACTERIAL CULTURE ROUTINE, FUNGAL OR YEAST CULTURE ROUTINE, AEROBIC BACTERIAL CULTURE ROUTINE Jak Luke MD 2023  6:51 PM          BACKGROUND:  Amor Zavaleta is a 78 year old male with severe right forearm infection from a cat bite.  He sustained a cat bite to right dorsal forearm on 2023.  Unfortunately, he developed septic extensor tenosynovitis.  Initially was treated at [facility] and underwent irrigation debridement in the operating room with Dr. Leyva on 2023 and 2023.  Cultures grew polymicrobial infection.  He was treated on IV antibiotics and discharged home on PO antibiotics on 2023.  He  returned to clinic with Dr. Carla Austin who noted recurrence of infection. He was taken back to the operating room on 12/22/2023 for repeat I&D. Repeat irrigation debridement was performed on 12/25/2023 with Dr. Austin.  At that time it was noted that he had had infection related attritional ruptures of the EPL, ECRL, and ECRB. He was taken back to the OR on 12/27/23 with Dr. Burkett. He has been in the hospital since on IV antibiotics with zosyn. He has recurrence of purulent drainage and is indicated for return to the OR for irrigation and debridement of the right forearm. MRI was obtained demonstrating ongoing fluid in the dorsal extensor tendons, primarily in the fourth dorsal compartment. There is also evidence of bony edema and possible disruption in the dorsal aspect of the distal radius concerning for osteomyelitis. I discussed the MRI findings with the patient. We will plan for repeat I&D right forearm and possible wound vac application today. He was amenable to the plan of care and all questions were answered.     OPERATIVE FINDINGS: Cortical disruption at the dorsal distal radius just proximal and radial to Glenda's tubercle.  Bone cultures obtained.  Soft tissue specimens obtained for culture.  No significant purulence tracking proximally.  Wound copiously irrigated with 9 L of normal saline.  Wound VAC applied.    OPERATIVE PROCEDURE: Amor Zavaleta was seen in the pre-operative area; informed consent was obtained.  The right upper extremity was appropriately marked by the patient and the operating surgeon.  A preoperative regional block was performed by the anesthesia team.  Please see their documentation for further details.  The patient was brought to the operating room and transferred to the OR bed.   The patient was positioned supine with appropriate padding and a safety strap.  A right upper arm tourniquet was placed but not inflated at this time.  The patient's operative extremity was prepped  and draped in the standard fashion.  At this time a surgical safety timeout was performed involving the operating room nurse, anesthesia team, scrub tech, and operating surgeon. The operative upper extremity was elevated and the tourniquet inflated.  The prior incision was opened.  The wound was bluntly opened.  Skin flaps were developed with blunt dissection radially and ulnarly.  The superficial sensory branch the radial nerve was identified and protected for the remainder of the case.  There was no significant pocket of new purulence.  Majority of the necrotic tissue was noted within the defect at the level of the attritional ruptures in the second and third compartments.  New tissue specimens were obtained and sent for culture.  There was no evidence of wrist joint effusion on the MRI or intraoperatively.  The incision was extended approximately 2 cm proximally.  Skin edges were debrided of necrosis.  There was no significant pocket of purulence proximally.  The first extensor compartment was intact without signs of purulence or necrosis.  The muscle bellies demonstrated myositis but no purulence.  The second compartment demonstrated rupture of both ECRL and ECRB.  There was no new purulence around the tendon stumps.  The EPL tendon was identified distally and this was debrided sharply.  The fourth extensor compartment muscle demonstrated myositis, but no purulence.  The extensor retinaculum remained intact over the fourth compartment.  The fifth extensor compartment did not demonstrate significant purulence.  There was no evidence of purulence tracking ulnarly. There was a cortical defect along the dorsal distal radius just proximal and radial to Glenda's tubercle which corresponded to the area of concern on MRI.  A curette was used to gain access to the medullary canal at the distal radius.  There was no katie purulence.  Dorsal distal radius bone was sent for culture.  The dorsal distal radius was curetted to  remove any unhealthy tissue.  The skin, subcutaneous tissues, fascia, muscle/tendon and bone was excisionally debrided with a combination of scalpel, scissors, rongeur, and curette.  At the conclusion of the excisional debridement, there was no further devitalized or infected tissue apparent in the wound.  The wound was then copiously irrigated with 9 L of normal saline.  The tourniquet was let down after 26 minutes.  Hemostasis was achieved.  The skin was loosely closed with 3-0 nylon sutures.  The fourth compartment tendons distally were covered with skin.  The central aspect of the wound was not able to be closed primarily with concern for the integrity of the skin at this area.  A wound VAC was placed.  A 5 cm x 9 cm sponge was placed within the wound on the radial aspect of the distal forearm.  Adaptic was then placed over the remainder of the incision that was closed loosely followed by a second 15 x 2 cm sponge.  Adhesive was applied and a hole was cut through the adhesive centrally.  A third 5 x 5 cm sponge was placed to allow for placement of the Belle pad.  The remainder of the adhesive was placed and the suction was applied.  Excellent suction was obtained and the wound VAC was set to 125 mmHg continuous low suction.  A removable volar Ortho-Glass splint was applied to keep the wrist slightly extended and the fingers in extension.  The patient was transferred to the hospital bed and awoken from anesthesia. At the conclusion of the case, all sponge, needle and instrument counts were correct. There were no complications. The patient was then taken to the PACU for recovery in stable condition.  The patient's sister, Penelope Shaffer, was contacted following the case to discuss the findings and plans moving forward.    POSTOPERATIVE PLAN:   Medicine Primary  Activity: Up with assist.  Weight bearing status: NWB RUE  Antibiotics: Continue IV antibiotics per ID recommendations   Diet: Begin with clear fluids and  progress diet as tolerated. NPO at 0001 Sunday for RTOR Sunday AM  DVT prophylaxis: Mechanical.   Bracing/Splinting: Removable orthoglass volar splint  Elevation: Elevate RUE  Wound Care: Wound vac 125 mmHg continuous  Pain management: per primary  Imaging: None  Labs: Trend inflammatory markers  Cultures: Pending, follow culture results closely.  Consults: ID, social work/care coordination - please coordinate with sister, Penelope Shaffer [phone]  Disposition: PACU    Plan for RTOR Sunday 12/31/23 for repeat I&D, wound vac exchange vs primary closure      Jak Luke MD  Hand and Upper Extremity Surgeon  [facility]

## 2023-12-30 NOTE — BRIEF OP NOTE
[facility]    Brief Operative Note    Pre-operative diagnosis: Infection of right hand due to bite, initial encounter [S61.451A, L08.9]  Post-operative diagnosis Infection of the right hand due to bite, initial encounter; osteomyelitis right distal radius    Procedure: IRRIGATION AND DEBRIDEMENT, HAND, Right - Hand    Surgeon: Surgeon(s) and Role:     * Jak Luke MD - Primary  Anesthesia: Choice with Block   Estimated Blood Loss: Less than 10 ml    Drains: Wound vac 250 mmHg continuous  Specimens:   ID Type Source Tests Collected by Time Destination   A : Right Dorsal Forearm Tissue Forearm, Right ANAEROBIC BACTERIAL CULTURE ROUTINE, FUNGAL OR YEAST CULTURE ROUTINE, AEROBIC BACTERIAL CULTURE ROUTINE Jak Luke MD 12/29/2023  6:46 PM    B : Right distal Radius Bone Tissue Wrist, Right ANAEROBIC BACTERIAL CULTURE ROUTINE, FUNGAL OR YEAST CULTURE ROUTINE, AEROBIC BACTERIAL CULTURE ROUTINE Jak Luke MD 12/29/2023  6:51 PM      Findings:   Evidence of infection: deep space infection and purulence,  osteomyelitis in distal radius with cortical disruption  Complications: None.  Implants: * No implants in log *

## 2023-12-30 NOTE — PROCEDURES
"PICC Line Insertion Procedure Note    Pt. Name:   Amor Zavaleta  MRN:          [id number]    Procedure:     Insertion of a  SINGLE Lumen  4 fr  Bard SOLO (valved) Power PICC, Lot number [id number]    Indications: Antibiotic    Contraindications : RIGHT WRIST INFECTION/Surgical    Procedure Details:    Patient identified with 2 identifiers and \"Time Out\" conducted.    Central line insertion bundle followed: Hand hygiene performed prior to procedure, site cleansed with Cholraprep (CHG), hat, mask, sterile gloves, sterile gown worn, patient draped with maximum barrier head to toe drape, sterile field maintained.    The left upper arm BASILIC vein was assessed by ultrasound and found to be anechoic, compressible, patent, and of adequate size.     Lidocaine 1% 2.5 ml administered SQ to the insertion site.     Modified Seldinger Technique (MST) used for insertion, ONE attempt(s) required to access vein. Imaging during access shows the needle within the vein.     PICC was advanced through peel-away sheath.    Catheter threaded without difficulty. The tip of the catheter was positioned in the SVC. Good blood return noted.    Catheter was flushed with 20 cc normal saline.     Catheter secured with Statlock, tissue adhesive (on insertion site only), Biopatch (CHG), and Tegaderm dressing applied.    The sharps that are included in the PICC insertion kit were accounted for and disposed of in the sharps container prior to breakdown of the sterile field.    CLABSI prevention brochure left at bedside.    Patient  tolerated procedure well.     Patient's primary RN notified PICC is ready for use.      Findings:    Total catheter length  49 cm, with 0 cm exposed. Mid upper arm circumference is 30 cm.     Tip placement verified in the distal SVC by [employer]:          Comments:  None        Brian Beard, MSN, RN, [facility]   Vascular Access - [facility]      "

## 2023-12-30 NOTE — PROGRESS NOTES
[facility] MEDICINE PROGRESS NOTE      Identification/Summary: Amor Zavaleta is a 78 year old male with a history of insulin-dependent diabetes type 2, CKD stage IV, HTN, LBBB who presents on 12/22/2023 with continued pain and swelling to right wrist following a cat bite.  He was seen by orthopedic surgeon in his office 12/22 and thought that the infection was worsening on oral antibiotics.  Taken to surgery for irrigation and debridement the evening of 12/22.  He reports she is doing better today.  He lives alone, in an apartment with 3 flights of stairs.  He feels things are going well.  Decreased redness over the dorsum of the hand wrist and distal forearm 12/27 compared with 12/26. Eventually to TCU with IV antibiotics.    Patient new to me 12/30; s/p I&D on 12/29, awaiting intra-operative cultures and final antibiotics plan as per ID.      Assessment/plan:     Septic joint right wrist with likely extension to right elbow secondary to cat bite 12/2  Patient was evaluated at [facility] and admitted 12/11.  Received IV antibiotics along with washouts from orthopedic surgery.  Was discharged 12/20 with 7 days of Augmentin and metronidazole per infectious disease recommendations  [facility] requested [id number] for washout and IV antibiotics due to continued infection  Appreciate orthopedic recommendations regarding right elbow pain and edema-likely extension of septic joint.  Vancomycin and Zosyn initiated in the ER  Blood cultures,   Has been seen by infectious disease and IV antibiotics to continue pending cultures.  Patient went back to surgery for washout 12/25 and it is reported that he had 3 ruptured tendons.    Patient would likely benefit from TCU and this was recommended by therapies.  Plan: Per orthopedic surgery and infectious disease.  Probably needs irrigation/washout at least 1 more time then to TCU with IV antibiotics.     Decreased mobility  secondary to long hospitalization  PT/OT-appreciate discharge recommendations for placement.     Right hand wounds from cat attack  Wound care appreciated.     Diabetes type 2-insulin-dependent, blood sugars have been good  Insulin pump at home  States home dose is Lantus 56 units at night and 16 units NovoLog with meals  A1c of 8.9 12/11/2023  Home dose of Lantus 56 units ordered but was some hypoglycemia the -12/23 it was decreased to 35 units.  Continue home very high insulin sliding scale with meals no insulin at bedtime   Plan: Because he is going to surgery this afternoon will give him 10 units of Lantus this morning instead of 35     HTN  Currently normotensive at 106/59 with heart rate of 64.  Hold home antihypertensives for now     CKD stage IV  Baseline creatinine appears to be 1.2  Creatinine 1.6 at admission now 1.3 on 12/25  Plan: Check BMP in the morning     Code Status: Full Code  DVT prophylaxis: Lovenox      Disposition:  -Discharge barriers: Orthopedic intervention, IV abx, PT/OT and placement to TCU.      Expected Discharge Date: 12/31/2023      Destination: inpatient rehabilitation facility  Discharge Comments: IV abx, 4th I&D either 12/29 or 12/30  [facility] TCU accepted for DC        The patient's care was discussed with the Patient.    45 MINUTES SPENT BY ME on the date of service doing chart review, history, exam, documentation & further activities per the note.      Raheem Lewis MD  Internal Medicine  [facility]  [facility]  Phone: #[phone]  Securely message with the Vocera Web Console (learn more here)  Text page via [employer] Paging/Directory       Interval History/Subjective:  No acute events overnight.    No report of chest pain, shortness of breath, or other new complaints. Discussed plan of care with patient. Answered all questions to patient's verbalized understanding and satisfaction.      Physical Exam/Objective:  Temp:  [97.6  F (36.4  C)-98.2  F  (36.8  C)] 98  F (36.7  C)  Pulse:  [50-59] 53  Resp:  [12-18] 16  BP: (108-142)/(55-69) 108/59  SpO2:  [94 %-100 %] 94 %  Body mass index is 28.21 kg/m .    GENERAL:  Alert, appears comfortable, in no acute distress, appears stated age,   HEAD:  Normocephalic, without obvious abnormality, atraumatic   EYES:  Conjunctiva/corneas clear, no scleral icterus, EOM's appears intact grossly   NOSE: Nares normal, septum midline, mucosa normal, no drainage   THROAT: Lips, mucosa, and tongue appear normal   NECK: Symmetrical, trachea appears midline   BACK:   Symmetric, no curvature noted   LUNGS:   Symmetric chest rise on inhalation, respirations unlabored   CHEST WALL:  No apparent deformity   HEART:  No thrills or heaves visualized   ABDOMEN:   No masses or organomegaly apparent; non-scaphoid   EXTREMITIES: Extremities appear normal, atraumatic, no cyanosis   SKIN: No exanthems in the visualized areas   NEURO: Alert and oriented x3, moves all four extremities freely and spontaneously   PSYCH: Cooperative, behavior is appropriate     Data reviewed today: I personally reviewed all new medications, labs, imaging/diagnostics reports over the past 24 hours. Pertinent findings include:    Labs:  Most Recent 3 CBC's:  Recent Labs   Lab Test 12/28/23  0553 12/25/23  0550 12/23/23  0617 12/22/23  1327   WBC  --  7.7 12.5* 11.1*   HGB  --  10.7* 10.5* 11.6*   MCV  --  96 96 97    360 395 459*     Most Recent 3 BMP's:  Recent Labs   Lab Test 12/30/23  0645 12/30/23  0229 12/29/23  2302 12/28/23  0632 12/28/23  0553 12/25/23  0633 12/25/23  0550 12/24/23  0701 12/24/23  0615 12/23/23  0632 12/23/23  0617 12/22/23  1640 12/22/23  1327 12/19/23  0734 12/19/23  0652   NA  --   --   --   --   --   --   --   --   --   --  136  --  136  --  133*   POTASSIUM  --   --   --   --   --   --   --   --   --   --  4.3  --  4.0  --  4.2   CHLORIDE  --   --   --   --   --   --   --   --   --   --  106  --  102  --  102   CO2  --   --   --   --    --   --   --   --   --   --  23  --  23  --  22   BUN  --   --   --   --   --   --   --   --   --   --  23.2*  --  32.6*  --  18.6   CR  --   --   --   --  1.25*  --  1.27*  --  1.29*  --  1.41*  --  1.61*  --  1.19*   ANIONGAP  --   --   --   --   --   --   --   --   --   --  7  --  11  --  9   VLADIMIR  --   --   --   --   --   --   --   --   --   --  8.0*  --  9.0  --  8.1*   * 126* 162*   < >  --    < >  --    < >  --    < > 130*   < > 98   < > 120*    < > = values in this interval not displayed.     Most Recent 2 LFT's:  Recent Labs   Lab Test 12/23/23  0617 12/11/23  1316   AST 23 27   ALT 9 45   ALKPHOS 41 120   BILITOTAL 0.5 1.1       Medications:   Personally Reviewed.  Medications      [Held by provider] amLODIPine  2.5 mg Oral Daily    [Held by provider] aspirin  325 mg Oral Daily    atorvastatin  10 mg Oral Daily    enoxaparin ANTICOAGULANT  40 mg Subcutaneous Q24H    famotidine  20 mg Oral Daily    insulin aspart  1-22 Units Subcutaneous TID w/meals    insulin glargine  35 Units Subcutaneous QAM AC    [Held by provider] lisinopril-hydrochlorothiazide  1 tablet Oral Daily    piperacillin-tazobactam  3.375 g Intravenous Q8H    sodium chloride (PF)  3 mL Intracatheter Q8H

## 2023-12-30 NOTE — PROGRESS NOTES
Orthopaedic Surgery Progress Note 12/30/2023    S: No acute events overnight.  Pain controlled. Block has worn off. Denies numbness or tingling. Mild chest discomfort this am. Denies fever,chills,SOB, nausea/vomiting. Tolerating  diet.   O:  Temp: 98  F (36.7  C) Temp src: Oral BP: 108/59 Pulse: 53   Resp: 16 SpO2: 94 % O2 Device: None (Room air) Oxygen Delivery: 6 LPM    Exam:  Gen: No acute distress, resting comfortably in bed.  Resp: Non-labored breathing  MSK:  RUE:  - Splint and Dressings c/d/i  - SILT rad/uln/med n distributions  - Wound vac in place, no leak, minimal output  - No EPL, minimal EDC firing, fires FPL, finger flexors  - Figners wwp      Recent Labs   Lab 12/28/23  0553 12/25/23  0550   WBC  --  7.7   HGB  --  10.7*    360       All cultures:  Recent Labs   Lab 12/27/23  1521 12/25/23  0856   CULTURE No growth after 2 days  No growth after 2 days  No anaerobic organisms isolated after 2 days No Growth  No anaerobic organisms isolated after 4 days  No growth after 4 days    Intra-op Cultures 12/29: NGTD    Assessment: Amor Zavaleta is a 78 year old male with PMH significant for IDT2DM, CKD stage IV, HTN, LBBB who sustained a cat bite on 12/2/23 s/p multiple I&D with Dr. Leyva on 12/11 and 12/13, Dr. Austin on 12/22 and 12/25 and Dr. Burkett on 12/27. He returned to OR with Dr. Luke on 12/29/23 for repeat I&D and wound vac application.     Plan:  Medicine Primary  Activity: Up with assist.  Weight bearing status: NWB RUE  Antibiotics: Continue IV antibiotics per ID recommendations   Diet: Begin with clear fluids and progress diet as tolerated. NPO at 0001 Sunday for RTOR Sunday AM  DVT prophylaxis: Mechanical.   Bracing/Splinting: Removable orthoglass volar splint  Elevation: Elevate RUE  Wound Care: Wound vac 125 mmHg continuous  Pain management: per primary  Imaging: None  Labs: Trend inflammatory markers   Cultures: Pending, follow culture results closely.  Consults: ID, social  work/care coordination - please coordinate with sister, Penelope Shaffer [phone]  Disposition: PACU     Plan for RTOR Sunday 12/31/23 for repeat I&D, wound vac exchange vs primary closure    Jak Luke MD  Hand and Upper Extremity Surgeon  [facility]

## 2023-12-30 NOTE — ANESTHESIA CARE TRANSFER NOTE
Patient: Amor Zavaleta    Procedure: Procedure(s):  IRRIGATION AND DEBRIDEMENT, HAND       Diagnosis: Infection of right hand due to bite, initial encounter [S61.451A, L08.9]  Diagnosis Additional Information: No value filed.    Anesthesia Type:   General     Note:    Oropharynx: oropharynx clear of all foreign objects and spontaneously breathing  Level of Consciousness: awake  Oxygen Supplementation: room air    Independent Airway: airway patency satisfactory and stable  Dentition: dentition unchanged  Vital Signs Stable: post-procedure vital signs reviewed and stable  Report to RN Given: handoff report given  Patient transferred to: Medical/Surgical Unit    Handoff Report: Identifed the Patient, Identified the Reponsible Provider, Reviewed the pertinent medical history, Discussed the surgical course, Reviewed Intra-OP anesthesia mangement and issues during anesthesia, Set expectations for post-procedure period and Allowed opportunity for questions and acknowledgement of understanding      Vitals:  Vitals Value Taken Time   /67 12/29/23 1959   Temp 36.4  C (97.6  F) 12/29/23 1959   Pulse 54 12/29/23 1959   Resp 16 12/29/23 1959   SpO2 96 % 12/29/23 1959       Electronically Signed By: CHIN Salgado CRNA  December 29, 2023  8:06 PM

## 2023-12-31 ENCOUNTER — ANESTHESIA EVENT (OUTPATIENT)
Dept: SURGERY | Facility: CLINIC | Age: 78
DRG: 464 | End: 2023-12-31
Payer: COMMERCIAL

## 2023-12-31 ENCOUNTER — ANESTHESIA (OUTPATIENT)
Dept: SURGERY | Facility: CLINIC | Age: 78
DRG: 464 | End: 2023-12-31
Payer: COMMERCIAL

## 2023-12-31 LAB
CREAT SERPL-MCNC: 0.81 MG/DL (ref 0.67–1.17)
CRP SERPL-MCNC: 18.9 MG/L
EGFRCR SERPLBLD CKD-EPI 2021: 90 ML/MIN/1.73M2
ERYTHROCYTE [DISTWIDTH] IN BLOOD BY AUTOMATED COUNT: 13.2 % (ref 10–15)
GLUCOSE BLDC GLUCOMTR-MCNC: 126 MG/DL (ref 70–99)
GLUCOSE BLDC GLUCOMTR-MCNC: 126 MG/DL (ref 70–99)
GLUCOSE BLDC GLUCOMTR-MCNC: 127 MG/DL (ref 70–99)
GLUCOSE BLDC GLUCOMTR-MCNC: 146 MG/DL (ref 70–99)
GLUCOSE BLDC GLUCOMTR-MCNC: 166 MG/DL (ref 70–99)
HCT VFR BLD AUTO: 31.4 % (ref 40–53)
HGB BLD-MCNC: 10.2 G/DL (ref 13.3–17.7)
MCH RBC QN AUTO: 31.3 PG (ref 26.5–33)
MCHC RBC AUTO-ENTMCNC: 32.5 G/DL (ref 31.5–36.5)
MCV RBC AUTO: 96 FL (ref 78–100)
PLATELET # BLD AUTO: 268 10E3/UL (ref 150–450)
PLATELET # BLD AUTO: 268 10E3/UL (ref 150–450)
RBC # BLD AUTO: 3.26 10E6/UL (ref 4.4–5.9)
WBC # BLD AUTO: 7.2 10E3/UL (ref 4–11)

## 2023-12-31 PROCEDURE — 258N000003 HC RX IP 258 OP 636: Performed by: ANESTHESIOLOGY

## 2023-12-31 PROCEDURE — 258N000003 HC RX IP 258 OP 636: Performed by: NURSE ANESTHETIST, CERTIFIED REGISTERED

## 2023-12-31 PROCEDURE — 250N000011 HC RX IP 250 OP 636: Performed by: NURSE ANESTHETIST, CERTIFIED REGISTERED

## 2023-12-31 PROCEDURE — 99232 SBSQ HOSP IP/OBS MODERATE 35: CPT | Performed by: HOSPITALIST

## 2023-12-31 PROCEDURE — 85014 HEMATOCRIT: CPT | Performed by: STUDENT IN AN ORGANIZED HEALTH CARE EDUCATION/TRAINING PROGRAM

## 2023-12-31 PROCEDURE — 87070 CULTURE OTHR SPECIMN AEROBIC: CPT | Performed by: STUDENT IN AN ORGANIZED HEALTH CARE EDUCATION/TRAINING PROGRAM

## 2023-12-31 PROCEDURE — 250N000013 HC RX MED GY IP 250 OP 250 PS 637: Performed by: ORTHOPAEDIC SURGERY

## 2023-12-31 PROCEDURE — 86140 C-REACTIVE PROTEIN: CPT | Performed by: STUDENT IN AN ORGANIZED HEALTH CARE EDUCATION/TRAINING PROGRAM

## 2023-12-31 PROCEDURE — 0PBH0ZZ EXCISION OF RIGHT RADIUS, OPEN APPROACH: ICD-10-PCS | Performed by: STUDENT IN AN ORGANIZED HEALTH CARE EDUCATION/TRAINING PROGRAM

## 2023-12-31 PROCEDURE — 250N000011 HC RX IP 250 OP 636: Performed by: ORTHOPAEDIC SURGERY

## 2023-12-31 PROCEDURE — 258N000001 HC RX 258: Performed by: STUDENT IN AN ORGANIZED HEALTH CARE EDUCATION/TRAINING PROGRAM

## 2023-12-31 PROCEDURE — 250N000011 HC RX IP 250 OP 636: Performed by: INTERNAL MEDICINE

## 2023-12-31 PROCEDURE — 999N000157 HC STATISTIC RCP TIME EA 10 MIN

## 2023-12-31 PROCEDURE — 87075 CULTR BACTERIA EXCEPT BLOOD: CPT | Performed by: STUDENT IN AN ORGANIZED HEALTH CARE EDUCATION/TRAINING PROGRAM

## 2023-12-31 PROCEDURE — 272N000001 HC OR GENERAL SUPPLY STERILE: Performed by: STUDENT IN AN ORGANIZED HEALTH CARE EDUCATION/TRAINING PROGRAM

## 2023-12-31 PROCEDURE — 250N000011 HC RX IP 250 OP 636: Performed by: ANESTHESIOLOGY

## 2023-12-31 PROCEDURE — 99232 SBSQ HOSP IP/OBS MODERATE 35: CPT | Performed by: INTERNAL MEDICINE

## 2023-12-31 PROCEDURE — 360N000075 HC SURGERY LEVEL 2, PER MIN: Performed by: STUDENT IN AN ORGANIZED HEALTH CARE EDUCATION/TRAINING PROGRAM

## 2023-12-31 PROCEDURE — 82565 ASSAY OF CREATININE: CPT | Performed by: ORTHOPAEDIC SURGERY

## 2023-12-31 PROCEDURE — 370N000017 HC ANESTHESIA TECHNICAL FEE, PER MIN: Performed by: STUDENT IN AN ORGANIZED HEALTH CARE EDUCATION/TRAINING PROGRAM

## 2023-12-31 PROCEDURE — 250N000009 HC RX 250: Performed by: NURSE ANESTHETIST, CERTIFIED REGISTERED

## 2023-12-31 PROCEDURE — 120N000001 HC R&B MED SURG/OB

## 2023-12-31 RX ORDER — HYDROMORPHONE HCL IN WATER/PF 6 MG/30 ML
0.4 PATIENT CONTROLLED ANALGESIA SYRINGE INTRAVENOUS EVERY 5 MIN PRN
Status: DISCONTINUED | OUTPATIENT
Start: 2023-12-31 | End: 2024-01-02

## 2023-12-31 RX ORDER — METHADONE HYDROCHLORIDE 10 MG/ML
10 INJECTION, SOLUTION INTRAMUSCULAR; INTRAVENOUS; SUBCUTANEOUS ONCE
Status: DISCONTINUED | OUTPATIENT
Start: 2023-12-31 | End: 2023-12-31 | Stop reason: HOSPADM

## 2023-12-31 RX ORDER — FENTANYL CITRATE 50 UG/ML
25 INJECTION, SOLUTION INTRAMUSCULAR; INTRAVENOUS EVERY 5 MIN PRN
Status: DISCONTINUED | OUTPATIENT
Start: 2023-12-31 | End: 2024-01-02

## 2023-12-31 RX ORDER — SODIUM CHLORIDE, SODIUM LACTATE, POTASSIUM CHLORIDE, CALCIUM CHLORIDE 600; 310; 30; 20 MG/100ML; MG/100ML; MG/100ML; MG/100ML
INJECTION, SOLUTION INTRAVENOUS CONTINUOUS
Status: DISCONTINUED | OUTPATIENT
Start: 2023-12-31 | End: 2024-01-02 | Stop reason: HOSPADM

## 2023-12-31 RX ORDER — CEFAZOLIN SODIUM 1 G/3ML
INJECTION, POWDER, FOR SOLUTION INTRAMUSCULAR; INTRAVENOUS PRN
Status: DISCONTINUED | OUTPATIENT
Start: 2023-12-31 | End: 2023-12-31

## 2023-12-31 RX ORDER — FENTANYL CITRATE 50 UG/ML
INJECTION, SOLUTION INTRAMUSCULAR; INTRAVENOUS PRN
Status: DISCONTINUED | OUTPATIENT
Start: 2023-12-31 | End: 2023-12-31

## 2023-12-31 RX ORDER — ONDANSETRON 2 MG/ML
INJECTION INTRAMUSCULAR; INTRAVENOUS PRN
Status: DISCONTINUED | OUTPATIENT
Start: 2023-12-31 | End: 2023-12-31

## 2023-12-31 RX ORDER — FENTANYL CITRATE 50 UG/ML
50 INJECTION, SOLUTION INTRAMUSCULAR; INTRAVENOUS EVERY 5 MIN PRN
Status: DISCONTINUED | OUTPATIENT
Start: 2023-12-31 | End: 2024-01-02

## 2023-12-31 RX ORDER — PROPOFOL 10 MG/ML
INJECTION, EMULSION INTRAVENOUS CONTINUOUS PRN
Status: DISCONTINUED | OUTPATIENT
Start: 2023-12-31 | End: 2023-12-31

## 2023-12-31 RX ORDER — SODIUM CHLORIDE, SODIUM LACTATE, POTASSIUM CHLORIDE, CALCIUM CHLORIDE 600; 310; 30; 20 MG/100ML; MG/100ML; MG/100ML; MG/100ML
INJECTION, SOLUTION INTRAVENOUS CONTINUOUS PRN
Status: DISCONTINUED | OUTPATIENT
Start: 2023-12-31 | End: 2023-12-31

## 2023-12-31 RX ORDER — ONDANSETRON 4 MG/1
4 TABLET, ORALLY DISINTEGRATING ORAL EVERY 30 MIN PRN
Status: DISCONTINUED | OUTPATIENT
Start: 2023-12-31 | End: 2024-01-02

## 2023-12-31 RX ORDER — HYDROMORPHONE HCL IN WATER/PF 6 MG/30 ML
0.2 PATIENT CONTROLLED ANALGESIA SYRINGE INTRAVENOUS EVERY 5 MIN PRN
Status: DISCONTINUED | OUTPATIENT
Start: 2023-12-31 | End: 2024-01-02

## 2023-12-31 RX ORDER — GLYCOPYRROLATE 0.2 MG/ML
INJECTION, SOLUTION INTRAMUSCULAR; INTRAVENOUS PRN
Status: DISCONTINUED | OUTPATIENT
Start: 2023-12-31 | End: 2023-12-31

## 2023-12-31 RX ORDER — ONDANSETRON 2 MG/ML
4 INJECTION INTRAMUSCULAR; INTRAVENOUS EVERY 30 MIN PRN
Status: DISCONTINUED | OUTPATIENT
Start: 2023-12-31 | End: 2024-01-02

## 2023-12-31 RX ORDER — PROPOFOL 10 MG/ML
INJECTION, EMULSION INTRAVENOUS PRN
Status: DISCONTINUED | OUTPATIENT
Start: 2023-12-31 | End: 2023-12-31

## 2023-12-31 RX ADMIN — GLYCOPYRROLATE 0.2 MG: 0.2 INJECTION INTRAMUSCULAR; INTRAVENOUS at 08:17

## 2023-12-31 RX ADMIN — PROPOFOL 40 MG: 10 INJECTION, EMULSION INTRAVENOUS at 08:04

## 2023-12-31 RX ADMIN — AMPICILLIN SODIUM AND SULBACTAM SODIUM 3 G: 2; 1 INJECTION, POWDER, FOR SOLUTION INTRAMUSCULAR; INTRAVENOUS at 16:59

## 2023-12-31 RX ADMIN — INSULIN GLARGINE 35 UNITS: 100 INJECTION, SOLUTION SUBCUTANEOUS at 11:20

## 2023-12-31 RX ADMIN — FENTANYL CITRATE 50 MCG: 50 INJECTION INTRAMUSCULAR; INTRAVENOUS at 08:04

## 2023-12-31 RX ADMIN — SODIUM CHLORIDE, POTASSIUM CHLORIDE, SODIUM LACTATE AND CALCIUM CHLORIDE: 600; 310; 30; 20 INJECTION, SOLUTION INTRAVENOUS at 08:01

## 2023-12-31 RX ADMIN — AMPICILLIN SODIUM AND SULBACTAM SODIUM 3 G: 2; 1 INJECTION, POWDER, FOR SOLUTION INTRAMUSCULAR; INTRAVENOUS at 02:42

## 2023-12-31 RX ADMIN — HYDROMORPHONE HYDROCHLORIDE 0.4 MG: 0.2 INJECTION, SOLUTION INTRAMUSCULAR; INTRAVENOUS; SUBCUTANEOUS at 10:34

## 2023-12-31 RX ADMIN — AMPICILLIN SODIUM AND SULBACTAM SODIUM 3 G: 2; 1 INJECTION, POWDER, FOR SOLUTION INTRAMUSCULAR; INTRAVENOUS at 21:18

## 2023-12-31 RX ADMIN — AMPICILLIN SODIUM AND SULBACTAM SODIUM 3 G: 2; 1 INJECTION, POWDER, FOR SOLUTION INTRAMUSCULAR; INTRAVENOUS at 10:38

## 2023-12-31 RX ADMIN — ONDANSETRON 4 MG: 2 INJECTION INTRAMUSCULAR; INTRAVENOUS at 08:20

## 2023-12-31 RX ADMIN — INSULIN ASPART 3 UNITS: 100 INJECTION, SOLUTION INTRAVENOUS; SUBCUTANEOUS at 17:03

## 2023-12-31 RX ADMIN — CEFAZOLIN 2 G: 1 INJECTION, POWDER, FOR SOLUTION INTRAMUSCULAR; INTRAVENOUS at 08:12

## 2023-12-31 RX ADMIN — OXYCODONE HYDROCHLORIDE 5 MG: 5 TABLET ORAL at 23:16

## 2023-12-31 RX ADMIN — PROPOFOL 150 MCG/KG/MIN: 10 INJECTION, EMULSION INTRAVENOUS at 08:04

## 2023-12-31 RX ADMIN — ACETAMINOPHEN 650 MG: 325 TABLET ORAL at 07:04

## 2023-12-31 RX ADMIN — FAMOTIDINE 20 MG: 20 TABLET ORAL at 17:03

## 2023-12-31 RX ADMIN — PROPOFOL 20 MG: 10 INJECTION, EMULSION INTRAVENOUS at 08:17

## 2023-12-31 RX ADMIN — ENOXAPARIN SODIUM 40 MG: 100 INJECTION SUBCUTANEOUS at 05:53

## 2023-12-31 ASSESSMENT — ACTIVITIES OF DAILY LIVING (ADL)
ADLS_ACUITY_SCORE: 34
ADLS_ACUITY_SCORE: 33
ADLS_ACUITY_SCORE: 33
ADLS_ACUITY_SCORE: 32
ADLS_ACUITY_SCORE: 34
ADLS_ACUITY_SCORE: 32
ADLS_ACUITY_SCORE: 33
ADLS_ACUITY_SCORE: 32

## 2023-12-31 ASSESSMENT — LIFESTYLE VARIABLES: TOBACCO_USE: 0

## 2023-12-31 NOTE — PROGRESS NOTES
Infectious Diseases Progress Note  [facility]     12/31/2023   Chart reviewed  Patient in OR 12/29/2023 4:11 PM   Previous note below   ASSESSMENT   78-year-old man with a history of diabetes readmitted for right wrist infection secondary to cat bite, with septic arthritis and osteomyelitis    Right wrist infection.  Active issue, hospitalized recently for wrist infection following cat bite/scratch.  Debridement on 12/11 and 12/13.  Polymicrobial cultures with MSSA, Pasteurella, and several anaerobic bacteria.  Discharged on Augmentin and Flagyl, but readmitted 2 days later after signs of persistent infection (see next)  Persistent right wrist infection.  Seen in follow-up in orthopedics clinic on 12/22.  Due to appearance of wound, patient was sent to the hospital for debridement which occurred on 12/22 - no growth on cultures. Repeat debridement on 12/25 with purulence and ruptured tendons, new cultures collected.  Wbc improved   Debridement 12-27 2023: Ongoing signs of infection with purulent material about the second and third compartments and ruptured tendon stumps .      Multiple surgical procedures this admission  1.  Right wrist arthrotomy for possible septic arthritis  2.  Irrigation and sharp debridement of wound of right dorsal forearm, wrist, hand and fourth dorsal extensor compartment.    1.  Irrigation and sharp, excisional debridement of wound of right dorsal forearm, wrist, hand and extensor compartments 1, 2, 3, 4      Findings:  Obvious purulence noted in the dorsal forearm with ruptures of the ECRL, ECRB and EPL tendons.  First dorsal compartment and fourth dorsal compartment appeared to be intact but had some fraying.  Fifth and sixth dorsal compartments without infection or fraying.        Cx NEG  No Path        PLAN       ALL cx neg   IV UNASYN based on prior cultures  6 weeks needed, ending 2/6/24  PICC in  Going to [facility]        Priscila Bowman M.D.    Culture 1+ Pasteurella multocida  Abnormal            Resulting Agency: [facility]     Susceptibility     Pasteurella multocida     ARIELLE     Ampicillin 0.19 ug/mL Susceptible     Azithromycin 0.75 ug/mL Susceptible     Ceftriaxone 0.002 ug/mL Susceptible     Levofloxacin 0.023 ug/mL Susceptible     Penicillin 0.094 ug/mL Susceptible     Trimethoprim/Sulfamethoxazole 0.047 ug/mL Susceptible                           ===========================================  Previous photo from 12/22/2023:    SUBJECTIVE / INTERVAL HISTORY:     Doing OK  Postop today with VAC  HEALTHY tissue          Antibiotics   Zosyn 12/22-    Previous:  Perioperative cefazolin  Augmentin and Flagyl prior to arrival  Vancomycin 12/22-12/23    Physical Exam     Temp:  [97.3  F (36.3  C)-98.3  F (36.8  C)] 97.8  F (36.6  C)  Pulse:  [] 59  Resp:  [16-18] 18  BP: (138-182)/(64-86) 163/72  SpO2:  [91 %-96 %] 96 %    BP (!) 163/72 (BP Location: Left leg)   Pulse 59   Temp 97.8  F (36.6  C) (Oral)   Resp 18   Ht 1.829 m (6')   Wt 94.3 kg (208 lb)   SpO2 96%   BMI 28.21 kg/m      Gen. appearance nontoxic  Eyes no conjunctivitis or icterus  Neck no stiffness or neck vein distention, no LN  Heart  No edema  Lungs breathing comfortably    Extremities no synovitis, trace edema, wrapped hand  Skin  no rash or emboli  Neurologic alert oriented no focal deficits        Cultures   Previous cultures reviewed  12/22 right wrist cultures x 2: No growth to date; no organisms on Gram stain  12/25 wrist cultures no growth to date     Susceptibility data from last 90 days.  Collected Specimen Info Organism Ampicillin Azithromycin Ceftriaxone Clindamycin Daptomycin Doxycycline Erythromycin Gentamicin Levofloxacin Oxacillin Penicillin Tetracycline Trimethoprim/Sulfamethoxazole  Vancomycin   12/11/23 Tissue from Wrist, Right Bacteroides pyogenes                 12/11/23 Tissue from Wrist, Right Staphylococcus aureus     S  S  S  S  S   S   S  S  S     Pasteurella multocida                 12/11/23  "Tissue from Hand, Right Fusobacterium nucleatum                   Bacteroides pyogenes                 12/11/23 Tissue from Hand, Right Pasteurella multocida                   Staphylococcus aureus                 12/11/23 Abscess from Hand, Right Fusobacterium nucleatum                   Bacteroides pyogenes                 12/11/23 Abscess from Hand, Right Pasteurella multocida  S  S  S       S   S   S               Pertinent Labs:     Recent Labs   Lab 12/31/23  0604 12/28/23  0553 12/25/23  0550   WBC 7.2  --  7.7   HGB 10.2*  --  10.7*     [phone]       No results for input(s): \"NA\", \"CO2\", \"BUN\", \"CREATININE\", \"ALBUMIN\", \"BILITOT\", \"ALKPHOS\", \"ALT\", \"AST\", \"GLUCOSE\" in the last 168 hours.    Invalid input(s): \"K\", \"CL\", \"CALCIUM\", \"LABALBU\", \"PROT\"      Recent Labs   Lab 12/31/23  0604   CRPI 18.90*           Imaging:     US Upper Extremity Venous Duplex Right    Result Date: 12/23/2023  EXAM: US UPPER EXTREMITY VENOUS DUPLEX RIGHT LOCATION: [facility] DATE: 12/23/2023 INDICATION: swelling COMPARISON: None. TECHNIQUE: Venous Duplex ultrasound of the right upper extremity with (when possible) and without compression, augmentation, and duplex. Color flow and spectral Doppler with waveform analysis performed. FINDINGS: Ultrasound includes evaluation of the internal jugular vein, innominate vein, subclavian vein, axillary vein, and brachial vein. The superficial cephalic and basilic veins were also evaluated where seen. RIGHT: No deep venous thrombosis. No superficial thrombophlebitis.     IMPRESSION: 1.  No deep venous thrombosis in the right upper extremity.    POC US Guidance Needle Placement    Result Date: 12/22/2023  Ultrasound was performed as guidance to an anesthesia procedure.  Click \"PACS images\" hyperlink below to view any stored images.  For specific procedure details, view procedure note authored by anesthesia.          "

## 2023-12-31 NOTE — OP NOTE
OPERATIVE REPORT   DATE OF OPERATION: 2023     Patient: Amor Zavaleta  MRN: [id number]  : 1945    SURGEON: Jak Luke MD    ASSISTANT: None    ANESTHESIOLOGIST: Anesthesiologist: Naveed Anand MD  CRNA: Tatyana Howard APRN CRNA    ANESTHESIA: MAC    PREOPERATIVE DIAGNOSIS:   1.         Right dorsal forearm polymicrobial infection with septic extensor tenosynovitis  2.         Right ECRL and ECRB tendon ruptures  3.         Right EPL tendon rupture  4.         Right distal radius osteomyelitis     POSTOPERATIVE DIAGNOSIS:   1.         Right dorsal forearm polymicrobial infection with septic extensor tenosynovitis  2.         Right ECRL and ECRB tendon ruptures  3.         Right EPL tendon rupture  4.         Right distal radius osteomyelitis     OPERATION(S) PERFORMED:   1. Irrigation and sharp excisional debridement of wound of right distal forearm including extensor compartments, wrist, hand and distal radius bone   2. Wound vac application 4.5 x 1.5 cm      ESTIMATED BLOOD LOSS: 10 mL.   TOURNIQUET TIME: 0 min    IMPLANTS: * No implants in log *    BACKGROUND:  BACKGROUND:  Amor Zavaleta is a 78 year old male with severe right forearm infection from a cat bite.  He sustained a cat bite to right dorsal forearm on 2023.  Unfortunately, he developed septic extensor tenosynovitis.  Initially was treated at [facility] and underwent irrigation debridement in the operating room with Dr. Leyva on 2023 and 2023.  Cultures grew polymicrobial infection.  He was treated on IV antibiotics and discharged home on PO antibiotics on 2023.  He returned to clinic with Dr. Carla Austin who noted recurrence of infection. He was taken back to the operating room on 2023 for repeat I&D. Repeat irrigation debridement was performed on 2023 with Dr. Austin.  At that time it was noted that he had had infection related attritional ruptures of the EPL, ECRL, and ECRB.  He was taken back to the OR on 12/27/23 with Dr. Burkett. He has been in the hospital since on IV antibiotics. MRI was obtained demonstrating ongoing fluid in the dorsal extensor tendons, primarily in the fourth dorsal compartment. There is also evidence of bony edema and possible disruption in the dorsal aspect of the distal radius concerning for osteomyelitis. We returned to the OR for repeat I&D and wound vac application on 12/29/23. We will plan for repeat I&D right forearm and possible wound vac application today. He was amenable to the plan of care and all questions were answered.      OPERATIVE FINDINGS:     Healthy appearing tissue. Sent two specimens for culture. Remaining 4.5 cm x 1.5 cm defect in the central aspect of the wound. Unable to close primarily. Wound vac placed over. Wound is 15.5 cm in length. Total of 3 black sponges - 6 cm x 2 cm in wound, 15 cm x 2 cm over incision and 5 cm x 5 cm for jose pad.     OPERATIVE PROCEDURE:   Amor Zavaleta was seen in the pre-operative area; informed consent was obtained.  The right upper extremity was appropriately marked by the patient and the operating surgeon.  A preoperative regional block was performed by the anesthesia team.  Please see their documentation for further details.  The patient was brought to the operating room and transferred to the OR bed.   The patient was positioned supine with appropriate padding and a safety strap.  A right upper arm tourniquet was placed but not inflated at this time. The wound vac was removed demonstrating healthy granulation tissue at the wound bed.  The patient's operative extremity was prepped and draped in the standard fashion.  At this time a surgical safety timeout was performed involving the operating room nurse, anesthesia team, scrub tech, and operating surgeon.   The prior sutures were removed and the wound was bluntly opened.  Skin flaps were developed with blunt dissection radially and ulnarly.  There  was no evidence of purulence in the wound, or new pockets tracking proximally, distally or ulnarly. The tissue appeared healthy.  New specimens were obtained from the right distal radius bone and dorsal tissue which were sent for specimen.  The wound was debrided including skin, subcutaneous tissue, muscle, fascia and bone with sharp scalpel, scissors, rongeur.  The wound was then copiously irrigated with 4 L of normal saline.  The wound was partially closed with 3-0 nylon suture.  A portion of the central aspect of the wound measuring 4.5 cm x 1.5 cm was unable to be closed.  The open area was over the fourth compartment at the level of the extensor retinaculum.  The tendons were covered.  A black sponge wound VAC was placed measuring 6 cm x 2 cm.  Adaptic was then placed over the partially closed wound followed by a long black sponge measuring 15 x 2 cm.   Adhesive was applied and a hole was cut through the adhesive centrally.  A third 5 x 5 cm sponge was placed to allow for placement of the Belle pad.  The remainder of the adhesive was placed and the suction was applied.  Excellent suction was obtained and the wound VAC was set to 125 mmHg continuous low suction.   A removable volar Ortho-Glass splint was applied to keep the wrist slightly extended and the fingers in extension.   The patient was transferred to the hospital bed and awoken from anesthesia. At the conclusion of the case, all sponge, needle and instrument counts were correct. There were no complications. The patient was then taken to the PACU for recovery in stable condition.  The patient's sister, Penelope Shaffer, was contacted following the case to discuss the findings and plans moving forward.     POSTOPERATIVE PLAN:   Medicine Primary  Activity: Up with assist  Weight bearing status: NWB RUE  Antibiotics: Continue IV antibiotics per ID recommendations   Diet: Begin with clear fluids and progress diet as tolerated  DVT prophylaxis: Mechanical.    Bracing/Splinting: Removable orthoglass volar splint  Elevation: Elevate RUE  Wound Care: Wound vac 125 mmHg continuous  Pain management: per primary  Imaging: None  Labs: Trend inflammatory markers  Cultures: Pending, follow culture results closely.  Consults: ID, social work/care coordination - please coordinate with sister, Penelope Shaffer [phone]  Disposition: PACU     Case discussed with Dr. Austin. RTOR TBD      Jak Luke MD  Hand and Upper Extremity Surgeon  [facility]

## 2023-12-31 NOTE — ANESTHESIA PREPROCEDURE EVALUATION
Anesthesia Pre-Procedure Evaluation    Patient: Amor Zavaleta   MRN: [id number] : 1945                  Past Medical History:   Diagnosis Date    Diabetes (H)     Sleep apnea       Past Surgical History:   Procedure Laterality Date    APPLY WOUND VAC Right 2023    Procedure: Right Wound vac application 5 cm x 9 cm;  Surgeon: Jak Luke MD;  Location: [facility] Main OR    INCISION AND DRAINAGE UPPER EXTREMITY, COMBINED Right 2023    Procedure: INCISION AND DRAINAGE, RIGHT UPPER FOREARM;  Surgeon: Leah Leyva MD;  Location: [facility] OR    INCISION AND DRAINAGE UPPER EXTREMITY, COMBINED Right 2023    Procedure: INCISION AND DRAINAGE, UPPER EXTREMITY;  Surgeon: Leah Leyva MD;  Location: [facility] OR    IRRIGATION AND DEBRIDEMENT HAND, COMBINED Right 2023    Procedure: Irrigation and sharp excisional debridement of wound of right distal forearm including extensor compartments, wrist, hand and distal radius bone;  Surgeon: Jak Luke MD;  Location: [facility] Main OR    IRRIGATION AND DEBRIDEMENT UPPER EXTREMITY, COMBINED Right 2023    Procedure: IRRIGATION AND DEBRIDEMENT, RIGHT WRIST;  Surgeon: Carla Austin MD;  Location: [facility] Main OR    IRRIGATION AND DEBRIDEMENT UPPER EXTREMITY, COMBINED Right 2023    Procedure: IRRIGATION AND DEBRIDEMENT, RIGHT WRIST;  Surgeon: Carla Austin MD;  [facility]    IRRIGATION AND DEBRIDEMENT UPPER EXTREMITY, COMBINED Right 2023    Procedure: IRRIGATION AND DEBRIDEMENT, RIGHT WRIST;  Surgeon: Erik Burkett MD;  Location: [facility] Main OR    PICC SINGLE LUMEN PLACEMENT  2023      Allergies   Allergen Reactions    Sulfa Antibiotics Hives      Social History     Tobacco Use    Smoking status: Never    Smokeless tobacco: Never   Substance Use Topics    Alcohol use: Not Currently      Wt Readings from Last 1 Encounters:   23 94.3 kg (208 lb)        Anesthesia  Evaluation   Pt has had prior anesthetic.     No history of anesthetic complications       ROS/MED HX  ENT/Pulmonary:     (+) sleep apnea,                                    (-) tobacco use   Neurologic:  - neg neurologic ROS   (+)    peripheral neuropathy,                            Cardiovascular: Comment: LBBB.    (+)  hypertension- -   -  - -                          Irregular Heartbeat/Palpitations,            METS/Exercise Tolerance:     Hematologic:     (+)      anemia,          Musculoskeletal:  - neg musculoskeletal ROS     GI/Hepatic:     (+) GERD, Asymptomatic on medication,               (-) hiatal hernia and esophageal disease   Renal/Genitourinary:     (+) renal disease, type: CRI,            Endo:     (+)  type II DM,       Diabetic complications: nephropathy neuropathy retinopathy.             Psychiatric/Substance Use:  - neg psychiatric ROS     Infectious Disease: Comment: UE infection.    (+) Recent Fever,           Malignancy:  - neg malignancy ROS     Other:  - neg other ROS          Physical Exam    Airway        Mallampati: I   TM distance: > 3 FB   Neck ROM: full   Mouth opening: > 3 cm    Respiratory Devices and Support         Dental       (+) Multiple visibly decayed, broken teeth      Cardiovascular   cardiovascular exam normal       Rhythm and rate: regular and normal     Pulmonary   pulmonary exam normal        breath sounds clear to auscultation           OUTSIDE LABS:  CBC:   Lab Results   Component Value Date    WBC 7.7 12/25/2023    WBC 12.5 (H) 12/23/2023    HGB 10.7 (L) 12/25/2023    HGB 10.5 (L) 12/23/2023    HCT 32.7 (L) 12/25/2023    HCT 32.3 (L) 12/23/2023     12/28/2023     12/25/2023     BMP:   Lab Results   Component Value Date     12/23/2023     12/22/2023    POTASSIUM 4.3 12/23/2023    POTASSIUM 4.0 12/22/2023    CHLORIDE 106 12/23/2023    CHLORIDE 102 12/22/2023    CO2 23 12/23/2023    CO2 23 12/22/2023    BUN 23.2 (H) 12/23/2023    BUN 32.6 (H)  "12/22/2023    CR 1.25 (H) 12/28/2023    CR 1.27 (H) 12/25/2023     (H) 12/31/2023     (H) 12/31/2023     COAGS: No results found for: \"PTT\", \"INR\", \"FIBR\"  POC: No results found for: \"BGM\", \"HCG\", \"HCGS\"  HEPATIC:   Lab Results   Component Value Date    ALBUMIN 2.5 (L) 12/23/2023    PROTTOTAL 6.1 (L) 12/23/2023    ALT 9 12/23/2023    AST 23 12/23/2023    ALKPHOS 41 12/23/2023    BILITOTAL 0.5 12/23/2023     OTHER:   Lab Results   Component Value Date    PH 7.46 (H) 12/11/2023    LACT 1.2 12/12/2023    A1C 8.9 (H) 12/11/2023    VLADIMIR 8.0 (L) 12/23/2023    PHOS 2.7 07/07/2023    MAG 1.9 12/19/2023    SED 61 (H) 12/11/2023       Anesthesia Plan    ASA Status:  3, emergent    NPO Status:  NPO Appropriate (had breakfast >8 hrs ago)    Anesthesia Type: MAC.      Maintenance: TIVA.        Consents    Anesthesia Plan(s) and associated risks, benefits, and realistic alternatives discussed. Questions answered and patient/representative(s) expressed understanding.     - Discussed:     - Discussed with:  Patient      - Extended Intubation/Ventilatory Support Discussed: No.      - Patient is DNR/DNI Status: No     Use of blood products discussed: No .     Postoperative Care    Pain management: Multi-modal analgesia, IV analgesics.   PONV prophylaxis: Ondansetron (or other 5HT-3)     Comments:               Naveed Anand MD    I have reviewed the pertinent notes and labs in the chart from the past 30 days and (re)examined the patient.  Any updates or changes from those notes are reflected in this note.             "

## 2023-12-31 NOTE — ADDENDUM NOTE
Addendum  created 12/31/23 1339 by Naveed Anand MD    Attestation recorded in Intraprocedure, Intraprocedure Attestations filed, Intraprocedure Event edited

## 2023-12-31 NOTE — PROGRESS NOTES
Orthopaedic Surgery Progress Note 12/31/2023    S: No acute events overnight.  Pain controlled. Denies numbness or tingling. No CP, SOB. NPO since midnight  O:  Temp: 98.1  F (36.7  C) Temp src: Oral BP: 138/64 Pulse: 68   Resp: 16 SpO2: 94 % O2 Device: None (Room air)      Exam:  Gen: No acute distress, resting comfortably in bed.  Resp: Non-labored breathing  MSK:  RUE:  - Splint and Dressings c/d/i  - SILT rad/uln/med n distributions  - Wound vac in place, no leak, minimal output  - No EPL, minimal EDC firing, fires FPL, finger flexors  - Figners wwp      Recent Labs   Lab 12/31/23  0604 12/28/23  0553 12/25/23  0550   WBC 7.2  --  7.7   HGB 10.2*  --  10.7*     268 339 360       All cultures:  Recent Labs   Lab 12/29/23  1851 12/29/23  1846 12/27/23  1521 12/25/23  0856   CULTURE No growth after 1 day  No anaerobic organisms isolated after 1 day  No growth, less than 1 day No growth after 1 day  No anaerobic organisms isolated after 1 day  No growth, less than 1 day No growth after 3 days  No growth after 3 days  No anaerobic organisms isolated after 3 days No growth after 5 days  No anaerobic organisms isolated after 5 days  No Growth    Intra-op Cultures 12/29: NGTD    Assessment: Amor Zavaleta is a 78 year old male with PMH significant for IDT2DM, CKD stage IV, HTN, LBBB who sustained a cat bite on 12/2/23 s/p multiple I&D with Dr. Leyva on 12/11 and 12/13, Dr. Austin on 12/22 and 12/25 and Dr. Burkett on 12/27. He returned to OR with Dr. Luke on 12/29/23 for repeat I&D and wound vac application.     Plan:  Medicine Primary  Activity: Up with assist.  Weight bearing status: NWB RUE  Antibiotics: Continue IV antibiotics per ID recommendations   Diet: NPO  DVT prophylaxis: Mechanical.   Bracing/Splinting: Removable orthoglass volar splint  Elevation: Elevate RUE  Wound Care: Wound vac 125 mmHg continuous  Pain management: per primary  Imaging: None  Labs: Trend inflammatory markers  This lady does have some heart failure chronically. She is already on Lasix but might benefit from Aldactone. I would recommend starting Aldactone 25 mg daily and stopping potassium supplementation with a rate P to a BMP and BNP in a couple weeks. Please let the patient know.  ES   Cultures: Pending, follow culture results closely.  Consults: ID, social work/care coordination - please coordinate with sister, Penelope Shaffer [phone]  Disposition: PACU     Plan for RTOR Sunday 12/31/23 for repeat I&D, wound vac exchange vs primary closure    Jak Luke MD  Hand and Upper Extremity Surgeon  [facility]

## 2023-12-31 NOTE — ANESTHESIA CARE TRANSFER NOTE
Patient: Amor Zavaleta    Procedure: Procedure(s):  IRRIGATION AND DEBRIDEMENT, HAND  APPLICATION, WOUND VAC       Diagnosis: Infection of right hand due to bite, initial encounter [S61.451A, L08.9]  Diagnosis Additional Information: No value filed.    Anesthesia Type:   General     Note:      Level of Consciousness: awake              Patient transferred to: Medical/Surgical Unit    Handoff Report: Identifed the Patient, Identified the Reponsible Provider, Reviewed the pertinent medical history, Discussed the surgical course, Reviewed Intra-OP anesthesia mangement and issues during anesthesia, Set expectations for post-procedure period and Allowed opportunity for questions and acknowledgement of understanding      Vitals:  Vitals Value Taken Time   /60 0932   Temp     Pulse 54    Resp     SpO2 98 0932       Electronically Signed By: CHIN CARROLL CRNA  December 31, 2023  9:34 AM

## 2023-12-31 NOTE — BRIEF OP NOTE
[facility]    Brief Operative Note    Pre-operative diagnosis:  Infection of the right hand due to bite, initial encounter; osteomyelitis right distal radius   Post-operative diagnosis Same as pre-operative diagnosis    Procedure: IRRIGATION AND DEBRIDEMENT, HAND, Right - Hand  APPLICATION, WOUND VAC - Update    Surgeon: Surgeon(s) and Role:     * Jak Luke MD - Primary  Anesthesia: Choice with Block   Estimated Blood Loss: Less than 10 ml    Drains: Wound vac 125 mmHg continuous low suction  Specimens:   ID Type Source Tests Collected by Time Destination   A : right hand #1 Tissue Arm, Right ANAEROBIC BACTERIAL CULTURE ROUTINE, AEROBIC BACTERIAL CULTURE ROUTINE Jak Luke MD 12/31/2023  8:31 AM    B : right arm #2 Tissue Hand, Right ANAEROBIC BACTERIAL CULTURE ROUTINE, AEROBIC BACTERIAL CULTURE ROUTINE Jak Luke MD 12/31/2023  8:35 AM      Findings:   Healthy appearing tissue. Sent two specimens for culture. Remaining 4.5 cm x 1.5 cm defect in the central aspect of the wound. Too tight to close primarily. Wound vac placed over. Wound is 15.5 cm in length. Total of 3 black sponges - 6 cm x 2 cm in wound, 15 cm x 2 cm over incision and 5 cm x 5 cm for jose pad.  Complications: None.  Implants: * No implants in log *

## 2023-12-31 NOTE — ANESTHESIA POSTPROCEDURE EVALUATION
Patient: Amor Zavaleta    Procedure: Procedure(s):  IRRIGATION AND DEBRIDEMENT, HAND  APPLICATION, WOUND VAC       Anesthesia Type:  General    Note:  Disposition: Inpatient   Postop Pain Control: Uneventful            Sign Out: Well controlled pain   PONV: No   Neuro/Psych: Uneventful            Sign Out: Acceptable/Baseline neuro status   Airway/Respiratory: Uneventful            Sign Out: Acceptable/Baseline resp. status   CV/Hemodynamics: Uneventful            Sign Out: Acceptable CV status; No obvious hypovolemia; No obvious fluid overload   Other NRE: NONE   DID A NON-ROUTINE EVENT OCCUR?            Last vitals:  Vitals:    12/30/23 1718 12/31/23 0100 12/31/23 1000   BP: (!) 142/71 138/64 (!) 163/69   Pulse: 54 68 55   Resp: 16 16 18   Temp: 36.8  C (98.3  F) 36.7  C (98.1  F) 36.3  C (97.3  F)   SpO2: 95% 94% 96%       Electronically Signed By: Naveed Anand MD  December 31, 2023  11:06 AM

## 2023-12-31 NOTE — PROGRESS NOTES
[facility] MEDICINE PROGRESS NOTE      Identification/Summary: Amor Zavaleta is a 78 year old male with a history of insulin-dependent diabetes type 2, CKD stage IV, HTN, LBBB who presents on 12/22/2023 with continued pain and swelling to right wrist following a cat bite.  He was seen by orthopedic surgeon in his office 12/22 and thought that the infection was worsening on oral antibiotics.  Taken to surgery for irrigation and debridement the evening of 12/22.  He reports she is doing better today.  He lives alone, in an apartment with 3 flights of stairs.  He feels things are going well.  Decreased redness over the dorsum of the hand wrist and distal forearm 12/27 compared with 12/26. Eventually to TCU with IV antibiotics.    Patient new to me 12/30; s/p I&D on 12/29, awaiting intra-operative cultures and final antibiotics plan as per ID.     Final antibiotics plan per ID on 12/30 is UNASYN based on prior cultures, 6 weeks needed, ending 2/6/24.    Plan per surgery is RTOR Sunday 12/31/23 for repeat I&D, wound vac exchange vs primary closure.     Assessment/plan:     Septic joint right wrist with likely extension to right elbow secondary to cat bite 12/2  -Patient was evaluated at [facility] and admitted 12/11.  Received IV antibiotics along with washouts from orthopedic surgery.  Was discharged 12/20 with 7 days of Augmentin and metronidazole per infectious disease recommendations  -[facility] requested admission 12/22 for washout and IV antibiotics due to continued infection  -Appreciate orthopedic recommendations regarding right elbow pain and edema-likely extension of septic joint.  -Vancomycin and Zosyn initiated in the ER  -Blood cultures  -Has been seen by infectious disease and IV antibiotics to continue pending cultures.  -Patient went back to surgery for washout 12/25 and it is reported that he had 3 ruptured tendons.    -Patient would likely benefit from  TCU and this was recommended by therapies.  -RTOR 12/31/23 for repeat I&D, wound vac exchange vs primary closure  -Final ID antibiotics plan UNASYN based on prior cultures, 6 weeks needed, ending 2/6/24     Decreased mobility secondary to long hospitalization  PT/OT-appreciate discharge recommendations for placement.     Right hand wounds from cat attack  Wound care appreciated.     Diabetes type 2-insulin-dependent, blood sugars have been good  Insulin pump at home  States home dose is Lantus 56 units at night and 16 units NovoLog with meals  A1c of 8.9 12/11/2023  Home dose of Lantus 56 units ordered but was some hypoglycemia the -12/23 it was decreased to 35 units.  Continue home very high insulin sliding scale with meals no insulin at bedtime   BG has been overall well-controlled in past 48-hrs; CTM and adjust PRN.     HTN  Currently normotensive at 106/59 with heart rate of 64.  Hold home antihypertensives for now     CKD stage IV  Baseline creatinine appears to be 1.2  Creatinine 1.6 at admission now 1.3 on 12/25  Plan: Check BMP in the morning     Code Status: Full Code  DVT prophylaxis: Lovenox      Disposition:  -Discharge barriers: Orthopedic intervention, IV abx, PT/OT and placement to TCU.       Expected Discharge Date: 12/31/2023      Destination: inpatient rehabilitation facility  Discharge Comments: IV abx, 4th I&D either 12/29 or 12/30  [facility] TCU accepted for DC        The patient's care was discussed with the Patient.    25 MINUTES SPENT BY ME on the date of service doing chart review, history, exam, documentation & further activities per the note.        Raheem Lewis MD  Internal Medicine  [place] [facility]  [facility]  Phone: #[phone]  Securely message with the Vocera Web Console (learn more here)  Text page via [employer] Paging/Directory       Interval History/Subjective:  No acute events overnight.    No report of chest pain, shortness of breath, or other new  complaints reported.     Physical Exam/Objective:  Temp:  [98  F (36.7  C)-98.3  F (36.8  C)] 98.3  F (36.8  C)  Pulse:  [50-54] 54  Resp:  [16] 16  BP: (108-142)/(59-71) 142/71  SpO2:  [94 %-95 %] 95 %  Body mass index is 28.21 kg/m .    Patient already in PACU/OR      Data reviewed today: I personally reviewed all new medications, labs, imaging/diagnostics reports over the past 24 hours. Pertinent findings include:    Labs:  Most Recent 3 CBC's:  Recent Labs   Lab Test 12/28/23  0553 12/25/23  0550 12/23/23  0617 12/22/23  1327   WBC  --  7.7 12.5* 11.1*   HGB  --  10.7* 10.5* 11.6*   MCV  --  96 96 97    360 395 459*     Most Recent 3 BMP's:  Recent Labs   Lab Test 12/30/23  2144 12/30/23  1727 12/30/23  1139 12/28/23  0632 12/28/23  0553 12/25/23  0633 12/25/23  0550 12/24/23  0701 12/24/23  0615 12/23/23  0632 12/23/23  0617 12/22/23  1640 12/22/23  1327 12/19/23  0734 12/19/23  0652   NA  --   --   --   --   --   --   --   --   --   --  136  --  136  --  133*   POTASSIUM  --   --   --   --   --   --   --   --   --   --  4.3  --  4.0  --  4.2   CHLORIDE  --   --   --   --   --   --   --   --   --   --  106  --  102  --  102   CO2  --   --   --   --   --   --   --   --   --   --  23  --  23  --  22   BUN  --   --   --   --   --   --   --   --   --   --  23.2*  --  32.6*  --  18.6   CR  --   --   --   --  1.25*  --  1.27*  --  1.29*  --  1.41*  --  1.61*  --  1.19*   ANIONGAP  --   --   --   --   --   --   --   --   --   --  7  --  11  --  9   VLADIMIR  --   --   --   --   --   --   --   --   --   --  8.0*  --  9.0  --  8.1*   * 220* 155*   < >  --    < >  --    < >  --    < > 130*   < > 98   < > 120*    < > = values in this interval not displayed.     Most Recent 2 LFT's:  Recent Labs   Lab Test 12/23/23  0617 12/11/23  1316   AST 23 27   ALT 9 45   ALKPHOS 41 120   BILITOTAL 0.5 1.1       Medications:   Personally Reviewed.  Medications      [Held by provider] amLODIPine  2.5 mg Oral Daily     ampicillin-sulbactam  3 g Intravenous Q6H    [Held by provider] aspirin  325 mg Oral Daily    atorvastatin  10 mg Oral Daily    enoxaparin ANTICOAGULANT  40 mg Subcutaneous Q24H    famotidine  20 mg Oral Daily    insulin aspart  1-22 Units Subcutaneous TID w/meals    insulin glargine  35 Units Subcutaneous QAM AC    [Held by provider] lisinopril-hydrochlorothiazide  1 tablet Oral Daily    sodium chloride (PF)  3 mL Intracatheter Q8H

## 2023-12-31 NOTE — PLAN OF CARE
Patient vital signs are at baseline: Yes  Patient able to ambulate as they were prior to admission or with assist devices provided by therapies during their stay:  Yes  Patient MUST void prior to discharge:  Yes  Patient able to tolerate oral intake:  Yes  Pain has adequate pain control using Oral analgesics:  Yes  Does patient have an identified :  Yes  Has goal D/C date and time been discussed with patient:  Yes    ACHS checks continued. NPO at midnight.    Chase Figueroa RN on 12/31/2023 at 12:22 AM

## 2023-12-31 NOTE — PLAN OF CARE
Goal Outcome Evaluation:      Problem: Adult Inpatient Plan of Care  Goal: Absence of Hospital-Acquired Illness or Injury  Intervention: Prevent Skin Injury  Problem: Pain Acute  Goal: Optimal Pain Control and Function  Intervention: Develop Pain Management Plan  Intervention: Prevent or Manage Pain    Patient vital signs are at baseline: Yes  Patient able to ambulate as they were prior to admission or with assist devices provided by therapies during their stay:  Yes  Patient MUST void prior to discharge:  Yes  Patient able to tolerate oral intake:  Yes  Pain has adequate pain control using Oral analgesics:  Yes  Does patient have an identified :  Yes  Has goal D/C date and time been discussed with patient:  Yes    A&OX4. VSS, on RA. Pt stated of having baseline neuropathy in LE bilateral and numbness and tingling in right arm and fingers from surgery. Right arm splint in place. Rated moderate pain. Pain managed with PRN oral pain medications. Pt has a single lumen PICC on left arm; flushed and blood return noted. Pt ambulated and voided adequately. NPO @midnight for surgery in AM. Call light within reach and bed alarm activated for safety.

## 2024-01-01 LAB
BACTERIA TISS BX CULT: NO GROWTH
BACTERIA TISS BX CULT: NORMAL
ERYTHROCYTE [DISTWIDTH] IN BLOOD BY AUTOMATED COUNT: 13.2 % (ref 10–15)
GLUCOSE BLDC GLUCOMTR-MCNC: 126 MG/DL (ref 70–99)
GLUCOSE BLDC GLUCOMTR-MCNC: 151 MG/DL (ref 70–99)
GLUCOSE BLDC GLUCOMTR-MCNC: 160 MG/DL (ref 70–99)
GLUCOSE BLDC GLUCOMTR-MCNC: 181 MG/DL (ref 70–99)
GLUCOSE BLDC GLUCOMTR-MCNC: 239 MG/DL (ref 70–99)
HCT VFR BLD AUTO: 31.1 % (ref 40–53)
HGB BLD-MCNC: 10 G/DL (ref 13.3–17.7)
MCH RBC QN AUTO: 31.3 PG (ref 26.5–33)
MCHC RBC AUTO-ENTMCNC: 32.2 G/DL (ref 31.5–36.5)
MCV RBC AUTO: 98 FL (ref 78–100)
PLATELET # BLD AUTO: 235 10E3/UL (ref 150–450)
RBC # BLD AUTO: 3.19 10E6/UL (ref 4.4–5.9)
WBC # BLD AUTO: 6.9 10E3/UL (ref 4–11)

## 2024-01-01 PROCEDURE — 85027 COMPLETE CBC AUTOMATED: CPT | Performed by: STUDENT IN AN ORGANIZED HEALTH CARE EDUCATION/TRAINING PROGRAM

## 2024-01-01 PROCEDURE — 250N000011 HC RX IP 250 OP 636: Performed by: INTERNAL MEDICINE

## 2024-01-01 PROCEDURE — 120N000001 HC R&B MED SURG/OB

## 2024-01-01 PROCEDURE — 250N000011 HC RX IP 250 OP 636: Performed by: ORTHOPAEDIC SURGERY

## 2024-01-01 PROCEDURE — 250N000013 HC RX MED GY IP 250 OP 250 PS 637: Performed by: ORTHOPAEDIC SURGERY

## 2024-01-01 PROCEDURE — 250N000013 HC RX MED GY IP 250 OP 250 PS 637: Performed by: STUDENT IN AN ORGANIZED HEALTH CARE EDUCATION/TRAINING PROGRAM

## 2024-01-01 PROCEDURE — 99232 SBSQ HOSP IP/OBS MODERATE 35: CPT | Performed by: STUDENT IN AN ORGANIZED HEALTH CARE EDUCATION/TRAINING PROGRAM

## 2024-01-01 RX ORDER — LISINOPRIL AND HYDROCHLOROTHIAZIDE 12.5; 2 MG/1; MG/1
1 TABLET ORAL DAILY
Status: DISCONTINUED | OUTPATIENT
Start: 2024-01-01 | End: 2024-01-06 | Stop reason: HOSPADM

## 2024-01-01 RX ADMIN — FAMOTIDINE 20 MG: 20 TABLET ORAL at 16:47

## 2024-01-01 RX ADMIN — ATORVASTATIN CALCIUM 10 MG: 10 TABLET, FILM COATED ORAL at 08:14

## 2024-01-01 RX ADMIN — INSULIN GLARGINE 35 UNITS: 100 INJECTION, SOLUTION SUBCUTANEOUS at 06:39

## 2024-01-01 RX ADMIN — LISINOPRIL AND HYDROCHLOROTHIAZIDE 1 TABLET: 12.5; 2 TABLET ORAL at 11:48

## 2024-01-01 RX ADMIN — AMPICILLIN SODIUM AND SULBACTAM SODIUM 3 G: 2; 1 INJECTION, POWDER, FOR SOLUTION INTRAMUSCULAR; INTRAVENOUS at 10:47

## 2024-01-01 RX ADMIN — OXYCODONE HYDROCHLORIDE 5 MG: 5 TABLET ORAL at 18:48

## 2024-01-01 RX ADMIN — ENOXAPARIN SODIUM 40 MG: 100 INJECTION SUBCUTANEOUS at 08:14

## 2024-01-01 RX ADMIN — AMPICILLIN SODIUM AND SULBACTAM SODIUM 3 G: 2; 1 INJECTION, POWDER, FOR SOLUTION INTRAMUSCULAR; INTRAVENOUS at 16:47

## 2024-01-01 RX ADMIN — AMPICILLIN SODIUM AND SULBACTAM SODIUM 3 G: 2; 1 INJECTION, POWDER, FOR SOLUTION INTRAMUSCULAR; INTRAVENOUS at 20:59

## 2024-01-01 RX ADMIN — AMPICILLIN SODIUM AND SULBACTAM SODIUM 3 G: 2; 1 INJECTION, POWDER, FOR SOLUTION INTRAMUSCULAR; INTRAVENOUS at 06:36

## 2024-01-01 RX ADMIN — INSULIN ASPART 10 UNITS: 100 INJECTION, SOLUTION INTRAVENOUS; SUBCUTANEOUS at 11:47

## 2024-01-01 RX ADMIN — ACETAMINOPHEN 650 MG: 325 TABLET ORAL at 06:36

## 2024-01-01 ASSESSMENT — ACTIVITIES OF DAILY LIVING (ADL)
ADLS_ACUITY_SCORE: 29
ADLS_ACUITY_SCORE: 33
ADLS_ACUITY_SCORE: 29
ADLS_ACUITY_SCORE: 33

## 2024-01-01 NOTE — PROGRESS NOTES
Care Management Follow Up    Length of Stay (days): 10    Expected Discharge Date: 01/01/2024     Concerns to be Addressed: discharge planning     Patient plan of care discussed at interdisciplinary rounds: Yes    Anticipated Discharge Disposition: Transitional Care     Anticipated Discharge Services: None  Anticipated Discharge DME: None    Patient/family educated on Medicare website which has current facility and service quality ratings: yes  Education Provided on the Discharge Plan: Yes  Patient/Family in Agreement with the Plan: yes    Referrals Placed by CM/SW: Post Acute Facilities  Private pay costs discussed: Not applicable at this time     Additional Information:  SWCM left  for Admission and not in office until 1/2/24.     JEFFREY Garcia

## 2024-01-01 NOTE — PROGRESS NOTES
[facility] MEDICINE PROGRESS NOTE      Identification/Summary: Amor Zavaleta is a 78 year old male with a history of insulin-dependent diabetes type 2, CKD stage IV, HTN, LBBB who presents on 12/22/2023 with continued pain and swelling to right wrist following a cat bite.  He was seen by orthopedic surgeon in his office 12/22 and thought that the infection was worsening on oral antibiotics.  Taken to surgery for irrigation and debridement the evening of 12/22. He normally lives alone, in an apartment with 3 flights of stairs.  He feels things are going well. Decreased redness over the dorsum of the hand wrist and distal forearm 12/27 compared with 12/26. Eventually to TCU with IV antibiotics.    S/p I&D on 12/29, awaiting intra-operative cultures and final antibiotics plan as per ID. Final antibiotics plan per ID on 12/30 is UNASYN based on prior cultures, 6 weeks needed, completing 2/6/24. Now also s/p repeat I&D on 12/31/23 with wound vac exchange by Dr. Luke w/ [facility]. New to writer 1/1/2024, stable, remains on iv abx and no new acute issues. Resuming zestoretic.     Assessment/plan:     Septic joint right wrist with likely extension to right elbow secondary to cat bite 12/2  -Patient was evaluated at [facility] and admitted 12/11.  Received IV antibiotics along with washouts from orthopedic surgery.  Was discharged 12/20 with 7 days of Augmentin and metronidazole per infectious disease recommendations  -[facility] requested admission 12/22 for washout and IV antibiotics due to continued infection  -Appreciate orthopedic recommendations regarding right elbow pain and edema-likely extension of septic joint.  -Vancomycin and Zosyn initiated in the ER  -Blood cultures  -Has been seen by infectious disease and IV antibiotics to continue as per cultures.  -Patient went back to surgery for washout 12/25 and it is reported that he had 3 ruptured tendons.     -Patient would likely benefit from TCU and this was recommended by therapies.  -RTOR 23 repeat I&D, wound vac application 4.5cm x 1.5cm  -Final ID antibiotics plan UNASYN based on prior cultures, 6 weeks needed, ending 24     Decreased mobility secondary to long hospitalization  PT/OT-appreciate discharge recommendations for placement      Right hand wounds from cat attack  Wound care appreciated.     Diabetes type 2-insulin-dependent, blood sugars have been good  Insulin pump at home  reported home dose is Lantus 56 units at night and 16 units NovoLog with meals  Of note, A1c of 8.9 2023  Home dose of Lantus 56 units ordered but some hypoglycemia the - it was decreased to 35 units.  Continue home very high insulin sliding scale with meals no insulin at bedtime   BG has been overall well-controlled in past few days; CTM and adjust PRN.   -Remains stable    HTN  Remains normotensive but spikes with pain to 170s. Has been off of home meds.  Home antihypertensives have been held in setting of hypotension.   - now on  rising blood pressures to 140s; pain would exacerbate so plan is the followin. Pain control and then    2. Resume Zestoretic 2024 with hold parameters and   3. Hold amlodipine given potential for edema side effects anytime      CKD stage IV  Baseline creatinine appears to be 1.2  Creatinine 1.6 at admission  - stable at normal range Cr 0.81 on   - as such, will resume zestoretic with hold parameters     Code Status: Full Code  DVT prophylaxis: Lovenox      Disposition:  -Discharge barriers: Orthopedic clearance, IV abx, PT/OT and placement to TCU.      Expected Discharge Date: 2024      Destination: inpatient rehabilitation facility  Discharge Comments: IV abx, 4th I&D either  or   [facility] TCU accepted for DC        The patient's care was discussed with the Patient.    40 MINUTES SPENT BY ME on the date of service doing chart review, history,  exam, documentation & further activities per the note.    Piero Lewis   Hospitalist Service  [facility]   Securely message with the [employer] Web Console (learn more here)  Text page via [employer] Paging/Directory         Interval History/Subjective:  No acute events overnight.  Normalized creatinine  No cbc yet  Bp 140s-170s and spikes with pain   Pt feels pain meds do help control pain which comes/goes   The return to OR I&D and wound vac placement went well  Pt has no new pain or concerns  No report of chest pain, shortness of breath, or other new complaints reported.   Passing gas     Physical Exam/Objective:  Temp:  [97.1  F (36.2  C)-98.4  F (36.9  C)] 97.1  F (36.2  C)  Pulse:  [55-59] 57  Resp:  [18] 18  BP: (140-182)/(61-86) 143/68  SpO2:  [91 %-96 %] 93 %  Body mass index is 28.21 kg/m .    General: alert, oriented, and in no acute distress  Pulmonary: clear to auscultation bilaterally, normal respiratory effort, on room air, no rales or wheezes or evidence of accessory muscle use  CVS: regular rate and rhythm, no murmurs, rubs, or gallops; no blatant jugular venous distention; no extremity edema and extremities are warm to the touch  GI: soft, nontender, BS+, no rebound or guarding, no conspicuous organomegaly   Neuro: nonfocal, moves all extremities of own volition  Psych: appropriate    Data reviewed today: I personally reviewed all new medications, labs, imaging/diagnostics reports over the past 24 hours. Pertinent findings include:    Labs:  Most Recent 3 CBC's:  Recent Labs   Lab Test 01/01/24  0821 12/31/23  0604 12/28/23  0553 12/25/23  0550   WBC 6.9 7.2  --  7.7   HGB 10.0* 10.2*  --  10.7*   MCV 98 96  --  96    268  268 339 360     Most Recent 3 BMP's:  Recent Labs   Lab Test 01/01/24  0600 01/01/24  0211 12/31/23  2123 12/31/23  1117 12/31/23  0604 12/28/23  0632 12/28/23  0553 12/25/23  0633 12/25/23  0550 12/23/23  0632 12/23/23  0617 12/22/23  1640  12/22/23  1327 12/19/23  0734 12/19/23  0652   NA  --   --   --   --   --   --   --   --   --   --  136  --  136  --  133*   POTASSIUM  --   --   --   --   --   --   --   --   --   --  4.3  --  4.0  --  4.2   CHLORIDE  --   --   --   --   --   --   --   --   --   --  106  --  102  --  102   CO2  --   --   --   --   --   --   --   --   --   --  23  --  23  --  22   BUN  --   --   --   --   --   --   --   --   --   --  23.2*  --  32.6*  --  18.6   CR  --   --   --   --  0.81  --  1.25*  --  1.27*   < > 1.41*  --  1.61*  --  1.19*   ANIONGAP  --   --   --   --   --   --   --   --   --   --  7  --  11  --  9   VLADIMIR  --   --   --   --   --   --   --   --   --   --  8.0*  --  9.0  --  8.1*   * 181* 146*   < >  --    < >  --    < >  --    < > 130*   < > 98   < > 120*    < > = values in this interval not displayed.     Most Recent 2 LFT's:  Recent Labs   Lab Test 12/23/23  0617 12/11/23  1316   AST 23 27   ALT 9 45   ALKPHOS 41 120   BILITOTAL 0.5 1.1       Medications:   Personally Reviewed.  Medications    lactated ringers        [Held by provider] amLODIPine  2.5 mg Oral Daily    ampicillin-sulbactam  3 g Intravenous Q6H    [Held by provider] aspirin  325 mg Oral Daily    atorvastatin  10 mg Oral Daily    enoxaparin ANTICOAGULANT  40 mg Subcutaneous Q24H    famotidine  20 mg Oral Daily    insulin aspart  1-22 Units Subcutaneous TID w/meals    insulin glargine  35 Units Subcutaneous QAM AC    [Held by provider] lisinopril-hydrochlorothiazide  1 tablet Oral Daily    sodium chloride (PF)  3 mL Intracatheter Q8H

## 2024-01-01 NOTE — PROGRESS NOTES
[facility] Infectious Disease  Afbrile  Nothing new on cx    Continue same    Priscila Bowman M.D.

## 2024-01-01 NOTE — PLAN OF CARE
Goal Outcome Evaluation:      Problem: Pain Acute  Goal: Optimal Pain Control and Function  Outcome: Progressing  Intervention: Prevent or Manage Pain  Recent Flowsheet Documentation  Taken 12/31/2023 2318 by Michelle Noe RN  Sensory Stimulation Regulation:   care clustered   lighting decreased   quiet environment promoted  Medication Review/Management: medications reviewed  Taken 12/31/2023 2108 by Michelle Noe, RN  Sensory Stimulation Regulation:   care clustered   lighting decreased   quiet environment promoted  Medication Review/Management: medications reviewed    Patient vital signs are at baseline: Pt was tachycardia. All other VS were WNL  Patient able to ambulate as they were prior to admission or with assist devices provided by therapies during their stay:  Yes, SBA.   Patient MUST void prior to discharge:  Yes  Patient able to tolerate oral intake:  Yes  Pain has adequate pain control using Oral analgesics:  Yes, pt stated PRN oral oxycodone was effective.   Does patient have an identified :  Yes  Has goal D/C date and time been discussed with patient:  Pending on medical clearance.     Pt was alert and oriented x4. Rated 0-7/10 pain. Stated that PRN oral oxycodone was effective. Pt denied any HA, Chest pain, SOB, or N/V during this shift. Pt used urinal. SBA to Ax1 gait and walker.

## 2024-01-01 NOTE — PROGRESS NOTES
Orthopaedic Surgery Progress Note 01/01/2024    S: No acute events overnight.  Pain controlled. Denies numbness or tingling. No CP, SOB. Tolerating diet. Wound vac functioning. Sister, Penelope Shaffer at bedside. All questions answered    O:  Temp: 97.8  F (36.6  C) Temp src: Oral BP: (!) 178/74 Pulse: 56   Resp: 18 SpO2: 95 % O2 Device: None (Room air)      Exam:  Gen: No acute distress, resting comfortably in bed.  Resp: Non-labored breathing  MSK:  RUE:  - Splint and Dressings c/d/i  - SILT rad/uln/med n distributions  - Wound vac in place, no leak, minimal output  - No EPL, minimal EDC firing, fires FPL, finger flexors  - Figners wwp      Recent Labs   Lab 01/01/24  0821 12/31/23  0604 12/28/23  0553   WBC 6.9 7.2  --    HGB 10.0* 10.2*  --     268  268 339     CRP Inflammation   Date Value Ref Range Status   12/31/2023 18.90 (H) <5.00 mg/L Final         All cultures:  Recent Labs   Lab 12/31/23  0835 12/31/23  0831 12/29/23  1851 12/29/23  1846 12/27/23  1521   CULTURE No anaerobic organisms isolated after 1 day  No growth, less than 1 day No anaerobic organisms isolated after 1 day  No growth, less than 1 day No growth after 2 days  No anaerobic organisms isolated after 2 days  Culture in progress No growth after 2 days  No growth after 2 days  No anaerobic organisms isolated after 2 days No Growth  No growth after 4 days  No anaerobic organisms isolated after 4 days    Intra-op Cultures   12/31/23: NGTD    Assessment: Amor Zavaleta is a 78 year old male with PMH significant for IDT2DM, CKD stage IV, HTN, LBBB who sustained a cat bite on 12/2/23 s/p multiple I&D with Dr. Leyva on 12/11 and 12/13, Dr. Austin on 12/22 and 12/25 and Dr. Burkett on 12/27. He returned to OR with Dr. Luke on 12/29/23 and 12/31/23 for repeat I&D and wound vac application.     Plan:  Medicine Primary  Activity: Up with assist.  Weight bearing status: NWB RUE  Antibiotics: Continue IV antibiotics per ID recommendations    Diet: NPO at midnight  DVT prophylaxis: Mechanical.   Bracing/Splinting: Removable orthoglass volar splint  Elevation: Elevate RUE  Wound Care: Wound vac 125 mmHg continuous  Pain management: per primary  Imaging: None  Labs: Trend inflammatory markers   Cultures: Pending, follow culture results closely.  Consults: ID, social work/care coordination - please coordinate with sisterPenelope [phone]  Disposition: pending clinical improvement    Plan for RTOR 1/2/24 with Dr. Austin   - NPO at midnight    Jak Luke MD  Hand and Upper Extremity Surgeon  [facility]

## 2024-01-01 NOTE — PLAN OF CARE
Had I&D done this morning. Complained of pain after arriving to floor. Dilaudid given x1. Pain has been minimal since. Wound vac in place. Patient able to void and tolerate diet. IV antibiotics given. Up with standby assist.

## 2024-01-02 ENCOUNTER — ANESTHESIA EVENT (OUTPATIENT)
Dept: SURGERY | Facility: CLINIC | Age: 79
DRG: 464 | End: 2024-01-02
Payer: COMMERCIAL

## 2024-01-02 ENCOUNTER — ANCILLARY PROCEDURE (OUTPATIENT)
Dept: ULTRASOUND IMAGING | Facility: CLINIC | Age: 79
End: 2024-01-02
Attending: ANESTHESIOLOGY
Payer: COMMERCIAL

## 2024-01-02 ENCOUNTER — ANESTHESIA (OUTPATIENT)
Dept: SURGERY | Facility: CLINIC | Age: 79
DRG: 464 | End: 2024-01-02
Payer: COMMERCIAL

## 2024-01-02 LAB
ANION GAP SERPL CALCULATED.3IONS-SCNC: 7 MMOL/L (ref 7–15)
ATRIAL RATE - MUSE: 55 BPM
BUN SERPL-MCNC: 16 MG/DL (ref 8–23)
CALCIUM SERPL-MCNC: 8.8 MG/DL (ref 8.8–10.2)
CHLORIDE SERPL-SCNC: 100 MMOL/L (ref 98–107)
CREAT SERPL-MCNC: 1.1 MG/DL (ref 0.67–1.17)
CRP SERPL-MCNC: 21.9 MG/L
DEPRECATED HCO3 PLAS-SCNC: 28 MMOL/L (ref 22–29)
DIASTOLIC BLOOD PRESSURE - MUSE: NORMAL MMHG
EGFRCR SERPLBLD CKD-EPI 2021: 69 ML/MIN/1.73M2
ERYTHROCYTE [DISTWIDTH] IN BLOOD BY AUTOMATED COUNT: 13 % (ref 10–15)
GLUCOSE BLDC GLUCOMTR-MCNC: 156 MG/DL (ref 70–99)
GLUCOSE BLDC GLUCOMTR-MCNC: 157 MG/DL (ref 70–99)
GLUCOSE BLDC GLUCOMTR-MCNC: 168 MG/DL (ref 70–99)
GLUCOSE BLDC GLUCOMTR-MCNC: 182 MG/DL (ref 70–99)
GLUCOSE BLDC GLUCOMTR-MCNC: 191 MG/DL (ref 70–99)
GLUCOSE BLDC GLUCOMTR-MCNC: 192 MG/DL (ref 70–99)
GLUCOSE SERPL-MCNC: 193 MG/DL (ref 70–99)
HCT VFR BLD AUTO: 32.8 % (ref 40–53)
HGB BLD-MCNC: 10.7 G/DL (ref 13.3–17.7)
INTERPRETATION ECG - MUSE: NORMAL
MCH RBC QN AUTO: 31.4 PG (ref 26.5–33)
MCHC RBC AUTO-ENTMCNC: 32.6 G/DL (ref 31.5–36.5)
MCV RBC AUTO: 96 FL (ref 78–100)
P AXIS - MUSE: 16 DEGREES
PLATELET # BLD AUTO: 247 10E3/UL (ref 150–450)
POTASSIUM SERPL-SCNC: 3.6 MMOL/L (ref 3.4–5.3)
PR INTERVAL - MUSE: 206 MS
QRS DURATION - MUSE: 140 MS
QT - MUSE: 482 MS
QTC - MUSE: 461 MS
R AXIS - MUSE: -75 DEGREES
RBC # BLD AUTO: 3.41 10E6/UL (ref 4.4–5.9)
SODIUM SERPL-SCNC: 135 MMOL/L (ref 135–145)
SYSTOLIC BLOOD PRESSURE - MUSE: NORMAL MMHG
T AXIS - MUSE: 40 DEGREES
VENTRICULAR RATE- MUSE: 55 BPM
WBC # BLD AUTO: 7.5 10E3/UL (ref 4–11)

## 2024-01-02 PROCEDURE — 250N000013 HC RX MED GY IP 250 OP 250 PS 637: Performed by: STUDENT IN AN ORGANIZED HEALTH CARE EDUCATION/TRAINING PROGRAM

## 2024-01-02 PROCEDURE — 258N000003 HC RX IP 258 OP 636: Performed by: ANESTHESIOLOGY

## 2024-01-02 PROCEDURE — 370N000017 HC ANESTHESIA TECHNICAL FEE, PER MIN: Performed by: STUDENT IN AN ORGANIZED HEALTH CARE EDUCATION/TRAINING PROGRAM

## 2024-01-02 PROCEDURE — 272N000001 HC OR GENERAL SUPPLY STERILE: Performed by: STUDENT IN AN ORGANIZED HEALTH CARE EDUCATION/TRAINING PROGRAM

## 2024-01-02 PROCEDURE — 250N000011 HC RX IP 250 OP 636: Performed by: INTERNAL MEDICINE

## 2024-01-02 PROCEDURE — 99232 SBSQ HOSP IP/OBS MODERATE 35: CPT | Performed by: STUDENT IN AN ORGANIZED HEALTH CARE EDUCATION/TRAINING PROGRAM

## 2024-01-02 PROCEDURE — 0KB90ZZ EXCISION OF RIGHT LOWER ARM AND WRIST MUSCLE, OPEN APPROACH: ICD-10-PCS | Performed by: STUDENT IN AN ORGANIZED HEALTH CARE EDUCATION/TRAINING PROGRAM

## 2024-01-02 PROCEDURE — 250N000011 HC RX IP 250 OP 636: Performed by: ANESTHESIOLOGY

## 2024-01-02 PROCEDURE — 360N000075 HC SURGERY LEVEL 2, PER MIN: Performed by: STUDENT IN AN ORGANIZED HEALTH CARE EDUCATION/TRAINING PROGRAM

## 2024-01-02 PROCEDURE — 80048 BASIC METABOLIC PNL TOTAL CA: CPT | Performed by: STUDENT IN AN ORGANIZED HEALTH CARE EDUCATION/TRAINING PROGRAM

## 2024-01-02 PROCEDURE — 86140 C-REACTIVE PROTEIN: CPT | Performed by: STUDENT IN AN ORGANIZED HEALTH CARE EDUCATION/TRAINING PROGRAM

## 2024-01-02 PROCEDURE — 710N000010 HC RECOVERY PHASE 1, LEVEL 2, PER MIN: Performed by: STUDENT IN AN ORGANIZED HEALTH CARE EDUCATION/TRAINING PROGRAM

## 2024-01-02 PROCEDURE — 999N000141 HC STATISTIC PRE-PROCEDURE NURSING ASSESSMENT: Performed by: STUDENT IN AN ORGANIZED HEALTH CARE EDUCATION/TRAINING PROGRAM

## 2024-01-02 PROCEDURE — 120N000001 HC R&B MED SURG/OB

## 2024-01-02 PROCEDURE — 250N000011 HC RX IP 250 OP 636: Performed by: STUDENT IN AN ORGANIZED HEALTH CARE EDUCATION/TRAINING PROGRAM

## 2024-01-02 PROCEDURE — 250N000009 HC RX 250: Performed by: NURSE ANESTHETIST, CERTIFIED REGISTERED

## 2024-01-02 PROCEDURE — 250N000025 HC SEVOFLURANE, PER MIN: Performed by: STUDENT IN AN ORGANIZED HEALTH CARE EDUCATION/TRAINING PROGRAM

## 2024-01-02 PROCEDURE — 250N000013 HC RX MED GY IP 250 OP 250 PS 637: Performed by: ORTHOPAEDIC SURGERY

## 2024-01-02 PROCEDURE — 250N000011 HC RX IP 250 OP 636: Performed by: NURSE ANESTHETIST, CERTIFIED REGISTERED

## 2024-01-02 PROCEDURE — 85027 COMPLETE CBC AUTOMATED: CPT | Performed by: STUDENT IN AN ORGANIZED HEALTH CARE EDUCATION/TRAINING PROGRAM

## 2024-01-02 RX ORDER — LIDOCAINE HYDROCHLORIDE 10 MG/ML
INJECTION, SOLUTION EPIDURAL; INFILTRATION; INTRACAUDAL; PERINEURAL
Status: DISCONTINUED
Start: 2024-01-02 | End: 2024-01-02 | Stop reason: HOSPADM

## 2024-01-02 RX ORDER — LIDOCAINE 40 MG/G
CREAM TOPICAL
Status: DISCONTINUED | OUTPATIENT
Start: 2024-01-02 | End: 2024-01-02 | Stop reason: HOSPADM

## 2024-01-02 RX ORDER — AMPICILLIN AND SULBACTAM 2; 1 G/1; G/1
3 INJECTION, POWDER, FOR SOLUTION INTRAMUSCULAR; INTRAVENOUS ONCE
Status: COMPLETED | OUTPATIENT
Start: 2024-01-02 | End: 2024-01-02

## 2024-01-02 RX ORDER — BUPIVACAINE HYDROCHLORIDE 5 MG/ML
INJECTION, SOLUTION PERINEURAL PRN
Status: DISCONTINUED | OUTPATIENT
Start: 2024-01-02 | End: 2024-01-02 | Stop reason: HOSPADM

## 2024-01-02 RX ORDER — ONDANSETRON 2 MG/ML
INJECTION INTRAMUSCULAR; INTRAVENOUS PRN
Status: DISCONTINUED | OUTPATIENT
Start: 2024-01-02 | End: 2024-01-02

## 2024-01-02 RX ORDER — PROPOFOL 10 MG/ML
INJECTION, EMULSION INTRAVENOUS PRN
Status: DISCONTINUED | OUTPATIENT
Start: 2024-01-02 | End: 2024-01-02

## 2024-01-02 RX ORDER — PROPOFOL 10 MG/ML
INJECTION, EMULSION INTRAVENOUS CONTINUOUS PRN
Status: DISCONTINUED | OUTPATIENT
Start: 2024-01-02 | End: 2024-01-02

## 2024-01-02 RX ORDER — BUPIVACAINE HYDROCHLORIDE 5 MG/ML
INJECTION, SOLUTION PERINEURAL
Status: DISCONTINUED
Start: 2024-01-02 | End: 2024-01-02 | Stop reason: HOSPADM

## 2024-01-02 RX ORDER — CEFAZOLIN SODIUM/WATER 2 G/20 ML
2 SYRINGE (ML) INTRAVENOUS
Status: COMPLETED | OUTPATIENT
Start: 2024-01-02 | End: 2024-01-02

## 2024-01-02 RX ORDER — EPHEDRINE SULFATE 50 MG/ML
INJECTION, SOLUTION INTRAMUSCULAR; INTRAVENOUS; SUBCUTANEOUS PRN
Status: DISCONTINUED | OUTPATIENT
Start: 2024-01-02 | End: 2024-01-02

## 2024-01-02 RX ORDER — CEFAZOLIN SODIUM/WATER 2 G/20 ML
2 SYRINGE (ML) INTRAVENOUS SEE ADMIN INSTRUCTIONS
Status: DISCONTINUED | OUTPATIENT
Start: 2024-01-02 | End: 2024-01-02 | Stop reason: HOSPADM

## 2024-01-02 RX ORDER — FENTANYL CITRATE 50 UG/ML
25-100 INJECTION, SOLUTION INTRAMUSCULAR; INTRAVENOUS
Status: DISCONTINUED | OUTPATIENT
Start: 2024-01-02 | End: 2024-01-02 | Stop reason: HOSPADM

## 2024-01-02 RX ORDER — SODIUM CHLORIDE, SODIUM LACTATE, POTASSIUM CHLORIDE, CALCIUM CHLORIDE 600; 310; 30; 20 MG/100ML; MG/100ML; MG/100ML; MG/100ML
INJECTION, SOLUTION INTRAVENOUS CONTINUOUS
Status: DISCONTINUED | OUTPATIENT
Start: 2024-01-02 | End: 2024-01-02 | Stop reason: HOSPADM

## 2024-01-02 RX ORDER — FENTANYL CITRATE 50 UG/ML
INJECTION, SOLUTION INTRAMUSCULAR; INTRAVENOUS PRN
Status: DISCONTINUED | OUTPATIENT
Start: 2024-01-02 | End: 2024-01-02

## 2024-01-02 RX ADMIN — FENTANYL CITRATE 50 MCG: 50 INJECTION INTRAMUSCULAR; INTRAVENOUS at 16:50

## 2024-01-02 RX ADMIN — OXYCODONE HYDROCHLORIDE 5 MG: 5 TABLET ORAL at 06:26

## 2024-01-02 RX ADMIN — Medication 2 G: at 16:30

## 2024-01-02 RX ADMIN — AMPICILLIN SODIUM AND SULBACTAM SODIUM 3 G: 2; 1 INJECTION, POWDER, FOR SOLUTION INTRAMUSCULAR; INTRAVENOUS at 21:00

## 2024-01-02 RX ADMIN — AMPICILLIN SODIUM AND SULBACTAM SODIUM 3 G: 2; 1 INJECTION, POWDER, FOR SOLUTION INTRAMUSCULAR; INTRAVENOUS at 09:16

## 2024-01-02 RX ADMIN — PROPOFOL 20 MG: 10 INJECTION, EMULSION INTRAVENOUS at 16:32

## 2024-01-02 RX ADMIN — SUGAMMADEX 200 MG: 100 INJECTION, SOLUTION INTRAVENOUS at 17:23

## 2024-01-02 RX ADMIN — FENTANYL CITRATE 50 MCG: 50 INJECTION INTRAMUSCULAR; INTRAVENOUS at 16:33

## 2024-01-02 RX ADMIN — AMPICILLIN SODIUM AND SULBACTAM SODIUM 3 G: 2; 1 INJECTION, POWDER, FOR SOLUTION INTRAMUSCULAR; INTRAVENOUS at 05:58

## 2024-01-02 RX ADMIN — ONDANSETRON 4 MG: 2 INJECTION INTRAMUSCULAR; INTRAVENOUS at 16:59

## 2024-01-02 RX ADMIN — Medication 5 MG: at 17:07

## 2024-01-02 RX ADMIN — AMPICILLIN AND SULBACTAM 3 G: 2; 1 INJECTION, POWDER, FOR SOLUTION INTRAMUSCULAR; INTRAVENOUS at 15:51

## 2024-01-02 RX ADMIN — ROCURONIUM BROMIDE 50 MG: 10 INJECTION, SOLUTION INTRAVENOUS at 16:50

## 2024-01-02 RX ADMIN — PROPOFOL 50 MG: 10 INJECTION, EMULSION INTRAVENOUS at 16:50

## 2024-01-02 RX ADMIN — ATORVASTATIN CALCIUM 10 MG: 10 TABLET, FILM COATED ORAL at 09:16

## 2024-01-02 RX ADMIN — PROPOFOL 150 MCG/KG/MIN: 10 INJECTION, EMULSION INTRAVENOUS at 16:32

## 2024-01-02 RX ADMIN — SODIUM CHLORIDE, POTASSIUM CHLORIDE, SODIUM LACTATE AND CALCIUM CHLORIDE: 600; 310; 30; 20 INJECTION, SOLUTION INTRAVENOUS at 06:29

## 2024-01-02 RX ADMIN — Medication 5 MG: at 17:04

## 2024-01-02 RX ADMIN — OXYCODONE HYDROCHLORIDE 5 MG: 5 TABLET ORAL at 14:43

## 2024-01-02 RX ADMIN — LISINOPRIL AND HYDROCHLOROTHIAZIDE 1 TABLET: 12.5; 2 TABLET ORAL at 09:16

## 2024-01-02 ASSESSMENT — ACTIVITIES OF DAILY LIVING (ADL)
ADLS_ACUITY_SCORE: 29
ADLS_ACUITY_SCORE: 33
ADLS_ACUITY_SCORE: 29

## 2024-01-02 ASSESSMENT — LIFESTYLE VARIABLES: TOBACCO_USE: 0

## 2024-01-02 NOTE — ANESTHESIA CARE TRANSFER NOTE
Patient: Amor Zavaleta    Procedure: Procedure(s):  IRRIGATION AND DEBRIDEMENT, HAND       Diagnosis: Infection of right hand due to bite, initial encounter [S61.451A, L08.9]  Diagnosis Additional Information: No value filed.    Anesthesia Type:   MAC     Note:    Oropharynx: oropharynx clear of all foreign objects and spontaneously breathing  Level of Consciousness: awake and drowsy  Oxygen Supplementation: face mask  Level of Supplemental Oxygen (L/min / FiO2): 6  Independent Airway: airway patency satisfactory and stable  Dentition: dentition unchanged  Vital Signs Stable: post-procedure vital signs reviewed and stable  Report to RN Given: handoff report given  Patient transferred to: PACU    Handoff Report: Identifed the Patient, Identified the Reponsible Provider, Reviewed the pertinent medical history, Discussed the surgical course, Reviewed Intra-OP anesthesia mangement and issues during anesthesia, Set expectations for post-procedure period and Allowed opportunity for questions and acknowledgement of understanding      Vitals:  Vitals Value Taken Time   /77 01/02/24 1731   Temp 36.8  C (98.3  F) 01/02/24 1730   Pulse 58 01/02/24 1731   Resp 18 01/02/24 1731   SpO2 100 % 01/02/24 1731   Vitals shown include unfiled device data.    Electronically Signed By: CHIN Muse CRNA  January 2, 2024  5:33 PM

## 2024-01-02 NOTE — PLAN OF CARE
Problem: Adult Inpatient Plan of Care  Goal: Optimal Comfort and Wellbeing  Intervention: Monitor Pain and Promote Comfort  Recent Flowsheet Documentation  Taken 1/2/2024 0050 by Michelle Noe RN  Pain Management Interventions: declines  Taken 1/1/2024 1933 by Michelle Noe RN  Pain Management Interventions: declines     Problem: Pain Acute  Goal: Optimal Pain Control and Function  Outcome: Progressing  Intervention: Develop Pain Management Plan  Recent Flowsheet Documentation  Taken 1/2/2024 0050 by Michelle Noe RN  Pain Management Interventions: declines  Taken 1/1/2024 1933 by Michelle Noe RN  Pain Management Interventions: declines  Intervention: Prevent or Manage Pain  Recent Flowsheet Documentation  Taken 1/2/2024 0050 by Michelle Noe RN  Sensory Stimulation Regulation:   care clustered   lighting decreased   quiet environment promoted  Medication Review/Management: medications reviewed  Taken 1/1/2024 2048 by Michelle Noe RN  Sensory Stimulation Regulation:   care clustered   lighting decreased   quiet environment promoted  Medication Review/Management: medications reviewed  Intervention: Optimize Psychosocial Wellbeing  Recent Flowsheet Documentation  Taken 1/2/2024 0050 by Michelle Noe RN  Supportive Measures: active listening utilized  Taken 1/1/2024 2048 by Michelle Noe RN  Supportive Measures: active listening utilized   Goal Outcome Evaluation:    Patient vital signs are at baseline: Yes  Patient able to ambulate as they were prior to admission or with assist devices provided by therapies during their stay:  Yes  Patient MUST void prior to discharge:  Yes  Patient able to tolerate oral intake:  Yes  Pain has adequate pain control using Oral analgesics:  Yes  Does patient have an identified :  Yes  Has goal D/C date and time been discussed with patient:  Pt is scheduled for I&D for 1/2/24 at 1600 hours.     Pt was NPO since midnight. Last of solid food was 2200 hours of Farhan  crackers and peanut better. Sip of water with medication at 0626 hours.

## 2024-01-02 NOTE — PLAN OF CARE
VSS- vac in place to right hand, running continuously at 125mmHg. Patient says he is comfortable throughout shift- no PRNs needed. L. PICC in place, blood return noted. Up w/ a SBA. Alert and oriented.

## 2024-01-02 NOTE — PROGRESS NOTES
Care Management Follow Up    Length of Stay (days): 11    Expected Discharge Date: 01/03/2024     Concerns to be Addressed: discharge planning       Patient plan of care discussed at interdisciplinary rounds: Yes    Anticipated Discharge Disposition: Transitional Care     Anticipated Discharge Services: None    Anticipated Discharge DME: None    Patient/family educated on Medicare website which has current facility and service quality ratings: yes    Education Provided on the Discharge Plan: Yes (AVS per bedside RN)    Patient/Family in Agreement with the Plan: yes    Referrals Placed by CM/SW: Post Acute Facilities        Additional Information:  CM reviewed chart. Anticipate patient will have another I&D today. Anticipate discharge to TCU. Transportation TBD. CM will follow.     [facility] TCU admissions contact # [phone]    Chioma Robbins RN

## 2024-01-02 NOTE — ANESTHESIA PROCEDURE NOTES
Airway       Patient location during procedure: OR       Procedure Start/Stop Times: 1/2/2024 4:49 PM  Staff -        Anesthesiologist:  Neha Wilkerson MD       CRNA: Sarahy Yung APRN CRNA       Performed By: CRNA  Consent for Airway        Urgency: elective  Indications and Patient Condition       Indications for airway management: leana-procedural       Induction type:intravenous       Mask difficulty assessment: 2 - vent by mask + OA or adjuvant +/- NMBA    Final Airway Details       Final airway type: endotracheal airway       Successful airway: ETT - single  Endotracheal Airway Details        ETT size (mm): 7.5       Cuffed: yes       Successful intubation technique: direct laryngoscopy       DL Blade Type: Gannon 2       Grade View of Cords: 1       Adjucts: stylet       Position: Right       Measured from: gums/teeth       Secured at (cm): 22       Bite block used: None    Post intubation assessment        Placement verified by: capnometry, equal breath sounds and chest rise        Number of attempts at approach: 1       Secured with: tape       Ease of procedure: easy       Dentition: Intact and Unchanged    Medication(s) Administered   Medication Administration Time: 1/2/2024 4:49 PM

## 2024-01-02 NOTE — ANESTHESIA PREPROCEDURE EVALUATION
Anesthesia Pre-Procedure Evaluation    Patient: Amor Zavaleta   MRN: [id number] : 1945                  Past Medical History:   Diagnosis Date    Diabetes (H)     Sleep apnea       Past Surgical History:   Procedure Laterality Date    APPLY WOUND VAC Right 2023    Procedure: Right Wound vac application 5 cm x 9 cm;  Surgeon: Jak Luke MD;  Location: [facility]    APPLY WOUND VAC Right 2023    Procedure: APPLICATION, WOUND VAC;  Surgeon: Jak Luke MD;  Location: [facility] OR    INCISION AND DRAINAGE UPPER EXTREMITY, COMBINED Right 2023    Procedure: INCISION AND DRAINAGE, RIGHT UPPER FOREARM;  Surgeon: Leah Leyva MD;  Location: [facility] OR    INCISION AND DRAINAGE UPPER EXTREMITY, COMBINED Right 2023    Procedure: INCISION AND DRAINAGE, UPPER EXTREMITY;  Surgeon: Leah Leyva MD;  Location: [facility] OR    IRRIGATION AND DEBRIDEMENT HAND, COMBINED Right 2023    Procedure: Irrigation and sharp excisional debridement of wound of right distal forearm including extensor compartments, wrist, hand and distal radius bone;  Surgeon: Jak Luke MD;  Location: [facility]    IRRIGATION AND DEBRIDEMENT HAND, COMBINED Right 2023    Procedure: IRRIGATION AND DEBRIDEMENT, HAND;  Surgeon: Jak Luke MD;  Location: [facility]    IRRIGATION AND DEBRIDEMENT UPPER EXTREMITY, COMBINED Right 2023    Procedure: IRRIGATION AND DEBRIDEMENT, RIGHT WRIST;  Surgeon: Carla Austin MD;  Location: [facility]    IRRIGATION AND DEBRIDEMENT UPPER EXTREMITY, COMBINED Right 2023    Procedure: IRRIGATION AND DEBRIDEMENT, RIGHT WRIST;  Surgeon: Carla Austin MD;  Location: [facility] OR    IRRIGATION AND DEBRIDEMENT UPPER EXTREMITY, COMBINED Right 2023    Procedure: IRRIGATION AND DEBRIDEMENT, RIGHT WRIST;  Surgeon: Erik Burkett MD;  Location: [facility]    PICC SINGLE LUMEN PLACEMENT   12/30/2023      Allergies   Allergen Reactions    Sulfa Antibiotics Hives      Social History     Tobacco Use    Smoking status: Never    Smokeless tobacco: Never   Substance Use Topics    Alcohol use: Not Currently      Wt Readings from Last 1 Encounters:   12/25/23 94.3 kg (208 lb)        Anesthesia Evaluation   Pt has had prior anesthetic.     No history of anesthetic complications       ROS/MED HX  ENT/Pulmonary:     (+) sleep apnea,                                    (-) tobacco use   Neurologic:  - neg neurologic ROS   (+)    peripheral neuropathy,                            Cardiovascular: Comment: LBBB.    (+)  hypertension- -   -  - -                          Irregular Heartbeat/Palpitations,            METS/Exercise Tolerance:     Hematologic:     (+)      anemia,          Musculoskeletal:  - neg musculoskeletal ROS     GI/Hepatic:     (+) GERD, Asymptomatic on medication,               (-) hiatal hernia and esophageal disease   Renal/Genitourinary:     (+) renal disease, type: CRI,            Endo:     (+)  type II DM,       Diabetic complications: nephropathy neuropathy retinopathy.             Psychiatric/Substance Use:  - neg psychiatric ROS     Infectious Disease: Comment: UE infection.    (+) Recent Fever,           Malignancy:  - neg malignancy ROS     Other:  - neg other ROS          Physical Exam    Airway        Mallampati: I   TM distance: > 3 FB   Neck ROM: full   Mouth opening: > 3 cm    Respiratory Devices and Support         Dental       (+) Multiple visibly decayed, broken teeth      Cardiovascular   cardiovascular exam normal       Rhythm and rate: regular and normal     Pulmonary   pulmonary exam normal        breath sounds clear to auscultation           OUTSIDE LABS:  CBC:   Lab Results   Component Value Date    WBC 7.5 01/02/2024    WBC 6.9 01/01/2024    HGB 10.7 (L) 01/02/2024    HGB 10.0 (L) 01/01/2024    HCT 32.8 (L) 01/02/2024    HCT 31.1 (L) 01/01/2024     01/02/2024    PLT  "235 01/01/2024     BMP:   Lab Results   Component Value Date     01/02/2024     12/23/2023    POTASSIUM 3.6 01/02/2024    POTASSIUM 4.3 12/23/2023    CHLORIDE 100 01/02/2024    CHLORIDE 106 12/23/2023    CO2 28 01/02/2024    CO2 23 12/23/2023    BUN 16.0 01/02/2024    BUN 23.2 (H) 12/23/2023    CR 1.10 01/02/2024    CR 0.81 12/31/2023     (H) 01/02/2024     (H) 01/02/2024     COAGS: No results found for: \"PTT\", \"INR\", \"FIBR\"  POC: No results found for: \"BGM\", \"HCG\", \"HCGS\"  HEPATIC:   Lab Results   Component Value Date    ALBUMIN 2.5 (L) 12/23/2023    PROTTOTAL 6.1 (L) 12/23/2023    ALT 9 12/23/2023    AST 23 12/23/2023    ALKPHOS 41 12/23/2023    BILITOTAL 0.5 12/23/2023     OTHER:   Lab Results   Component Value Date    PH 7.46 (H) 12/11/2023    LACT 1.2 12/12/2023    A1C 8.9 (H) 12/11/2023    VLADIMIR 8.8 01/02/2024    PHOS 2.7 07/07/2023    MAG 1.9 12/19/2023    SED 61 (H) 12/11/2023       Anesthesia Plan    ASA Status:  3, emergent    NPO Status:  NPO Appropriate (had breakfast >8 hrs ago)    Anesthesia Type: MAC.              Consents    Anesthesia Plan(s) and associated risks, benefits, and realistic alternatives discussed. Questions answered and patient/representative(s) expressed understanding.     - Discussed:     - Discussed with:  Patient      - Extended Intubation/Ventilatory Support Discussed: No.      - Patient is DNR/DNI Status: No     Use of blood products discussed: No .     Postoperative Care    Pain management: Multi-modal analgesia, IV analgesics.   PONV prophylaxis: Ondansetron (or other 5HT-3)     Comments:               Darrian Dalal MD    I have reviewed the pertinent notes and labs in the chart from the past 30 days and (re)examined the patient.  Any updates or changes from those notes are reflected in this note.             "

## 2024-01-02 NOTE — PLAN OF CARE
Problem: Infection  Goal: Absence of Infection Signs and Symptoms  Outcome: Progressing     A/O x 4, VSS on RA. Pain minimal, per pt 1/10. L PICC infusing LR at 100 ml/hr, ok to draw labs on. Wound vac in place RUE continuous at 125. Maintained NPO status since midnight.  A x 1 with walker and gait belt.     Scheduled I & D this afternoon at 1600.

## 2024-01-02 NOTE — OP NOTE
OPERATIVE REPORT   DATE OF OPERATION: 24     Patient: Amor Zavaleta  MRN: [id number]  : 1945    SURGEON: Carla Austin MD    ASSISTANT: Cathy Verdugo PA-C.  DIONNE assist required for patient positioning, soft tissue retraction, instrumentation assist, and patient safety.      ANESTHESIOLOGIST: Anesthesiologist: Neha Wilkerson MD  CRNA: Sarahy Yung APRN CRNA    ANESTHESIA:     PREOPERATIVE DIAGNOSIS:   1.         Right dorsal forearm polymicrobial infection with septic extensor tenosynovitis  2.         Right ECRL and ECRB tendon ruptures  3.         Right EPL tendon rupture  4.         Right distal radius osteomyelitis     POSTOPERATIVE DIAGNOSIS:   1.         Right dorsal forearm polymicrobial infection with septic extensor tenosynovitis  2.         Right ECRL and ECRB tendon ruptures  3.         Right EPL tendon rupture  4.         Right distal radius osteomyelitis     OPERATION(S) PERFORMED:   1. Irrigation and sharp excisional debridement of wound of right distal forearm including extensor compartments, wrist and hand   2.         Right dorsal wrist wound closure 4.5 x 2 cm  2. Incisional wound vac application       ESTIMATED BLOOD LOSS: 10 mL.     TOURNIQUET TIME: 0 min    IMPLANTS: None     BACKGROUND:  BACKGROUND:  Amor Zavaleta is a 78 year old male with severe right forearm infection from a cat bite.  He sustained a cat bite to right dorsal forearm on 2023.  Unfortunately, he developed septic extensor tenosynovitis.  Initially was treated at [facility] and underwent irrigation debridement in the operating room with Dr. Leyva on 2023 and 2023.  Cultures grew polymicrobial infection.  He was treated on IV antibiotics and discharged home on PO antibiotics on 2023.  He returned to clinic with Dr. Carla Austin who noted recurrence of infection. He was taken back to the operating room on 2023 for repeat I&D. Repeat irrigation debridement was performed  on 12/25/2023 with Dr. Austin.  At that time it was noted that he had had infection related attritional ruptures of the EPL, ECRL, and ECRB. He was taken back to the OR on 12/27/23 with Dr. Burkett. He has been in the hospital since on IV antibiotics. MRI was obtained demonstrating ongoing fluid in the dorsal extensor tendons, primarily in the fourth dorsal compartment. There was also evidence of bony edema and possible disruption in the dorsal aspect of the distal radius concerning for osteomyelitis. He returned to the OR for repeat I&D and wound vac application on 12/29/23 and 12/21/23 with Dr. Luke. We will plan for repeat I&D right forearm and possible wound vac application today. He was amenable to the plan of care and all questions were answered.      OPERATIVE FINDINGS:     Healthy appearing tissue.  No gross purulence.  Remaining 4.5 cm x 2.0 cm defect in the central aspect of the wound.  Able to be closed with 3-0 nylon without undue tension.  Incisional wound vac placed over wound, 15.5 cm in length. Total of 1 black sponge.      OPERATIVE PROCEDURE:   Amor Zavaleta was seen in the pre-operative area; informed consent was obtained.  The right upper extremity was appropriately marked by the patient and the operating surgeon.   Please see their documentation for further details.  The patient was brought to the operating room and transferred to the OR bed.   The patient was positioned supine with appropriate padding and a safety strap.  He was induced under general anesthesia. A right upper arm tourniquet was placed but not inflated at this time. The wound vac was removed demonstrating healthy granulation tissue at the wound bed.  The patient's operative extremity was prepped and draped in the standard fashion.  At this time a surgical safety timeout was performed involving the operating room nurse, anesthesia team, scrub tech, and operating surgeon.   There was no gross purulence. The distal and  proximal incisions that were previously closed were not reopened.  The remaining area the wound was bluntly dissected radially and ulnarly.    There was no evidence of purulence in the wound, or new pockets tracking proximally, distally or ulnarly. The tissue appeared healthy.  The wound was debrided including skin, subcutaneous tissue, muscle, fascia with sharp scalpel, scissors, rongeur.  The wound was then copiously irrigated with 3 L of normal saline.  The wound was closed with 3-0 nylon suture.  This measured 4.5 x 2 cm.  There was no undue tension on the wound.  An Adaptic was then placed over the wound followed by a long black sponge.  Adhesive was applied and a hole was cut through the adhesive centrally.  The Belle pad was then placed and excellent suction was obtained and the wound VAC was set to 125 mmHg continuous low suction.   A removable volar Ortho-Glass splint was applied to keep the wrist slightly extended and the fingers in extension.   The patient was transferred to the hospital bed and awoken from anesthesia. At the conclusion of the case, all sponge, needle and instrument counts were correct. There were no complications. The patient was then taken to the PACU for recovery in stable condition.  The patient's sister, Melba Shaffer, was contacted following the case to discuss the findings and plans moving forward.     POSTOPERATIVE PLAN:   Medicine Primary  Activity: Up with assist  Weight bearing status: NWB RUE  Antibiotics: Continue IV antibiotics per ID recommendations, narrow as indicated  Diet: Begin with clear fluids and progress diet as tolerated  DVT prophylaxis: Mechanical and per primary.   Bracing/Splinting: Removable orthoglass volar splint  Elevation: Elevate RUE  Wound Care: Wound vac 125 mmHg continuous for 3 days, remove Friday, 1/5/2024  Pain management: per primary  Imaging: None  Labs: Trend inflammatory markers  Cultures: Pending, follow culture results closely.  Consults: ID,  social work/care coordination - please coordinate with sister, Melba Shaffer [phone]  Disposition: PACU        Carla Austin MD

## 2024-01-02 NOTE — PROGRESS NOTES
[facility] MEDICINE PROGRESS NOTE      Identification/Summary: Amor Zavaleta is a 78 year old male with a history of insulin-dependent diabetes type 2, CKD stage IV, HTN, LBBB who presents on 12/22/2023 with continued pain and swelling to right wrist following a cat bite.  He was seen by orthopedic surgeon in his office 12/22 and thought that the infection was worsening on oral antibiotics.  Taken to surgery for irrigation and debridement the evening of 12/22. He normally lives alone, in an apartment with 3 flights of stairs.  He feels things are going well. Decreased redness over the dorsum of the hand wrist and distal forearm 12/27 compared with 12/26. Eventually to TCU with IV antibiotics.    S/p I&D on 12/29, awaiting intra-operative cultures and final antibiotics plan as per ID. Final antibiotics plan per ID on 12/30 is UNASYN based on prior cultures, 6 weeks needed, completing 2/6/24. Now also s/p repeat I&D on 12/31/23 with wound vac exchange by Dr. Luke w/ [facility]. New to writer 1/1/2024, stable, remains on iv abx and no new acute issues. Resumed zestoretic. Plan for RTOR 1/2/2024. Is npo.     Assessment/plan:     Septic joint right wrist with likely extension to right elbow secondary to cat bite 12/2  -Patient was evaluated at [facility] and admitted 12/11.  Received IV antibiotics along with washouts from orthopedic surgery.  Was discharged 12/20 with 7 days of Augmentin and metronidazole per infectious disease recommendations  -[facility] requested admission 12/22 for washout and IV antibiotics due to continued infection  -Appreciate orthopedic recommendations regarding right elbow pain and edema-likely extension of septic joint.  -Vancomycin and Zosyn initiated in the ER  -Blood cultures  -Has been seen by infectious disease and IV antibiotics to continue as per cultures.  -Patient went back to surgery for washout 12/25 and it is reported that he had  3 ruptured tendons.    -Patient would likely benefit from TCU and this was recommended by therapies.  -RTOR 23 repeat I&D, wound vac application 4.5cm x 1.5cm  -Final ID antibiotics plan UNASYN based on prior cultures, 6 weeks needed, ending 24  - RTOR again 2024      Decreased mobility secondary to long hospitalization  PT/OT-appreciate discharge recommendations for placement      Right hand wounds from cat attack  Wound care appreciated.     Diabetes type 2-insulin-dependent, blood sugars have been good  Insulin pump at home  reported home dose is Lantus 56 units at night and 16 units NovoLog with meals  Of note, A1c of 8.9 2023  Home dose of Lantus 56 units ordered but some hypoglycemia the - it was decreased to 35 units.  Continue home very high insulin sliding scale with meals no insulin at bedtime   BG has been overall well-controlled in past few days; CTM and adjust PRN.   -Remains stable  - hold AM insulin on 2024 given return to OR in PM    HTN  Remains normotensive but spikes with pain to 170s. Has been off of home meds.  Home antihypertensives have been held in setting of hypotension.   - now on  rising blood pressures to 140s; pain would exacerbate so plan is the followin. Pain control and then    2. Resume Zestoretic 2024 with hold parameters and   3. Hold amlodipine given potential for edema side effects anytime      CKD stage IV  Baseline creatinine appears to be 1.2  Creatinine 1.6 at admission  - stable at normal range Cr 0.81 on   - as such, resumed zestoretic with hold parameters     Code Status: Full Code  DVT prophylaxis: Lovenox      Disposition:  -Discharge barriers: Orthopedic clearance, IV abx, PT/OT and placement to TCU.       Expected Discharge Date: 2024      Destination: inpatient rehabilitation facility  Discharge Comments: IV abx, 4th I&D either  or   [facility] TCU accepted for DC        The patient's care was  discussed with the Patient.    49 MINUTES SPENT BY ME on the date of service doing chart review, history, exam, documentation & further activities per the note.    Piero Lewis   Hospitalist Service  [facility]   Securely message with the Vocera Web Console (learn more here)  Text page via [facility] Paging/Directory         Interval History/Subjective:  No acute events overnight.  Npo and plan for return to OR today  High pressures spikes to 170s then improved back to 140s  Pain unchanged/ comes/goes  Otherwise no new symptoms, No report of chest pain, shortness of breath, or other new complaints reported.      Physical Exam/Objective:  Temp:  [97.1  F (36.2  C)-97.8  F (36.6  C)] 97.8  F (36.6  C)  Pulse:  [56-57] 56  Resp:  [18] 18  BP: (143-178)/(68-74) 178/74  SpO2:  [93 %-95 %] 95 %  Body mass index is 28.21 kg/m .    General: alert, oriented, and in no acute distress  Pulmonary: clear to auscultation bilaterally, normal respiratory effort, on room air, no rales or wheezes or evidence of accessory muscle use  CVS: regular rate and rhythm, no murmurs, rubs, or gallops; no blatant jugular venous distention; no extremity edema and extremities are warm to the touch  GI: soft, nontender, BS+, no rebound or guarding, no conspicuous organomegaly   Neuro: nonfocal, moves all extremities of own volition  Psych: appropriate  Extremities: right upper limb in dressing, c/d/I, vacuum stable     Data reviewed today: I personally reviewed all new medications, labs, imaging/diagnostics reports over the past 24 hours. Pertinent findings include:    Labs:  Most Recent 3 CBC's:  Recent Labs   Lab Test 01/02/24  0608 01/01/24  0821 12/31/23  0604   WBC 7.5 6.9 7.2   HGB 10.7* 10.0* 10.2*   MCV 96 98 96    235 268  268     Most Recent 3 BMP's:  Recent Labs   Lab Test 01/02/24  0613 01/02/24  0608 01/02/24  0218 12/31/23  1117 12/31/23  0604 12/28/23  0632 12/28/23  0553 12/23/23  0632 12/23/23  0617  12/22/23  1640 12/22/23  1327   NA  --  135  --   --   --   --   --   --  136  --  136   POTASSIUM  --  3.6  --   --   --   --   --   --  4.3  --  4.0   CHLORIDE  --  100  --   --   --   --   --   --  106  --  102   CO2  --  28  --   --   --   --   --   --  23  --  23   BUN  --  16.0  --   --   --   --   --   --  23.2*  --  32.6*   CR  --  1.10  --   --  0.81  --  1.25*   < > 1.41*  --  1.61*   ANIONGAP  --  7  --   --   --   --   --   --  7  --  11   VLADIMIR  --  8.8  --   --   --   --   --   --  8.0*  --  9.0   * 193* 182*   < >  --    < >  --    < > 130*   < > 98    < > = values in this interval not displayed.     Most Recent 2 LFT's:  Recent Labs   Lab Test 12/23/23  0617 12/11/23  1316   AST 23 27   ALT 9 45   ALKPHOS 41 120   BILITOTAL 0.5 1.1       Medications:   Personally Reviewed.  Medications    lactated ringers 100 mL/hr at 01/02/24 0629      [Held by provider] amLODIPine  2.5 mg Oral Daily    ampicillin-sulbactam  3 g Intravenous Q6H    [Held by provider] aspirin  325 mg Oral Daily    atorvastatin  10 mg Oral Daily    enoxaparin ANTICOAGULANT  40 mg Subcutaneous Q24H    famotidine  20 mg Oral Daily    insulin aspart  1-22 Units Subcutaneous TID w/meals    insulin glargine  35 Units Subcutaneous QAM AC    lisinopril-hydrochlorothiazide  1 tablet Oral Daily    sodium chloride (PF)  3 mL Intracatheter Q8H

## 2024-01-03 ENCOUNTER — APPOINTMENT (OUTPATIENT)
Dept: PHYSICAL THERAPY | Facility: CLINIC | Age: 79
DRG: 464 | End: 2024-01-03
Payer: COMMERCIAL

## 2024-01-03 ENCOUNTER — APPOINTMENT (OUTPATIENT)
Dept: OCCUPATIONAL THERAPY | Facility: CLINIC | Age: 79
DRG: 464 | End: 2024-01-03
Attending: PHYSICIAN ASSISTANT
Payer: COMMERCIAL

## 2024-01-03 LAB
BACTERIA TISS BX CULT: NO GROWTH
BACTERIA TISS BX CULT: NORMAL
CREAT SERPL-MCNC: 1.14 MG/DL (ref 0.67–1.17)
EGFRCR SERPLBLD CKD-EPI 2021: 66 ML/MIN/1.73M2
GLUCOSE BLDC GLUCOMTR-MCNC: 175 MG/DL (ref 70–99)
GLUCOSE BLDC GLUCOMTR-MCNC: 214 MG/DL (ref 70–99)
GLUCOSE BLDC GLUCOMTR-MCNC: 222 MG/DL (ref 70–99)
HGB BLD-MCNC: 10.5 G/DL (ref 13.3–17.7)
PLATELET # BLD AUTO: 225 10E3/UL (ref 150–450)

## 2024-01-03 PROCEDURE — 99232 SBSQ HOSP IP/OBS MODERATE 35: CPT | Performed by: STUDENT IN AN ORGANIZED HEALTH CARE EDUCATION/TRAINING PROGRAM

## 2024-01-03 PROCEDURE — 97535 SELF CARE MNGMENT TRAINING: CPT | Mod: GO

## 2024-01-03 PROCEDURE — 97166 OT EVAL MOD COMPLEX 45 MIN: CPT | Mod: GO

## 2024-01-03 PROCEDURE — 85049 AUTOMATED PLATELET COUNT: CPT | Performed by: ORTHOPAEDIC SURGERY

## 2024-01-03 PROCEDURE — 97116 GAIT TRAINING THERAPY: CPT | Mod: GP

## 2024-01-03 PROCEDURE — 82565 ASSAY OF CREATININE: CPT | Performed by: ORTHOPAEDIC SURGERY

## 2024-01-03 PROCEDURE — 250N000011 HC RX IP 250 OP 636: Performed by: INTERNAL MEDICINE

## 2024-01-03 PROCEDURE — 85018 HEMOGLOBIN: CPT | Performed by: STUDENT IN AN ORGANIZED HEALTH CARE EDUCATION/TRAINING PROGRAM

## 2024-01-03 PROCEDURE — 120N000001 HC R&B MED SURG/OB

## 2024-01-03 PROCEDURE — 250N000013 HC RX MED GY IP 250 OP 250 PS 637: Performed by: ORTHOPAEDIC SURGERY

## 2024-01-03 PROCEDURE — 250N000011 HC RX IP 250 OP 636: Performed by: ORTHOPAEDIC SURGERY

## 2024-01-03 PROCEDURE — 250N000013 HC RX MED GY IP 250 OP 250 PS 637: Performed by: STUDENT IN AN ORGANIZED HEALTH CARE EDUCATION/TRAINING PROGRAM

## 2024-01-03 RX ORDER — CALCIUM CARBONATE 500 MG/1
1000 TABLET, CHEWABLE ORAL ONCE
Status: COMPLETED | OUTPATIENT
Start: 2024-01-03 | End: 2024-01-03

## 2024-01-03 RX ORDER — SIMETHICONE 80 MG
80 TABLET,CHEWABLE ORAL ONCE
Status: COMPLETED | OUTPATIENT
Start: 2024-01-03 | End: 2024-01-03

## 2024-01-03 RX ADMIN — AMPICILLIN SODIUM AND SULBACTAM SODIUM 3 G: 2; 1 INJECTION, POWDER, FOR SOLUTION INTRAMUSCULAR; INTRAVENOUS at 17:07

## 2024-01-03 RX ADMIN — CALCIUM CARBONATE (ANTACID) CHEW TAB 500 MG 1000 MG: 500 CHEW TAB at 11:12

## 2024-01-03 RX ADMIN — FAMOTIDINE 20 MG: 20 TABLET ORAL at 17:07

## 2024-01-03 RX ADMIN — AMPICILLIN SODIUM AND SULBACTAM SODIUM 3 G: 2; 1 INJECTION, POWDER, FOR SOLUTION INTRAMUSCULAR; INTRAVENOUS at 03:54

## 2024-01-03 RX ADMIN — OXYCODONE HYDROCHLORIDE 5 MG: 5 TABLET ORAL at 08:06

## 2024-01-03 RX ADMIN — SIMETHICONE 80 MG: 80 TABLET, CHEWABLE ORAL at 11:12

## 2024-01-03 RX ADMIN — ATORVASTATIN CALCIUM 10 MG: 10 TABLET, FILM COATED ORAL at 08:06

## 2024-01-03 RX ADMIN — AMPICILLIN SODIUM AND SULBACTAM SODIUM 3 G: 2; 1 INJECTION, POWDER, FOR SOLUTION INTRAMUSCULAR; INTRAVENOUS at 21:33

## 2024-01-03 RX ADMIN — ENOXAPARIN SODIUM 40 MG: 100 INJECTION SUBCUTANEOUS at 08:06

## 2024-01-03 RX ADMIN — INSULIN ASPART 8 UNITS: 100 INJECTION, SOLUTION INTRAVENOUS; SUBCUTANEOUS at 18:05

## 2024-01-03 RX ADMIN — AMPICILLIN SODIUM AND SULBACTAM SODIUM 3 G: 2; 1 INJECTION, POWDER, FOR SOLUTION INTRAMUSCULAR; INTRAVENOUS at 11:12

## 2024-01-03 RX ADMIN — LISINOPRIL AND HYDROCHLOROTHIAZIDE 1 TABLET: 12.5; 2 TABLET ORAL at 08:07

## 2024-01-03 RX ADMIN — INSULIN GLARGINE 35 UNITS: 100 INJECTION, SOLUTION SUBCUTANEOUS at 06:48

## 2024-01-03 RX ADMIN — INSULIN ASPART 9 UNITS: 100 INJECTION, SOLUTION INTRAVENOUS; SUBCUTANEOUS at 12:23

## 2024-01-03 ASSESSMENT — ACTIVITIES OF DAILY LIVING (ADL)
ADLS_ACUITY_SCORE: 29
ADLS_ACUITY_SCORE: 33

## 2024-01-03 NOTE — PROGRESS NOTES
Care Management Follow Up    Length of Stay (days): 12    Expected Discharge Date: 01/05/2024     Concerns to be Addressed: discharge planning       Patient plan of care discussed at interdisciplinary rounds: Yes    Anticipated Discharge Disposition: Transitional Care     Anticipated Discharge Services:  TBD    Anticipated Discharge DME: None    Patient/family educated on Medicare website which has current facility and service quality ratings: yes    Education Provided on the Discharge Plan: Yes (AVS per bedside RN)    Patient/Family in Agreement with the Plan: yes    Referrals Placed by CM/SW: Post Acute Facilities      Additional Information:  CM reviewed chart. CM met with patient to discuss discharge plan. Patient is agreeable to TCU. CM left VM to follow up on bed at [facility] () . CM left  requesting a return call. .10:36 AM Patient has an accepting TCU bed but unsure how long they will hold the bed. CM to follow up on Thursday January 4, 2024. Transportation TBD. CM to follow.     [facility] TCU admissions contact # [phone]     Chioma Robbins RN

## 2024-01-03 NOTE — PROGRESS NOTES
01/03/24 1450   Appointment Info   Signing Clinician's Name / Credentials (OT) Nikos Bettencourt OTR/L   Living Environment   People in Home alone   Current Living Arrangements apartment   Transportation Anticipated family or friend will provide   Living Environment Comments Pt lives on third floor apt - no elevator. Pt has tub shower w/ shower chair, standard toilet   Self-Care   Usual Activity Tolerance good   Current Activity Tolerance moderate   Equipment Currently Used at Home none   Fall history within last six months no   Activity/Exercise/Self-Care Comment Pt typically IND w/ ADLs and IADLs at baseline   General Information   Onset of Illness/Injury or Date of Surgery 12/22/23   Referring Physician Piero Lewis MD   Patient/Family Therapy Goal Statement (OT) get stronger and go to TCU   Additional Occupational Profile Info/Pertinent History of Current Problem IRRIGATION AND DEBRIDEMENT, HAND x8   Existing Precautions/Restrictions weight bearing   Right Upper Extremity (Weight-bearing Status) (S)  non weight-bearing (NWB)  (hand/wrist)   Cognitive Status Examination   Orientation Status orientation to person, place and time   Affect/Mental Status (Cognitive) WFL   Visual Perception   Visual Impairment/Limitations corrective lenses full-time   Sensory   Sensory Comments neuropathy in bilateral feet at baseline   Pain Assessment   Patient Currently in Pain Yes, see Vital Sign flowsheet   Posture   Posture not impaired   Range of Motion Comprehensive   General Range of Motion upper extremity range of motion deficits identified   Comment, General Range of Motion R hand/wrist due to splint/wrap   Strength Comprehensive (MMT)   Comment, General Manual Muscle Testing (MMT) Assessment mild weakness   Coordination   Fine Motor Coordination R hand   Bed Mobility   Bed Mobility supine-sit;sit-supine;scooting/bridging   Comment (Bed Mobility) Min A   Transfers   Transfers bed-chair transfer;toilet transfer;shower  transfer   Transfer Comments SBA-CGA   Activities of Daily Living   BADL Assessment/Intervention bathing;upper body dressing;lower body dressing;toileting   Bathing Assessment/Intervention   [place] Level (Bathing) minimum assist (75% patient effort)   Upper Body Dressing Assessment/Training   [place] Level (Upper Body Dressing) minimum assist (75% patient effort)   Lower Body Dressing Assessment/Training   [place] Level (Lower Body Dressing) minimum assist (75% patient effort)   Toileting   [place] Level (Toileting) contact guard assist   Clinical Impression   Criteria for Skilled Therapeutic Interventions Met (OT) Yes, treatment indicated   OT Diagnosis decreased ADLs   Influenced by the following impairments R hand I&D   OT Problem List-Impairments impacting ADL activity tolerance impaired;mobility;strength;pain;range of motion (ROM);post-surgical precautions   Assessment of Occupational Performance 3-5 Performance Deficits   Identified Performance Deficits dressing, bed mobility, toileting, bathing, all transfers   Planned Therapy Interventions (OT) ADL retraining;bed mobility training;transfer training;strengthening;progressive activity/exercise   Clinical Decision Making Complexity (OT) detailed assessment/moderate complexity   Risk & Benefits of therapy have been explained evaluation/treatment results reviewed;patient;sibling   OT Total Evaluation Time   OT Eval, Moderate Complexity Minutes (99845) 10   OT Goals   Therapy Frequency (OT) 5 times/week   OT Predicted Duration/Target Date for Goal Attainment 01/09/24   OT Goals Lower Body Dressing;Bed Mobility;Transfers;Toilet Transfer/Toileting   OT: Lower Body Dressing Modified independent;within precautions   OT: Bed Mobility Modified independent;supine to/from sitting;rolling;within precautions   OT: Transfer Modified independent;with assistive device;within precautions  (tub shower w/ shower chair)   OT: Toilet Transfer/Toileting Modified  independent;toilet transfer;cleaning and garment management;within precautions   Interventions   Interventions Quick Adds Self-Care/Home Management   Self-Care/Home Management   Self-Care/Home Mgmt/ADL, Compensatory, Meal Prep Minutes (43651) 13   Symptoms Noted During/After Treatment (Meal Preparation/Planning Training) none   Treatment Detail/Skilled Intervention Pt upright in bed - wound vac/spint on R hand/wrist. Pt cued for bed mobility to sit EOB - completed w/ SBA. Pt able to doff socks but needs Min A to don socks. Pt aware of NWB through R hand/wrist. Pt STS w/ CGA and amb. 20 ft to BR w/ platform walker and SBA. Pt cued for safe toilet transfer w/in px - completed w/ CGA. Pt amb. around room and back to bed. Pt needing cues and Min A to get back into bed. Discussed one handed techniques for dressing. Pt hopeful this is his last hand surgery.   OT Discharge Planning   OT Plan 1/9: all transfers, one handed dressing, standing ADLs   OT Discharge Recommendation (DC Rec) (S)  Transitional Care Facility   OT Rationale for DC Rec Pt lives alone and needing assistance w/ ADLs due to NWB in R hand/wrist. Pt would benefit from TCU to increase safety and independence w/ ADLs   OT Brief overview of current status SBA-CGA for transfers, Min A dressing   Total Session Time   Timed Code Treatment Minutes 13   Total Session Time (sum of timed and untimed services) 23

## 2024-01-03 NOTE — ANESTHESIA POSTPROCEDURE EVALUATION
Patient: Amor Zavaleta    Procedure: Procedure(s):  IRRIGATION AND DEBRIDEMENT, HAND       Anesthesia Type:  MAC    Note:     Postop Pain Control: Uneventful            Sign Out: Well controlled pain   PONV: No   Neuro/Psych: Uneventful            Sign Out: Acceptable/Baseline neuro status   Airway/Respiratory: Uneventful            Sign Out: Acceptable/Baseline resp. status   CV/Hemodynamics: Uneventful            Sign Out: Acceptable CV status; No obvious hypovolemia; No obvious fluid overload   Other NRE: NONE   DID A NON-ROUTINE EVENT OCCUR? No           Last vitals:  Vitals Value Taken Time   /73 01/02/24 1800   Temp 36.7  C (98  F) 01/02/24 1800   Pulse 59 01/02/24 1800   Resp 18 01/02/24 1800   SpO2 95 % 01/02/24 1800       Electronically Signed By: PASCUAL BERRIOS MD  January 2, 2024  8:09 PM

## 2024-01-03 NOTE — PROGRESS NOTES
"Orthopedic Progress Note      Assessment: 1 Day Post-Op  S/P Procedure(s):  IRRIGATION AND DEBRIDEMENT, HAND x8    Plan:   - Continue PT/OT  - Weightbearing status: NWB RUE  - Anticoagulation:  per primary  - ID following appreciate recruth hunt as indicated  - yesterday was likely his last surgery hopefully  - removable volar splint  - wound vac to remain in place until Friday 1/5/24  - Discharge planning: pending ID plan, TCU      Subjective:  Pain: mild  Chest pain, SOB: no  Nausea, Vomiting:  no  Lightheadedness, Dizziness:  no  Neuro:  Patient denies new onset numbness or paresthesias    Patient is doing well today.  Is ambulating tolerating oral intake and voiding without difficulty.  Wound VAC is in place with no drainage.  Is excited that yesterday was likely his last surgery.  Will continue to appreciate ID Stew for further planning    Objective:  /63 (BP Location: Left leg)   Pulse 59   Temp 97.9  F (36.6  C) (Oral)   Resp 16   Ht 1.829 m (6')   Wt 94.3 kg (208 lb)   SpO2 93%   BMI 28.21 kg/m    The patient is A&Ox3. Appears comfortable.   Sensation is intact.  Able to wiggle fingers without difficulty, give thumbs up  Radial pulse intact.  Calves are soft and non-tender. Negative Keya's.  The incision is covered. Dressing C/D/I.    Wound VAC in place with no drainage    Pertinent Labs   Lab Results: personally reviewed.   No results found for: \"INR\", \"PROTIME\"  Lab Results   Component Value Date    WBC 7.5 01/02/2024    HGB 10.5 (L) 01/03/2024    HCT 32.8 (L) 01/02/2024    MCV 96 01/02/2024     01/03/2024     Lab Results   Component Value Date     01/02/2024    CO2 28 01/02/2024         Report completed by:  Socorro Shabazz PA-C/Dr. Austin  [facility]    Date: 1/3/2024  Time: 9:59 AM    "

## 2024-01-03 NOTE — PLAN OF CARE
Problem: Adult Inpatient Plan of Care  Goal: Optimal Comfort and Wellbeing  Outcome: Progressing  Intervention: Monitor Pain and Promote Comfort    Problem: Infection  Goal: Absence of Infection Signs and Symptoms  Outcome: Progressing       Pt is A&Ox4, VSS on RA. A1 with walker and gait belt when ambulating. Voiding adequately. Dressing to RUE is CDI. Wound vac in place at 125. Pt reporting some numbness/tingling in RUE that is decreasing. Pt is reporting mild pain during shift. PICC line in place, blood return noted, labs drawn.

## 2024-01-03 NOTE — PLAN OF CARE
VSS- wound vac in place at 125mmHg. 5mg oxycodone given for pain, effective. Tolerating regular diet. Single lumen PICC patent and blood return noted. IV ampicillin.

## 2024-01-03 NOTE — PROGRESS NOTES
[facility] MEDICINE PROGRESS NOTE      Identification/Summary: Amor Zavaleta is a 78 year old male with a history of insulin-dependent diabetes type 2, CKD stage IV, HTN, LBBB who presents on 12/22/2023 with continued pain and swelling to right wrist following a cat bite.  He was seen by orthopedic surgeon in his office 12/22 and thought that the infection was worsening on oral antibiotics.  Taken to surgery for irrigation and debridement the evening of 12/22. He normally lives alone, in an apartment with 3 flights of stairs.  He feels things are going well. Decreased redness over the dorsum of the hand wrist and distal forearm 12/27 compared with 12/26. Eventually to TCU with IV antibiotics.    S/p I&D on 12/29, awaiting intra-operative cultures and final antibiotics plan as per ID. Final antibiotics plan per ID on 12/30 is UNASYN based on prior cultures, 6 weeks needed, completing 2/6/24. Now also s/p repeat I&D on 12/31/23 with wound vac exchange by Dr. Luke w/ EBL of 10ccs. New to writer 1/1/2024, stable, remains on iv abx and no new acute issues. Resumed zestoretic. S/p yet another I&D with right dorsal wrist wound closure and incisional wound vac application on 1/2 with EBL of 10ccs. It is possible he may not need further OR time- greatly appreciate Orthopedics.      Dispo is eventually TCU, pending surgical clearance. ID plan in place (6 weeks)     Assessment/plan:  Septic joint right wrist with likely extension to right elbow secondary to cat bite 12/2  -Patient was evaluated at [facility] and admitted 12/11.  Received IV antibiotics along with washouts from orthopedic surgery.  Was discharged 12/20 with 7 days of Augmentin and metronidazole per infectious disease recommendations  -[facility] requested admission 12/22 for washout and IV antibiotics due to continued infection  -Appreciate orthopedic recommendations regarding right elbow pain and edema-likely  extension of septic joint.  -Vancomycin and Zosyn initiated in the ER  -Blood cultures  -Has been seen by infectious disease and IV antibiotics to continue as per cultures.  -Patient went back to surgery for washout  and it is reported that he had 3 ruptured tendons.    -Patient would likely benefit from TCU and this was recommended by therapies.  -RTOR 23 repeat I&D, wound vac application 4.5cm x 1.5cm  -Final ID antibiotics plan UNASYN based on prior cultures, 6 weeks needed, ending 24  - RTOR again 2024 - went well including wound closure  - dispo is tcu     Decreased mobility secondary to long hospitalization  PT/OT-appreciate discharge recommendations for placement      Right hand wounds from cat attack  Wound care appreciated.     Diabetes type 2-insulin-dependent, blood sugars have been good  Insulin pump at home  reported home dose is Lantus 56 units at night and 16 units NovoLog with meals  Of note, A1c of 8.9 2023  Home dose of Lantus 56 units ordered but some hypoglycemia the - it was decreased to 35 units.  Continue home very high insulin sliding scale with meals no insulin at bedtime   BG has been overall well-controlled in past few days; CTM and adjust PRN.   -Remains stable  - hold AM insulin on 2024 given return to OR in PM    HTN  Remains normotensive but spikes with pain to 170s. Has been off of home meds.  Home antihypertensives have been held in setting of hypotension.   - now on  rising blood pressures to 140s; pain would exacerbate so plan is the followin. Pain control and then    2. Resume Zestoretic 2024 with hold parameters and   3. Hold amlodipine given potential for edema side effects anytime      CKD stage IV  Baseline creatinine appears to be 1.2  Creatinine 1.6 at admission  - stable at normal range Cr 0.81 on   - as such, resumed zestoretic with hold parameters  - remains stable at 1.14 on 1/3/2024     Gas  A/p- no abd pain; trying  gas-x and tums on 1/3; cont prn tums as well     Code Status: Full Code  DVT prophylaxis: Lovenox      Disposition:  -Discharge barriers: Orthopedic clearance, IV abx, PT/OT and placement to TCU.       Expected Discharge Date: 01/03/2024      Destination: inpatient rehabilitation facility  Discharge Comments: IV abx, I&D 1/2  [facility]        The patient's care was discussed with the Patient.    46 MINUTES SPENT BY ME on the date of service doing chart review, history, exam, documentation & further activities per the note.    Piero Lewis   Hospitalist Service  [facility]   Securely message with the Vocera Web Console (learn more here)  Text page via [employer] Paging/Directory       ==============    Interval History/Subjective:  No acute events overnight.  He is hoping no more surgeries  Has some gas, discussed trying tums and gas-x  No new symptoms or pain otherwise  Discussed with both orthopedics and SW     Physical Exam/Objective:  Temp:  [97.7  F (36.5  C)-98.3  F (36.8  C)] 98.3  F (36.8  C)  Pulse:  [55-70] 55  Resp:  [15-25] 15  BP: (122-180)/(57-89) 122/57  SpO2:  [90 %-100 %] 94 %  Body mass index is 28.21 kg/m .    General: alert, oriented, and in no acute distress  Pulmonary: clear to auscultation bilaterally, normal respiratory effort, on room air, no rales or wheezes or evidence of accessory muscle use  CVS: regular rate and rhythm, no murmurs, rubs, or gallops; no blatant jugular venous distention; no extremity edema and extremities are warm to the touch  GI: soft, nontender, BS+, no rebound or guarding, no conspicuous organomegaly   Neuro: nonfocal, moves all extremities of own volition  Psych: appropriate  Extremities: right upper limb in new dressing/wrap, c/d/I, vacuum stable      Data reviewed today: I personally reviewed all new medications, labs, imaging/diagnostics reports over the past 24 hours. Pertinent findings include:    Labs:  Most  Recent 3 CBC's:  Recent Labs   Lab Test 01/03/24  0551 01/02/24  0608 01/01/24  0821 12/31/23  0604   WBC  --  7.5 6.9 7.2   HGB  --  10.7* 10.0* 10.2*   MCV  --  96 98 96    247 235 268  268     Most Recent 3 BMP's:  Recent Labs   Lab Test 01/03/24  0653 01/03/24  0551 01/02/24  2136 01/02/24  1756 01/02/24  0613 01/02/24  0608 12/31/23  1117 12/31/23  0604 12/23/23  0632 12/23/23  0617 12/22/23  1640 12/22/23  1327   NA  --   --   --   --   --  135  --   --   --  136  --  136   POTASSIUM  --   --   --   --   --  3.6  --   --   --  4.3  --  4.0   CHLORIDE  --   --   --   --   --  100  --   --   --  106  --  102   CO2  --   --   --   --   --  28  --   --   --  23  --  23   BUN  --   --   --   --   --  16.0  --   --   --  23.2*  --  32.6*   CR  --  1.14  --   --   --  1.10  --  0.81   < > 1.41*  --  1.61*   ANIONGAP  --   --   --   --   --  7  --   --   --  7  --  11   VLADIMIR  --   --   --   --   --  8.8  --   --   --  8.0*  --  9.0   *  --  192* 156*   < > 193*   < >  --    < > 130*   < > 98    < > = values in this interval not displayed.     Most Recent 2 LFT's:  Recent Labs   Lab Test 12/23/23  0617 12/11/23  1316   AST 23 27   ALT 9 45   ALKPHOS 41 120   BILITOTAL 0.5 1.1       Medications:   Personally Reviewed.  Medications        [Held by provider] amLODIPine  2.5 mg Oral Daily    ampicillin-sulbactam  3 g Intravenous Q6H    [Held by provider] aspirin  325 mg Oral Daily    atorvastatin  10 mg Oral Daily    enoxaparin ANTICOAGULANT  40 mg Subcutaneous Q24H    famotidine  20 mg Oral Daily    insulin aspart  1-22 Units Subcutaneous TID w/meals    insulin glargine  35 Units Subcutaneous QAM AC    lisinopril-hydrochlorothiazide  1 tablet Oral Daily    sodium chloride (PF)  3 mL Intracatheter Q8H

## 2024-01-04 ENCOUNTER — APPOINTMENT (OUTPATIENT)
Dept: OCCUPATIONAL THERAPY | Facility: CLINIC | Age: 79
DRG: 464 | End: 2024-01-04
Payer: COMMERCIAL

## 2024-01-04 ENCOUNTER — APPOINTMENT (OUTPATIENT)
Dept: PHYSICAL THERAPY | Facility: CLINIC | Age: 79
DRG: 464 | End: 2024-01-04
Payer: COMMERCIAL

## 2024-01-04 LAB
CRP SERPL-MCNC: 16.6 MG/L
ERYTHROCYTE [DISTWIDTH] IN BLOOD BY AUTOMATED COUNT: 13.3 % (ref 10–15)
GLUCOSE BLDC GLUCOMTR-MCNC: 152 MG/DL (ref 70–99)
GLUCOSE BLDC GLUCOMTR-MCNC: 165 MG/DL (ref 70–99)
GLUCOSE BLDC GLUCOMTR-MCNC: 167 MG/DL (ref 70–99)
GLUCOSE BLDC GLUCOMTR-MCNC: 181 MG/DL (ref 70–99)
HCT VFR BLD AUTO: 32.7 % (ref 40–53)
HGB BLD-MCNC: 10.7 G/DL (ref 13.3–17.7)
MCH RBC QN AUTO: 31.4 PG (ref 26.5–33)
MCHC RBC AUTO-ENTMCNC: 32.7 G/DL (ref 31.5–36.5)
MCV RBC AUTO: 96 FL (ref 78–100)
PLATELET # BLD AUTO: 226 10E3/UL (ref 150–450)
RBC # BLD AUTO: 3.41 10E6/UL (ref 4.4–5.9)
WBC # BLD AUTO: 7.3 10E3/UL (ref 4–11)

## 2024-01-04 PROCEDURE — 99232 SBSQ HOSP IP/OBS MODERATE 35: CPT | Performed by: STUDENT IN AN ORGANIZED HEALTH CARE EDUCATION/TRAINING PROGRAM

## 2024-01-04 PROCEDURE — 97116 GAIT TRAINING THERAPY: CPT | Mod: GP

## 2024-01-04 PROCEDURE — 97530 THERAPEUTIC ACTIVITIES: CPT | Mod: GP

## 2024-01-04 PROCEDURE — 250N000013 HC RX MED GY IP 250 OP 250 PS 637: Performed by: STUDENT IN AN ORGANIZED HEALTH CARE EDUCATION/TRAINING PROGRAM

## 2024-01-04 PROCEDURE — 120N000001 HC R&B MED SURG/OB

## 2024-01-04 PROCEDURE — 86140 C-REACTIVE PROTEIN: CPT | Performed by: STUDENT IN AN ORGANIZED HEALTH CARE EDUCATION/TRAINING PROGRAM

## 2024-01-04 PROCEDURE — 250N000013 HC RX MED GY IP 250 OP 250 PS 637: Performed by: ORTHOPAEDIC SURGERY

## 2024-01-04 PROCEDURE — 85027 COMPLETE CBC AUTOMATED: CPT | Performed by: STUDENT IN AN ORGANIZED HEALTH CARE EDUCATION/TRAINING PROGRAM

## 2024-01-04 PROCEDURE — 250N000011 HC RX IP 250 OP 636: Performed by: ORTHOPAEDIC SURGERY

## 2024-01-04 PROCEDURE — 250N000011 HC RX IP 250 OP 636: Performed by: INTERNAL MEDICINE

## 2024-01-04 PROCEDURE — 97535 SELF CARE MNGMENT TRAINING: CPT | Mod: GO

## 2024-01-04 RX ADMIN — ENOXAPARIN SODIUM 40 MG: 100 INJECTION SUBCUTANEOUS at 08:01

## 2024-01-04 RX ADMIN — INSULIN GLARGINE 35 UNITS: 100 INJECTION, SOLUTION SUBCUTANEOUS at 07:00

## 2024-01-04 RX ADMIN — OXYCODONE HYDROCHLORIDE 5 MG: 5 TABLET ORAL at 00:02

## 2024-01-04 RX ADMIN — FAMOTIDINE 20 MG: 20 TABLET ORAL at 16:42

## 2024-01-04 RX ADMIN — AMPICILLIN SODIUM AND SULBACTAM SODIUM 3 G: 2; 1 INJECTION, POWDER, FOR SOLUTION INTRAMUSCULAR; INTRAVENOUS at 04:11

## 2024-01-04 RX ADMIN — AMPICILLIN SODIUM AND SULBACTAM SODIUM 3 G: 2; 1 INJECTION, POWDER, FOR SOLUTION INTRAMUSCULAR; INTRAVENOUS at 16:42

## 2024-01-04 RX ADMIN — ACETAMINOPHEN 650 MG: 325 TABLET ORAL at 10:15

## 2024-01-04 RX ADMIN — AMPICILLIN SODIUM AND SULBACTAM SODIUM 3 G: 2; 1 INJECTION, POWDER, FOR SOLUTION INTRAMUSCULAR; INTRAVENOUS at 09:31

## 2024-01-04 RX ADMIN — ATORVASTATIN CALCIUM 10 MG: 10 TABLET, FILM COATED ORAL at 08:01

## 2024-01-04 RX ADMIN — INSULIN ASPART 3 UNITS: 100 INJECTION, SOLUTION INTRAVENOUS; SUBCUTANEOUS at 07:02

## 2024-01-04 RX ADMIN — AMPICILLIN SODIUM AND SULBACTAM SODIUM 3 G: 2; 1 INJECTION, POWDER, FOR SOLUTION INTRAMUSCULAR; INTRAVENOUS at 22:18

## 2024-01-04 RX ADMIN — INSULIN ASPART 2 UNITS: 100 INJECTION, SOLUTION INTRAVENOUS; SUBCUTANEOUS at 12:22

## 2024-01-04 RX ADMIN — LISINOPRIL AND HYDROCHLOROTHIAZIDE 1 TABLET: 12.5; 2 TABLET ORAL at 08:01

## 2024-01-04 RX ADMIN — INSULIN ASPART 3 UNITS: 100 INJECTION, SOLUTION INTRAVENOUS; SUBCUTANEOUS at 17:18

## 2024-01-04 ASSESSMENT — ACTIVITIES OF DAILY LIVING (ADL)
ADLS_ACUITY_SCORE: 33

## 2024-01-04 NOTE — PLAN OF CARE
A/O, VSS on RA. 1 dose oxycodone administered for pain control. Voiding via urinal.     Goal Outcome Evaluation:    Problem: Adult Inpatient Plan of Care  Goal: Optimal Comfort and Wellbeing  Outcome: Progressing  Intervention: Monitor Pain and Promote Comfort  Recent Flowsheet Documentation  Taken 1/4/2024 0002 by Karie Yung RN  Pain Management Interventions: medication (see MAR)     Problem: Risk for Delirium  Goal: Improved Behavioral Control  Intervention: Minimize Safety Risk  Recent Flowsheet Documentation  Taken 1/4/2024 0002 by Karie Yung RN  Enhanced Safety Measures: room near unit station     Problem: Pain Acute  Goal: Optimal Pain Control and Function  Outcome: Progressing  Intervention: Develop Pain Management Plan  Recent Flowsheet Documentation  Taken 1/4/2024 0002 by Karie Yung RN  Pain Management Interventions: medication (see MAR)  Intervention: Prevent or Manage Pain  Recent Flowsheet Documentation  Taken 1/4/2024 0002 by Karie Yung RN  Medication Review/Management: medications reviewed  Intervention: Optimize Psychosocial Wellbeing  Recent Flowsheet Documentation  Taken 1/4/2024 0002 by Karie Yung RN  Supportive Measures: active listening utilized

## 2024-01-04 NOTE — PROGRESS NOTES
Care Management Follow Up    Length of Stay (days): 13    Expected Discharge Date: 01/05/2024     Concerns to be Addressed: discharge planning       Patient plan of care discussed at interdisciplinary rounds: Yes    Anticipated Discharge Disposition: Transitional Care     Anticipated Discharge Services:  transportation    Anticipated Discharge DME: None    Patient/family educated on Medicare website which has current facility and service quality ratings: yes    Education Provided on the Discharge Plan: Yes (AVS per bedside RN)    Patient/Family in Agreement with the Plan: yes    Referrals Placed by CM/SW: Post Acute Facilities    Private pay costs discussed: transportation costs    Additional Information:  CM reviewed chart. CM left VM for admissions [facility] TCU to coordinate discharge. Anticipate discharge on Friday January 5, 2024. 11:53 AM     CM called [facility] () to coordinate discharge for tomorrow Friday January 5, 2024. 12:43 PM     CM unable to reach admissions at [facility] but marked as declined in EPIC. 2:51 PM         [facility] admissions contact # [phone]      Referrals pending  [facility] ([facility])   [facility] ([facility])   [facility] ()     Chioma Robbins RN

## 2024-01-04 NOTE — PROGRESS NOTES
"Orthopedic Progress Note      Assessment: 1 Day Post-Op  S/P Procedure(s):  IRRIGATION AND DEBRIDEMENT, HAND x8    Plan:   - Continue PT/OT  - Weightbearing status: NWB RUE  - Anticoagulation:  per primary  - ID following appreciate recs, narrow as indicated  - yesterday was likely his last surgery hopefully  - removable volar splint  - wound vac to remain in place until Friday 1/5/24  - Discharge planning: TCU tomorrow      Subjective:  Pain: mild  Chest pain, SOB: no  Nausea, Vomiting:  no  Lightheadedness, Dizziness:  no  Neuro:  Patient denies new onset numbness or paresthesias    Patient is doing well today.  Is ambulating tolerating oral intake and voiding without difficulty.  Wound VAC is in place with no drainage.      Objective:  /64 (BP Location: Left leg, Patient Position: Semi-Lomeli's, Cuff Size: Adult Regular)   Pulse 59   Temp 98  F (36.7  C) (Oral)   Resp 16   Ht 1.829 m (6')   Wt 94.3 kg (208 lb)   SpO2 94%   BMI 28.21 kg/m    The patient is A&Ox3. Appears comfortable.   Sensation is intact.  Able to wiggle fingers without difficulty, give thumbs up  Radial pulse intact.  Calves are soft and non-tender. Negative Keya's.  The incision is covered. Dressing C/D/I.    Wound VAC in place with no drainage    Pertinent Labs   Lab Results: personally reviewed.   No results found for: \"INR\", \"PROTIME\"  Lab Results   Component Value Date    WBC 7.3 01/04/2024    HGB 10.7 (L) 01/04/2024    HCT 32.7 (L) 01/04/2024    MCV 96 01/04/2024     01/04/2024     Lab Results   Component Value Date     01/02/2024    CO2 28 01/02/2024         Report completed by:  Socorro Shabazz PA-C/Dr. Austin  [facility]  01/04/24     "

## 2024-01-04 NOTE — PROGRESS NOTES
[facility] MEDICINE PROGRESS NOTE      Identification/Summary: Amor Zavaleta is a 78 year old male with a history of insulin-dependent diabetes type 2, CKD stage IV, HTN, LBBB who presented on 12/22/2023 with continued pain and swelling to right wrist following a cat bite.  He was seen by orthopedic surgeon in his office 12/22 and thought that the infection was worsening on oral antibiotics.  Taken to surgery for irrigation and debridement the evening of 12/22. He normally lives alone, in an apartment with 3 flights of stairs.     Patient was evaluated at [facility] and admitted 12/11.  Received IV antibiotics along with washouts from orthopedic surgery.  Was discharged 12/20 with 7 days of Augmentin and metronidazole per infectious disease recommendations. [facility] requested admission 12/22 for washout and IV antibiotics due to continued infection. Admitted to [facility] on 12/22/2023.    S/p I&D on 1/25;  s/p follow up I&D on 1/27, and s/p follow up I&D on 12/29, s/p I&D on 12/31/23, and final I&D on 1/2/24. Infectious disease has guided iv antibiotics based on cultures, pt will need 6 weeks- will be completing 2/6/24. Treated with zosyn and vancomycin, then transitioned to cefazolin w/ zosyn, then ultimately Unasyn and will be discharged with unasyn.     Blood pressures and sugars were labile, but overall stable, usual meds resumed and therapies assessed with plan for TCU.     Pending placement and Orthopedic clearance-- they will inspect under dressing on 1/5 and possibly discharge as early as tomorrow. Discussed w/ SW and also updated his sister Melba Shaffer.       Assessment/plan:  Septic joint right wrist with likely extension to right elbow secondary to cat bite 12/2  A- as directly updated. Might be ready for TCU as early as 1/5  P:  -Appreciate orthopedics and ID  - unasyn through 2/6/24  - to TCU     Decreased mobility secondary to long  hospitalization  PT/OT-appreciate discharge recommendations for placement      Right hand wounds from cat attack  Wound care appreciated.     Diabetes type 2-insulin-dependent, blood sugars have been good  Insulin pump at home  reported home dose is Lantus 56 units at night and 16 units NovoLog with meals  Of note, A1c of 8.9 2023  Home dose of Lantus 56 units ordered but some hypoglycemia the - it was decreased to 35 units.  Continue home very high insulin sliding scale with meals no insulin at bedtime   BG has been overall well-controlled in past few days; CTM and adjust PRN.   -Remains stable  - hold AM insulin on 2024 given return to OR in PM  - stable     HTN  Remains normotensive but spikes with pain to 170s. Has been off of home meds.  Home antihypertensives have been held in setting of hypotension.   - now on  rising blood pressures to 140s; pain would exacerbate so plan is the followin. Pain control and then    2. Resume Zestoretic 2024 with hold parameters and   3. Hold amlodipine given potential for edema side effects anytime   - stable     CKD stage IV  Baseline creatinine appears to be 1.2  Creatinine 1.6 at admission  - stable at normal range Cr 0.81 on   - as such, resumed zestoretic with hold parameters  - remains stable at 1.14 on 1/3/2024     Gas  A/p- no abd pain; trying gas-x and tums on 1/3; cont prn tums as well     Code Status: Full Code  DVT prophylaxis: Lovenox  Dispo- as early as tmr       Expected Discharge Date: 2024      Destination: inpatient rehabilitation facility  Discharge Comments: IV abx, I&D   [facility] accepted for DC        45 MINUTES SPENT BY ME on the date of service doing chart review, history, exam, documentation & further activities per the note.    Piero Lewis   Hospitalist Service  [facility]   Securely message with the Vocera Web Console (learn more here)  Text page via [employer]  [facility]/Directory       ==============    Interval History/Subjective:  No acute events overnight.  He is hoping for discharge tomorrow  No new symptoms or pain or concerns  I updated Melba Shaffer (his sister) at noon  Discussed with Sw and updated pt's family      Physical Exam/Objective:  Temp:  [98  F (36.7  C)-98.6  F (37  C)] 98  F (36.7  C)  Pulse:  [57-59] 59  Resp:  [16] 16  BP: (135-148)/(64-72) 135/64  SpO2:  [93 %-94 %] 94 %  Body mass index is 28.21 kg/m .    General: alert, oriented, and in no acute distress  Pulmonary: clear to auscultation bilaterally, normal respiratory effort, on room air, no rales or wheezes or evidence of accessory muscle use  CVS: regular rate and rhythm, no murmurs, rubs, or gallops; no blatant jugular venous distention; no extremity edema and extremities are warm to the touch  GI: soft, nontender, BS+, no rebound or guarding, no conspicuous organomegaly   Neuro: nonfocal, moves all extremities of own volition  Psych: appropriate  Extremities: right upper limb in dressing/wrap, c/d/I, woudn vac stable       Data reviewed today: I personally reviewed all new medications, labs, imaging/diagnostics reports over the past 24 hours. Pertinent findings include:    Labs:  Most Recent 3 CBC's:  Recent Labs   Lab Test 01/04/24  0659 01/03/24  0551 01/02/24  0608 01/01/24  0821   WBC 7.3  --  7.5 6.9   HGB 10.7* 10.5* 10.7* 10.0*   MCV 96  --  96 98    225 247 235     Most Recent 3 BMP's:  Recent Labs   Lab Test 01/04/24  1157 01/04/24  0649 01/03/24  1753 01/03/24  0653 01/03/24  0551 01/02/24  0613 01/02/24  0608 12/31/23  1117 12/31/23  0604 12/23/23  0632 12/23/23  0617 12/22/23  1640 12/22/23  1327   NA  --   --   --   --   --   --  135  --   --   --  136  --  136   POTASSIUM  --   --   --   --   --   --  3.6  --   --   --  4.3  --  4.0   CHLORIDE  --   --   --   --   --   --  100  --   --   --  106  --  102   CO2  --   --   --   --   --   --  28  --   --   --  23  --  23   BUN  --    --   --   --   --   --  16.0  --   --   --  23.2*  --  32.6*   CR  --   --   --   --  1.14  --  1.10  --  0.81   < > 1.41*  --  1.61*   ANIONGAP  --   --   --   --   --   --  7  --   --   --  7  --  11   VLADIMIR  --   --   --   --   --   --  8.8  --   --   --  8.0*  --  9.0   * 167* 214*   < >  --    < > 193*   < >  --    < > 130*   < > 98    < > = values in this interval not displayed.     Most Recent 2 LFT's:  Recent Labs   Lab Test 12/23/23  0617 12/11/23  1316   AST 23 27   ALT 9 45   ALKPHOS 41 120   BILITOTAL 0.5 1.1       Medications:   Personally Reviewed.  Medications        [Held by provider] amLODIPine  2.5 mg Oral Daily    ampicillin-sulbactam  3 g Intravenous Q6H    [Held by provider] aspirin  325 mg Oral Daily    atorvastatin  10 mg Oral Daily    enoxaparin ANTICOAGULANT  40 mg Subcutaneous Q24H    famotidine  20 mg Oral Daily    insulin aspart  1-22 Units Subcutaneous TID w/meals    insulin glargine  35 Units Subcutaneous QAM AC    lisinopril-hydrochlorothiazide  1 tablet Oral Daily    sodium chloride (PF)  3 mL Intracatheter Q8H

## 2024-01-04 NOTE — PLAN OF CARE
Pic in place. Flushed and caps replaced. Saline locked in between antibiotics. Wound vac in place. Continuous 125 suction. No output. Bp 142/65. Blood sugar checks continued. Regular diet. One assist with walker and belt.   Pt has a flat affect. Alert and oriented, forgetful. Voiding spontaneously.     Chase Figueroa RN on 1/3/2024 at 11:21 PM

## 2024-01-04 NOTE — PLAN OF CARE
Patient received 1 PRN dose of Tylenol today for pain. Remains on IV antibiotics and has his PICC line infusing well. He is up ambulating and working with therapy with his podium walker. Was up in the room and in the chair multiple times today. Discussed plan with sister who is helping patient with his home and will be helping with discharge. Hope is to find a TCU tomorrow. Wound vac remains in place today with no drainage, may be removed tomorrow.

## 2024-01-05 ENCOUNTER — APPOINTMENT (OUTPATIENT)
Dept: OCCUPATIONAL THERAPY | Facility: CLINIC | Age: 79
DRG: 464 | End: 2024-01-05
Payer: COMMERCIAL

## 2024-01-05 ENCOUNTER — APPOINTMENT (OUTPATIENT)
Dept: PHYSICAL THERAPY | Facility: CLINIC | Age: 79
DRG: 464 | End: 2024-01-05
Payer: COMMERCIAL

## 2024-01-05 PROBLEM — G62.9 PERIPHERAL NEUROPATHY: Status: ACTIVE | Noted: 2020-01-20

## 2024-01-05 PROBLEM — K21.9 GASTROESOPHAGEAL REFLUX DISEASE: Status: ACTIVE | Noted: 2020-01-20

## 2024-01-05 PROBLEM — I10 ESSENTIAL HYPERTENSION: Status: ACTIVE | Noted: 2020-01-20

## 2024-01-05 LAB
BACTERIA TISS BX CULT: NO GROWTH
BACTERIA TISS BX CULT: NORMAL
BACTERIA TISS BX CULT: NORMAL
GLUCOSE BLDC GLUCOMTR-MCNC: 143 MG/DL (ref 70–99)
GLUCOSE BLDC GLUCOMTR-MCNC: 145 MG/DL (ref 70–99)
GLUCOSE BLDC GLUCOMTR-MCNC: 175 MG/DL (ref 70–99)
GLUCOSE BLDC GLUCOMTR-MCNC: 216 MG/DL (ref 70–99)

## 2024-01-05 PROCEDURE — 250N000013 HC RX MED GY IP 250 OP 250 PS 637: Performed by: ORTHOPAEDIC SURGERY

## 2024-01-05 PROCEDURE — 250N000011 HC RX IP 250 OP 636: Performed by: INTERNAL MEDICINE

## 2024-01-05 PROCEDURE — 97535 SELF CARE MNGMENT TRAINING: CPT | Mod: GO

## 2024-01-05 PROCEDURE — 250N000013 HC RX MED GY IP 250 OP 250 PS 637: Performed by: STUDENT IN AN ORGANIZED HEALTH CARE EDUCATION/TRAINING PROGRAM

## 2024-01-05 PROCEDURE — 250N000011 HC RX IP 250 OP 636: Performed by: ORTHOPAEDIC SURGERY

## 2024-01-05 PROCEDURE — 99232 SBSQ HOSP IP/OBS MODERATE 35: CPT | Performed by: STUDENT IN AN ORGANIZED HEALTH CARE EDUCATION/TRAINING PROGRAM

## 2024-01-05 PROCEDURE — 97530 THERAPEUTIC ACTIVITIES: CPT | Mod: GP

## 2024-01-05 PROCEDURE — 97116 GAIT TRAINING THERAPY: CPT | Mod: GP

## 2024-01-05 PROCEDURE — 99232 SBSQ HOSP IP/OBS MODERATE 35: CPT | Performed by: INTERNAL MEDICINE

## 2024-01-05 PROCEDURE — 120N000001 HC R&B MED SURG/OB

## 2024-01-05 RX ORDER — HYDROXYZINE HYDROCHLORIDE 10 MG/1
10 TABLET, FILM COATED ORAL EVERY 6 HOURS PRN
Start: 2024-01-05 | End: 2024-02-06

## 2024-01-05 RX ORDER — OXYCODONE HYDROCHLORIDE 5 MG/1
2.5 TABLET ORAL EVERY 4 HOURS PRN
Qty: 20 TABLET | Refills: 0 | Status: SHIPPED | OUTPATIENT
Start: 2024-01-05 | End: 2024-01-10

## 2024-01-05 RX ADMIN — ACETAMINOPHEN 650 MG: 325 TABLET ORAL at 02:29

## 2024-01-05 RX ADMIN — AMPICILLIN SODIUM AND SULBACTAM SODIUM 3 G: 2; 1 INJECTION, POWDER, FOR SOLUTION INTRAMUSCULAR; INTRAVENOUS at 04:07

## 2024-01-05 RX ADMIN — INSULIN ASPART 1 UNITS: 100 INJECTION, SOLUTION INTRAVENOUS; SUBCUTANEOUS at 17:53

## 2024-01-05 RX ADMIN — INSULIN GLARGINE 35 UNITS: 100 INJECTION, SOLUTION SUBCUTANEOUS at 06:41

## 2024-01-05 RX ADMIN — ATORVASTATIN CALCIUM 10 MG: 10 TABLET, FILM COATED ORAL at 08:09

## 2024-01-05 RX ADMIN — AMPICILLIN SODIUM AND SULBACTAM SODIUM 3 G: 2; 1 INJECTION, POWDER, FOR SOLUTION INTRAMUSCULAR; INTRAVENOUS at 16:16

## 2024-01-05 RX ADMIN — AMPICILLIN SODIUM AND SULBACTAM SODIUM 3 G: 2; 1 INJECTION, POWDER, FOR SOLUTION INTRAMUSCULAR; INTRAVENOUS at 22:13

## 2024-01-05 RX ADMIN — ENOXAPARIN SODIUM 40 MG: 100 INJECTION SUBCUTANEOUS at 08:09

## 2024-01-05 RX ADMIN — INSULIN ASPART 1 UNITS: 100 INJECTION, SOLUTION INTRAVENOUS; SUBCUTANEOUS at 06:45

## 2024-01-05 RX ADMIN — INSULIN ASPART 4 UNITS: 100 INJECTION, SOLUTION INTRAVENOUS; SUBCUTANEOUS at 12:01

## 2024-01-05 RX ADMIN — ACETAMINOPHEN 650 MG: 325 TABLET ORAL at 16:15

## 2024-01-05 RX ADMIN — AMPICILLIN SODIUM AND SULBACTAM SODIUM 3 G: 2; 1 INJECTION, POWDER, FOR SOLUTION INTRAMUSCULAR; INTRAVENOUS at 10:17

## 2024-01-05 RX ADMIN — LISINOPRIL AND HYDROCHLOROTHIAZIDE 1 TABLET: 12.5; 2 TABLET ORAL at 08:19

## 2024-01-05 RX ADMIN — FAMOTIDINE 20 MG: 20 TABLET ORAL at 16:14

## 2024-01-05 ASSESSMENT — ACTIVITIES OF DAILY LIVING (ADL)
ADLS_ACUITY_SCORE: 29
ADLS_ACUITY_SCORE: 31
ADLS_ACUITY_SCORE: 33
ADLS_ACUITY_SCORE: 31
ADLS_ACUITY_SCORE: 29
ADLS_ACUITY_SCORE: 31

## 2024-01-05 NOTE — PROGRESS NOTES
Occupational Therapy Discharge Summary    Reason for therapy discharge:    All goals and outcomes met, no further needs identified.    Progress towards therapy goal(s). See goals on Care Plan in [facility] electronic health record for goal details.  Goals met    Therapy recommendation(s):    Continued therapy is recommended.  Rationale/Recommendations:  TCU for safety w/ all ADLs and IADLs post op.

## 2024-01-05 NOTE — PLAN OF CARE
A/O, VSS on RA. Tylenol administered for pain. Denies N/V/SOB. Voiding adequately via urinal. PICC patent and infusing well. Wound vac: no drainage.     Goal Outcome Evaluation:    Problem: Pain Acute  Goal: Optimal Pain Control and Function  Outcome: Progressing

## 2024-01-05 NOTE — PROGRESS NOTES
Infectious Diseases Progress Note  [facility]     01/05/2024   Chart reviewed  Patient in OR 12/29/2023 4:11 PM   Previous note below   ASSESSMENT   78-year-old man with a history of diabetes readmitted for right wrist infection secondary to cat bite, with septic arthritis and osteomyelitis    Right wrist infection.  Active issue, hospitalized recently for wrist infection following cat bite/scratch.  Debridement on 12/11 and 12/13.  Polymicrobial cultures with MSSA, Pasteurella, and several anaerobic bacteria.  Discharged on Augmentin and Flagyl, but readmitted 2 days later after signs of persistent infection (see next)  Persistent right wrist infection.  Seen in follow-up in orthopedics clinic on 12/22.  Due to appearance of wound, patient was sent to the hospital for debridement which occurred on 12/22 - no growth on cultures. Repeat debridement on 12/25 with purulence and ruptured tendons, new cultures collected.  Wbc improved   Debridement 12-27 2023: Ongoing signs of infection with purulent material about the second and third compartments and ruptured tendon stumps .      Multiple surgical procedures this admission  1.  Right wrist arthrotomy for possible septic arthritis  2.  Irrigation and sharp debridement of wound of right dorsal forearm, wrist, hand and fourth dorsal extensor compartment.    1.  Irrigation and sharp, excisional debridement of wound of right dorsal forearm, wrist, hand and extensor compartments 1, 2, 3, 4      Findings:  Obvious purulence noted in the dorsal forearm with ruptures of the ECRL, ECRB and EPL tendons.  First dorsal compartment and fourth dorsal compartment appeared to be intact but had some fraying.  Fifth and sixth dorsal compartments without infection or fraying.    Last debrdiemenent 12/1 no pus        Cx NEG  No Path        PLAN       ALL cx neg   IV UNASYN based on prior cultures  6 weeks needed, ending 2/6/24  PICC in  Going to TCU, [facility]   All Side effects and  potential toxicities of antibiotic, including but not limited to nephrotoxicity, hepatotoxicity,diarrhea and colitis , allergic reactions and rash, line related infection and clots ,etc. have been explained to the patient and he agrees to proceed.    Will sign off, please call with questions    Priscila Bowman M.D.    Culture 1+ Pasteurella multocida Abnormal            Resulting Agency: [facility]     Susceptibility     Pasteurella multocida     ARIELLE     Ampicillin 0.19 ug/mL Susceptible     Azithromycin 0.75 ug/mL Susceptible     Ceftriaxone 0.002 ug/mL Susceptible     Levofloxacin 0.023 ug/mL Susceptible     Penicillin 0.094 ug/mL Susceptible     Trimethoprim/Sulfamethoxazole 0.047 ug/mL Susceptible                           ===========================================  Previous photo from 12/22/2023:    SUBJECTIVE / INTERVAL HISTORY:     Doing OK  Postop today with VAC  HEALTHY tissue          Antibiotics   Zosyn 12/22-    Previous:  Perioperative cefazolin  Augmentin and Flagyl prior to arrival  Vancomycin 12/22-12/23    Physical Exam     Temp:  [97.8  F (36.6  C)-98  F (36.7  C)] 97.8  F (36.6  C)  Pulse:  [53-61] 53  Resp:  [16-18] 16  BP: (121-156)/(57-72) 156/72  SpO2:  [92 %-93 %] 92 %    BP (!) 156/72 (BP Location: Left leg, Patient Position: Supine, Cuff Size: Adult Regular)   Pulse 53   Temp 97.8  F (36.6  C) (Oral)   Resp 16   Ht 1.829 m (6')   Wt 94.3 kg (208 lb)   SpO2 92%   BMI 28.21 kg/m      Gen. appearance nontoxic  Eyes no conjunctivitis or icterus  Neck no stiffness or neck vein distention, no LN  Heart  No edema  Lungs breathing comfortably    Extremities no synovitis, trace edema, wrapped hand/arm  Edema fingers  See surgical exam  Skin  no rash or emboli  Neurologic alert oriented no focal deficits        Cultures   Previous cultures reviewed  12/22 right wrist cultures x 2: No growth to date; no organisms on Gram stain  12/25 wrist cultures no growth to date     Susceptibility data from  last 90 days.  Collected Specimen Info Organism Ampicillin Azithromycin Ceftriaxone Clindamycin Daptomycin Doxycycline Erythromycin Gentamicin Levofloxacin Oxacillin Penicillin Tetracycline Trimethoprim/Sulfamethoxazole  Vancomycin   12/11/23 Tissue from Wrist, Right Bacteroides pyogenes                 12/11/23 Tissue from Wrist, Right Staphylococcus aureus     S  S  S  S  S   S   S  S  S     Pasteurella multocida                 12/11/23 Tissue from Hand, Right Fusobacterium nucleatum                   Bacteroides pyogenes                 12/11/23 Tissue from Hand, Right Pasteurella multocida                   Staphylococcus aureus                 12/11/23 Abscess from Hand, Right Fusobacterium nucleatum                   Bacteroides pyogenes                 12/11/23 Abscess from Hand, Right Pasteurella multocida  S  S  S       S   S   S               Pertinent Labs:     Recent Labs   Lab 01/04/24  0659 01/03/24  0551 01/02/24  0608 01/01/24  0821   WBC 7.3  --  7.5 6.9   HGB 10.7* 10.5* 10.7* 10.0*    225 247 235       Recent Labs   Lab 01/02/24  0608      CO2 28   BUN 16.0         Recent Labs   Lab 01/04/24  0659 01/02/24  0608 12/31/23  0604   CRPI 16.60* 21.90* 18.90*           Imaging:     US Upper Extremity Venous Duplex Right    Result Date: 12/23/2023  EXAM: US UPPER EXTREMITY VENOUS DUPLEX RIGHT LOCATION: [facility] DATE: 12/23/2023 INDICATION: swelling COMPARISON: None. TECHNIQUE: Venous Duplex ultrasound of the right upper extremity with (when possible) and without compression, augmentation, and duplex. Color flow and spectral Doppler with waveform analysis performed. FINDINGS: Ultrasound includes evaluation of the internal jugular vein, innominate vein, subclavian vein, axillary vein, and brachial vein. The superficial cephalic and basilic veins were also evaluated where seen. RIGHT: No deep venous thrombosis. No superficial thrombophlebitis.     IMPRESSION: 1.   "No deep venous thrombosis in the right upper extremity.    POC US Guidance Needle Placement    Result Date: 12/22/2023  Ultrasound was performed as guidance to an anesthesia procedure.  Click \"PACS images\" hyperlink below to view any stored images.  For specific procedure details, view procedure note authored by anesthesia.          "

## 2024-01-05 NOTE — PLAN OF CARE
Problem: Pain Acute  Goal: Optimal Pain Control and Function  Outcome: Progressing  Intervention: Develop Pain Management Plan  Recent Flowsheet Documentation  Taken 1/5/2024 0801 by Valeria Lewis RN  Pain Management Interventions: distraction  Intervention: Prevent or Manage Pain  Recent Flowsheet Documentation  Taken 1/5/2024 0801 by Valeria Lewis RN  Bowel Elimination Promotion:   adequate fluid intake promoted   ambulation promoted  Medication Review/Management: medications reviewed     Problem: Adult Inpatient Plan of Care  Goal: Optimal Comfort and Wellbeing  Intervention: Monitor Pain and Promote Comfort  Recent Flowsheet Documentation  Taken 1/5/2024 0801 by Valeria Lweis RN  Pain Management Interventions: distraction       Pt A&Ox4. VSS. Pain well controlled, stated 2/10 when doing reassessment. Independent with SBA. Ride was canceled this shift due to logistical issue. Scheduled tomorrow

## 2024-01-05 NOTE — PROGRESS NOTES
"Orthopedic Progress Note      Assessment: 3 Day Post-Op  S/P Procedure(s):  IRRIGATION AND DEBRIDEMENT, HAND x8    Plan:   - Continue PT/OT  - Weightbearing status: NWB RUE  - Anticoagulation:  per primary  - ID following appreciate recs, narrow as indicated  - removable volar splint  - wound vac removed today  - Discharge planning: TCU tomorrow, all Ortho discharge orders are in      Subjective:  Pain: mild  Chest pain, SOB: no  Nausea, Vomiting:  no  Lightheadedness, Dizziness:  no  Neuro:  Patient denies new onset numbness or paresthesias    Patient is doing well today.  Is ambulating tolerating oral intake and voiding without difficulty.  Wound VAC is in place with no drainage.  Removed today    Objective:  BP (!) 156/72 (BP Location: Left leg, Patient Position: Supine, Cuff Size: Adult Regular)   Pulse 53   Temp 97.8  F (36.6  C) (Oral)   Resp 16   Ht 1.829 m (6')   Wt 94.3 kg (208 lb)   SpO2 92%   BMI 28.21 kg/m    The patient is A&Ox3. Appears comfortable.   Sensation is intact.  Able to wiggle fingers without difficulty, give thumbs up  Radial pulse intact.  Calves are soft and non-tender. Negative Keya's.  The incision is covered. Dressing C/D/I.    Pertinent Labs   Lab Results: personally reviewed.   No results found for: \"INR\", \"PROTIME\"  Lab Results   Component Value Date    WBC 7.3 01/04/2024    HGB 10.7 (L) 01/04/2024    HCT 32.7 (L) 01/04/2024    MCV 96 01/04/2024     01/04/2024     Lab Results   Component Value Date     01/02/2024    CO2 28 01/02/2024         Report completed by:  Socorro Shabazz PA-C/Dr. Austin  [facility]  01/05/24     "

## 2024-01-05 NOTE — PROGRESS NOTES
Care Management Follow Up    Length of Stay (days): 14    Expected Discharge Date: 01/06/2024     Concerns to be Addressed: discharge planning       Patient plan of care discussed at interdisciplinary rounds: Yes    Anticipated Discharge Disposition:  [facility])     Anticipated Discharge Services: transportation    Anticipated Discharge DME: None    Patient/family educated on Medicare website which has current facility and service quality ratings: yes    Education Provided on the Discharge Plan: Yes (AVS per bedside RN)    Patient/Family in Agreement with the Plan: yes    Referrals Placed by CM/SW: Post Acute Facilities    Private pay costs discussed: transportation costs    Additional Information:  CM reviewed chart. [facility]) able to accept patient on Saturday January 6, 2024. CM sent requested discharge orders to [facility]).     Patient and Melba Jo (sister) request that CM arrange transportation for discharge. Both are aware that patient may be billed for the transportation. Both are agreeable.     CM updated patient and Melba Shaffer (sister) regarding [facility]) able to accept on Saturday. Both are in agreement     [facility] wheelchair ride arranged for Saturday January 6, 2024 with at  time between 2:10 pm - 2:50 pm.     Chioma Robbins RN

## 2024-01-05 NOTE — PROGRESS NOTES
[facility] MEDICINE PROGRESS NOTE      Identification/Summary (can use for d/c summary):Amor Zavaleta is a 78 year old male with a history of insulin-dependent diabetes type 2, CKD stage IV, HTN, LBBB who presented on 12/22/2023 with continued pain and swelling to right wrist following a cat bite.  He was seen by orthopedic surgeon in his office 12/22 and thought that the infection was worsening on oral antibiotics.  Taken to surgery for irrigation and debridement the evening of 12/22. He normally lives alone, in an apartment with 3 flights of stairs.     Patient was evaluated at [facility] and admitted 12/11.  Received IV antibiotics along with washouts from orthopedic surgery.  Was discharged 12/20 with 7 days of Augmentin and metronidazole per infectious disease recommendations. [facility] requested admission 12/22 for washout and IV antibiotics due to continued infection. Admitted to [facility] on 12/22/2023.    S/p I&D on 1/25;  s/p follow up I&D on 1/27, and s/p follow up I&D on 12/29, s/p I&D on 12/31/23, and final I&D on 1/2/24. In total x8 surgical procedures. Infectious disease has guided iv antibiotics based on cultures, pt will need 6 weeks- will be completing 2/6/24 one month later. Treated with zosyn and vancomycin initially, then transitioned to cefazolin w/ zosyn, then ultimately Unasyn and will be discharged with unasyn as per ID.    Blood pressures and sugars were labile, but overall stable, usual meds resumed re-titrated and therapies assessed with plan for TCU. We have Orthopedic clearance as of 1/5/2024. Discussed w/ SW and also updated his sister Melba Shaffer.     Pending placement; orders were already placed for a planned discharge 1/5/2024 as initially TCU was available but this changed and now likely tomorrow on 1/6.      Of note, I have discussed with [facility] to page Infectious Disease 10:30am 1/5/2024 to have the Uansyn ordered for tomorrow's anticipated  discharge to alleviate any discharge delays.   Piero Lewis MD on 2024 at 10:32 AM      Assessment/plan:  Septic joint right wrist with likely extension to right elbow secondary to cat bite   A- as directly updated.   P:  -Appreciate orthopedics and ID  - unasyn through 24   - to TCU; initially set for 2024 but now likely tomorrow     Decreased mobility secondary to long hospitalization  PT/OT-appreciate discharge recommendations for placement      Right hand wounds from cat attack  Wound cares appreciated.     Diabetes type 2-insulin-dependent, blood sugars have been good  Insulin pump at home  reported home dose is Lantus 56 units at night and 16 units NovoLog with meals  Of note, A1c of 8.9 2023  Home dose of Lantus 56 units ordered but some hypoglycemia the - it was decreased to 35 units.  Continue home very high insulin sliding scale with meals no insulin at bedtime   BG stable (some highs noted when doses were held prior-to or day-of those 8 surgical procedures)    HTN  Remains normotensive but spikes with pain. Has been off of home meds.  Home antihypertensives have been held in setting of hypotension.   - now on  rising blood pressures to 140s; pain would exacerbate so plan is the followin. Pain control and then    2. Resume Zestoretic 2024 with hold parameters and   3. For now, hold amlodipine given potential for edema side effects anytime   - stable   - can resume amlodipine on discharge      CKD stage IV  Baseline creatinine appears to be 1.2  Creatinine 1.6 at admission  - stable at normal range Cr 0.81 on   - as such, resumed zestoretic with hold parameters  - remained stable at 1.14 on 1/3/2024     Gas  A/p- no abd pain; trying gas-x and tums on 1/3; cont prn tums as well     Code Status: Full Code  DVT prophylaxis: Lovenox  Dispo- tcu, pending placement; initially set for 2024 now rescheduled tmr       Expected Discharge Date: 2024,  8:59 AM     Destination: inpatient rehabilitation facility  Discharge Comments: IV abx, I&D 1/2  [facility]        49 MINUTES SPENT BY ME on the date of service doing chart review, history, exam, documentation & further activities per the note.    Piero Lewis   Hospitalist Service  [facility]   Securely message with the Vocera Web Console (learn more here)  Text page via [employer] Paging/Directory       ==============    Interval History/Subjective:  No acute events overnight.  Initially set for TCU today, however, logistical issue lead to spot no longer available  As such likely tomorrow; pt was a bit disappointed  No new symptoms or pain or concerns otherwise  Discussed with Sw again       Physical Exam/Objective:  Temp:  [97.8  F (36.6  C)-98  F (36.7  C)] 97.8  F (36.6  C)  Pulse:  [53-61] 53  Resp:  [16-18] 16  BP: (121-156)/(57-72) 156/72  SpO2:  [92 %-93 %] 92 %  Body mass index is 28.21 kg/m .     General: alert, oriented, and in no acute distress  Pulmonary: clear to auscultation bilaterally, normal respiratory effort, on room air, no rales or wheezes or evidence of accessory muscle use  CVS: regular rate and rhythm, no murmurs, rubs, or gallops; no blatant jugular venous distention; no extremity edema and extremities are warm to the touch  GI: soft, nontender, BS+, no rebound or guarding, no conspicuous organomegaly   Neuro: nonfocal, moves all extremities of own volition  Psych: appropriate  Extremities: right upper limb in dressing/wrap remain c/d/i     Data reviewed today: I personally reviewed all new medications, labs, imaging/diagnostics reports over the past 24 hours. Pertinent findings include:    Labs:  Most Recent 3 CBC's:  Recent Labs   Lab Test 01/04/24  0659 01/03/24  0551 01/02/24  0608 01/01/24  0821   WBC 7.3  --  7.5 6.9   HGB 10.7* 10.5* 10.7* 10.0*   MCV 96  --  96 98    225 247 235     Most Recent 3 BMP's:  Recent Labs   Lab Test  01/05/24  0625 01/04/24  2116 01/04/24  1717 01/03/24  0653 01/03/24  0551 01/02/24  0613 01/02/24  0608 12/31/23  1117 12/31/23  0604 12/23/23  0632 12/23/23  0617 12/22/23  1640 12/22/23  1327   NA  --   --   --   --   --   --  135  --   --   --  136  --  136   POTASSIUM  --   --   --   --   --   --  3.6  --   --   --  4.3  --  4.0   CHLORIDE  --   --   --   --   --   --  100  --   --   --  106  --  102   CO2  --   --   --   --   --   --  28  --   --   --  23  --  23   BUN  --   --   --   --   --   --  16.0  --   --   --  23.2*  --  32.6*   CR  --   --   --   --  1.14  --  1.10  --  0.81   < > 1.41*  --  1.61*   ANIONGAP  --   --   --   --   --   --  7  --   --   --  7  --  11   VLADIMIR  --   --   --   --   --   --  8.8  --   --   --  8.0*  --  9.0   * 181* 165*   < >  --    < > 193*   < >  --    < > 130*   < > 98    < > = values in this interval not displayed.     Most Recent 2 LFT's:  Recent Labs   Lab Test 12/23/23  0617 12/11/23  1316   AST 23 27   ALT 9 45   ALKPHOS 41 120   BILITOTAL 0.5 1.1       Medications:   Personally Reviewed.  Medications        [Held by provider] amLODIPine  2.5 mg Oral Daily    ampicillin-sulbactam  3 g Intravenous Q6H    [Held by provider] aspirin  325 mg Oral Daily    atorvastatin  10 mg Oral Daily    enoxaparin ANTICOAGULANT  40 mg Subcutaneous Q24H    famotidine  20 mg Oral Daily    insulin aspart  1-22 Units Subcutaneous TID w/meals    insulin glargine  35 Units Subcutaneous QAM AC    lisinopril-hydrochlorothiazide  1 tablet Oral Daily    sodium chloride (PF)  3 mL Intracatheter Q8H

## 2024-01-06 ENCOUNTER — APPOINTMENT (OUTPATIENT)
Dept: PHYSICAL THERAPY | Facility: CLINIC | Age: 79
DRG: 464 | End: 2024-01-06
Payer: COMMERCIAL

## 2024-01-06 VITALS
WEIGHT: 208 LBS | RESPIRATION RATE: 16 BRPM | OXYGEN SATURATION: 95 % | TEMPERATURE: 98.3 F | DIASTOLIC BLOOD PRESSURE: 70 MMHG | BODY MASS INDEX: 28.17 KG/M2 | SYSTOLIC BLOOD PRESSURE: 160 MMHG | HEIGHT: 72 IN | HEART RATE: 56 BPM

## 2024-01-06 LAB
CREAT SERPL-MCNC: 1.18 MG/DL (ref 0.67–1.17)
CRP SERPL-MCNC: 9.04 MG/L
EGFRCR SERPLBLD CKD-EPI 2021: 63 ML/MIN/1.73M2
ERYTHROCYTE [DISTWIDTH] IN BLOOD BY AUTOMATED COUNT: 13.4 % (ref 10–15)
GLUCOSE BLDC GLUCOMTR-MCNC: 151 MG/DL (ref 70–99)
GLUCOSE BLDC GLUCOMTR-MCNC: 168 MG/DL (ref 70–99)
HCT VFR BLD AUTO: 33.7 % (ref 40–53)
HGB BLD-MCNC: 10.9 G/DL (ref 13.3–17.7)
MCH RBC QN AUTO: 31.2 PG (ref 26.5–33)
MCHC RBC AUTO-ENTMCNC: 32.3 G/DL (ref 31.5–36.5)
MCV RBC AUTO: 97 FL (ref 78–100)
PLATELET # BLD AUTO: 200 10E3/UL (ref 150–450)
PLATELET # BLD AUTO: 200 10E3/UL (ref 150–450)
RBC # BLD AUTO: 3.49 10E6/UL (ref 4.4–5.9)
WBC # BLD AUTO: 6.6 10E3/UL (ref 4–11)

## 2024-01-06 PROCEDURE — 85027 COMPLETE CBC AUTOMATED: CPT | Performed by: STUDENT IN AN ORGANIZED HEALTH CARE EDUCATION/TRAINING PROGRAM

## 2024-01-06 PROCEDURE — 250N000013 HC RX MED GY IP 250 OP 250 PS 637: Performed by: STUDENT IN AN ORGANIZED HEALTH CARE EDUCATION/TRAINING PROGRAM

## 2024-01-06 PROCEDURE — 82565 ASSAY OF CREATININE: CPT | Performed by: ORTHOPAEDIC SURGERY

## 2024-01-06 PROCEDURE — 250N000013 HC RX MED GY IP 250 OP 250 PS 637: Performed by: ORTHOPAEDIC SURGERY

## 2024-01-06 PROCEDURE — 250N000011 HC RX IP 250 OP 636: Performed by: INTERNAL MEDICINE

## 2024-01-06 PROCEDURE — 250N000011 HC RX IP 250 OP 636: Performed by: ORTHOPAEDIC SURGERY

## 2024-01-06 PROCEDURE — 97116 GAIT TRAINING THERAPY: CPT | Mod: GP

## 2024-01-06 PROCEDURE — 99239 HOSP IP/OBS DSCHRG MGMT >30: CPT | Performed by: FAMILY MEDICINE

## 2024-01-06 PROCEDURE — 86140 C-REACTIVE PROTEIN: CPT | Performed by: STUDENT IN AN ORGANIZED HEALTH CARE EDUCATION/TRAINING PROGRAM

## 2024-01-06 RX ORDER — AMPICILLIN AND SULBACTAM 2; 1 G/1; G/1
INJECTION, POWDER, FOR SOLUTION INTRAMUSCULAR; INTRAVENOUS
DISCHARGE
Start: 2024-01-06 | End: 2024-02-06

## 2024-01-06 RX ORDER — NICOTINE POLACRILEX 4 MG
15-30 LOZENGE BUCCAL
Status: DISCONTINUED | OUTPATIENT
Start: 2024-01-06 | End: 2024-01-06 | Stop reason: HOSPADM

## 2024-01-06 RX ORDER — DEXTROSE MONOHYDRATE 25 G/50ML
25-50 INJECTION, SOLUTION INTRAVENOUS
Status: DISCONTINUED | OUTPATIENT
Start: 2024-01-06 | End: 2024-01-06 | Stop reason: HOSPADM

## 2024-01-06 RX ADMIN — INSULIN GLARGINE 35 UNITS: 100 INJECTION, SOLUTION SUBCUTANEOUS at 06:53

## 2024-01-06 RX ADMIN — AMPICILLIN SODIUM AND SULBACTAM SODIUM 3 G: 2; 1 INJECTION, POWDER, FOR SOLUTION INTRAMUSCULAR; INTRAVENOUS at 04:44

## 2024-01-06 RX ADMIN — ATORVASTATIN CALCIUM 10 MG: 10 TABLET, FILM COATED ORAL at 08:02

## 2024-01-06 RX ADMIN — AMPICILLIN SODIUM AND SULBACTAM SODIUM 3 G: 2; 1 INJECTION, POWDER, FOR SOLUTION INTRAMUSCULAR; INTRAVENOUS at 10:27

## 2024-01-06 RX ADMIN — INSULIN ASPART 3 UNITS: 100 INJECTION, SOLUTION INTRAVENOUS; SUBCUTANEOUS at 08:04

## 2024-01-06 RX ADMIN — LISINOPRIL AND HYDROCHLOROTHIAZIDE 1 TABLET: 12.5; 2 TABLET ORAL at 08:02

## 2024-01-06 RX ADMIN — ENOXAPARIN SODIUM 40 MG: 100 INJECTION SUBCUTANEOUS at 07:53

## 2024-01-06 RX ADMIN — OXYCODONE HYDROCHLORIDE 5 MG: 5 TABLET ORAL at 00:05

## 2024-01-06 ASSESSMENT — ACTIVITIES OF DAILY LIVING (ADL)
ADLS_ACUITY_SCORE: 29

## 2024-01-06 NOTE — PLAN OF CARE
Physical Therapy Discharge Summary    Reason for therapy discharge:    Discharged to transitional care facility.    Progress towards therapy goal(s). See goals on Care Plan in [facility] electronic health record for goal details.  Goals partially met.  Barriers to achieving goals:   discharge from facility.    Therapy recommendation(s):    Continued therapy is recommended.  Rationale/Recommendations:  continued PT at TCU to improve functional mobility.  Continue home exercise program.

## 2024-01-06 NOTE — PROGRESS NOTES
Care Management Discharge Note    Discharge Date: 01/06/2024       Discharge Disposition:  [facility](TCU)    Discharge Services: None    Discharge DME: None    Discharge Transportation: agency    Private pay costs discussed: transportation costs      PAS Confirmation Code: [id number]    Patient/family educated on Medicare website which has current facility and service quality ratings: yes    Education Provided on the Discharge Plan: Yes (AVS per bedside RN)    Persons Notified of Discharge Plans: patient and Melba Shaffer (sister)    Patient/Family in Agreement with the Plan: yes    Handoff Referral Completed: Yes    Additional Information:  CM reviewed chart.   [facility] transportation. Patient and Melba Deena (sister) aware patient may be billed for transportation.     [facility] referral sent per protocol.     Chioma Robbins RN

## 2024-01-06 NOTE — DISCHARGE SUMMARY
[facility] MEDICINE  DISCHARGE SUMMARY     Primary Care Physician: Farzaneh Sandy  Admission Date: 12/22/2023   Discharge Provider: Megan Guadalupe MD Discharge Date: 1/6/2024   Diet:   Diabetic and  low-salt diet   Code Status: Full Code   Activity: DCACTIVITY: Activity as tolerated        Condition at Discharge: Stable     REASON FOR PRESENTATION(See Admission Note for Details)   Worsening right wrist infection    PRINCIPAL & ACTIVE DISCHARGE DIAGNOSES   Right wrist septic arthritis, Pasteurella multocida  Osteomyelitis  Right wrist infection following cat bite required debridement on 12/11 and 12/13  Persistent right wrist infection required debridement 12/22 and 12/25 and 12/27  Right wrist arthrotomy for septic arthritis  I&D of right dorsal forearm, wrist and extensor compartments  DM2, hemoglobin A1c 8.9  Essential hypertension  CKD 3, baseline Creatinine 1.2  Overweight Body mass index is 28.21 kg/m .  Dyslipidemia  GERD    PENDING LABS     Unresulted Labs Ordered in the Past 30 Days of this Admission       Date and Time Order Name Status Description    12/31/2023  8:35 AM Anaerobic Bacterial Culture Routine Preliminary     12/31/2023  8:32 AM Anaerobic Bacterial Culture Routine Preliminary     12/29/2023  6:52 PM Fungal or Yeast Culture Routine Preliminary     12/29/2023  6:47 PM Fungal or Yeast Culture Routine Preliminary     12/27/2023  3:22 PM Fungal or Yeast Culture Routine Preliminary     12/25/2023  8:57 AM Fungal or Yeast Culture Routine Preliminary     12/11/2023  8:02 PM Fungal or Yeast Culture Routine Preliminary     12/11/2023  7:57 PM Fungal or Yeast Culture Routine Preliminary     12/11/2023  7:53 PM Fungal or Yeast Culture Routine Preliminary               PROCEDURES ( this hospitalization only)      Procedure(s):  IRRIGATION AND DEBRIDEMENT, HAND  Right wrist infection following cat bite required debridement on 12/11 and 12/13  Persistent right wrist  infection required debridement 12/22 and 12/25 and 12/27  Right wrist arthrotomy for septic arthritis  I&D of right dorsal forearm, wrist and extensor compartments    RECOMMENDATIONS TO OUTPATIENT PROVIDER FOR F/U VISIT     Follow-up Appointments     Follow Up Care      Please follow-up with Dr. Austin's team in 1 weeks at [facility].   Call our scheduling line at [phone] to make an appointment, if you do   not already have one scheduled.        Follow Up and recommended labs and tests      Follow up with Nursing home physician.  Ensure completion of unasyn   through 2/6/24        Follow Up and recommended labs and tests      Follow-up  with TCU physician in next rounding  Follow-up with primary MD in 1 week after discharge from TCU  Follow-up with hand surgery in 1 week  PICC line care  Weekly lab-CBC, CMP, CRP and fax the result to infectious disease  Local wound care  Daily dressing changes              DISPOSITION         SUMMARY OF HOSPITAL COURSE:      Mr.Gary JESSICA Zavaleta is a 78 year old male with a history of insulin-dependent diabetes type 2, CKD 3, essential hypertension, GERD, LBBB, sent hospitalization at [facility] 12/11 to 12/20 with right hand infection following a cat bite, came with worsening right wrist infection.  He had prolonged course.  I&D on 12/11 and 12/13, culture grew polymicrobial with MSSA, Pasteurella, several anaerobic bacteria.  Treated with IV antibiotic then discharged with Augmentin and metronidazole on 12/20.  Patient returned in 2 days with worsening infection.  He needed debridement 12/22 with no growth, repeat debridement 12/25 with purulence and ruptured tendons, redebridement 12/27 for ongoing infection with purulent material about the second and third compartment and ruptured tendon stumps.  He required multiple surgeries in this hospitalization including right wrist arthrotomy for septic arthritis and multiple I&D's.  Evaluated by hand surgery and infectious  disease.  Started on IV Unasyn until 2/6/2024 for total 6 weeks.  Will resume local wound care.  Sutures will be removed by hand surgery in a week.  He is discharging to TCU for more rehabilitation.  He is right-handed.  Nonweightbearing in right upper extremity.    Has CKD 3.  Creatinine is stable at 1.14.  Has DM2 with hemoglobin A1c 8.9.  Will resume diabetic diet, Lantus 56 units and NovoLog 16 units 3 times a day with meals and insulin sliding scale.  Blood pressure is labile.  Resume lisinopril hydrochlorothiazide 20/12.5 mg daily, norvasc  2.5 mg daily.           Discharge Medications with Med changes:     Current Discharge Medication List        START taking these medications    Details   ampicillin-sulbactam (UNASYN) 3 (2-1) g SOLR vial EVERY 6 HOURS UNTIL 2/6/2024    Associated Diagnoses: Infection of right hand due to bite, initial encounter; Cellulitis of right upper extremity      hydrOXYzine HCl (ATARAX) 10 MG tablet Take 1 tablet (10 mg) by mouth every 6 hours as needed for other (adjuvant pain)    Associated Diagnoses: Infection of right hand due to bite, initial encounter; Cellulitis of right upper extremity      !! insulin aspart (NOVOLOG PEN) 100 UNIT/ML pen Inject 1-7 Units Subcutaneous 3 times daily (before meals) Correction Scale - MEDIUM INSULIN RESISTANCE DOSING     Do Not give Correction Insulin if Pre-Meal BG less than 140.   For Pre-Meal  - 189 give 1 unit.   For Pre-Meal  - 239 give 2 units.   For Pre-Meal  - 289 give 3 units.   For Pre-Meal  - 339 give 4 units.   For Pre-Meal - 399 give 5 units.   For Pre-Meal -449 give 6 units  For Pre-Meal BG greater than or equal to 450 give 7 units.    Associated Diagnoses: Type 2 diabetes mellitus without complication, with long-term current use of insulin (H)       !! - Potential duplicate medications found. Please discuss with provider.        CONTINUE these medications which have CHANGED    Details   oxyCODONE  (ROXICODONE) 5 MG tablet Take 0.5 tablets (2.5 mg) by mouth every 4 hours as needed for moderate to severe pain  Qty: 20 tablet, Refills: 0    Associated Diagnoses: Infection of right hand due to bite, initial encounter; Cellulitis of right upper extremity           CONTINUE these medications which have NOT CHANGED    Details   acetaminophen (TYLENOL) 325 MG tablet Take 2 tablets (650 mg) by mouth every 4 hours as needed for mild pain or other (and adjunct with moderate or severe pain or per patient request)  Qty: 100 tablet, Refills: 0    Associated Diagnoses: Cellulitis of right upper extremity      amLODIPine (NORVASC) 2.5 MG tablet Take 2.5 mg by mouth daily      aspirin (ASA) 325 MG tablet Take 325 mg by mouth daily      famotidine (PEPCID) 20 MG tablet Take 20 mg by mouth daily      !! insulin aspart (NOVOLOG PEN) 100 UNIT/ML pen Inject 16 Units Subcutaneous 3 times daily (with meals)      insulin glargine (LANTUS PEN) 100 UNIT/ML pen Inject 56 Units Subcutaneous every morning      lisinopril-hydrochlorothiazide (ZESTORETIC) 20-12.5 MG tablet Take 1 tablet by mouth daily      lovastatin (MEVACOR) 40 MG tablet Take 1 tablet by mouth 2 times daily      Multiple Vitamin (MULTIVITAMIN PO) Take 0.5 tablets by mouth 2 times daily      povidone-iodine (BETADINE) 10 % topical solution Apply topically as needed for wound care Apply topically to incision for wound care  Qty: 14.8 mL, Refills: 0    Associated Diagnoses: Cellulitis of right upper extremity      senna-docusate (SENOKOT-S/PERICOLACE) 8.6-50 MG tablet Take 2 tablets by mouth 2 times daily as needed for constipation  Qty: 30 tablet, Refills: 0    Associated Diagnoses: Cellulitis of right upper extremity       !! - Potential duplicate medications found. Please discuss with provider.        STOP taking these medications       amoxicillin-clavulanate (AUGMENTIN) 875-125 MG tablet Comments:   Reason for Stopping:         metroNIDAZOLE (FLAGYL) 500 MG tablet  Comments:   Reason for Stopping:                     Rationale for medication changes:      Unasyn until 2/6/2024  Pain control with Tylenol and oxycodone        Consults   Infectious disease  Hand surgery  Wound care  PT/OT      Immunizations given this encounter     Most Recent Immunizations   Administered Date(s) Administered     COVID-19 12+ (2023-24) (Pfizer) 10/27/2023     COVID-19 Bivalent 12+ (Pfizer) 10/10/2022     COVID-19 MONOVALENT 12+ (Pfizer) 09/28/2021     COVID-19 Monovalent 12+ (Pfizer 2022) 04/04/2022     Flu 65+ Years 09/18/2020     Flu, Unspecified 11/22/2007     Influenza (H1N1) 01/05/2010     Influenza (IIV3) PF 10/01/2021     Influenza Vaccine 65+ (FLUAD) 09/27/2023     Influenza Vaccine 65+ (Fluzone HD) 09/23/2021     Influenza Vaccine >6 months,quad, PF 09/15/2022     Influenza Vaccine, 6+MO IM (QUADRIVALENT W/PRESERVATIVES) 09/18/2018     Influenza, seasonal, injectable, PF 09/22/2010     Pneumo Conj 13-V (2010&after) 02/01/2021     Pneumococcal 23 valent 02/20/2017     RSV Vaccine (Arexvy) 10/27/2023     TD,PF 7+ (Tenivac) 12/30/1994     TDAP (Adacel,Boostrix) 12/14/2017     Zoster recombinant adjuvanted (SHINGRIX) 12/11/2019           Anticoagulation Information      None      SIGNIFICANT IMAGING FINDINGS     Results for orders placed or performed during the hospital encounter of 12/22/23   US Upper Extremity Venous Duplex Right    Impression    IMPRESSION:  1.  No deep venous thrombosis in the right upper extremity.   MR Wrist Right w/o & w Contrast    Impression    IMPRESSION:  1.  Peripherally enhancing fluid collection along the dorsal aspect of the hand and wrist encircles the extensor digitorum tendons and is consistent with a toxic tenosynovitis. This is worrisome for abscess and appears to approach the skin surface where   it may spontaneously drain. It also extends proximally to the level of the wrist joint and abuts the periosteal margin of the radius where it appears to extend  into the dorsal margin of the distal radius just radial to Glenda's tubercle with surrounding   bone edema worrisome for osteomyelitis and extension of abscess into the bone.  2.  No significant wrist joint effusion to suggest septic arthropathy.  3.  Flexor digitorum tendon tendinopathy to the index finger. No tearing.  4.  Extensor pollicis longus tendon tendinopathy without tearing.  5.  Bone edema within the distal radius as described. This is worrisome for early developing osteomyelitis.       SIGNIFICANT LABORATORY FINDINGS     Wound culture 1+ Pasteurella multocida                 Susceptibility              Pasteurella multocida       ARIELLE       Ampicillin 0.19 ug/mL Susceptible       Azithromycin 0.75 ug/mL Susceptible       Ceftriaxone 0.002 ug/mL Susceptible       Levofloxacin 0.023 ug/mL Susceptible       Penicillin 0.094 ug/mL Susceptible       Trimethoprim/Sulfamethoxazole 0.047 ug/mL Susceptible                           WBC 6.6, hemoglobin 10.9  -->16-->9.04  Cr 1.18        Discharge Orders        General info for SNF    Length of Stay Estimate: Short Term Care: Estimated # of Days <30  Condition at Discharge: Improving  Level of care:skilled   Rehabilitation Potential: Good  Admission H&P remains valid and up-to-date: Yes  Recent Chemotherapy: N/A  Use Nursing Home Standing Orders: Yes     Mantoux instructions    Give two-step Mantoux (PPD) Per Facility Policy Yes     Follow Up and recommended labs and tests    Follow up with Nursing home physician.  Ensure completion of unasyn through 2/6/24     Reason for your hospital stay    Infected wrist: Septic joint right wrist with likely extension to right elbow secondary to cat bite 12/2 Requiring surgery: IRRIGATION AND DEBRIDEMENT, HAND x8; you are discharging to a Transitional Care Unit for further cares and rehab     Incentive Spirometry    Incentive Spirometry 10 times per hour, 4 times per day.     When to call - Contact Surgeon Team    You  "may experience symptoms that require follow-up before your scheduled appointment. Refer to the \"Stoplight Tool\" for instructions on when to contact your Surgeon Team if you are concerned about pain control, blood clots, constipation, or if you are unable to urinate.     When to call - Reach out to Urgent Care    If you are not able to reach your Surgeon Team and you need immediate care, go to the Orthopedic Walk-in Clinic or Urgent Care at your Surgeon's office.  Do NOT go to the Emergency Room unless you have shortness of breath, chest pain, or other signs of a medical emergency.     When to call - Reasons to Call 911    Call 911 immediately if you experience sudden-onset chest pain, arm weakness/numbness, slurred speech, or shortness of breath     Symptoms - Fever Management    A low grade fever can be expected after surgery.  Use acetaminophen (TYLENOL) as needed for fever management.  Contact your Surgeon Team if you have a fever greater than 101.5 F, chills, and/or night sweats.     Symptoms - Constipation management    Constipation (hard, dry bowel movements) is expected after surgery due to the combination of being less active, the anesthetic, and the opioid pain medication.  You can do the following to help reduce constipation:  ~  FLUIDS:  Drink clear liquids (water or Gatorade), or juice (apple/prune).  ~  DIET:  Eat a fiber rich diet.    ~  ACTIVITY:  Get up and move around several times a day.  Increase your activity as you are able.  MEDICATIONS:  Reduce the risk of constipation by starting medications before you are constipated.  You can take Miralax   (1 packet as directed) and/or a stool softener (Senokot 1-2 tablets 1-2 times a day).  If you already have constipation and these medications are not working, you can get magnesium citrate and use as directed.  If you continue to have constipation you can try an over the counter suppository or enema.  Call your Surgeon Team if it has been greater than 3 " days since your last bowel movement.     Symptoms - Reduced Urine Output    Changes in the amount of fluids you drank before and after surgery may result in problems urinating.  It is important to stay well-hydrated after surgery and drink plenty of water. If it has been greater than 8 hours since you have urinated despite drinking plenty of water, call your Surgeon Team.     Comfort and Pain Management - Pain after Surgery    Pain after surgery is normal and expected.  You will have some amount of pain for several weeks after surgery.  Your pain will improve with time.  There are several things you can do to help reduce your pain including: rest, ice, elevation, and using pain medications as needed. Contact your Surgeon Team if you have pain that persists or worsens after surgery despite rest, ice, elevation, and taking your medication(s) as prescribed. Contact your Surgeon Team if you have new numbness, tingling, or weakness in your operative extremity.     Comfort and Pain Management - Swelling after Surgery    Swelling and/or bruising of the surgical extremity is common and may persist for several months after surgery. In addition to frequent icing and elevation, gentle compressive support with an ACE wrap or tubigrip may help with swelling. Apply compression regularly, removing at least twice daily to perform skin checks. Contact your Surgeon Team if your swelling increases and is NOT associated with an increase in your activity level, or if your swelling increases and is associated with redness and pain.     Comfort and Pain Management - Cold therapy    Ice can be used to control swelling and discomfort after surgery. Place a thin towel over your operative site and apply the ice pack overtop. Leave ice pack in place for 20 minutes, then remove for 20 minutes. Repeat this 20 minutes on/20 minutes off routine as often as tolerated.     Medication Instructions - Acetaminophen (TYLENOL) Instructions    You were  discharged with acetaminophen (TYLENOL) for pain management after surgery. Acetaminophen most effectively manages pain symptoms when it is taken on a schedule without missing doses (every four, six, or eight hours). Your Provider will prescribe a safe daily dose between 3000 - 4000 mg.  Do NOT exceed this daily dose. Most patients use acetaminophen for pain control for the first four weeks after surgery.  You can wean from this medication as your pain decreases.     Medication Instructions - NSAID Instructions    You were discharged with an anti-inflammatory medication for pain management to use in combination with acetaminophen (TYLENOL) and the narcotic pain medication.  Take this medication exactly as directed.  You should only take one anti-inflammatory at a time.  Some common anti-inflammatories include: ibuprofen (ADVIL, MOTRIN), naproxen (ALEVE, NAPROSYN), celecoxib (CELEBREX), meloxicam (MOBIC), ketorolac (TORADOL).  Take this medication with food and water.     Medication Instructions - Opioids - Tapering Instructions    In the first three days following surgery, your symptoms may warrant use of the narcotic pain medication every four to six hours as prescribed. This is normal. As your pain symptoms improve, focus your efforts on decreasing (tapering) use of narcotic medications. The most successful tapering strategy is to first, decrease the number of tablets you take every 4-6 hours to the minimum prescribed. Then, increase the amount of time between doses.  For example:  First, taper to   or 1 tablet every 4-6 hours.  Then, taper to   or 1 tablet every 6-8 hours.  Then, taper to   or 1 tablet every 8-10 hours.  Then, taper to   or 1 tablet every 10-12 hours.  Then, taper to   or 1 tablet at bedtime.  The bedtime dose can help with comfort during sleep and is typically the last dose to be discontinued after surgery.     Follow Up Care    Please follow-up with Dr. Austin's team in 1 weeks at [facility]. Call our scheduling line at [phone] to make an appointment, if you do not already have one scheduled.     Shower with wound/dressing covered    You must COVER your dressing or incision with saran wrap (or any other non-permeable covering) to allow the incision to remain dry while showering.  You may shower 3 days after surgery as long as the surgical wound stays dry. Continue to cover your dressing or incision for showering until your first office visit.  You are strictly prohibited from soaking   or submerging the surgical wound underwater.     Comfort and Pain Management - UPPER extremity Elevation    Swelling is expected for several months after surgery. This type of swelling is usually associated with gravity and activity, and can be improved with elevation.   The best way to do this is to get your hand above your heart by sitting down, resting your elbow on a pillow or arm rest, with your hand in the air. Perform this elevation as often as possible especially for the first two weeks after surgery     Medication Instructions - Opioid Instructions (Less than 65 years)    You were discharged with an opioid medication (hydromorphone, oxycodone, hydrocodone, or tramadol). This medication should only be taken for breakthrough pain that is not controlled with acetaminophen (TYLENOL). If you rate your pain less than 3 you do not need this medication.  Pain rating 0-3:  You do not need this medication.  Pain rating 4-6:  Take 1 tablet every 4-6 hours as needed  Pain rating 7-10:  Take 2 tablets every 4-6 hours as needed.  Do not exceed 6 tablets per day     General info for SNF    Length of Stay Estimate: Short Term Care: Estimated # of Days <30  Condition at Discharge: Stable  Level of care:skilled   Rehabilitation Potential: Good  Admission H&P remains valid and up-to-date: Yes  Recent Chemotherapy: N/A  Use Nursing Home Standing Orders: Yes     Reason for your hospital stay    Worsening right hand  infection     Glucose monitor nursing POCT    Before meals and at bedtime     Activity - Up ad magalys    Weightbearing status: NWB RUE     Follow Up and recommended labs and tests    Follow-up  with TCU physician in next rounding  Follow-up with primary MD in 1 week after discharge from TCU  Follow-up with hand surgery in 1 week  PICC line care  Weekly lab-CBC, CMP, CRP and fax the result to infectious disease  Local wound care  Daily dressing changes     Full Code     Physical Therapy Adult Consult    Evaluate and treat as clinically indicated.    Reason:  right wrist surgery (x8)     Occupational Therapy Adult Consult    Evaluate and treat as clinically indicated.    Reason:  right wrist surgery (x8)     Fall precautions     Diet    Follow this diet upon discharge: Diabetic, low salt       Examination   Physical Exam   Temp:  [98.2  F (36.8  C)-98.4  F (36.9  C)] 98.3  F (36.8  C)  Pulse:  [55-59] 56  Resp:  [16] 16  BP: (136-160)/(66-70) 160/70  SpO2:  [92 %-95 %] 95 %  Wt Readings from Last 1 Encounters:   12/25/23 94.3 kg (208 lb)     GENERAL:  Alert, appears comfortable, in no acute distress, appears stated age   HEAD:  Normocephalic, without obvious abnormality, atraumatic   EYES:  PERRL, conjunctiva clear,  EOM's intact   NOSE: Nares normal, septum midline, mucosa normal, no drainage   THROAT: Lips, mucosa,  ums normal, mouth moist   NECK: Supple, symmetrical, trachea midline   BACK:   Symmetric, no curvature, ROM normal   LUNGS:   Clear to auscultation bilaterally, no rales, rhonchi, or wheezing, symmetric chest rise on inhalation, respirations unlabored   CHEST WALL:  No tenderness or deformity   HEART:  Regular rate and rhythm, S1 and S2 normal, no murmur    ABDOMEN:   Soft, non-tender, bowel sounds active , no masses, no organomegaly, no rebound or guarding   EXTREMITIES: Edema on right dorsal hand and wrist, incision are healing, sutures are in place   SKIN: Status post I&D of right hand   NEURO: Alert,  oriented x 4     PSYCH: Cooperative, behavior is appropriate          Please see EMR for more detailed significant labs, imaging, consultant notes etc.    I, Megan Guadalupe MD, personally saw the patient today and spent greater than 30 minutes discharging this patient.    Megan Guadalupe MD  [facility]    CC:Farzaneh Sandy

## 2024-01-06 NOTE — PLAN OF CARE
Problem: Adult Inpatient Plan of Care  Goal: Optimal Comfort and Wellbeing  Intervention: Monitor Pain and Promote Comfort  Recent Flowsheet Documentation  Taken 1/6/2024 0005 by Antonia Pereira, RN  Pain Management Interventions: medication (see MAR)     Problem: Risk for Delirium  Goal: Improved Behavioral Control  Intervention: Minimize Safety Risk  Recent Flowsheet Documentation  Taken 1/6/2024 0015 by Antonia Pereira, RN  Enhanced Safety Measures: room near unit station   Goal Outcome Evaluation:    VSS. PRN oxy given for pain. RUE splinted and elevated. IV abx given. PICC in place. Discharge today to TCU.

## 2024-01-06 NOTE — PLAN OF CARE
"Problem: Pain Acute  Goal: Optimal Pain Control and Function  Outcome: Progressing  Intervention: Develop Pain Management Plan  Intervention: Prevent or Manage Pain      Problem: Infection  Goal: Absence of Infection Signs and Symptoms  Outcome: Progressing     Pt continues IV ABX regimen via PICC line. Pain managed well reports right hand pain between \"3-4/10\" this shift. Pt is SBA and walked hallways this evening. AC  gave sliding scale insulin 1 unit, HS . Plan is to discharge to [facility] TCU 1/6/24 afternoon.                          "

## 2024-01-07 LAB
BACTERIA TISS BX CULT: NORMAL
BACTERIA TISS BX CULT: NORMAL

## 2024-01-08 ENCOUNTER — TRANSITIONAL CARE UNIT VISIT (OUTPATIENT)
Dept: GERIATRICS | Facility: CLINIC | Age: 79
End: 2024-01-08
Payer: COMMERCIAL

## 2024-01-08 ENCOUNTER — LAB REQUISITION (OUTPATIENT)
Dept: LAB | Facility: CLINIC | Age: 79
End: 2024-01-08
Payer: COMMERCIAL

## 2024-01-08 ENCOUNTER — DOCUMENTATION ONLY (OUTPATIENT)
Dept: GERIATRICS | Facility: CLINIC | Age: 79
End: 2024-01-08

## 2024-01-08 VITALS
DIASTOLIC BLOOD PRESSURE: 64 MMHG | WEIGHT: 196.4 LBS | TEMPERATURE: 97.8 F | OXYGEN SATURATION: 95 % | HEART RATE: 56 BPM | HEIGHT: 72 IN | RESPIRATION RATE: 18 BRPM | BODY MASS INDEX: 26.6 KG/M2 | SYSTOLIC BLOOD PRESSURE: 178 MMHG

## 2024-01-08 DIAGNOSIS — G60.9 IDIOPATHIC PERIPHERAL NEUROPATHY: ICD-10-CM

## 2024-01-08 DIAGNOSIS — S61.451A OPEN BITE OF RIGHT HAND, INITIAL ENCOUNTER: ICD-10-CM
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
## 2024-01-08 DIAGNOSIS — I10 ESSENTIAL HYPERTENSION: ICD-10-CM

## 2024-01-08 DIAGNOSIS — E11.00 TYPE 2 DIABETES MELLITUS WITH HYPEROSMOLARITY WITHOUT COMA, WITH LONG-TERM CURRENT USE OF INSULIN (H): ICD-10-CM

## 2024-01-08 DIAGNOSIS — Z79.4 TYPE 2 DIABETES MELLITUS WITH HYPEROSMOLARITY WITHOUT COMA, WITH LONG-TERM CURRENT USE OF INSULIN (H): ICD-10-CM

## 2024-01-08 DIAGNOSIS — K21.00 GASTROESOPHAGEAL REFLUX DISEASE WITH ESOPHAGITIS WITHOUT HEMORRHAGE: ICD-10-CM

## 2024-01-08 DIAGNOSIS — L03.113 CELLULITIS OF RIGHT UPPER EXTREMITY: ICD-10-CM

## 2024-01-08 DIAGNOSIS — A41.9 BACTERIAL SEPSIS (H): Primary | ICD-10-CM

## 2024-01-08 LAB
BACTERIA ABSC ANAEROBE+AEROBE CULT: NO GROWTH
BACTERIA TISS BX CULT: NO GROWTH
BACTERIA TISS BX CULT: NO GROWTH

## 2024-01-08 PROCEDURE — 99306 1ST NF CARE HIGH MDM 50: CPT | Performed by: NURSE PRACTITIONER

## 2024-01-08 NOTE — PROGRESS NOTES
[facility] GERIATRIC SERVICES    Code Status:  FULL CODE   Visit Type:   Chief Complaint   Patient presents with    TCU Admission     Hannah 12/22/2023 - 1/6/2024     Facility:  [facility] ([facility]) [[id number]]         HPI: Amor Zavaleta is a 78 year old male who I am seeing today for admit to the TCU.  Patient recently hospitalized on 12/22/2023 with worsening right wrist infection.  He was initially hospitalized on 12/11 to 12/20 for right hand infection following a cat bite with worsening right wrist infection.  He had a prolonged course.  Underwent I&D on 12/11 and 12/13.  Cultures grew polymicrobial with MSSA, Pasteurella and several anaerobic bacteria.  He was treated with IV antibiotic and discharged on Augmentin and Flagyl on 12/20.  Patient returned in 2 days with worsening infection.  He underwent I&D on 12/22 with no growth and repeat debridement on 12/25 with purulent and ruptured tendons.  Again underwent redebridement on 12/27 for ongoing infection with purulent material about the second and third compartment and ruptured tendon stumps.  He required multiple surgeries during this hospitalization including right wrist arthrotomy for septic arthritis and multiple I&D's.  He was seen by both hand surgery and infectious disease.  He was started on IV Unasyn and will continue this for 6 weeks until 2/6/2024.  He continues with local wound care.  Sutures in place.  He is nonweightbearing to the right upper extremity/right hand.  He is right-handed.  Diabetes mellitus type 2 with a hemoglobin A1c of 8.9.  He continues on Lantus and short acting insulin.    Transitional Care Course: Today patient sitting up in wheelchair.  He continues with dressing to right hand.  Today and incision intact sutures.  No redness or warmth.  Edema of the right hand.  Continues on splinting.  Nonweightbearing.  Denies any shortness of breath or chest pain.  He is eating well.  He is having regular bowel  movements.    Assessment/Plan:     Cat bite, subsequent encounter  Bacterial sepsis (H)  Cellulitis of right upper extremity  Septic arthritis  Tendon exposure  -Status post multiple I&D.  -Status post right wrist arthrotomy.  -Continue topical wound care.  -IV Unasyn for 6 weeks until 2/6/2024.  -Weekly CBC, BMP and CRP.  -ID following.  -Tylenol and oxycodone for pain.  -Follow-up with hand surgeon for suture removal in 1 week.  -Nonweightbearing right upper extremity.  -OT to elevate for elevation while up in wheelchair.    Type 2 diabetes mellitus with hyperosmolarity without coma, with long-term current use of insulin (H)  -Hemoglobin A1c 8.9.  -Patient home dose of Lantus is 56 units.  Blood sugar slightly low today.  Patient tells me he was receiving 35 units during hospitalization.  -Also on NovoLog 16 units 3 times daily.  This was held this am due to low blood sugar.  -Decrease Lantus to 40 units nightly.  -Blood sugars 4 times daily.  -Offer at bedtime snack.    Gastroesophageal reflux disease with esophagitis without hemorrhage  -Continue famotidine.    Essential hypertension  -Continue Norvasc 2.5 daily.  -Continue lisinopril/hydrochlorothiazide daily.    CKD3  -Follow BMP.      Okay for PT OT eval and treat.      Active Ambulatory Problems     Diagnosis Date Noted    Diabetes mellitus, type 2 (H) 08/25/2023    Stage 3 chronic kidney disease, unspecified whether stage 3a or 3b CKD (H) 08/25/2023    Sinus bradycardia 08/25/2023    LBBB (left bundle branch block) 08/25/2023    Cellulitis of right upper extremity 12/11/2023    Infection of right hand due to bite, initial encounter 12/22/2023    Diverticular disease of large intestine 05/31/2016    Essential hypertension 01/20/2020    Gastroesophageal reflux disease 01/20/2020    Hemorrhoids 05/31/2016    History of colonic polyps 05/31/2016    Peripheral neuropathy 01/20/2020    Pure hypercholesterolemia 10/21/2004     Resolved Ambulatory Problems      Diagnosis Date Noted    Seizure (H) 08/10/2007     Past Medical History:   Diagnosis Date    Diabetes (H)     Sleep apnea      Allergies   Allergen Reactions    Sulfa Antibiotics Hives       All Meds and Allergies reviewed in the record at the facility and is the most up-to-date.    Post Discharge Medication Reconciliation Status: discharge medications reconciled and changed, per note/orders  Current Outpatient Medications   Medication Sig    acetaminophen (TYLENOL) 325 MG tablet Take 2 tablets (650 mg) by mouth every 4 hours as needed for mild pain or other (and adjunct with moderate or severe pain or per patient request)    amLODIPine (NORVASC) 2.5 MG tablet Take 2.5 mg by mouth daily    ampicillin-sulbactam (UNASYN) 3 (2-1) g SOLR vial EVERY 6 HOURS UNTIL 2/6/2024    aspirin (ASA) 325 MG tablet Take 325 mg by mouth daily    famotidine (PEPCID) 20 MG tablet Take 20 mg by mouth daily    hydrOXYzine HCl (ATARAX) 10 MG tablet Take 1 tablet (10 mg) by mouth every 6 hours as needed for other (adjuvant pain)    insulin aspart (NOVOLOG PEN) 100 UNIT/ML pen Inject 16 Units Subcutaneous 3 times daily (with meals)    insulin glargine (LANTUS PEN) 100 UNIT/ML pen Inject 40 Units Subcutaneous every morning    lisinopril-hydrochlorothiazide (ZESTORETIC) 20-12.5 MG tablet Take 1 tablet by mouth daily    lovastatin (MEVACOR) 40 MG tablet Take 1 tablet by mouth 2 times daily    Multiple Vitamin (MULTIVITAMIN PO) Take 0.5 tablets by mouth 2 times daily    oxyCODONE (ROXICODONE) 5 MG tablet Take 0.5 tablets (2.5 mg) by mouth every 4 hours as needed for moderate to severe pain    povidone-iodine (BETADINE) 10 % topical solution Apply topically as needed for wound care Apply topically to incision for wound care    senna-docusate (SENOKOT-S/PERICOLACE) 8.6-50 MG tablet Take 2 tablets by mouth 2 times daily as needed for constipation     No current facility-administered medications for this visit.       REVIEW OF SYSTEMS:   10 point  review of systems reviewed and pertinent positives in the HPI.     PHYSICAL EXAMINATION:  Physical Exam     Vital signs: BP (!) 178/64   Pulse 56   Temp 97.8  F (36.6  C)   Resp 18   Ht 1.829 m (6')   Wt 89.1 kg (196 lb 6.4 oz)   SpO2 95%   BMI 26.64 kg/m    General: Awake, Alert, oriented x3, sitting up in wheelchair, appropriately, follows simple commands, conversant  HEENT:moist oral mucosa  NECK: Supple  CVS:  S1  S2, without murmur or gallop.   LUNG: Clear to auscultation, No wheezes, rales or rhonci.  BACK: No kyphosis of the thoracic spine  ABDOMEN: Soft, with positive bowel sounds  EXTREMITIES: Moves both upper and lower extremities without limitation of the right upper extremity, 2+ right hand edema, some movement of the fingers.  SKIN: Sutures intact to right hand wrist and forearm.  No redness or warmth.  Minimal serous drainage.  NEUROLOGIC: Intact, pulses palpable  PSYCHIATRIC: Cognition intact      Labs:  All labs reviewed in the nursing home record and Epic   @  Lab Results   Component Value Date    WBC 6.6 01/06/2024     Lab Results   Component Value Date    RBC 3.49 01/06/2024     Lab Results   Component Value Date    HGB 10.9 01/06/2024     Lab Results   Component Value Date    HCT 33.7 01/06/2024     Lab Results   Component Value Date    MCV 97 01/06/2024     Lab Results   Component Value Date    MCH 31.2 01/06/2024     Lab Results   Component Value Date    MCHC 32.3 01/06/2024     Lab Results   Component Value Date    RDW 13.4 01/06/2024     Lab Results   Component Value Date     01/06/2024     01/06/2024        @Last Comprehensive Metabolic Panel:  Sodium   Date Value Ref Range Status   01/02/2024 135 135 - 145 mmol/L Final     Comment:     Reference intervals for this test were updated on 09/26/2023 to more accurately reflect our healthy population. There may be differences in the flagging of prior results with similar values performed with this method. Interpretation of  those prior results can be made in the context of the updated reference intervals.      Potassium   Date Value Ref Range Status   01/02/2024 3.6 3.4 - 5.3 mmol/L Final   06/28/2022 3.3 (L) 3.5 - 5.0 mmol/L Final     Chloride   Date Value Ref Range Status   01/02/2024 100 98 - 107 mmol/L Final   06/28/2022 103 98 - 107 mmol/L Final     Carbon Dioxide (CO2)   Date Value Ref Range Status   01/02/2024 28 22 - 29 mmol/L Final   06/28/2022 26 22 - 31 mmol/L Final     Anion Gap   Date Value Ref Range Status   01/02/2024 7 7 - 15 mmol/L Final   06/28/2022 13 5 - 18 mmol/L Final     Glucose   Date Value Ref Range Status   06/28/2022 91 70 - 125 mg/dL Final     GLUCOSE BY METER POCT   Date Value Ref Range Status   01/06/2024 151 (H) 70 - 99 mg/dL Final     Urea Nitrogen   Date Value Ref Range Status   01/02/2024 16.0 8.0 - 23.0 mg/dL Final   06/28/2022 26 8 - 28 mg/dL Final     Creatinine   Date Value Ref Range Status   01/06/2024 1.18 (H) 0.67 - 1.17 mg/dL Final     GFR Estimate   Date Value Ref Range Status   01/06/2024 63 >60 mL/min/1.73m2 Final   05/19/2021 37 (L) >60 mL/min/1.73m2 Final     Calcium   Date Value Ref Range Status   01/02/2024 8.8 8.8 - 10.2 mg/dL Final     45 minutes spent for this visit which included preparing for the visit, reviewing medications, reviewing laboratory, reviewing consults as well as face-to-face time spent with patient and collaboration with the nursing staff.    This note has been dictated using voice recognition software. Any grammatical or context distortions are unintentional and inherent to the software    Electronically signed by: Justina aBrragan CNP

## 2024-01-09 LAB
ALBUMIN SERPL BCG-MCNC: 3 G/DL (ref 3.5–5.2)
ALP SERPL-CCNC: 35 U/L (ref 40–150)
ALT SERPL W P-5'-P-CCNC: 15 U/L (ref 0–70)
ANION GAP SERPL CALCULATED.3IONS-SCNC: 11 MMOL/L (ref 7–15)
AST SERPL W P-5'-P-CCNC: 24 U/L (ref 0–45)
BASOPHILS # BLD AUTO: 0.1 10E3/UL (ref 0–0.2)
BASOPHILS NFR BLD AUTO: 1 %
BILIRUB SERPL-MCNC: 0.4 MG/DL
BUN SERPL-MCNC: 19.6 MG/DL (ref 8–23)
CALCIUM SERPL-MCNC: 8.4 MG/DL (ref 8.8–10.2)
CHLORIDE SERPL-SCNC: 103 MMOL/L (ref 98–107)
CREAT SERPL-MCNC: 1.26 MG/DL (ref 0.67–1.17)
CRP SERPL-MCNC: 6.87 MG/L
DEPRECATED HCO3 PLAS-SCNC: 23 MMOL/L (ref 22–29)
EGFRCR SERPLBLD CKD-EPI 2021: 58 ML/MIN/1.73M2
EOSINOPHIL # BLD AUTO: 0.2 10E3/UL (ref 0–0.7)
EOSINOPHIL NFR BLD AUTO: 3 %
ERYTHROCYTE [DISTWIDTH] IN BLOOD BY AUTOMATED COUNT: 13.8 % (ref 10–15)
GLUCOSE SERPL-MCNC: 116 MG/DL (ref 70–99)
HCT VFR BLD AUTO: 32 % (ref 40–53)
HGB BLD-MCNC: 10.2 G/DL (ref 13.3–17.7)
IMM GRANULOCYTES # BLD: 0 10E3/UL
IMM GRANULOCYTES NFR BLD: 1 %
LYMPHOCYTES # BLD AUTO: 2 10E3/UL (ref 0.8–5.3)
LYMPHOCYTES NFR BLD AUTO: 28 %
MCH RBC QN AUTO: 31.6 PG (ref 26.5–33)
MCHC RBC AUTO-ENTMCNC: 31.9 G/DL (ref 31.5–36.5)
MCV RBC AUTO: 99 FL (ref 78–100)
MONOCYTES # BLD AUTO: 0.8 10E3/UL (ref 0–1.3)
MONOCYTES NFR BLD AUTO: 11 %
NEUTROPHILS # BLD AUTO: 4 10E3/UL (ref 1.6–8.3)
NEUTROPHILS NFR BLD AUTO: 56 %
NRBC # BLD AUTO: 0 10E3/UL
NRBC BLD AUTO-RTO: 0 /100
PLATELET # BLD AUTO: 185 10E3/UL (ref 150–450)
POTASSIUM SERPL-SCNC: 3.4 MMOL/L (ref 3.4–5.3)
PROT SERPL-MCNC: 6.5 G/DL (ref 6.4–8.3)
RBC # BLD AUTO: 3.23 10E6/UL (ref 4.4–5.9)
SODIUM SERPL-SCNC: 137 MMOL/L (ref 135–145)
WBC # BLD AUTO: 7.1 10E3/UL (ref 4–11)

## 2024-01-09 PROCEDURE — 86140 C-REACTIVE PROTEIN: CPT | Performed by: NURSE PRACTITIONER

## 2024-01-09 PROCEDURE — 85025 COMPLETE CBC W/AUTO DIFF WBC: CPT | Performed by: NURSE PRACTITIONER

## 2024-01-09 PROCEDURE — 80053 COMPREHEN METABOLIC PANEL: CPT | Performed by: NURSE PRACTITIONER

## 2024-01-10 DIAGNOSIS — L03.113 CELLULITIS OF RIGHT UPPER EXTREMITY: ICD-10-CM

## 2024-01-10 DIAGNOSIS — S61.451A INFECTION OF RIGHT HAND DUE TO BITE, INITIAL ENCOUNTER: ICD-10-CM

## 2024-01-10 DIAGNOSIS — L08.9 INFECTION OF RIGHT HAND DUE TO BITE, INITIAL ENCOUNTER: ICD-10-CM

## 2024-01-10 RX ORDER — OXYCODONE HYDROCHLORIDE 5 MG/1
2.5 TABLET ORAL EVERY 4 HOURS PRN
Qty: 30 TABLET | Refills: 0 | Status: SHIPPED | OUTPATIENT
Start: 2024-01-10 | End: 2024-02-06

## 2024-01-11 ENCOUNTER — TRANSITIONAL CARE UNIT VISIT (OUTPATIENT)
Dept: GERIATRICS | Facility: CLINIC | Age: 79
End: 2024-01-11
Payer: COMMERCIAL

## 2024-01-11 ENCOUNTER — TELEPHONE (OUTPATIENT)
Dept: INFECTIOUS DISEASES | Facility: CLINIC | Age: 79
End: 2024-01-11

## 2024-01-11 VITALS
HEART RATE: 57 BPM | SYSTOLIC BLOOD PRESSURE: 139 MMHG | DIASTOLIC BLOOD PRESSURE: 81 MMHG | HEIGHT: 72 IN | OXYGEN SATURATION: 97 % | BODY MASS INDEX: 26.6 KG/M2 | RESPIRATION RATE: 16 BRPM | TEMPERATURE: 98.3 F | WEIGHT: 196.4 LBS
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
## 2024-01-11 DIAGNOSIS — L03.113 CELLULITIS OF RIGHT UPPER EXTREMITY: ICD-10-CM

## 2024-01-11 DIAGNOSIS — G60.9 IDIOPATHIC PERIPHERAL NEUROPATHY: ICD-10-CM

## 2024-01-11 DIAGNOSIS — I10 ESSENTIAL HYPERTENSION: ICD-10-CM

## 2024-01-11 DIAGNOSIS — K21.00 GASTROESOPHAGEAL REFLUX DISEASE WITH ESOPHAGITIS WITHOUT HEMORRHAGE: ICD-10-CM

## 2024-01-11 DIAGNOSIS — E11.00 TYPE 2 DIABETES MELLITUS WITH HYPEROSMOLARITY WITHOUT COMA, WITH LONG-TERM CURRENT USE OF INSULIN (H): ICD-10-CM

## 2024-01-11 DIAGNOSIS — Z79.4 TYPE 2 DIABETES MELLITUS WITH HYPEROSMOLARITY WITHOUT COMA, WITH LONG-TERM CURRENT USE OF INSULIN (H): ICD-10-CM

## 2024-01-11 DIAGNOSIS — A41.9 BACTERIAL SEPSIS (H): Primary | ICD-10-CM

## 2024-01-11 PROCEDURE — 99309 SBSQ NF CARE MODERATE MDM 30: CPT | Performed by: NURSE PRACTITIONER

## 2024-01-11 NOTE — TELEPHONE ENCOUNTER
[facility] Call Center    Phone Message    May a detailed message be left on voicemail: yes     Reason for Call: Other: Jodee from [facility] in [facility] is calling to see if pt is needing a hospital follow up with Dr Vasquez. Please call Jodee back at [phone].     Action Taken: Message routed to:  Other: [facility] ID    Travel Screening: Not Applicable

## 2024-01-11 NOTE — LETTER
1/11/2024        RE: Amor Zavaleta  [address]  [facility] [address]         HEALTH GERIATRIC SERVICES    Code Status:  FULL CODE   Visit Type:   Chief Complaint   Patient presents with     TCU Follow Up     Facility:  [facility] ([facility]) [[id number]]         HPI: Amor Zavaleta is a 78 year old male who I am seeing today for follow-up on the TCU.  Patient recently hospitalized on 12/22/2023 with worsening right wrist infection.  He was initially hospitalized on 12/11 to 12/20 for right hand infection following a cat bite with worsening right wrist infection.  He had a prolonged course.  Underwent I&D on 12/11 and 12/13.  Cultures grew polymicrobial with MSSA, Pasteurella and several anaerobic bacteria.  He was treated with IV antibiotic and discharged on Augmentin and Flagyl on 12/20.  Patient returned in 2 days with worsening infection.  He underwent I&D on 12/22 with no growth and repeat debridement on 12/25 with purulent and ruptured tendons.  Again underwent redebridement on 12/27 for ongoing infection with purulent material about the second and third compartment and ruptured tendon stumps.  He required multiple surgeries during this hospitalization including right wrist arthrotomy for septic arthritis and multiple I&D's.  He was seen by both hand surgery and infectious disease.  He was started on IV Unasyn and will continue this for 6 weeks until 2/6/2024.  He continues with local wound care.  Sutures in place.  He is nonweightbearing to the right upper extremity/right hand.  He is right-handed.  Diabetes mellitus type 2 with a hemoglobin A1c of 8.9.  He continues on Lantus and short acting insulin.    Transitional Care Course: Today patient sitting up in wheelchair.  Dressing dry and intact to right hand.  Edema improved. Continues with splinting.  Nonweightbearing.  Today reporting redness to left hand of the third and fourth digit.  He did have scabs which he picked at and removed.   Lightly red.  No warmth.  Afebrile.  Recent EXTR without leukocytosis. Blood sugars initially low now trending upward.  Recent reduction in his short acting insulin.    Assessment/Plan:     Cat bite, subsequent encounter  Bacterial sepsis (H)  Cellulitis of right upper extremity  Septic arthritis  Tendon exposure  -Status post multiple I&D.  -Status post right wrist arthrotomy.  -Continue topical wound care.  -IV Unasyn for 6 weeks until 2/6/2024.  -Weekly CBC, BMP and CRP.  No leukocytosis.  CRP reviewed trending downward.  -ID following.  -Tylenol and oxycodone for pain.  -Follow-up with hand surgeon for suture removal in 1 week.  -Nonweightbearing right upper extremity.  -OT to elevate for elevation while up in wheelchair.    Type 2 diabetes mellitus with hyperosmolarity without coma, with long-term current use of insulin (H)  -Hemoglobin A1c 8.9.  -Patient home dose of Lantus is 56 units.  Blood sugars initially low now trending upward.  Increase Lantus to 45 units.  -NovoLog decreased to 8 units with sliding scale.  -Blood sugars 4 times daily.  -Offer at bedtime snack.    Gastroesophageal reflux disease with esophagitis without hemorrhage  -Continue famotidine.    Essential hypertension  -Continue Norvasc 2.5 daily.  -Continue lisinopril/hydrochlorothiazide daily.    CKD3  -Follow BMP unremarkable.    Excoriation to the third and fourth fingers of the left hand  -Bactroban twice daily x 7 days.    Active Ambulatory Problems     Diagnosis Date Noted     Diabetes mellitus, type 2 (H) 08/25/2023     Stage 3 chronic kidney disease, unspecified whether stage 3a or 3b CKD (H) 08/25/2023     Sinus bradycardia 08/25/2023     LBBB (left bundle branch block) 08/25/2023     Cellulitis of right upper extremity 12/11/2023     Infection of right hand due to bite, initial encounter 12/22/2023     Diverticular disease of large intestine 05/31/2016     Essential hypertension 01/20/2020     Gastroesophageal reflux disease  01/20/2020     Hemorrhoids 05/31/2016     History of colonic polyps 05/31/2016     Peripheral neuropathy 01/20/2020     Pure hypercholesterolemia 10/21/2004     Resolved Ambulatory Problems     Diagnosis Date Noted     Seizure (H) 08/10/2007     Past Medical History:   Diagnosis Date     Diabetes (H)      Sleep apnea      Allergies   Allergen Reactions     Sulfa Antibiotics Hives       All Meds and Allergies reviewed in the record at the facility and is the most up-to-date.    Current Outpatient Medications   Medication Sig     acetaminophen (TYLENOL) 325 MG tablet Take 2 tablets (650 mg) by mouth every 4 hours as needed for mild pain or other (and adjunct with moderate or severe pain or per patient request)     amLODIPine (NORVASC) 2.5 MG tablet Take 2.5 mg by mouth daily     ampicillin-sulbactam (UNASYN) 3 (2-1) g SOLR vial EVERY 6 HOURS UNTIL 2/6/2024     aspirin (ASA) 325 MG tablet Take 325 mg by mouth daily     famotidine (PEPCID) 20 MG tablet Take 20 mg by mouth daily     hydrOXYzine HCl (ATARAX) 10 MG tablet Take 1 tablet (10 mg) by mouth every 6 hours as needed for other (adjuvant pain)     insulin aspart (NOVOLOG PEN) 100 UNIT/ML pen Inject 16 Units Subcutaneous 3 times daily (with meals)     insulin glargine (LANTUS PEN) 100 UNIT/ML pen Inject 45 Units Subcutaneous every morning     lisinopril-hydrochlorothiazide (ZESTORETIC) 20-12.5 MG tablet Take 1 tablet by mouth daily     lovastatin (MEVACOR) 40 MG tablet Take 1 tablet by mouth 2 times daily     Multiple Vitamin (MULTIVITAMIN PO) Take 0.5 tablets by mouth 2 times daily     oxyCODONE (ROXICODONE) 5 MG tablet Take 0.5 tablets (2.5 mg) by mouth every 4 hours as needed for severe pain     povidone-iodine (BETADINE) 10 % topical solution Apply topically as needed for wound care Apply topically to incision for wound care     senna-docusate (SENOKOT-S/PERICOLACE) 8.6-50 MG tablet Take 2 tablets by mouth 2 times daily as needed for constipation     No current  facility-administered medications for this visit.       REVIEW OF SYSTEMS:   10 point review of systems reviewed and pertinent positives in the HPI.     PHYSICAL EXAMINATION:  Physical Exam     Vital signs: /81   Pulse 57   Temp 98.3  F (36.8  C)   Resp 16   Ht 1.829 m (6')   Wt 89.1 kg (196 lb 6.4 oz)   SpO2 97%   BMI 26.64 kg/m    General: Awake, Alert, oriented x3, lying in bed, conversant  HEENT:moist oral mucosa  NECK: Supple  CVS:  S1  S2, without murmur or gallop.   LUNG: Clear to auscultation, No wheezes, rales or rhonci.  BACK: No kyphosis of the thoracic spine  ABDOMEN: Soft, with positive bowel sounds  EXTREMITIES: Moves both upper and lower extremities without limitation of the right upper extremity, 1+ right hand edema, some movement of the fingers.  SKIN: Dressing to right hand.  Left hand third and fourth digit with slight redness and excoriation noted.  NEUROLOGIC: Intact, pulses palpable  PSYCHIATRIC: Cognition intact      Labs:  All labs reviewed in the nursing home record and Epic   @  Lab Results   Component Value Date    WBC 6.6 01/06/2024     Lab Results   Component Value Date    RBC 3.49 01/06/2024     Lab Results   Component Value Date    HGB 10.9 01/06/2024     Lab Results   Component Value Date    HCT 33.7 01/06/2024     Lab Results   Component Value Date    MCV 97 01/06/2024     Lab Results   Component Value Date    MCH 31.2 01/06/2024     Lab Results   Component Value Date    MCHC 32.3 01/06/2024     Lab Results   Component Value Date    RDW 13.4 01/06/2024     Lab Results   Component Value Date     01/06/2024     01/06/2024        @Last Comprehensive Metabolic Panel:  Sodium   Date Value Ref Range Status   01/09/2024 137 135 - 145 mmol/L Final     Comment:     Reference intervals for this test were updated on 09/26/2023 to more accurately reflect our healthy population. There may be differences in the flagging of prior results with similar values performed with  this method. Interpretation of those prior results can be made in the context of the updated reference intervals.      Potassium   Date Value Ref Range Status   01/09/2024 3.4 3.4 - 5.3 mmol/L Final   06/28/2022 3.3 (L) 3.5 - 5.0 mmol/L Final     Chloride   Date Value Ref Range Status   01/09/2024 103 98 - 107 mmol/L Final   06/28/2022 103 98 - 107 mmol/L Final     Carbon Dioxide (CO2)   Date Value Ref Range Status   01/09/2024 23 22 - 29 mmol/L Final   06/28/2022 26 22 - 31 mmol/L Final     Anion Gap   Date Value Ref Range Status   01/09/2024 11 7 - 15 mmol/L Final   06/28/2022 13 5 - 18 mmol/L Final     Glucose   Date Value Ref Range Status   01/09/2024 116 (H) 70 - 99 mg/dL Final   06/28/2022 91 70 - 125 mg/dL Final     GLUCOSE BY METER POCT   Date Value Ref Range Status   01/06/2024 151 (H) 70 - 99 mg/dL Final     Urea Nitrogen   Date Value Ref Range Status   01/09/2024 19.6 8.0 - 23.0 mg/dL Final   06/28/2022 26 8 - 28 mg/dL Final     Creatinine   Date Value Ref Range Status   01/09/2024 1.26 (H) 0.67 - 1.17 mg/dL Final     GFR Estimate   Date Value Ref Range Status   01/09/2024 58 (L) >60 mL/min/1.73m2 Final   05/19/2021 37 (L) >60 mL/min/1.73m2 Final     Calcium   Date Value Ref Range Status   01/09/2024 8.4 (L) 8.8 - 10.2 mg/dL Final       This note has been dictated using voice recognition software. Any grammatical or context distortions are unintentional and inherent to the software    Electronically signed by: Justina Barragan CNP       Sincerely,        Justina Barragan NP

## 2024-01-12 NOTE — PROGRESS NOTES
[facility] GERIATRIC SERVICES    Code Status:  FULL CODE   Visit Type:   Chief Complaint   Patient presents with    TCU Follow Up     Facility:  [facility] ([facility]) [[id number]]         HPI: Amor Zavaleta is a 78 year old male who I am seeing today for follow-up on the TCU.  Patient recently hospitalized on 12/22/2023 with worsening right wrist infection.  He was initially hospitalized on 12/11 to 12/20 for right hand infection following a cat bite with worsening right wrist infection.  He had a prolonged course.  Underwent I&D on 12/11 and 12/13.  Cultures grew polymicrobial with MSSA, Pasteurella and several anaerobic bacteria.  He was treated with IV antibiotic and discharged on Augmentin and Flagyl on 12/20.  Patient returned in 2 days with worsening infection.  He underwent I&D on 12/22 with no growth and repeat debridement on 12/25 with purulent and ruptured tendons.  Again underwent redebridement on 12/27 for ongoing infection with purulent material about the second and third compartment and ruptured tendon stumps.  He required multiple surgeries during this hospitalization including right wrist arthrotomy for septic arthritis and multiple I&D's.  He was seen by both hand surgery and infectious disease.  He was started on IV Unasyn and will continue this for 6 weeks until 2/6/2024.  He continues with local wound care.  Sutures in place.  He is nonweightbearing to the right upper extremity/right hand.  He is right-handed.  Diabetes mellitus type 2 with a hemoglobin A1c of 8.9.  He continues on Lantus and short acting insulin.    Transitional Care Course: Today patient sitting up in wheelchair.  Dressing dry and intact to right hand.  Edema improved. Continues with splinting.  Nonweightbearing.  Today reporting redness to left hand of the third and fourth digit.  He did have scabs which he picked at and removed.  Lightly red.  No warmth.  Afebrile.  Recent EXTR without leukocytosis. Blood sugars  initially low now trending upward.  Recent reduction in his short acting insulin.    Assessment/Plan:     Cat bite, subsequent encounter  Bacterial sepsis (H)  Cellulitis of right upper extremity  Septic arthritis  Tendon exposure  -Status post multiple I&D.  -Status post right wrist arthrotomy.  -Continue topical wound care.  -IV Unasyn for 6 weeks until 2/6/2024.  -Weekly CBC, BMP and CRP.  No leukocytosis.  CRP reviewed trending downward.  -ID following.  -Tylenol and oxycodone for pain.  -Follow-up with hand surgeon for suture removal in 1 week.  -Nonweightbearing right upper extremity.  -OT to elevate for elevation while up in wheelchair.    Type 2 diabetes mellitus with hyperosmolarity without coma, with long-term current use of insulin (H)  -Hemoglobin A1c 8.9.  -Patient home dose of Lantus is 56 units.  Blood sugars initially low now trending upward.  Increase Lantus to 45 units.  -NovoLog decreased to 8 units with sliding scale.  -Blood sugars 4 times daily.  -Offer at bedtime snack.    Gastroesophageal reflux disease with esophagitis without hemorrhage  -Continue famotidine.    Essential hypertension  -Continue Norvasc 2.5 daily.  -Continue lisinopril/hydrochlorothiazide daily.    CKD3  -Follow BMP unremarkable.    Excoriation to the third and fourth fingers of the left hand  -Bactroban twice daily x 7 days.    Active Ambulatory Problems     Diagnosis Date Noted    Diabetes mellitus, type 2 (H) 08/25/2023    Stage 3 chronic kidney disease, unspecified whether stage 3a or 3b CKD (H) 08/25/2023    Sinus bradycardia 08/25/2023    LBBB (left bundle branch block) 08/25/2023    Cellulitis of right upper extremity 12/11/2023    Infection of right hand due to bite, initial encounter 12/22/2023    Diverticular disease of large intestine 05/31/2016    Essential hypertension 01/20/2020    Gastroesophageal reflux disease 01/20/2020    Hemorrhoids 05/31/2016    History of colonic polyps 05/31/2016    Peripheral  neuropathy 01/20/2020    Pure hypercholesterolemia 10/21/2004     Resolved Ambulatory Problems     Diagnosis Date Noted    Seizure (H) 08/10/2007     Past Medical History:   Diagnosis Date    Diabetes (H)     Sleep apnea      Allergies   Allergen Reactions    Sulfa Antibiotics Hives       All Meds and Allergies reviewed in the record at the facility and is the most up-to-date.    Current Outpatient Medications   Medication Sig    acetaminophen (TYLENOL) 325 MG tablet Take 2 tablets (650 mg) by mouth every 4 hours as needed for mild pain or other (and adjunct with moderate or severe pain or per patient request)    amLODIPine (NORVASC) 2.5 MG tablet Take 2.5 mg by mouth daily    ampicillin-sulbactam (UNASYN) 3 (2-1) g SOLR vial EVERY 6 HOURS UNTIL 2/6/2024    aspirin (ASA) 325 MG tablet Take 325 mg by mouth daily    famotidine (PEPCID) 20 MG tablet Take 20 mg by mouth daily    hydrOXYzine HCl (ATARAX) 10 MG tablet Take 1 tablet (10 mg) by mouth every 6 hours as needed for other (adjuvant pain)    insulin aspart (NOVOLOG PEN) 100 UNIT/ML pen Inject 16 Units Subcutaneous 3 times daily (with meals)    insulin glargine (LANTUS PEN) 100 UNIT/ML pen Inject 45 Units Subcutaneous every morning    lisinopril-hydrochlorothiazide (ZESTORETIC) 20-12.5 MG tablet Take 1 tablet by mouth daily    lovastatin (MEVACOR) 40 MG tablet Take 1 tablet by mouth 2 times daily    Multiple Vitamin (MULTIVITAMIN PO) Take 0.5 tablets by mouth 2 times daily    oxyCODONE (ROXICODONE) 5 MG tablet Take 0.5 tablets (2.5 mg) by mouth every 4 hours as needed for severe pain    povidone-iodine (BETADINE) 10 % topical solution Apply topically as needed for wound care Apply topically to incision for wound care    senna-docusate (SENOKOT-S/PERICOLACE) 8.6-50 MG tablet Take 2 tablets by mouth 2 times daily as needed for constipation     No current facility-administered medications for this visit.       REVIEW OF SYSTEMS:   10 point review of systems reviewed  and pertinent positives in the HPI.     PHYSICAL EXAMINATION:  Physical Exam     Vital signs: /81   Pulse 57   Temp 98.3  F (36.8  C)   Resp 16   Ht 1.829 m (6')   Wt 89.1 kg (196 lb 6.4 oz)   SpO2 97%   BMI 26.64 kg/m    General: Awake, Alert, oriented x3, lying in bed, conversant  HEENT:moist oral mucosa  NECK: Supple  CVS:  S1  S2, without murmur or gallop.   LUNG: Clear to auscultation, No wheezes, rales or rhonci.  BACK: No kyphosis of the thoracic spine  ABDOMEN: Soft, with positive bowel sounds  EXTREMITIES: Moves both upper and lower extremities without limitation of the right upper extremity, 1+ right hand edema, some movement of the fingers.  SKIN: Dressing to right hand.  Left hand third and fourth digit with slight redness and excoriation noted.  NEUROLOGIC: Intact, pulses palpable  PSYCHIATRIC: Cognition intact      Labs:  All labs reviewed in the nursing home record and Epic   @  Lab Results   Component Value Date    WBC 6.6 01/06/2024     Lab Results   Component Value Date    RBC 3.49 01/06/2024     Lab Results   Component Value Date    HGB 10.9 01/06/2024     Lab Results   Component Value Date    HCT 33.7 01/06/2024     Lab Results   Component Value Date    MCV 97 01/06/2024     Lab Results   Component Value Date    MCH 31.2 01/06/2024     Lab Results   Component Value Date    MCHC 32.3 01/06/2024     Lab Results   Component Value Date    RDW 13.4 01/06/2024     Lab Results   Component Value Date     01/06/2024     01/06/2024        @Last Comprehensive Metabolic Panel:  Sodium   Date Value Ref Range Status   01/09/2024 137 135 - 145 mmol/L Final     Comment:     Reference intervals for this test were updated on 09/26/2023 to more accurately reflect our healthy population. There may be differences in the flagging of prior results with similar values performed with this method. Interpretation of those prior results can be made in the context of the updated reference  intervals.      Potassium   Date Value Ref Range Status   01/09/2024 3.4 3.4 - 5.3 mmol/L Final   06/28/2022 3.3 (L) 3.5 - 5.0 mmol/L Final     Chloride   Date Value Ref Range Status   01/09/2024 103 98 - 107 mmol/L Final   06/28/2022 103 98 - 107 mmol/L Final     Carbon Dioxide (CO2)   Date Value Ref Range Status   01/09/2024 23 22 - 29 mmol/L Final   06/28/2022 26 22 - 31 mmol/L Final     Anion Gap   Date Value Ref Range Status   01/09/2024 11 7 - 15 mmol/L Final   06/28/2022 13 5 - 18 mmol/L Final     Glucose   Date Value Ref Range Status   01/09/2024 116 (H) 70 - 99 mg/dL Final   06/28/2022 91 70 - 125 mg/dL Final     GLUCOSE BY METER POCT   Date Value Ref Range Status   01/06/2024 151 (H) 70 - 99 mg/dL Final     Urea Nitrogen   Date Value Ref Range Status   01/09/2024 19.6 8.0 - 23.0 mg/dL Final   06/28/2022 26 8 - 28 mg/dL Final     Creatinine   Date Value Ref Range Status   01/09/2024 1.26 (H) 0.67 - 1.17 mg/dL Final     GFR Estimate   Date Value Ref Range Status   01/09/2024 58 (L) >60 mL/min/1.73m2 Final   05/19/2021 37 (L) >60 mL/min/1.73m2 Final     Calcium   Date Value Ref Range Status   01/09/2024 8.4 (L) 8.8 - 10.2 mg/dL Final       This note has been dictated using voice recognition software. Any grammatical or context distortions are unintentional and inherent to the software    Electronically signed by: Justina Barragan, CNP

## 2024-01-15 ENCOUNTER — LAB REQUISITION (OUTPATIENT)
Dept: LAB | Facility: CLINIC | Age: 79
End: 2024-01-15
Payer: COMMERCIAL

## 2024-01-15 DIAGNOSIS — S61.451A OPEN BITE OF RIGHT HAND, INITIAL ENCOUNTER: ICD-10-CM

## 2024-01-16 ENCOUNTER — TRANSITIONAL CARE UNIT VISIT (OUTPATIENT)
Dept: GERIATRICS | Facility: CLINIC | Age: 79
End: 2024-01-16
Payer: COMMERCIAL

## 2024-01-16 VITALS
HEIGHT: 72 IN | TEMPERATURE: 97.9 F | DIASTOLIC BLOOD PRESSURE: 68 MMHG | SYSTOLIC BLOOD PRESSURE: 146 MMHG | HEART RATE: 56 BPM | RESPIRATION RATE: 16 BRPM | OXYGEN SATURATION: 94 % | WEIGHT: 196.2 LBS | BODY MASS INDEX: 26.57 KG/M2

## 2024-01-16 DIAGNOSIS — K21.00 GASTROESOPHAGEAL REFLUX DISEASE WITH ESOPHAGITIS WITHOUT HEMORRHAGE: ICD-10-CM

## 2024-01-16 DIAGNOSIS — A41.9 BACTERIAL SEPSIS (H): Primary | ICD-10-CM

## 2024-01-16 DIAGNOSIS — I10 ESSENTIAL HYPERTENSION: ICD-10-CM

## 2024-01-16 DIAGNOSIS — G60.9 IDIOPATHIC PERIPHERAL NEUROPATHY: ICD-10-CM
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
## 2024-01-16 DIAGNOSIS — E11.00 TYPE 2 DIABETES MELLITUS WITH HYPEROSMOLARITY WITHOUT COMA, WITH LONG-TERM CURRENT USE OF INSULIN (H): ICD-10-CM

## 2024-01-16 DIAGNOSIS — Z79.4 TYPE 2 DIABETES MELLITUS WITH HYPEROSMOLARITY WITHOUT COMA, WITH LONG-TERM CURRENT USE OF INSULIN (H): ICD-10-CM

## 2024-01-16 LAB
ALBUMIN SERPL BCG-MCNC: 3.3 G/DL (ref 3.5–5.2)
ALP SERPL-CCNC: 34 U/L (ref 40–150)
ALT SERPL W P-5'-P-CCNC: 12 U/L (ref 0–70)
ANION GAP SERPL CALCULATED.3IONS-SCNC: 10 MMOL/L (ref 7–15)
AST SERPL W P-5'-P-CCNC: 24 U/L (ref 0–45)
BASOPHILS # BLD AUTO: 0.1 10E3/UL (ref 0–0.2)
BASOPHILS NFR BLD AUTO: 1 %
BILIRUB SERPL-MCNC: 0.3 MG/DL
BUN SERPL-MCNC: 20.6 MG/DL (ref 8–23)
CALCIUM SERPL-MCNC: 8.8 MG/DL (ref 8.8–10.2)
CHLORIDE SERPL-SCNC: 103 MMOL/L (ref 98–107)
CREAT SERPL-MCNC: 1.16 MG/DL (ref 0.67–1.17)
CRP SERPL-MCNC: <3 MG/L
DEPRECATED HCO3 PLAS-SCNC: 24 MMOL/L (ref 22–29)
EGFRCR SERPLBLD CKD-EPI 2021: 64 ML/MIN/1.73M2
EOSINOPHIL # BLD AUTO: 0.6 10E3/UL (ref 0–0.7)
EOSINOPHIL NFR BLD AUTO: 9 %
ERYTHROCYTE [DISTWIDTH] IN BLOOD BY AUTOMATED COUNT: 14.3 % (ref 10–15)
GLUCOSE SERPL-MCNC: 134 MG/DL (ref 70–99)
HCT VFR BLD AUTO: 34.3 % (ref 40–53)
HGB BLD-MCNC: 10.9 G/DL (ref 13.3–17.7)
IMM GRANULOCYTES # BLD: 0 10E3/UL
IMM GRANULOCYTES NFR BLD: 0 %
LYMPHOCYTES # BLD AUTO: 1.9 10E3/UL (ref 0.8–5.3)
LYMPHOCYTES NFR BLD AUTO: 28 %
MCH RBC QN AUTO: 30.9 PG (ref 26.5–33)
MCHC RBC AUTO-ENTMCNC: 31.8 G/DL (ref 31.5–36.5)
MCV RBC AUTO: 97 FL (ref 78–100)
MONOCYTES # BLD AUTO: 0.7 10E3/UL (ref 0–1.3)
MONOCYTES NFR BLD AUTO: 11 %
NEUTROPHILS # BLD AUTO: 3.5 10E3/UL (ref 1.6–8.3)
NEUTROPHILS NFR BLD AUTO: 51 %
NRBC # BLD AUTO: 0 10E3/UL
NRBC BLD AUTO-RTO: 0 /100
PLATELET # BLD AUTO: 189 10E3/UL (ref 150–450)
POTASSIUM SERPL-SCNC: 3.4 MMOL/L (ref 3.4–5.3)
PROT SERPL-MCNC: 6.7 G/DL (ref 6.4–8.3)
RBC # BLD AUTO: 3.53 10E6/UL (ref 4.4–5.9)
SODIUM SERPL-SCNC: 137 MMOL/L (ref 135–145)
WBC # BLD AUTO: 6.7 10E3/UL (ref 4–11)

## 2024-01-16 PROCEDURE — 99309 SBSQ NF CARE MODERATE MDM 30: CPT | Performed by: NURSE PRACTITIONER

## 2024-01-16 PROCEDURE — 86140 C-REACTIVE PROTEIN: CPT | Performed by: FAMILY MEDICINE

## 2024-01-16 PROCEDURE — 85025 COMPLETE CBC W/AUTO DIFF WBC: CPT | Performed by: FAMILY MEDICINE

## 2024-01-16 PROCEDURE — 80053 COMPREHEN METABOLIC PANEL: CPT | Performed by: FAMILY MEDICINE

## 2024-01-16 NOTE — LETTER
1/16/2024        RE: Amor Zavaleta  [address]  [facility] [address]         HEALTH GERIATRIC SERVICES    Code Status:  FULL CODE   Visit Type:   Chief Complaint   Patient presents with     TCU Follow Up     Facility:  [facility] ([facility]) [[id number]]         HPI: Amor Zavaleta is a 78 year old male who I am seeing today for follow-up on the TCU.  Patient recently hospitalized on 12/22/2023 with worsening right wrist infection.  He was initially hospitalized on 12/11 to 12/20 for right hand infection following a cat bite with worsening right wrist infection.  He had a prolonged course.  Underwent I&D on 12/11 and 12/13.  Cultures grew polymicrobial with MSSA, Pasteurella and several anaerobic bacteria.  He was treated with IV antibiotic and discharged on Augmentin and Flagyl on 12/20.  Patient returned in 2 days with worsening infection.  He underwent I&D on 12/22 with no growth and repeat debridement on 12/25 with purulent and ruptured tendons.  Again underwent redebridement on 12/27 for ongoing infection with purulent material about the second and third compartment and ruptured tendon stumps.  He required multiple surgeries during this hospitalization including right wrist arthrotomy for septic arthritis and multiple I&D's.  He was seen by both hand surgery and infectious disease.  He was started on IV Unasyn and will continue this for 6 weeks until 2/6/2024.  He continues with local wound care.  Sutures in place.  He is nonweightbearing to the right upper extremity/right hand.  He is right-handed.  Diabetes mellitus type 2 with a hemoglobin A1c of 8.9.  He continues on Lantus and short acting insulin.    Transitional Care Course: Today patient sitting up on the side of the bed. Pt had follow up with orthopedics late last week. Half of sutures removed.  No redness or warmth.  Swelling to right hand improving.  Patient now in hand splint.  Patient continues on IV antibiotics.  Blood  sugars occasionally in the 200s.  He did have a low blood sugar over the weekend however reported dislike of food and did not eat.  BP occasionally elevated.      Assessment/Plan:     Cat bite, subsequent encounter  Bacterial sepsis (H)  Cellulitis of right upper extremity  Septic arthritis  Tendon exposure  -Status post multiple I&D.  -Status post right wrist arthrotomy.  -Continue topical wound care.  -IV Unasyn for 6 weeks until 2/6/2024.  -Weekly CBC, BMP and CRP.  No leukocytosis.  CRP now less than 3.  -ID following.  -Tylenol and oxycodone for pain.  -Follow-up with hand surgeon late last week.-Sutures removed.  Plan follow-up in 1 week.  -Nonweightbearing right upper extremity.  Continue enhancement.    Type 2 diabetes mellitus with hyperosmolarity without coma, with long-term current use of insulin (H)  -Hemoglobin A1c 8.9.  -Patient home dose of Lantus is 56 units.  Currently Lantus to 45 units.  -NovoLog  8 units with sliding scale.  -Blood sugars 4 times daily.  -Offer at bedtime snack.    Gastroesophageal reflux disease with esophagitis without hemorrhage  -Continue famotidine.    Essential hypertension  -Continue Norvasc 2.5 daily.  -Continue lisinopril/hydrochlorothiazide daily.  -Occasionally elevated blood pressure.  -Monitor    CKD3  -Follow BMP unremarkable.    Excoriation to the third and fourth fingers of the left hand  -Bactroban twice daily x 7 days.  -Improved.    Active Ambulatory Problems     Diagnosis Date Noted     Diabetes mellitus, type 2 (H) 08/25/2023     Stage 3 chronic kidney disease, unspecified whether stage 3a or 3b CKD (H) 08/25/2023     Sinus bradycardia 08/25/2023     LBBB (left bundle branch block) 08/25/2023     Cellulitis of right upper extremity 12/11/2023     Infection of right hand due to bite, initial encounter 12/22/2023     Diverticular disease of large intestine 05/31/2016     Essential hypertension 01/20/2020     Gastroesophageal reflux disease 01/20/2020      Hemorrhoids 05/31/2016     History of colonic polyps 05/31/2016     Peripheral neuropathy 01/20/2020     Pure hypercholesterolemia 10/21/2004     Resolved Ambulatory Problems     Diagnosis Date Noted     Seizure (H) 08/10/2007     Past Medical History:   Diagnosis Date     Diabetes (H)      Sleep apnea      Allergies   Allergen Reactions     Sulfa Antibiotics Hives       All Meds and Allergies reviewed in the record at the facility and is the most up-to-date.    Current Outpatient Medications   Medication Sig     acetaminophen (TYLENOL) 325 MG tablet Take 2 tablets (650 mg) by mouth every 4 hours as needed for mild pain or other (and adjunct with moderate or severe pain or per patient request)     amLODIPine (NORVASC) 2.5 MG tablet Take 2.5 mg by mouth daily     ampicillin-sulbactam (UNASYN) 3 (2-1) g SOLR vial EVERY 6 HOURS UNTIL 2/6/2024     aspirin (ASA) 325 MG tablet Take 325 mg by mouth daily     famotidine (PEPCID) 20 MG tablet Take 20 mg by mouth daily     hydrOXYzine HCl (ATARAX) 10 MG tablet Take 1 tablet (10 mg) by mouth every 6 hours as needed for other (adjuvant pain)     insulin aspart (NOVOLOG PEN) 100 UNIT/ML pen Inject 16 Units Subcutaneous 3 times daily (with meals)     insulin glargine (LANTUS PEN) 100 UNIT/ML pen Inject 45 Units Subcutaneous every morning     lisinopril-hydrochlorothiazide (ZESTORETIC) 20-12.5 MG tablet Take 1 tablet by mouth daily     lovastatin (MEVACOR) 40 MG tablet Take 1 tablet by mouth 2 times daily     Multiple Vitamin (MULTIVITAMIN PO) Take 0.5 tablets by mouth 2 times daily     oxyCODONE (ROXICODONE) 5 MG tablet Take 0.5 tablets (2.5 mg) by mouth every 4 hours as needed for severe pain     povidone-iodine (BETADINE) 10 % topical solution Apply topically as needed for wound care Apply topically to incision for wound care     senna-docusate (SENOKOT-S/PERICOLACE) 8.6-50 MG tablet Take 2 tablets by mouth 2 times daily as needed for constipation     No current  facility-administered medications for this visit.       REVIEW OF SYSTEMS:   10 point review of systems reviewed and pertinent positives in the HPI.     PHYSICAL EXAMINATION:  Physical Exam     Vital signs: BP (!) 146/68   Pulse 56   Temp 97.9  F (36.6  C)   Resp 16   Ht 1.829 m (6')   Wt 89 kg (196 lb 3.2 oz)   SpO2 94%   BMI 26.61 kg/m    General: Awake, Alert, oriented x3, lying in bed, conversant  HEENT:moist oral mucosa  NECK: Supple  CVS:  S1  S2, without murmur or gallop.   LUNG: Clear to auscultation, No wheezes, rales or rhonci.  BACK: No kyphosis of the thoracic spine  ABDOMEN: Soft, with positive bowel sounds  EXTREMITIES: Moves both upper and lower extremities without limitation of the right upper extremity, 1+ right hand edema, some movement of the fingers.  SKIN: Partial suture removed, granulation noted.  No redness or warmth.  Well-approximated.  NEUROLOGIC: Intact, pulses palpable  PSYCHIATRIC: Cognition intact      Labs:  All labs reviewed in the nursing home record and Epic   @  Lab Results   Component Value Date    WBC 6.6 01/06/2024     Lab Results   Component Value Date    RBC 3.49 01/06/2024     Lab Results   Component Value Date    HGB 10.9 01/06/2024     Lab Results   Component Value Date    HCT 33.7 01/06/2024     Lab Results   Component Value Date    MCV 97 01/06/2024     Lab Results   Component Value Date    MCH 31.2 01/06/2024     Lab Results   Component Value Date    MCHC 32.3 01/06/2024     Lab Results   Component Value Date    RDW 13.4 01/06/2024     Lab Results   Component Value Date     01/06/2024     01/06/2024        @Last Comprehensive Metabolic Panel:  Sodium   Date Value Ref Range Status   01/16/2024 137 135 - 145 mmol/L Final     Comment:     Reference intervals for this test were updated on 09/26/2023 to more accurately reflect our healthy population. There may be differences in the flagging of prior results with similar values performed with this method.  Interpretation of those prior results can be made in the context of the updated reference intervals.      Potassium   Date Value Ref Range Status   01/16/2024 3.4 3.4 - 5.3 mmol/L Final   06/28/2022 3.3 (L) 3.5 - 5.0 mmol/L Final     Chloride   Date Value Ref Range Status   01/16/2024 103 98 - 107 mmol/L Final   06/28/2022 103 98 - 107 mmol/L Final     Carbon Dioxide (CO2)   Date Value Ref Range Status   01/16/2024 24 22 - 29 mmol/L Final   06/28/2022 26 22 - 31 mmol/L Final     Anion Gap   Date Value Ref Range Status   01/16/2024 10 7 - 15 mmol/L Final   06/28/2022 13 5 - 18 mmol/L Final     Glucose   Date Value Ref Range Status   01/16/2024 134 (H) 70 - 99 mg/dL Final   06/28/2022 91 70 - 125 mg/dL Final     GLUCOSE BY METER POCT   Date Value Ref Range Status   01/06/2024 151 (H) 70 - 99 mg/dL Final     Urea Nitrogen   Date Value Ref Range Status   01/16/2024 20.6 8.0 - 23.0 mg/dL Final   06/28/2022 26 8 - 28 mg/dL Final     Creatinine   Date Value Ref Range Status   01/16/2024 1.16 0.67 - 1.17 mg/dL Final     GFR Estimate   Date Value Ref Range Status   01/16/2024 64 >60 mL/min/1.73m2 Final   05/19/2021 37 (L) >60 mL/min/1.73m2 Final     Calcium   Date Value Ref Range Status   01/16/2024 8.8 8.8 - 10.2 mg/dL Final       This note has been dictated using voice recognition software. Any grammatical or context distortions are unintentional and inherent to the software    Electronically signed by: Justina Barragan CNP       Sincerely,        Justina Barragan NP

## 2024-01-17 NOTE — PROGRESS NOTES
[facility] GERIATRIC SERVICES    Code Status:  FULL CODE   Visit Type:   Chief Complaint   Patient presents with    TCU Follow Up     Facility:  [facility] ([facility]) [[id number]]         HPI: Amor Zavaleta is a 78 year old male who I am seeing today for follow-up on the TCU.  Patient recently hospitalized on 12/22/2023 with worsening right wrist infection.  He was initially hospitalized on 12/11 to 12/20 for right hand infection following a cat bite with worsening right wrist infection.  He had a prolonged course.  Underwent I&D on 12/11 and 12/13.  Cultures grew polymicrobial with MSSA, Pasteurella and several anaerobic bacteria.  He was treated with IV antibiotic and discharged on Augmentin and Flagyl on 12/20.  Patient returned in 2 days with worsening infection.  He underwent I&D on 12/22 with no growth and repeat debridement on 12/25 with purulent and ruptured tendons.  Again underwent redebridement on 12/27 for ongoing infection with purulent material about the second and third compartment and ruptured tendon stumps.  He required multiple surgeries during this hospitalization including right wrist arthrotomy for septic arthritis and multiple I&D's.  He was seen by both hand surgery and infectious disease.  He was started on IV Unasyn and will continue this for 6 weeks until 2/6/2024.  He continues with local wound care.  Sutures in place.  He is nonweightbearing to the right upper extremity/right hand.  He is right-handed.  Diabetes mellitus type 2 with a hemoglobin A1c of 8.9.  He continues on Lantus and short acting insulin.    Transitional Care Course: Today patient sitting up on the side of the bed. Pt had follow up with orthopedics late last week. Half of sutures removed.  No redness or warmth.  Swelling to right hand improving.  Patient now in hand splint.  Patient continues on IV antibiotics.  Blood sugars occasionally in the 200s.  He did have a low blood sugar over the weekend however reported  dislike of food and did not eat.  BP occasionally elevated.      Assessment/Plan:     Cat bite, subsequent encounter  Bacterial sepsis (H)  Cellulitis of right upper extremity  Septic arthritis  Tendon exposure  -Status post multiple I&D.  -Status post right wrist arthrotomy.  -Continue topical wound care.  -IV Unasyn for 6 weeks until 2/6/2024.  -Weekly CBC, BMP and CRP.  No leukocytosis.  CRP now less than 3.  -ID following.  -Tylenol and oxycodone for pain.  -Follow-up with hand surgeon late last week.-Sutures removed.  Plan follow-up in 1 week.  -Nonweightbearing right upper extremity.  Continue enhancement.    Type 2 diabetes mellitus with hyperosmolarity without coma, with long-term current use of insulin (H)  -Hemoglobin A1c 8.9.  -Patient home dose of Lantus is 56 units.  Currently Lantus to 45 units.  -NovoLog  8 units with sliding scale.  -Blood sugars 4 times daily.  -Offer at bedtime snack.    Gastroesophageal reflux disease with esophagitis without hemorrhage  -Continue famotidine.    Essential hypertension  -Continue Norvasc 2.5 daily.  -Continue lisinopril/hydrochlorothiazide daily.  -Occasionally elevated blood pressure.  -Monitor    CKD3  -Follow BMP unremarkable.    Excoriation to the third and fourth fingers of the left hand  -Bactroban twice daily x 7 days.  -Improved.    Active Ambulatory Problems     Diagnosis Date Noted    Diabetes mellitus, type 2 (H) 08/25/2023    Stage 3 chronic kidney disease, unspecified whether stage 3a or 3b CKD (H) 08/25/2023    Sinus bradycardia 08/25/2023    LBBB (left bundle branch block) 08/25/2023    Cellulitis of right upper extremity 12/11/2023    Infection of right hand due to bite, initial encounter 12/22/2023    Diverticular disease of large intestine 05/31/2016    Essential hypertension 01/20/2020    Gastroesophageal reflux disease 01/20/2020    Hemorrhoids 05/31/2016    History of colonic polyps 05/31/2016    Peripheral neuropathy 01/20/2020    Pure  hypercholesterolemia 10/21/2004     Resolved Ambulatory Problems     Diagnosis Date Noted    Seizure (H) 08/10/2007     Past Medical History:   Diagnosis Date    Diabetes (H)     Sleep apnea      Allergies   Allergen Reactions    Sulfa Antibiotics Hives       All Meds and Allergies reviewed in the record at the facility and is the most up-to-date.    Current Outpatient Medications   Medication Sig    acetaminophen (TYLENOL) 325 MG tablet Take 2 tablets (650 mg) by mouth every 4 hours as needed for mild pain or other (and adjunct with moderate or severe pain or per patient request)    amLODIPine (NORVASC) 2.5 MG tablet Take 2.5 mg by mouth daily    ampicillin-sulbactam (UNASYN) 3 (2-1) g SOLR vial EVERY 6 HOURS UNTIL 2/6/2024    aspirin (ASA) 325 MG tablet Take 325 mg by mouth daily    famotidine (PEPCID) 20 MG tablet Take 20 mg by mouth daily    hydrOXYzine HCl (ATARAX) 10 MG tablet Take 1 tablet (10 mg) by mouth every 6 hours as needed for other (adjuvant pain)    insulin aspart (NOVOLOG PEN) 100 UNIT/ML pen Inject 16 Units Subcutaneous 3 times daily (with meals)    insulin glargine (LANTUS PEN) 100 UNIT/ML pen Inject 45 Units Subcutaneous every morning    lisinopril-hydrochlorothiazide (ZESTORETIC) 20-12.5 MG tablet Take 1 tablet by mouth daily    lovastatin (MEVACOR) 40 MG tablet Take 1 tablet by mouth 2 times daily    Multiple Vitamin (MULTIVITAMIN PO) Take 0.5 tablets by mouth 2 times daily    oxyCODONE (ROXICODONE) 5 MG tablet Take 0.5 tablets (2.5 mg) by mouth every 4 hours as needed for severe pain    povidone-iodine (BETADINE) 10 % topical solution Apply topically as needed for wound care Apply topically to incision for wound care    senna-docusate (SENOKOT-S/PERICOLACE) 8.6-50 MG tablet Take 2 tablets by mouth 2 times daily as needed for constipation     No current facility-administered medications for this visit.       REVIEW OF SYSTEMS:   10 point review of systems reviewed and pertinent positives in the  HPI.     PHYSICAL EXAMINATION:  Physical Exam     Vital signs: BP (!) 146/68   Pulse 56   Temp 97.9  F (36.6  C)   Resp 16   Ht 1.829 m (6')   Wt 89 kg (196 lb 3.2 oz)   SpO2 94%   BMI 26.61 kg/m    General: Awake, Alert, oriented x3, lying in bed, conversant  HEENT:moist oral mucosa  NECK: Supple  CVS:  S1  S2, without murmur or gallop.   LUNG: Clear to auscultation, No wheezes, rales or rhonci.  BACK: No kyphosis of the thoracic spine  ABDOMEN: Soft, with positive bowel sounds  EXTREMITIES: Moves both upper and lower extremities without limitation of the right upper extremity, 1+ right hand edema, some movement of the fingers.  SKIN: Partial suture removed, granulation noted.  No redness or warmth.  Well-approximated.  NEUROLOGIC: Intact, pulses palpable  PSYCHIATRIC: Cognition intact      Labs:  All labs reviewed in the nursing home record and Epic   @  Lab Results   Component Value Date    WBC 6.6 01/06/2024     Lab Results   Component Value Date    RBC 3.49 01/06/2024     Lab Results   Component Value Date    HGB 10.9 01/06/2024     Lab Results   Component Value Date    HCT 33.7 01/06/2024     Lab Results   Component Value Date    MCV 97 01/06/2024     Lab Results   Component Value Date    MCH 31.2 01/06/2024     Lab Results   Component Value Date    MCHC 32.3 01/06/2024     Lab Results   Component Value Date    RDW 13.4 01/06/2024     Lab Results   Component Value Date     01/06/2024     01/06/2024        @Last Comprehensive Metabolic Panel:  Sodium   Date Value Ref Range Status   01/16/2024 137 135 - 145 mmol/L Final     Comment:     Reference intervals for this test were updated on 09/26/2023 to more accurately reflect our healthy population. There may be differences in the flagging of prior results with similar values performed with this method. Interpretation of those prior results can be made in the context of the updated reference intervals.      Potassium   Date Value Ref Range  Status   01/16/2024 3.4 3.4 - 5.3 mmol/L Final   06/28/2022 3.3 (L) 3.5 - 5.0 mmol/L Final     Chloride   Date Value Ref Range Status   01/16/2024 103 98 - 107 mmol/L Final   06/28/2022 103 98 - 107 mmol/L Final     Carbon Dioxide (CO2)   Date Value Ref Range Status   01/16/2024 24 22 - 29 mmol/L Final   06/28/2022 26 22 - 31 mmol/L Final     Anion Gap   Date Value Ref Range Status   01/16/2024 10 7 - 15 mmol/L Final   06/28/2022 13 5 - 18 mmol/L Final     Glucose   Date Value Ref Range Status   01/16/2024 134 (H) 70 - 99 mg/dL Final   06/28/2022 91 70 - 125 mg/dL Final     GLUCOSE BY METER POCT   Date Value Ref Range Status   01/06/2024 151 (H) 70 - 99 mg/dL Final     Urea Nitrogen   Date Value Ref Range Status   01/16/2024 20.6 8.0 - 23.0 mg/dL Final   06/28/2022 26 8 - 28 mg/dL Final     Creatinine   Date Value Ref Range Status   01/16/2024 1.16 0.67 - 1.17 mg/dL Final     GFR Estimate   Date Value Ref Range Status   01/16/2024 64 >60 mL/min/1.73m2 Final   05/19/2021 37 (L) >60 mL/min/1.73m2 Final     Calcium   Date Value Ref Range Status   01/16/2024 8.8 8.8 - 10.2 mg/dL Final       This note has been dictated using voice recognition software. Any grammatical or context distortions are unintentional and inherent to the software    Electronically signed by: Justina Barragan, CNP

## 2024-01-18 ENCOUNTER — TRANSITIONAL CARE UNIT VISIT (OUTPATIENT)
Dept: GERIATRICS | Facility: CLINIC | Age: 79
End: 2024-01-18
Payer: COMMERCIAL

## 2024-01-18 VITALS
HEART RATE: 59 BPM | OXYGEN SATURATION: 97 % | DIASTOLIC BLOOD PRESSURE: 117 MMHG | TEMPERATURE: 97.2 F | WEIGHT: 195.2 LBS | BODY MASS INDEX: 26.44 KG/M2 | HEIGHT: 72 IN | RESPIRATION RATE: 16 BRPM | SYSTOLIC BLOOD PRESSURE: 197 MMHG

## 2024-01-18 DIAGNOSIS — G89.29 CHRONIC LOW BACK PAIN WITHOUT SCIATICA, UNSPECIFIED BACK PAIN LATERALITY: ICD-10-CM

## 2024-01-18 DIAGNOSIS — Z79.4 TYPE 2 DIABETES MELLITUS WITH HYPEROSMOLARITY WITHOUT COMA, WITH LONG-TERM CURRENT USE OF INSULIN (H): ICD-10-CM

## 2024-01-18 DIAGNOSIS — E11.00 TYPE 2 DIABETES MELLITUS WITH HYPEROSMOLARITY WITHOUT COMA, WITH LONG-TERM CURRENT USE OF INSULIN (H): ICD-10-CM

## 2024-01-18 DIAGNOSIS — M54.50 CHRONIC LOW BACK PAIN WITHOUT SCIATICA, UNSPECIFIED BACK PAIN LATERALITY: ICD-10-CM

## 2024-01-18 DIAGNOSIS — I10 ESSENTIAL HYPERTENSION: ICD-10-CM

## 2024-01-18 DIAGNOSIS — A41.9 BACTERIAL SEPSIS (H): Primary | ICD-10-CM

## 2024-01-18 DIAGNOSIS — G60.9 IDIOPATHIC PERIPHERAL NEUROPATHY: ICD-10-CM
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
## 2024-01-18 PROCEDURE — 99309 SBSQ NF CARE MODERATE MDM 30: CPT | Performed by: NURSE PRACTITIONER

## 2024-01-18 RX ORDER — LIDOCAINE 40 MG/G
CREAM TOPICAL 2 TIMES DAILY PRN
COMMUNITY
End: 2024-03-11

## 2024-01-18 NOTE — PROGRESS NOTES
[facility] GERIATRIC SERVICES    Code Status:  FULL CODE   Visit Type:   Chief Complaint   Patient presents with    TCU Follow Up     Facility:  [facility] ([facility]) [[id number]]         HPI: Amor Zavaleta is a 78 year old male who I am seeing today for follow-up on the TCU.  Patient recently hospitalized on 12/22/2023 with worsening right wrist infection.  He was initially hospitalized on 12/11 to 12/20 for right hand infection following a cat bite with worsening right wrist infection.  He had a prolonged course.  Underwent I&D on 12/11 and 12/13.  Cultures grew polymicrobial with MSSA, Pasteurella and several anaerobic bacteria.  He was treated with IV antibiotic and discharged on Augmentin and Flagyl on 12/20.  Patient returned in 2 days with worsening infection.  He underwent I&D on 12/22 with no growth and repeat debridement on 12/25 with purulent and ruptured tendons.  Again underwent redebridement on 12/27 for ongoing infection with purulent material about the second and third compartment and ruptured tendon stumps.  He required multiple surgeries during this hospitalization including right wrist arthrotomy for septic arthritis and multiple I&D's.  He was seen by both hand surgery and infectious disease.  He was started on IV Unasyn and will continue this for 6 weeks until 2/6/2024.  He continues with local wound care.  Sutures in place.  He is nonweightbearing to the right upper extremity/right hand.  He is right-handed.  Diabetes mellitus type 2 with a hemoglobin A1c of 8.9.  He continues on Lantus and short acting insulin.    Transitional Care Course: Today patient sitting up in bedside chair. Right hand swelling improving. Incision intact with half of sutures. Splinting in place. Pain controlled with tylenol. Patient continues on IV antibiotics. CRP now WNL. No leukocytosis. Pt reporting chronic low back and joint pain. He uses Aspercreme topically 1-2 times a day as needed at home.      Assessment/Plan:     Cat bite, subsequent encounter  Bacterial sepsis (H)  Cellulitis of right upper extremity  Septic arthritis  Tendon exposure  -Status post multiple I&D.  -Status post right wrist arthrotomy.  -Continue topical wound care.  -IV Unasyn for 6 weeks until 2/6/2024.  -Weekly CBC, BMP and CRP.  No leukocytosis.  CRP now less than 3.  -ID following.  -Tylenol and oxycodone for pain.  -Follow-up with hand surgeon late last week.-Sutures removed.  Plan follow-up in 1 week.  -Nonweightbearing right upper extremity.  Continue enhancement.    Type 2 diabetes mellitus with hyperosmolarity without coma, with long-term current use of insulin (H)  -Hemoglobin A1c 8.9.  -Patient home dose of Lantus is 56 units.  Currently Lantus to 45 units.  -NovoLog  8 units with sliding scale.  -Blood sugars 4 times daily.  -Offer at bedtime snack.    Gastroesophageal reflux disease with esophagitis without hemorrhage  -Continue famotidine.    Essential hypertension  -Continue Norvasc 2.5 daily.  -Continue lisinopril/hydrochlorothiazide daily.  -Occasionally elevated blood pressure.  -Monitor    CKD3  -Follow BMP unremarkable.    Chronic back pain  -apply Aspercreme BID prn.     Active Ambulatory Problems     Diagnosis Date Noted    Diabetes mellitus, type 2 (H) 08/25/2023    Stage 3 chronic kidney disease, unspecified whether stage 3a or 3b CKD (H) 08/25/2023    Sinus bradycardia 08/25/2023    LBBB (left bundle branch block) 08/25/2023    Cellulitis of right upper extremity 12/11/2023    Infection of right hand due to bite, initial encounter 12/22/2023    Diverticular disease of large intestine 05/31/2016    Essential hypertension 01/20/2020    Gastroesophageal reflux disease 01/20/2020    Hemorrhoids 05/31/2016    History of colonic polyps 05/31/2016    Peripheral neuropathy 01/20/2020    Pure hypercholesterolemia 10/21/2004     Resolved Ambulatory Problems     Diagnosis Date Noted    Seizure (H) 08/10/2007     Past  Medical History:   Diagnosis Date    Diabetes (H)     Sleep apnea      Allergies   Allergen Reactions    Sulfa Antibiotics Hives       All Meds and Allergies reviewed in the record at the facility and is the most up-to-date.    Current Outpatient Medications   Medication Sig    lidocaine (LMX4) 4 % external cream Apply topically 2 times daily as needed for mild pain    acetaminophen (TYLENOL) 325 MG tablet Take 2 tablets (650 mg) by mouth every 4 hours as needed for mild pain or other (and adjunct with moderate or severe pain or per patient request)    amLODIPine (NORVASC) 2.5 MG tablet Take 2.5 mg by mouth daily    ampicillin-sulbactam (UNASYN) 3 (2-1) g SOLR vial EVERY 6 HOURS UNTIL 2/6/2024    aspirin (ASA) 325 MG tablet Take 325 mg by mouth daily    famotidine (PEPCID) 20 MG tablet Take 20 mg by mouth daily    hydrOXYzine HCl (ATARAX) 10 MG tablet Take 1 tablet (10 mg) by mouth every 6 hours as needed for other (adjuvant pain)    insulin aspart (NOVOLOG PEN) 100 UNIT/ML pen Inject 16 Units Subcutaneous 3 times daily (with meals)    insulin glargine (LANTUS PEN) 100 UNIT/ML pen Inject 45 Units Subcutaneous every morning    lisinopril-hydrochlorothiazide (ZESTORETIC) 20-12.5 MG tablet Take 1 tablet by mouth daily    lovastatin (MEVACOR) 40 MG tablet Take 1 tablet by mouth 2 times daily    Multiple Vitamin (MULTIVITAMIN PO) Take 0.5 tablets by mouth 2 times daily    oxyCODONE (ROXICODONE) 5 MG tablet Take 0.5 tablets (2.5 mg) by mouth every 4 hours as needed for severe pain    povidone-iodine (BETADINE) 10 % topical solution Apply topically as needed for wound care Apply topically to incision for wound care    senna-docusate (SENOKOT-S/PERICOLACE) 8.6-50 MG tablet Take 2 tablets by mouth 2 times daily as needed for constipation     No current facility-administered medications for this visit.       REVIEW OF SYSTEMS:   10 point review of systems reviewed and pertinent positives in the HPI.     PHYSICAL  EXAMINATION:  Physical Exam     Vital signs: BP (!) 197/117   Pulse 59   Temp 97.2  F (36.2  C)   Resp 16   Ht 1.829 m (6')   Wt 88.5 kg (195 lb 3.2 oz)   SpO2 97%   BMI 26.47 kg/m    General: Awake, Alert, oriented x3, sitting up in bedside chair, conversant  HEENT:moist oral mucosa  NECK: Supple  BACK: No kyphosis of the thoracic spine  ABDOMEN: Soft, with positive bowel sounds  EXTREMITIES: Moves both upper and lower extremities without limitation of the right upper extremity, 1+ right hand edema, + CMS.   SKIN: Partial suture removed, granulation.   No redness or warmth.  Well-approximated.  NEUROLOGIC: Intact, pulses palpable  PSYCHIATRIC: Cognition intact      Labs:  All labs reviewed in the nursing home record and Epic   @  Lab Results   Component Value Date    WBC 6.6 01/06/2024     Lab Results   Component Value Date    RBC 3.49 01/06/2024     Lab Results   Component Value Date    HGB 10.9 01/06/2024     Lab Results   Component Value Date    HCT 33.7 01/06/2024     Lab Results   Component Value Date    MCV 97 01/06/2024     Lab Results   Component Value Date    MCH 31.2 01/06/2024     Lab Results   Component Value Date    MCHC 32.3 01/06/2024     Lab Results   Component Value Date    RDW 13.4 01/06/2024     Lab Results   Component Value Date     01/06/2024     01/06/2024        @Last Comprehensive Metabolic Panel:  Sodium   Date Value Ref Range Status   01/16/2024 137 135 - 145 mmol/L Final     Comment:     Reference intervals for this test were updated on 09/26/2023 to more accurately reflect our healthy population. There may be differences in the flagging of prior results with similar values performed with this method. Interpretation of those prior results can be made in the context of the updated reference intervals.      Potassium   Date Value Ref Range Status   01/16/2024 3.4 3.4 - 5.3 mmol/L Final   06/28/2022 3.3 (L) 3.5 - 5.0 mmol/L Final     Chloride   Date Value Ref Range  Status   01/16/2024 103 98 - 107 mmol/L Final   06/28/2022 103 98 - 107 mmol/L Final     Carbon Dioxide (CO2)   Date Value Ref Range Status   01/16/2024 24 22 - 29 mmol/L Final   06/28/2022 26 22 - 31 mmol/L Final     Anion Gap   Date Value Ref Range Status   01/16/2024 10 7 - 15 mmol/L Final   06/28/2022 13 5 - 18 mmol/L Final     Glucose   Date Value Ref Range Status   01/16/2024 134 (H) 70 - 99 mg/dL Final   06/28/2022 91 70 - 125 mg/dL Final     GLUCOSE BY METER POCT   Date Value Ref Range Status   01/06/2024 151 (H) 70 - 99 mg/dL Final     Urea Nitrogen   Date Value Ref Range Status   01/16/2024 20.6 8.0 - 23.0 mg/dL Final   06/28/2022 26 8 - 28 mg/dL Final     Creatinine   Date Value Ref Range Status   01/16/2024 1.16 0.67 - 1.17 mg/dL Final     GFR Estimate   Date Value Ref Range Status   01/16/2024 64 >60 mL/min/1.73m2 Final   05/19/2021 37 (L) >60 mL/min/1.73m2 Final     Calcium   Date Value Ref Range Status   01/16/2024 8.8 8.8 - 10.2 mg/dL Final       This note has been dictated using voice recognition software. Any grammatical or context distortions are unintentional and inherent to the software    Electronically signed by: Justina Barragan, CNP

## 2024-01-18 NOTE — LETTER
1/18/2024        RE: Amor Zavaleta  [address]  [facility] [address]         HEALTH GERIATRIC SERVICES    Code Status:  FULL CODE   Visit Type:   Chief Complaint   Patient presents with     TCU Follow Up     Facility:  [facility] ([facility]) [[id number]]         HPI: Amor Zavaleta is a 78 year old male who I am seeing today for follow-up on the TCU.  Patient recently hospitalized on 12/22/2023 with worsening right wrist infection.  He was initially hospitalized on 12/11 to 12/20 for right hand infection following a cat bite with worsening right wrist infection.  He had a prolonged course.  Underwent I&D on 12/11 and 12/13.  Cultures grew polymicrobial with MSSA, Pasteurella and several anaerobic bacteria.  He was treated with IV antibiotic and discharged on Augmentin and Flagyl on 12/20.  Patient returned in 2 days with worsening infection.  He underwent I&D on 12/22 with no growth and repeat debridement on 12/25 with purulent and ruptured tendons.  Again underwent redebridement on 12/27 for ongoing infection with purulent material about the second and third compartment and ruptured tendon stumps.  He required multiple surgeries during this hospitalization including right wrist arthrotomy for septic arthritis and multiple I&D's.  He was seen by both hand surgery and infectious disease.  He was started on IV Unasyn and will continue this for 6 weeks until 2/6/2024.  He continues with local wound care.  Sutures in place.  He is nonweightbearing to the right upper extremity/right hand.  He is right-handed.  Diabetes mellitus type 2 with a hemoglobin A1c of 8.9.  He continues on Lantus and short acting insulin.    Transitional Care Course: Today patient sitting up in bedside chair. Right hand swelling improving. Incision intact with half of sutures. Splinting in place. Pain controlled with tylenol. Patient continues on IV antibiotics. CRP now WNL. No leukocytosis. Pt reporting chronic low back  and joint pain. He uses Aspercreme topically 1-2 times a day as needed at home.     Assessment/Plan:     Cat bite, subsequent encounter  Bacterial sepsis (H)  Cellulitis of right upper extremity  Septic arthritis  Tendon exposure  -Status post multiple I&D.  -Status post right wrist arthrotomy.  -Continue topical wound care.  -IV Unasyn for 6 weeks until 2/6/2024.  -Weekly CBC, BMP and CRP.  No leukocytosis.  CRP now less than 3.  -ID following.  -Tylenol and oxycodone for pain.  -Follow-up with hand surgeon late last week.-Sutures removed.  Plan follow-up in 1 week.  -Nonweightbearing right upper extremity.  Continue enhancement.    Type 2 diabetes mellitus with hyperosmolarity without coma, with long-term current use of insulin (H)  -Hemoglobin A1c 8.9.  -Patient home dose of Lantus is 56 units.  Currently Lantus to 45 units.  -NovoLog  8 units with sliding scale.  -Blood sugars 4 times daily.  -Offer at bedtime snack.    Gastroesophageal reflux disease with esophagitis without hemorrhage  -Continue famotidine.    Essential hypertension  -Continue Norvasc 2.5 daily.  -Continue lisinopril/hydrochlorothiazide daily.  -Occasionally elevated blood pressure.  -Monitor    CKD3  -Follow BMP unremarkable.    Chronic back pain  -apply Aspercreme BID prn.     Active Ambulatory Problems     Diagnosis Date Noted     Diabetes mellitus, type 2 (H) 08/25/2023     Stage 3 chronic kidney disease, unspecified whether stage 3a or 3b CKD (H) 08/25/2023     Sinus bradycardia 08/25/2023     LBBB (left bundle branch block) 08/25/2023     Cellulitis of right upper extremity 12/11/2023     Infection of right hand due to bite, initial encounter 12/22/2023     Diverticular disease of large intestine 05/31/2016     Essential hypertension 01/20/2020     Gastroesophageal reflux disease 01/20/2020     Hemorrhoids 05/31/2016     History of colonic polyps 05/31/2016     Peripheral neuropathy 01/20/2020     Pure hypercholesterolemia 10/21/2004      Resolved Ambulatory Problems     Diagnosis Date Noted     Seizure (H) 08/10/2007     Past Medical History:   Diagnosis Date     Diabetes (H)      Sleep apnea      Allergies   Allergen Reactions     Sulfa Antibiotics Hives       All Meds and Allergies reviewed in the record at the facility and is the most up-to-date.    Current Outpatient Medications   Medication Sig     lidocaine (LMX4) 4 % external cream Apply topically 2 times daily as needed for mild pain     acetaminophen (TYLENOL) 325 MG tablet Take 2 tablets (650 mg) by mouth every 4 hours as needed for mild pain or other (and adjunct with moderate or severe pain or per patient request)     amLODIPine (NORVASC) 2.5 MG tablet Take 2.5 mg by mouth daily     ampicillin-sulbactam (UNASYN) 3 (2-1) g SOLR vial EVERY 6 HOURS UNTIL 2/6/2024     aspirin (ASA) 325 MG tablet Take 325 mg by mouth daily     famotidine (PEPCID) 20 MG tablet Take 20 mg by mouth daily     hydrOXYzine HCl (ATARAX) 10 MG tablet Take 1 tablet (10 mg) by mouth every 6 hours as needed for other (adjuvant pain)     insulin aspart (NOVOLOG PEN) 100 UNIT/ML pen Inject 16 Units Subcutaneous 3 times daily (with meals)     insulin glargine (LANTUS PEN) 100 UNIT/ML pen Inject 45 Units Subcutaneous every morning     lisinopril-hydrochlorothiazide (ZESTORETIC) 20-12.5 MG tablet Take 1 tablet by mouth daily     lovastatin (MEVACOR) 40 MG tablet Take 1 tablet by mouth 2 times daily     Multiple Vitamin (MULTIVITAMIN PO) Take 0.5 tablets by mouth 2 times daily     oxyCODONE (ROXICODONE) 5 MG tablet Take 0.5 tablets (2.5 mg) by mouth every 4 hours as needed for severe pain     povidone-iodine (BETADINE) 10 % topical solution Apply topically as needed for wound care Apply topically to incision for wound care     senna-docusate (SENOKOT-S/PERICOLACE) 8.6-50 MG tablet Take 2 tablets by mouth 2 times daily as needed for constipation     No current facility-administered medications for this visit.       REVIEW  OF SYSTEMS:   10 point review of systems reviewed and pertinent positives in the HPI.     PHYSICAL EXAMINATION:  Physical Exam     Vital signs: BP (!) 197/117   Pulse 59   Temp 97.2  F (36.2  C)   Resp 16   Ht 1.829 m (6')   Wt 88.5 kg (195 lb 3.2 oz)   SpO2 97%   BMI 26.47 kg/m    General: Awake, Alert, oriented x3, sitting up in bedside chair, conversant  HEENT:moist oral mucosa  NECK: Supple  BACK: No kyphosis of the thoracic spine  ABDOMEN: Soft, with positive bowel sounds  EXTREMITIES: Moves both upper and lower extremities without limitation of the right upper extremity, 1+ right hand edema, + CMS.   SKIN: Partial suture removed, granulation.   No redness or warmth.  Well-approximated.  NEUROLOGIC: Intact, pulses palpable  PSYCHIATRIC: Cognition intact      Labs:  All labs reviewed in the nursing home record and Epic   @  Lab Results   Component Value Date    WBC 6.6 01/06/2024     Lab Results   Component Value Date    RBC 3.49 01/06/2024     Lab Results   Component Value Date    HGB 10.9 01/06/2024     Lab Results   Component Value Date    HCT 33.7 01/06/2024     Lab Results   Component Value Date    MCV 97 01/06/2024     Lab Results   Component Value Date    MCH 31.2 01/06/2024     Lab Results   Component Value Date    MCHC 32.3 01/06/2024     Lab Results   Component Value Date    RDW 13.4 01/06/2024     Lab Results   Component Value Date     01/06/2024     01/06/2024        @Last Comprehensive Metabolic Panel:  Sodium   Date Value Ref Range Status   01/16/2024 137 135 - 145 mmol/L Final     Comment:     Reference intervals for this test were updated on 09/26/2023 to more accurately reflect our healthy population. There may be differences in the flagging of prior results with similar values performed with this method. Interpretation of those prior results can be made in the context of the updated reference intervals.      Potassium   Date Value Ref Range Status   01/16/2024 3.4 3.4 - 5.3  mmol/L Final   06/28/2022 3.3 (L) 3.5 - 5.0 mmol/L Final     Chloride   Date Value Ref Range Status   01/16/2024 103 98 - 107 mmol/L Final   06/28/2022 103 98 - 107 mmol/L Final     Carbon Dioxide (CO2)   Date Value Ref Range Status   01/16/2024 24 22 - 29 mmol/L Final   06/28/2022 26 22 - 31 mmol/L Final     Anion Gap   Date Value Ref Range Status   01/16/2024 10 7 - 15 mmol/L Final   06/28/2022 13 5 - 18 mmol/L Final     Glucose   Date Value Ref Range Status   01/16/2024 134 (H) 70 - 99 mg/dL Final   06/28/2022 91 70 - 125 mg/dL Final     GLUCOSE BY METER POCT   Date Value Ref Range Status   01/06/2024 151 (H) 70 - 99 mg/dL Final     Urea Nitrogen   Date Value Ref Range Status   01/16/2024 20.6 8.0 - 23.0 mg/dL Final   06/28/2022 26 8 - 28 mg/dL Final     Creatinine   Date Value Ref Range Status   01/16/2024 1.16 0.67 - 1.17 mg/dL Final     GFR Estimate   Date Value Ref Range Status   01/16/2024 64 >60 mL/min/1.73m2 Final   05/19/2021 37 (L) >60 mL/min/1.73m2 Final     Calcium   Date Value Ref Range Status   01/16/2024 8.8 8.8 - 10.2 mg/dL Final       This note has been dictated using voice recognition software. Any grammatical or context distortions are unintentional and inherent to the software    Electronically signed by: Justina Barragan, BETSY       Sincerely,        Justina Barragan, NP

## 2024-01-22 ENCOUNTER — LAB REQUISITION (OUTPATIENT)
Dept: LAB | Facility: CLINIC | Age: 79
End: 2024-01-22
Payer: COMMERCIAL

## 2024-01-22 DIAGNOSIS — S61.451A OPEN BITE OF RIGHT HAND, INITIAL ENCOUNTER: ICD-10-CM

## 2024-01-22 LAB — BACTERIA TISS BX CULT: NO GROWTH

## 2024-01-23 ENCOUNTER — TRANSITIONAL CARE UNIT VISIT (OUTPATIENT)
Dept: GERIATRICS | Facility: CLINIC | Age: 79
End: 2024-01-23
Payer: COMMERCIAL

## 2024-01-23 VITALS
HEART RATE: 58 BPM | TEMPERATURE: 98.1 F | BODY MASS INDEX: 26.44 KG/M2 | DIASTOLIC BLOOD PRESSURE: 53 MMHG | RESPIRATION RATE: 18 BRPM | WEIGHT: 195.2 LBS | HEIGHT: 72 IN | SYSTOLIC BLOOD PRESSURE: 156 MMHG | OXYGEN SATURATION: 97 %

## 2024-01-23 DIAGNOSIS — Z79.4 TYPE 2 DIABETES MELLITUS WITH HYPEROSMOLARITY WITHOUT COMA, WITH LONG-TERM CURRENT USE OF INSULIN (H): ICD-10-CM

## 2024-01-23 DIAGNOSIS — E11.00 TYPE 2 DIABETES MELLITUS WITH HYPEROSMOLARITY WITHOUT COMA, WITH LONG-TERM CURRENT USE OF INSULIN (H): ICD-10-CM

## 2024-01-23 DIAGNOSIS — A41.9 BACTERIAL SEPSIS (H): Primary | ICD-10-CM

## 2024-01-23 DIAGNOSIS — G89.29 CHRONIC LOW BACK PAIN WITHOUT SCIATICA, UNSPECIFIED BACK PAIN LATERALITY: ICD-10-CM

## 2024-01-23 DIAGNOSIS — M54.50 CHRONIC LOW BACK PAIN WITHOUT SCIATICA, UNSPECIFIED BACK PAIN LATERALITY: ICD-10-CM

## 2024-01-23 DIAGNOSIS — G60.9 IDIOPATHIC PERIPHERAL NEUROPATHY: ICD-10-CM
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
## 2024-01-23 DIAGNOSIS — I10 ESSENTIAL HYPERTENSION: ICD-10-CM

## 2024-01-23 LAB
ALBUMIN SERPL BCG-MCNC: 3.3 G/DL (ref 3.5–5.2)
ALP SERPL-CCNC: 34 U/L (ref 40–150)
ALT SERPL W P-5'-P-CCNC: 12 U/L (ref 0–70)
ANION GAP SERPL CALCULATED.3IONS-SCNC: 11 MMOL/L (ref 7–15)
AST SERPL W P-5'-P-CCNC: 19 U/L (ref 0–45)
BASOPHILS # BLD AUTO: 0.1 10E3/UL (ref 0–0.2)
BASOPHILS NFR BLD AUTO: 1 %
BILIRUB SERPL-MCNC: 0.3 MG/DL
BUN SERPL-MCNC: 22 MG/DL (ref 8–23)
CALCIUM SERPL-MCNC: 9 MG/DL (ref 8.8–10.2)
CHLORIDE SERPL-SCNC: 103 MMOL/L (ref 98–107)
CREAT SERPL-MCNC: 1.13 MG/DL (ref 0.67–1.17)
CRP SERPL-MCNC: 3.44 MG/L
DEPRECATED HCO3 PLAS-SCNC: 23 MMOL/L (ref 22–29)
EGFRCR SERPLBLD CKD-EPI 2021: 67 ML/MIN/1.73M2
EOSINOPHIL # BLD AUTO: 0.9 10E3/UL (ref 0–0.7)
EOSINOPHIL NFR BLD AUTO: 12 %
ERYTHROCYTE [DISTWIDTH] IN BLOOD BY AUTOMATED COUNT: 14.4 % (ref 10–15)
GLUCOSE SERPL-MCNC: 135 MG/DL (ref 70–99)
HCT VFR BLD AUTO: 36.5 % (ref 40–53)
HGB BLD-MCNC: 11.9 G/DL (ref 13.3–17.7)
IMM GRANULOCYTES # BLD: 0 10E3/UL
IMM GRANULOCYTES NFR BLD: 0 %
LYMPHOCYTES # BLD AUTO: 1.6 10E3/UL (ref 0.8–5.3)
LYMPHOCYTES NFR BLD AUTO: 22 %
MCH RBC QN AUTO: 32.2 PG (ref 26.5–33)
MCHC RBC AUTO-ENTMCNC: 32.6 G/DL (ref 31.5–36.5)
MCV RBC AUTO: 99 FL (ref 78–100)
MONOCYTES # BLD AUTO: 0.8 10E3/UL (ref 0–1.3)
MONOCYTES NFR BLD AUTO: 11 %
NEUTROPHILS # BLD AUTO: 4.1 10E3/UL (ref 1.6–8.3)
NEUTROPHILS NFR BLD AUTO: 54 %
NRBC # BLD AUTO: 0 10E3/UL
NRBC BLD AUTO-RTO: 0 /100
PLATELET # BLD AUTO: 168 10E3/UL (ref 150–450)
POTASSIUM SERPL-SCNC: 3.4 MMOL/L (ref 3.4–5.3)
PROT SERPL-MCNC: 6.4 G/DL (ref 6.4–8.3)
RBC # BLD AUTO: 3.69 10E6/UL (ref 4.4–5.9)
SODIUM SERPL-SCNC: 137 MMOL/L (ref 135–145)
WBC # BLD AUTO: 7.6 10E3/UL (ref 4–11)

## 2024-01-23 PROCEDURE — 80053 COMPREHEN METABOLIC PANEL: CPT | Performed by: NURSE PRACTITIONER

## 2024-01-23 PROCEDURE — 86140 C-REACTIVE PROTEIN: CPT | Performed by: NURSE PRACTITIONER

## 2024-01-23 PROCEDURE — 99309 SBSQ NF CARE MODERATE MDM 30: CPT | Performed by: NURSE PRACTITIONER

## 2024-01-23 PROCEDURE — 85025 COMPLETE CBC W/AUTO DIFF WBC: CPT | Performed by: NURSE PRACTITIONER

## 2024-01-23 PROCEDURE — 36415 COLL VENOUS BLD VENIPUNCTURE: CPT | Performed by: NURSE PRACTITIONER

## 2024-01-23 PROCEDURE — P9604 ONE-WAY ALLOW PRORATED TRIP: HCPCS | Performed by: NURSE PRACTITIONER

## 2024-01-23 NOTE — LETTER
1/23/2024        RE: Amor Zavaleta  [address]  [facility] [address]         HEALTH GERIATRIC SERVICES    Code Status:  FULL CODE   Visit Type:   Chief Complaint   Patient presents with     TCU Follow Up     Facility:  [facility] ([facility]) [[id number]]         HPI: Amor Zavaleta is a 78 year old male who I am seeing today for follow-up on the TCU.  Patient recently hospitalized on 12/22/2023 with worsening right wrist infection.  He was initially hospitalized on 12/11 to 12/20 for right hand infection following a cat bite with worsening right wrist infection.  He had a prolonged course.  Underwent I&D on 12/11 and 12/13.  Cultures grew polymicrobial with MSSA, Pasteurella and several anaerobic bacteria.  He was treated with IV antibiotic and discharged on Augmentin and Flagyl on 12/20.  Patient returned in 2 days with worsening infection.  He underwent I&D on 12/22 with no growth and repeat debridement on 12/25 with purulent and ruptured tendons.  Again underwent redebridement on 12/27 for ongoing infection with purulent material about the second and third compartment and ruptured tendon stumps.  He required multiple surgeries during this hospitalization including right wrist arthrotomy for septic arthritis and multiple I&D's.  He was seen by both hand surgery and infectious disease.  He was started on IV Unasyn and will continue this for 6 weeks until 2/6/2024.  He continues with local wound care.  Sutures in place.  He is nonweightbearing to the right upper extremity/right hand.  He is right-handed.  Diabetes mellitus type 2 with a hemoglobin A1c of 8.9.  He continues on Lantus and short acting insulin.    Transitional Care Course: Today patient sitting up in bedside chair.  Incision to right hand intact.  Right hand swelling improving.  No redness or warmth.  Pain controlled with tylenol. Patient continues on IV antibiotics. CRP now WNL. No leukocytosis. Chronic low back and joint pain.   Continue Aspercreme.    Assessment/Plan:     Cat bite, subsequent encounter  Bacterial sepsis (H)  Cellulitis of right upper extremity  Septic arthritis  Tendon exposure  -Status post multiple I&D.  -Status post right wrist arthrotomy.  -Continue topical wound care.  -IV Unasyn for 6 weeks until 2/6/2024.  -Weekly CBC, BMP and CRP.  No leukocytosis.  CRP now 3.44.  -ID following.  -Tylenol and oxycodone for pain.  -Follow-up with hand surgeon late last week.-Sutures removed.  Plan follow-up in 1 week.  -Nonweightbearing right upper extremity.  Continue enhancement.    Type 2 diabetes mellitus with hyperosmolarity without coma, with long-term current use of insulin (H)  -Hemoglobin A1c 8.9.  -Patient home dose of Lantus is 56 units.  Currently Lantus to 45 units.  -NovoLog  8 units with sliding scale.  -Blood sugars 4 times daily.  -Offer at bedtime snack.    Gastroesophageal reflux disease with esophagitis without hemorrhage  -Continue famotidine.    Essential hypertension  -Continue Norvasc 2.5 daily.  -Continue lisinopril/hydrochlorothiazide daily.  -Occasionally elevated blood pressure.  -Monitor    CKD3  -Follow BMP unremarkable.    Chronic back pain  -apply Aspercreme BID prn.     Active Ambulatory Problems     Diagnosis Date Noted     Diabetes mellitus, type 2 (H) 08/25/2023     Stage 3 chronic kidney disease, unspecified whether stage 3a or 3b CKD (H) 08/25/2023     Sinus bradycardia 08/25/2023     LBBB (left bundle branch block) 08/25/2023     Cellulitis of right upper extremity 12/11/2023     Infection of right hand due to bite, initial encounter 12/22/2023     Diverticular disease of large intestine 05/31/2016     Essential hypertension 01/20/2020     Gastroesophageal reflux disease 01/20/2020     Hemorrhoids 05/31/2016     History of colonic polyps 05/31/2016     Peripheral neuropathy 01/20/2020     Pure hypercholesterolemia 10/21/2004     Resolved Ambulatory Problems     Diagnosis Date Noted     Seizure  (H) 08/10/2007     Past Medical History:   Diagnosis Date     Diabetes (H)      Sleep apnea      Allergies   Allergen Reactions     Sulfa Antibiotics Hives       All Meds and Allergies reviewed in the record at the facility and is the most up-to-date.    Current Outpatient Medications   Medication Sig     acetaminophen (TYLENOL) 325 MG tablet Take 2 tablets (650 mg) by mouth every 4 hours as needed for mild pain or other (and adjunct with moderate or severe pain or per patient request)     amLODIPine (NORVASC) 2.5 MG tablet Take 2.5 mg by mouth daily     ampicillin-sulbactam (UNASYN) 3 (2-1) g SOLR vial EVERY 6 HOURS UNTIL 2/6/2024     aspirin (ASA) 325 MG tablet Take 325 mg by mouth daily     famotidine (PEPCID) 20 MG tablet Take 20 mg by mouth daily     hydrOXYzine HCl (ATARAX) 10 MG tablet Take 1 tablet (10 mg) by mouth every 6 hours as needed for other (adjuvant pain)     insulin aspart (NOVOLOG PEN) 100 UNIT/ML pen Inject 16 Units Subcutaneous 3 times daily (with meals)     insulin glargine (LANTUS PEN) 100 UNIT/ML pen Inject 45 Units Subcutaneous every morning     lidocaine (LMX4) 4 % external cream Apply topically 2 times daily as needed for mild pain     lisinopril-hydrochlorothiazide (ZESTORETIC) 20-12.5 MG tablet Take 1 tablet by mouth daily     lovastatin (MEVACOR) 40 MG tablet Take 1 tablet by mouth 2 times daily     Multiple Vitamin (MULTIVITAMIN PO) Take 0.5 tablets by mouth 2 times daily     oxyCODONE (ROXICODONE) 5 MG tablet Take 0.5 tablets (2.5 mg) by mouth every 4 hours as needed for severe pain     povidone-iodine (BETADINE) 10 % topical solution Apply topically as needed for wound care Apply topically to incision for wound care     senna-docusate (SENOKOT-S/PERICOLACE) 8.6-50 MG tablet Take 2 tablets by mouth 2 times daily as needed for constipation     No current facility-administered medications for this visit.       REVIEW OF SYSTEMS:   10 point review of systems reviewed and pertinent  positives in the HPI.     PHYSICAL EXAMINATION:  Physical Exam     Vital signs: BP (!) 156/53   Pulse 58   Temp 98.1  F (36.7  C)   Resp 18   Ht 1.829 m (6')   Wt 88.5 kg (195 lb 3.2 oz)   SpO2 97%   BMI 26.47 kg/m    General: Awake, Alert, oriented x3, sitting up in bedside chair, conversant  HEENT:moist oral mucosa  NECK: Supple  BACK: No kyphosis of the thoracic spine  ABDOMEN: Soft, with positive bowel sounds  EXTREMITIES: Moves both upper and lower extremities without limitation of the right upper extremity, 1+ right hand edema, + CMS.   SKIN: Partial suture removed, granulation.   No redness or warmth.  Well-approximated.  NEUROLOGIC: Intact, pulses palpable  PSYCHIATRIC: Cognition intact      Labs:  All labs reviewed in the nursing home record and Epic   @  Lab Results   Component Value Date    WBC 6.6 01/06/2024     Lab Results   Component Value Date    RBC 3.49 01/06/2024     Lab Results   Component Value Date    HGB 10.9 01/06/2024     Lab Results   Component Value Date    HCT 33.7 01/06/2024     Lab Results   Component Value Date    MCV 97 01/06/2024     Lab Results   Component Value Date    MCH 31.2 01/06/2024     Lab Results   Component Value Date    MCHC 32.3 01/06/2024     Lab Results   Component Value Date    RDW 13.4 01/06/2024     Lab Results   Component Value Date     01/06/2024     01/06/2024        @Last Comprehensive Metabolic Panel:  Sodium   Date Value Ref Range Status   01/23/2024 137 135 - 145 mmol/L Final     Comment:     Reference intervals for this test were updated on 09/26/2023 to more accurately reflect our healthy population. There may be differences in the flagging of prior results with similar values performed with this method. Interpretation of those prior results can be made in the context of the updated reference intervals.      Potassium   Date Value Ref Range Status   01/23/2024 3.4 3.4 - 5.3 mmol/L Final   06/28/2022 3.3 (L) 3.5 - 5.0 mmol/L Final      Chloride   Date Value Ref Range Status   01/23/2024 103 98 - 107 mmol/L Final   06/28/2022 103 98 - 107 mmol/L Final     Carbon Dioxide (CO2)   Date Value Ref Range Status   01/23/2024 23 22 - 29 mmol/L Final   06/28/2022 26 22 - 31 mmol/L Final     Anion Gap   Date Value Ref Range Status   01/23/2024 11 7 - 15 mmol/L Final   06/28/2022 13 5 - 18 mmol/L Final     Glucose   Date Value Ref Range Status   01/23/2024 135 (H) 70 - 99 mg/dL Final   06/28/2022 91 70 - 125 mg/dL Final     GLUCOSE BY METER POCT   Date Value Ref Range Status   01/06/2024 151 (H) 70 - 99 mg/dL Final     Urea Nitrogen   Date Value Ref Range Status   01/23/2024 22.0 8.0 - 23.0 mg/dL Final   06/28/2022 26 8 - 28 mg/dL Final     Creatinine   Date Value Ref Range Status   01/23/2024 1.13 0.67 - 1.17 mg/dL Final     GFR Estimate   Date Value Ref Range Status   01/23/2024 67 >60 mL/min/1.73m2 Final   05/19/2021 37 (L) >60 mL/min/1.73m2 Final     Calcium   Date Value Ref Range Status   01/23/2024 9.0 8.8 - 10.2 mg/dL Final       This note has been dictated using voice recognition software. Any grammatical or context distortions are unintentional and inherent to the software    Electronically signed by: Justina Barragan CNP       Sincerely,        Justina Barragan, NP

## 2024-01-24 LAB — BACTERIA TISS BX CULT: NO GROWTH

## 2024-01-24 NOTE — PROGRESS NOTES
[facility] GERIATRIC SERVICES    Code Status:  FULL CODE   Visit Type:   Chief Complaint   Patient presents with    TCU Follow Up     Facility:  [facility] ([facility]) [[id number]]         HPI: Amor Zavaleta is a 78 year old male who I am seeing today for follow-up on the TCU.  Patient recently hospitalized on 12/22/2023 with worsening right wrist infection.  He was initially hospitalized on 12/11 to 12/20 for right hand infection following a cat bite with worsening right wrist infection.  He had a prolonged course.  Underwent I&D on 12/11 and 12/13.  Cultures grew polymicrobial with MSSA, Pasteurella and several anaerobic bacteria.  He was treated with IV antibiotic and discharged on Augmentin and Flagyl on 12/20.  Patient returned in 2 days with worsening infection.  He underwent I&D on 12/22 with no growth and repeat debridement on 12/25 with purulent and ruptured tendons.  Again underwent redebridement on 12/27 for ongoing infection with purulent material about the second and third compartment and ruptured tendon stumps.  He required multiple surgeries during this hospitalization including right wrist arthrotomy for septic arthritis and multiple I&D's.  He was seen by both hand surgery and infectious disease.  He was started on IV Unasyn and will continue this for 6 weeks until 2/6/2024.  He continues with local wound care.  Sutures in place.  He is nonweightbearing to the right upper extremity/right hand.  He is right-handed.  Diabetes mellitus type 2 with a hemoglobin A1c of 8.9.  He continues on Lantus and short acting insulin.    Transitional Care Course: Today patient sitting up in bedside chair.  Incision to right hand intact.  Right hand swelling improving.  No redness or warmth.  Pain controlled with tylenol. Patient continues on IV antibiotics. CRP now WNL. No leukocytosis. Chronic low back and joint pain.  Continue Aspercreme.    Assessment/Plan:     Cat bite, subsequent encounter  Bacterial  sepsis (H)  Cellulitis of right upper extremity  Septic arthritis  Tendon exposure  -Status post multiple I&D.  -Status post right wrist arthrotomy.  -Continue topical wound care.  -IV Unasyn for 6 weeks until 2/6/2024.  -Weekly CBC, BMP and CRP.  No leukocytosis.  CRP now 3.44.  -ID following.  -Tylenol and oxycodone for pain.  -Follow-up with hand surgeon late last week.-Sutures removed.  Plan follow-up in 1 week.  -Nonweightbearing right upper extremity.  Continue enhancement.    Type 2 diabetes mellitus with hyperosmolarity without coma, with long-term current use of insulin (H)  -Hemoglobin A1c 8.9.  -Patient home dose of Lantus is 56 units.  Currently Lantus to 45 units.  -NovoLog  8 units with sliding scale.  -Blood sugars 4 times daily.  -Offer at bedtime snack.    Gastroesophageal reflux disease with esophagitis without hemorrhage  -Continue famotidine.    Essential hypertension  -Continue Norvasc 2.5 daily.  -Continue lisinopril/hydrochlorothiazide daily.  -Occasionally elevated blood pressure.  -Monitor    CKD3  -Follow BMP unremarkable.    Chronic back pain  -apply Aspercreme BID prn.     Active Ambulatory Problems     Diagnosis Date Noted    Diabetes mellitus, type 2 (H) 08/25/2023    Stage 3 chronic kidney disease, unspecified whether stage 3a or 3b CKD (H) 08/25/2023    Sinus bradycardia 08/25/2023    LBBB (left bundle branch block) 08/25/2023    Cellulitis of right upper extremity 12/11/2023    Infection of right hand due to bite, initial encounter 12/22/2023    Diverticular disease of large intestine 05/31/2016    Essential hypertension 01/20/2020    Gastroesophageal reflux disease 01/20/2020    Hemorrhoids 05/31/2016    History of colonic polyps 05/31/2016    Peripheral neuropathy 01/20/2020    Pure hypercholesterolemia 10/21/2004     Resolved Ambulatory Problems     Diagnosis Date Noted    Seizure (H) 08/10/2007     Past Medical History:   Diagnosis Date    Diabetes (H)     Sleep apnea       Allergies   Allergen Reactions    Sulfa Antibiotics Hives       All Meds and Allergies reviewed in the record at the facility and is the most up-to-date.    Current Outpatient Medications   Medication Sig    acetaminophen (TYLENOL) 325 MG tablet Take 2 tablets (650 mg) by mouth every 4 hours as needed for mild pain or other (and adjunct with moderate or severe pain or per patient request)    amLODIPine (NORVASC) 2.5 MG tablet Take 2.5 mg by mouth daily    ampicillin-sulbactam (UNASYN) 3 (2-1) g SOLR vial EVERY 6 HOURS UNTIL 2/6/2024    aspirin (ASA) 325 MG tablet Take 325 mg by mouth daily    famotidine (PEPCID) 20 MG tablet Take 20 mg by mouth daily    hydrOXYzine HCl (ATARAX) 10 MG tablet Take 1 tablet (10 mg) by mouth every 6 hours as needed for other (adjuvant pain)    insulin aspart (NOVOLOG PEN) 100 UNIT/ML pen Inject 16 Units Subcutaneous 3 times daily (with meals)    insulin glargine (LANTUS PEN) 100 UNIT/ML pen Inject 45 Units Subcutaneous every morning    lidocaine (LMX4) 4 % external cream Apply topically 2 times daily as needed for mild pain    lisinopril-hydrochlorothiazide (ZESTORETIC) 20-12.5 MG tablet Take 1 tablet by mouth daily    lovastatin (MEVACOR) 40 MG tablet Take 1 tablet by mouth 2 times daily    Multiple Vitamin (MULTIVITAMIN PO) Take 0.5 tablets by mouth 2 times daily    oxyCODONE (ROXICODONE) 5 MG tablet Take 0.5 tablets (2.5 mg) by mouth every 4 hours as needed for severe pain    povidone-iodine (BETADINE) 10 % topical solution Apply topically as needed for wound care Apply topically to incision for wound care    senna-docusate (SENOKOT-S/PERICOLACE) 8.6-50 MG tablet Take 2 tablets by mouth 2 times daily as needed for constipation     No current facility-administered medications for this visit.       REVIEW OF SYSTEMS:   10 point review of systems reviewed and pertinent positives in the HPI.     PHYSICAL EXAMINATION:  Physical Exam     Vital signs: BP (!) 156/53   Pulse 58   Temp  98.1  F (36.7  C)   Resp 18   Ht 1.829 m (6')   Wt 88.5 kg (195 lb 3.2 oz)   SpO2 97%   BMI 26.47 kg/m    General: Awake, Alert, oriented x3, sitting up in bedside chair, conversant  HEENT:moist oral mucosa  NECK: Supple  BACK: No kyphosis of the thoracic spine  ABDOMEN: Soft, with positive bowel sounds  EXTREMITIES: Moves both upper and lower extremities without limitation of the right upper extremity, 1+ right hand edema, + CMS.   SKIN: Partial suture removed, granulation.   No redness or warmth.  Well-approximated.  NEUROLOGIC: Intact, pulses palpable  PSYCHIATRIC: Cognition intact      Labs:  All labs reviewed in the nursing home record and Epic   @  Lab Results   Component Value Date    WBC 6.6 01/06/2024     Lab Results   Component Value Date    RBC 3.49 01/06/2024     Lab Results   Component Value Date    HGB 10.9 01/06/2024     Lab Results   Component Value Date    HCT 33.7 01/06/2024     Lab Results   Component Value Date    MCV 97 01/06/2024     Lab Results   Component Value Date    MCH 31.2 01/06/2024     Lab Results   Component Value Date    MCHC 32.3 01/06/2024     Lab Results   Component Value Date    RDW 13.4 01/06/2024     Lab Results   Component Value Date     01/06/2024     01/06/2024        @Last Comprehensive Metabolic Panel:  Sodium   Date Value Ref Range Status   01/23/2024 137 135 - 145 mmol/L Final     Comment:     Reference intervals for this test were updated on 09/26/2023 to more accurately reflect our healthy population. There may be differences in the flagging of prior results with similar values performed with this method. Interpretation of those prior results can be made in the context of the updated reference intervals.      Potassium   Date Value Ref Range Status   01/23/2024 3.4 3.4 - 5.3 mmol/L Final   06/28/2022 3.3 (L) 3.5 - 5.0 mmol/L Final     Chloride   Date Value Ref Range Status   01/23/2024 103 98 - 107 mmol/L Final   06/28/2022 103 98 - 107 mmol/L Final      Carbon Dioxide (CO2)   Date Value Ref Range Status   01/23/2024 23 22 - 29 mmol/L Final   06/28/2022 26 22 - 31 mmol/L Final     Anion Gap   Date Value Ref Range Status   01/23/2024 11 7 - 15 mmol/L Final   06/28/2022 13 5 - 18 mmol/L Final     Glucose   Date Value Ref Range Status   01/23/2024 135 (H) 70 - 99 mg/dL Final   06/28/2022 91 70 - 125 mg/dL Final     GLUCOSE BY METER POCT   Date Value Ref Range Status   01/06/2024 151 (H) 70 - 99 mg/dL Final     Urea Nitrogen   Date Value Ref Range Status   01/23/2024 22.0 8.0 - 23.0 mg/dL Final   06/28/2022 26 8 - 28 mg/dL Final     Creatinine   Date Value Ref Range Status   01/23/2024 1.13 0.67 - 1.17 mg/dL Final     GFR Estimate   Date Value Ref Range Status   01/23/2024 67 >60 mL/min/1.73m2 Final   05/19/2021 37 (L) >60 mL/min/1.73m2 Final     Calcium   Date Value Ref Range Status   01/23/2024 9.0 8.8 - 10.2 mg/dL Final       This note has been dictated using voice recognition software. Any grammatical or context distortions are unintentional and inherent to the software    Electronically signed by: Justina Barragan, CNP

## 2024-01-25 ENCOUNTER — TRANSITIONAL CARE UNIT VISIT (OUTPATIENT)
Dept: GERIATRICS | Facility: CLINIC | Age: 79
End: 2024-01-25
Payer: COMMERCIAL

## 2024-01-25 VITALS
HEIGHT: 72 IN | BODY MASS INDEX: 26.82 KG/M2 | OXYGEN SATURATION: 95 % | RESPIRATION RATE: 16 BRPM | TEMPERATURE: 97.9 F | WEIGHT: 198 LBS | DIASTOLIC BLOOD PRESSURE: 66 MMHG | SYSTOLIC BLOOD PRESSURE: 115 MMHG | HEART RATE: 57 BPM
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
## 2024-01-25 DIAGNOSIS — I10 ESSENTIAL HYPERTENSION: ICD-10-CM

## 2024-01-25 DIAGNOSIS — A41.9 BACTERIAL SEPSIS (H): Primary | ICD-10-CM

## 2024-01-25 DIAGNOSIS — E11.00 TYPE 2 DIABETES MELLITUS WITH HYPEROSMOLARITY WITHOUT COMA, WITH LONG-TERM CURRENT USE OF INSULIN (H): ICD-10-CM

## 2024-01-25 DIAGNOSIS — Z79.4 TYPE 2 DIABETES MELLITUS WITH HYPEROSMOLARITY WITHOUT COMA, WITH LONG-TERM CURRENT USE OF INSULIN (H): ICD-10-CM

## 2024-01-25 PROCEDURE — 99309 SBSQ NF CARE MODERATE MDM 30: CPT | Performed by: NURSE PRACTITIONER

## 2024-01-25 NOTE — LETTER
1/25/2024        RE: Amor Zavaleta  [address]  [facility] [address]         HEALTH GERIATRIC SERVICES    Code Status:  FULL CODE   Visit Type:   Chief Complaint   Patient presents with     TCU Follow Up     Facility:  [facility] ([facility]) [[id number]]         HPI: Amor Zavaleta is a 78 year old male who I am seeing today for follow-up on the TCU.  Patient recently hospitalized on 12/22/2023 with worsening right wrist infection.  He was initially hospitalized on 12/11 to 12/20 for right hand infection following a cat bite with worsening right wrist infection.  He had a prolonged course.  Underwent I&D on 12/11 and 12/13.  Cultures grew polymicrobial with MSSA, Pasteurella and several anaerobic bacteria.  He was treated with IV antibiotic and discharged on Augmentin and Flagyl on 12/20.  Patient returned in 2 days with worsening infection.  He underwent I&D on 12/22 with no growth and repeat debridement on 12/25 with purulent and ruptured tendons.  Again underwent redebridement on 12/27 for ongoing infection with purulent material about the second and third compartment and ruptured tendon stumps.  He required multiple surgeries during this hospitalization including right wrist arthrotomy for septic arthritis and multiple I&D's.  He was seen by both hand surgery and infectious disease.  He was started on IV Unasyn and will continue this for 6 weeks until 2/6/2024.  He continues with local wound care.  Sutures in place.  He is nonweightbearing to the right upper extremity/right hand.  He is right-handed.  Diabetes mellitus type 2 with a hemoglobin A1c of 8.9.  He continues on Lantus and short acting insulin.    Transitional Care Course: Today patient sitting up in bedside chair. Pt continues in splint to right hand. Upon removal, incision to right hand intact.  Right hand swelling improving. No redness or warmth.  Pt continues on IV antibiotics. No leukocytosis. Pain controlled with tylenol.  Family concerned about elevated bp. Initially bp in the 190s systolically however now in the 150's and DBP in the 70-90s. Pt reports staff were taking his bp after he had walked down to the scales or after activity. I did review bps with pt and risk factors. BS continued to be in the 200s late evening.     Assessment/Plan:     Cat bite, subsequent encounter  Bacterial sepsis (H)  Cellulitis of right upper extremity  Septic arthritis  Tendon exposure  -Status post multiple I&D.  -Status post right wrist arthrotomy.  -Continue topical wound care.  -IV Unasyn for 6 weeks until 2/6/2024.  -Weekly CBC, BMP and CRP.  No leukocytosis.  CRP now 3.44.  -ID following.  -Tylenol and oxycodone for pain.  -Follow-up with hand surgeon late last week.-Sutures removed.  Plan follow-up in 1 week.  -Nonweightbearing right upper extremity.  Continue enhancement.    Type 2 diabetes mellitus with hyperosmolarity without coma, with long-term current use of insulin (H)  -Hemoglobin A1c 8.9.  -Patient home dose of Lantus is 56 units. Increase Lantus to 48 units.   -NovoLog  8 units with sliding scale.  -Blood sugars 4 times daily.  -Offer at bedtime snack.    Essential hypertension  -Increase Norvasc 5 daily.  -Continue lisinopril/hydrochlorothiazide daily.  -Monitor    CKD3  -Follow BMP unremarkable.  -Pt to follow with nephrology out pt.     Chronic back pain  -apply Aspercreme BID prn.     Active Ambulatory Problems     Diagnosis Date Noted     Diabetes mellitus, type 2 (H) 08/25/2023     Stage 3 chronic kidney disease, unspecified whether stage 3a or 3b CKD (H) 08/25/2023     Sinus bradycardia 08/25/2023     LBBB (left bundle branch block) 08/25/2023     Cellulitis of right upper extremity 12/11/2023     Infection of right hand due to bite, initial encounter 12/22/2023     Diverticular disease of large intestine 05/31/2016     Essential hypertension 01/20/2020     Gastroesophageal reflux disease 01/20/2020     Hemorrhoids 05/31/2016      History of colonic polyps 05/31/2016     Peripheral neuropathy 01/20/2020     Pure hypercholesterolemia 10/21/2004     Resolved Ambulatory Problems     Diagnosis Date Noted     Seizure (H) 08/10/2007     Past Medical History:   Diagnosis Date     Diabetes (H)      Sleep apnea      Allergies   Allergen Reactions     Sulfa Antibiotics Hives       All Meds and Allergies reviewed in the record at the facility and is the most up-to-date.    Current Outpatient Medications   Medication Sig     acetaminophen (TYLENOL) 325 MG tablet Take 2 tablets (650 mg) by mouth every 4 hours as needed for mild pain or other (and adjunct with moderate or severe pain or per patient request)     amLODIPine (NORVASC) 2.5 MG tablet Take 5 mg by mouth daily     ampicillin-sulbactam (UNASYN) 3 (2-1) g SOLR vial EVERY 6 HOURS UNTIL 2/6/2024     aspirin (ASA) 325 MG tablet Take 325 mg by mouth daily     famotidine (PEPCID) 20 MG tablet Take 20 mg by mouth daily     hydrOXYzine HCl (ATARAX) 10 MG tablet Take 1 tablet (10 mg) by mouth every 6 hours as needed for other (adjuvant pain)     insulin aspart (NOVOLOG PEN) 100 UNIT/ML pen Inject 16 Units Subcutaneous 3 times daily (with meals)     insulin glargine (LANTUS PEN) 100 UNIT/ML pen Inject 45 Units Subcutaneous every morning     lidocaine (LMX4) 4 % external cream Apply topically 2 times daily as needed for mild pain     lisinopril-hydrochlorothiazide (ZESTORETIC) 20-12.5 MG tablet Take 1 tablet by mouth daily     lovastatin (MEVACOR) 40 MG tablet Take 1 tablet by mouth 2 times daily     Multiple Vitamin (MULTIVITAMIN PO) Take 0.5 tablets by mouth 2 times daily     oxyCODONE (ROXICODONE) 5 MG tablet Take 0.5 tablets (2.5 mg) by mouth every 4 hours as needed for severe pain     povidone-iodine (BETADINE) 10 % topical solution Apply topically as needed for wound care Apply topically to incision for wound care     senna-docusate (SENOKOT-S/PERICOLACE) 8.6-50 MG tablet Take 2 tablets by mouth 2  times daily as needed for constipation     No current facility-administered medications for this visit.       REVIEW OF SYSTEMS:   10 point review of systems reviewed and pertinent positives in the HPI.     PHYSICAL EXAMINATION:  Physical Exam     Vital signs: /66   Pulse 57   Temp 97.9  F (36.6  C)   Resp 16   Ht 1.829 m (6')   Wt 89.8 kg (198 lb)   SpO2 95%   BMI 26.85 kg/m    General: Awake, Alert, oriented x3, sitting up in bedside chair, conversant  HEENT:moist oral mucosa  NECK: Supple  BACK: No kyphosis of the thoracic spine  ABDOMEN: Soft, with positive bowel sounds  EXTREMITIES: Moves both upper and lower extremities without limitation of the right upper extremity, 1+ right hand edema, + CMS.   SKIN: Partial suture removed, granulation.   No redness or warmth.  Well-approximated.  NEUROLOGIC: Intact, pulses palpable  PSYCHIATRIC: Cognition intact      Labs:  All labs reviewed in the nursing home record and Epic   @  Lab Results   Component Value Date    WBC 6.6 01/06/2024     Lab Results   Component Value Date    RBC 3.49 01/06/2024     Lab Results   Component Value Date    HGB 10.9 01/06/2024     Lab Results   Component Value Date    HCT 33.7 01/06/2024     Lab Results   Component Value Date    MCV 97 01/06/2024     Lab Results   Component Value Date    MCH 31.2 01/06/2024     Lab Results   Component Value Date    MCHC 32.3 01/06/2024     Lab Results   Component Value Date    RDW 13.4 01/06/2024     Lab Results   Component Value Date     01/06/2024     01/06/2024        @Last Comprehensive Metabolic Panel:  Sodium   Date Value Ref Range Status   01/23/2024 137 135 - 145 mmol/L Final     Comment:     Reference intervals for this test were updated on 09/26/2023 to more accurately reflect our healthy population. There may be differences in the flagging of prior results with similar values performed with this method. Interpretation of those prior results can be made in the context of  the updated reference intervals.      Potassium   Date Value Ref Range Status   01/23/2024 3.4 3.4 - 5.3 mmol/L Final   06/28/2022 3.3 (L) 3.5 - 5.0 mmol/L Final     Chloride   Date Value Ref Range Status   01/23/2024 103 98 - 107 mmol/L Final   06/28/2022 103 98 - 107 mmol/L Final     Carbon Dioxide (CO2)   Date Value Ref Range Status   01/23/2024 23 22 - 29 mmol/L Final   06/28/2022 26 22 - 31 mmol/L Final     Anion Gap   Date Value Ref Range Status   01/23/2024 11 7 - 15 mmol/L Final   06/28/2022 13 5 - 18 mmol/L Final     Glucose   Date Value Ref Range Status   01/23/2024 135 (H) 70 - 99 mg/dL Final   06/28/2022 91 70 - 125 mg/dL Final     GLUCOSE BY METER POCT   Date Value Ref Range Status   01/06/2024 151 (H) 70 - 99 mg/dL Final     Urea Nitrogen   Date Value Ref Range Status   01/23/2024 22.0 8.0 - 23.0 mg/dL Final   06/28/2022 26 8 - 28 mg/dL Final     Creatinine   Date Value Ref Range Status   01/23/2024 1.13 0.67 - 1.17 mg/dL Final     GFR Estimate   Date Value Ref Range Status   01/23/2024 67 >60 mL/min/1.73m2 Final   05/19/2021 37 (L) >60 mL/min/1.73m2 Final     Calcium   Date Value Ref Range Status   01/23/2024 9.0 8.8 - 10.2 mg/dL Final     Message left for sister.     This note has been dictated using voice recognition software. Any grammatical or context distortions are unintentional and inherent to the software    Electronically signed by: Justina Barragan CNP       Sincerely,        Justina Barragan NP

## 2024-01-26 LAB
BACTERIA TISS BX CULT: NO GROWTH
BACTERIA TISS BX CULT: NO GROWTH

## 2024-01-26 NOTE — PROGRESS NOTES
[facility] GERIATRIC SERVICES    Code Status:  FULL CODE   Visit Type:   Chief Complaint   Patient presents with    TCU Follow Up     Facility:  [facility] [place] ([facility]) [[id number]]         HPI: Amor Zavaleta is a 78 year old male who I am seeing today for follow-up on the TCU.  Patient recently hospitalized on 12/22/2023 with worsening right wrist infection.  He was initially hospitalized on 12/11 to 12/20 for right hand infection following a cat bite with worsening right wrist infection.  He had a prolonged course.  Underwent I&D on 12/11 and 12/13.  Cultures grew polymicrobial with MSSA, Pasteurella and several anaerobic bacteria.  He was treated with IV antibiotic and discharged on Augmentin and Flagyl on 12/20.  Patient returned in 2 days with worsening infection.  He underwent I&D on 12/22 with no growth and repeat debridement on 12/25 with purulent and ruptured tendons.  Again underwent redebridement on 12/27 for ongoing infection with purulent material about the second and third compartment and ruptured tendon stumps.  He required multiple surgeries during this hospitalization including right wrist arthrotomy for septic arthritis and multiple I&D's.  He was seen by both hand surgery and infectious disease.  He was started on IV Unasyn and will continue this for 6 weeks until 2/6/2024.  He continues with local wound care.  Sutures in place.  He is nonweightbearing to the right upper extremity/right hand.  He is right-handed.  Diabetes mellitus type 2 with a hemoglobin A1c of 8.9.  He continues on Lantus and short acting insulin.    Transitional Care Course: Today patient sitting up in bedside chair. Pt continues in splint to right hand. Upon removal, incision to right hand intact.  Right hand swelling improving. No redness or warmth.  Pt continues on IV antibiotics. No leukocytosis. Pain controlled with tylenol. Family concerned about elevated bp. Initially bp in the 190s systolically however now in the  150's and DBP in the 70-90s. Pt reports staff were taking his bp after he had walked down to the scales or after activity. I did review bps with pt and risk factors. BS continued to be in the 200s late evening.     Assessment/Plan:     Cat bite, subsequent encounter  Bacterial sepsis (H)  Cellulitis of right upper extremity  Septic arthritis  Tendon exposure  -Status post multiple I&D.  -Status post right wrist arthrotomy.  -Continue topical wound care.  -IV Unasyn for 6 weeks until 2/6/2024.  -Weekly CBC, BMP and CRP.  No leukocytosis.  CRP now 3.44.  -ID following.  -Tylenol and oxycodone for pain.  -Follow-up with hand surgeon late last week.-Sutures removed.  Plan follow-up in 1 week.  -Nonweightbearing right upper extremity.  Continue enhancement.    Type 2 diabetes mellitus with hyperosmolarity without coma, with long-term current use of insulin (H)  -Hemoglobin A1c 8.9.  -Patient home dose of Lantus is 56 units. Increase Lantus to 48 units.   -NovoLog  8 units with sliding scale.  -Blood sugars 4 times daily.  -Offer at bedtime snack.    Essential hypertension  -Increase Norvasc 5 daily.  -Continue lisinopril/hydrochlorothiazide daily.  -Monitor    CKD3  -Follow BMP unremarkable.  -Pt to follow with nephrology out pt.     Chronic back pain  -apply Aspercreme BID prn.     Active Ambulatory Problems     Diagnosis Date Noted    Diabetes mellitus, type 2 (H) 08/25/2023    Stage 3 chronic kidney disease, unspecified whether stage 3a or 3b CKD (H) 08/25/2023    Sinus bradycardia 08/25/2023    LBBB (left bundle branch block) 08/25/2023    Cellulitis of right upper extremity 12/11/2023    Infection of right hand due to bite, initial encounter 12/22/2023    Diverticular disease of large intestine 05/31/2016    Essential hypertension 01/20/2020    Gastroesophageal reflux disease 01/20/2020    Hemorrhoids 05/31/2016    History of colonic polyps 05/31/2016    Peripheral neuropathy 01/20/2020    Pure hypercholesterolemia  10/21/2004     Resolved Ambulatory Problems     Diagnosis Date Noted    Seizure (H) 08/10/2007     Past Medical History:   Diagnosis Date    Diabetes (H)     Sleep apnea      Allergies   Allergen Reactions    Sulfa Antibiotics Hives       All Meds and Allergies reviewed in the record at the facility and is the most up-to-date.    Current Outpatient Medications   Medication Sig    acetaminophen (TYLENOL) 325 MG tablet Take 2 tablets (650 mg) by mouth every 4 hours as needed for mild pain or other (and adjunct with moderate or severe pain or per patient request)    amLODIPine (NORVASC) 2.5 MG tablet Take 5 mg by mouth daily    ampicillin-sulbactam (UNASYN) 3 (2-1) g SOLR vial EVERY 6 HOURS UNTIL 2/6/2024    aspirin (ASA) 325 MG tablet Take 325 mg by mouth daily    famotidine (PEPCID) 20 MG tablet Take 20 mg by mouth daily    hydrOXYzine HCl (ATARAX) 10 MG tablet Take 1 tablet (10 mg) by mouth every 6 hours as needed for other (adjuvant pain)    insulin aspart (NOVOLOG PEN) 100 UNIT/ML pen Inject 16 Units Subcutaneous 3 times daily (with meals)    insulin glargine (LANTUS PEN) 100 UNIT/ML pen Inject 45 Units Subcutaneous every morning    lidocaine (LMX4) 4 % external cream Apply topically 2 times daily as needed for mild pain    lisinopril-hydrochlorothiazide (ZESTORETIC) 20-12.5 MG tablet Take 1 tablet by mouth daily    lovastatin (MEVACOR) 40 MG tablet Take 1 tablet by mouth 2 times daily    Multiple Vitamin (MULTIVITAMIN PO) Take 0.5 tablets by mouth 2 times daily    oxyCODONE (ROXICODONE) 5 MG tablet Take 0.5 tablets (2.5 mg) by mouth every 4 hours as needed for severe pain    povidone-iodine (BETADINE) 10 % topical solution Apply topically as needed for wound care Apply topically to incision for wound care    senna-docusate (SENOKOT-S/PERICOLACE) 8.6-50 MG tablet Take 2 tablets by mouth 2 times daily as needed for constipation     No current facility-administered medications for this visit.       REVIEW OF  SYSTEMS:   10 point review of systems reviewed and pertinent positives in the HPI.     PHYSICAL EXAMINATION:  Physical Exam     Vital signs: /66   Pulse 57   Temp 97.9  F (36.6  C)   Resp 16   Ht 1.829 m (6')   Wt 89.8 kg (198 lb)   SpO2 95%   BMI 26.85 kg/m    General: Awake, Alert, oriented x3, sitting up in bedside chair, conversant  HEENT:moist oral mucosa  NECK: Supple  BACK: No kyphosis of the thoracic spine  ABDOMEN: Soft, with positive bowel sounds  EXTREMITIES: Moves both upper and lower extremities without limitation of the right upper extremity, 1+ right hand edema, + CMS.   SKIN: Partial suture removed, granulation.   No redness or warmth.  Well-approximated.  NEUROLOGIC: Intact, pulses palpable  PSYCHIATRIC: Cognition intact      Labs:  All labs reviewed in the nursing home record and Epic   @  Lab Results   Component Value Date    WBC 6.6 01/06/2024     Lab Results   Component Value Date    RBC 3.49 01/06/2024     Lab Results   Component Value Date    HGB 10.9 01/06/2024     Lab Results   Component Value Date    HCT 33.7 01/06/2024     Lab Results   Component Value Date    MCV 97 01/06/2024     Lab Results   Component Value Date    MCH 31.2 01/06/2024     Lab Results   Component Value Date    MCHC 32.3 01/06/2024     Lab Results   Component Value Date    RDW 13.4 01/06/2024     Lab Results   Component Value Date     01/06/2024     01/06/2024        @Last Comprehensive Metabolic Panel:  Sodium   Date Value Ref Range Status   01/23/2024 137 135 - 145 mmol/L Final     Comment:     Reference intervals for this test were updated on 09/26/2023 to more accurately reflect our healthy population. There may be differences in the flagging of prior results with similar values performed with this method. Interpretation of those prior results can be made in the context of the updated reference intervals.      Potassium   Date Value Ref Range Status   01/23/2024 3.4 3.4 - 5.3 mmol/L Final    06/28/2022 3.3 (L) 3.5 - 5.0 mmol/L Final     Chloride   Date Value Ref Range Status   01/23/2024 103 98 - 107 mmol/L Final   06/28/2022 103 98 - 107 mmol/L Final     Carbon Dioxide (CO2)   Date Value Ref Range Status   01/23/2024 23 22 - 29 mmol/L Final   06/28/2022 26 22 - 31 mmol/L Final     Anion Gap   Date Value Ref Range Status   01/23/2024 11 7 - 15 mmol/L Final   06/28/2022 13 5 - 18 mmol/L Final     Glucose   Date Value Ref Range Status   01/23/2024 135 (H) 70 - 99 mg/dL Final   06/28/2022 91 70 - 125 mg/dL Final     GLUCOSE BY METER POCT   Date Value Ref Range Status   01/06/2024 151 (H) 70 - 99 mg/dL Final     Urea Nitrogen   Date Value Ref Range Status   01/23/2024 22.0 8.0 - 23.0 mg/dL Final   06/28/2022 26 8 - 28 mg/dL Final     Creatinine   Date Value Ref Range Status   01/23/2024 1.13 0.67 - 1.17 mg/dL Final     GFR Estimate   Date Value Ref Range Status   01/23/2024 67 >60 mL/min/1.73m2 Final   05/19/2021 37 (L) >60 mL/min/1.73m2 Final     Calcium   Date Value Ref Range Status   01/23/2024 9.0 8.8 - 10.2 mg/dL Final     Message left for sister.     This note has been dictated using voice recognition software. Any grammatical or context distortions are unintentional and inherent to the software    Electronically signed by: Justina Barragan, CNP

## 2024-01-28 ENCOUNTER — LAB REQUISITION (OUTPATIENT)
Dept: LAB | Facility: CLINIC | Age: 79
End: 2024-01-28
Payer: COMMERCIAL

## 2024-01-28 DIAGNOSIS — S61.451A OPEN BITE OF RIGHT HAND, INITIAL ENCOUNTER: ICD-10-CM

## 2024-01-29 ENCOUNTER — TRANSITIONAL CARE UNIT VISIT (OUTPATIENT)
Dept: GERIATRICS | Facility: CLINIC | Age: 79
End: 2024-01-29
Payer: COMMERCIAL

## 2024-01-29 VITALS
SYSTOLIC BLOOD PRESSURE: 163 MMHG | HEART RATE: 62 BPM | RESPIRATION RATE: 18 BRPM | DIASTOLIC BLOOD PRESSURE: 94 MMHG | HEIGHT: 72 IN | OXYGEN SATURATION: 98 % | WEIGHT: 195.6 LBS | TEMPERATURE: 97.7 F | BODY MASS INDEX: 26.49 KG/M2

## 2024-01-29 DIAGNOSIS — Z79.4 TYPE 2 DIABETES MELLITUS WITH HYPEROSMOLARITY WITHOUT COMA, WITH LONG-TERM CURRENT USE OF INSULIN (H): ICD-10-CM

## 2024-01-29 DIAGNOSIS — R00.1 BRADYCARDIA: ICD-10-CM

## 2024-01-29 DIAGNOSIS — E11.00 TYPE 2 DIABETES MELLITUS WITH HYPEROSMOLARITY WITHOUT COMA, WITH LONG-TERM CURRENT USE OF INSULIN (H): ICD-10-CM

## 2024-01-29 DIAGNOSIS — A41.9 BACTERIAL SEPSIS (H): Primary | ICD-10-CM

## 2024-01-29 DIAGNOSIS — I10 ESSENTIAL HYPERTENSION: ICD-10-CM
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
## 2024-01-29 PROCEDURE — 99309 SBSQ NF CARE MODERATE MDM 30: CPT | Performed by: NURSE PRACTITIONER

## 2024-01-29 NOTE — LETTER
1/29/2024        RE: Amor Zavaleta  [address]  [place] [address]        [facility] GERIATRIC SERVICES    Code Status:  FULL CODE   Visit Type:   Chief Complaint   Patient presents with     TCU Follow Up     Facility:  [facility] ([facility]) [[id number]]         HPI: Amor Zavaleta is a 78 year old male who I am seeing today for follow-up on the TCU.  Patient recently hospitalized on 12/22/2023 with worsening right wrist infection.  He was initially hospitalized on 12/11 to 12/20 for right hand infection following a cat bite with worsening right wrist infection.  He had a prolonged course.  Underwent I&D on 12/11 and 12/13.  Cultures grew polymicrobial with MSSA, Pasteurella and several anaerobic bacteria.  He was treated with IV antibiotic and discharged on Augmentin and Flagyl on 12/20.  Patient returned in 2 days with worsening infection.  He underwent I&D on 12/22 with no growth and repeat debridement on 12/25 with purulent and ruptured tendons.  Again underwent redebridement on 12/27 for ongoing infection with purulent material about the second and third compartment and ruptured tendon stumps.  He required multiple surgeries during this hospitalization including right wrist arthrotomy for septic arthritis and multiple I&D's.  He was seen by both hand surgery and infectious disease.  He was started on IV Unasyn and will continue this for 6 weeks until 2/6/2024.  He continues with local wound care.  Sutures in place.  He is nonweightbearing to the right upper extremity/right hand.  He is right-handed.  Diabetes mellitus type 2 with a hemoglobin A1c of 8.9.  He continues on Lantus and short acting insulin.    Transitional Care Course: Today patient sitting up in bedside chair. Incision to right wrist intact. Scabbing noted. Pt denies any pain. Blood pressures improving with increase in Amlodipine. Blood sugars improving. Occ 200s late evening. Pulses in the 50s. Pt asymptomatic. Reports this  is chronic. Denies any dizziness or CP. Pt reports having some occ SOB with exertion at home walking up stairs. He had cardiac work up 1 year prior and wore an event monitor. 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 He gets chest pressure at times when exposed to smoke or car exhaust. Questionable lung disease due to environmental exposure.     Assessment/Plan:     Cat bite, subsequent encounter  Bacterial sepsis (H)  Cellulitis of right upper extremity  Septic arthritis  Tendon exposure  -Status post multiple I&D.  -Status post right wrist arthrotomy.  -Continue topical wound care.  -IV Unasyn for 6 weeks until 2/6/2024.  -Weekly CBC, BMP and CRP.  No leukocytosis.  CRP WNL.   -ID following. Follow up next Thursday.   -Tylenol  for pain.  -Follow-up with hand surgeon late last week.Sutures removed. Follow up 2 weeks.   -Nonweightbearing right upper extremity.  Continue enhancement.    Type 2 diabetes mellitus with hyperosmolarity without coma, with long-term current use of insulin (H)  -Hemoglobin A1c 8.9.  -Patient home dose of Lantus is 56 units. Increase Lantus to 50 units.   -NovoLog  8 units with sliding scale.  -Blood sugars 4 times daily.  -Offer at bedtime snack.    Essential hypertension  -Norvasc 5 daily.  -Continue lisinopril/hydrochlorothiazide daily.  -Monitor (BP improving)     Bradycardia  -heart rates in the 50s   -asymptomatic  -Seen by Dr Fonseca ( [place])      Results of monitor His heart rate from a 42 bpm to 83 bpm, average heartbeat of 53 bpm.  There is no long pauses more than 3 seconds, no other cardiac arrhythmia. No indication for PPM.   -Cardiac MRI 10/23 with no reproducible ischemia. EF 68%.     CKD3  -Follow BMP unremarkable.  -Pt to follow with nephrology out pt.     Environmental chemical exposure  -Recommend follow up with pulmonology out pt.     Active Ambulatory Problems     Diagnosis Date Noted     Diabetes mellitus, type 2 (H) 08/25/2023     Stage 3 chronic kidney  disease, unspecified whether stage 3a or 3b CKD (H) 08/25/2023     Sinus bradycardia 08/25/2023     LBBB (left bundle branch block) 08/25/2023     Cellulitis of right upper extremity 12/11/2023     Infection of right hand due to bite, initial encounter 12/22/2023     Diverticular disease of large intestine 05/31/2016     Essential hypertension 01/20/2020     Gastroesophageal reflux disease 01/20/2020     Hemorrhoids 05/31/2016     History of colonic polyps 05/31/2016     Peripheral neuropathy 01/20/2020     Pure hypercholesterolemia 10/21/2004     Resolved Ambulatory Problems     Diagnosis Date Noted     Seizure (H) 08/10/2007     Past Medical History:   Diagnosis Date     Diabetes (H)      Sleep apnea      Allergies   Allergen Reactions     Sulfa Antibiotics Hives       All Meds and Allergies reviewed in the record at the facility and is the most up-to-date.    Current Outpatient Medications   Medication Sig     acetaminophen (TYLENOL) 325 MG tablet Take 2 tablets (650 mg) by mouth every 4 hours as needed for mild pain or other (and adjunct with moderate or severe pain or per patient request)     amLODIPine (NORVASC) 2.5 MG tablet Take 5 mg by mouth daily     ampicillin-sulbactam (UNASYN) 3 (2-1) g SOLR vial EVERY 6 HOURS UNTIL 2/6/2024     aspirin (ASA) 325 MG tablet Take 325 mg by mouth daily     famotidine (PEPCID) 20 MG tablet Take 20 mg by mouth daily     hydrOXYzine HCl (ATARAX) 10 MG tablet Take 1 tablet (10 mg) by mouth every 6 hours as needed for other (adjuvant pain)     insulin aspart (NOVOLOG PEN) 100 UNIT/ML pen Inject 16 Units Subcutaneous 3 times daily (with meals)     insulin glargine (LANTUS PEN) 100 UNIT/ML pen Inject 50 Units Subcutaneous every morning     lidocaine (LMX4) 4 % external cream Apply topically 2 times daily as needed for mild pain     lisinopril-hydrochlorothiazide (ZESTORETIC) 20-12.5 MG tablet Take 1 tablet by mouth daily     lovastatin (MEVACOR) 40 MG tablet Take 1 tablet by  mouth 2 times daily     Multiple Vitamin (MULTIVITAMIN PO) Take 0.5 tablets by mouth 2 times daily     oxyCODONE (ROXICODONE) 5 MG tablet Take 0.5 tablets (2.5 mg) by mouth every 4 hours as needed for severe pain     povidone-iodine (BETADINE) 10 % topical solution Apply topically as needed for wound care Apply topically to incision for wound care     senna-docusate (SENOKOT-S/PERICOLACE) 8.6-50 MG tablet Take 2 tablets by mouth 2 times daily as needed for constipation     No current facility-administered medications for this visit.       REVIEW OF SYSTEMS:   10 point review of systems reviewed and pertinent positives in the HPI.     PHYSICAL EXAMINATION:  Physical Exam     Vital signs: BP (!) 163/94   Pulse 62   Temp 97.7  F (36.5  C)   Resp 18   Ht 1.829 m (6')   Wt 88.7 kg (195 lb 9.6 oz)   SpO2 98%   BMI 26.53 kg/m    General: Awake, Alert, oriented x3, sitting up in bedside chair, conversant  HEENT:moist oral mucosa  NECK: Supple  BACK: No kyphosis of the thoracic spine  ABDOMEN: Soft, with positive bowel sounds  EXTREMITIES: Moves both upper and lower extremities without limitation of the right upper extremity, trace right hand edema, + CMS.   SKIN: Incision to right forearm dry and intact. No redness or warmth.  Well-approximated.  NEUROLOGIC: Intact, pulses palpable  PSYCHIATRIC: Cognition intact      Labs:  All labs reviewed in the nursing home record and Epic   @  Lab Results   Component Value Date    WBC 6.6 01/06/2024     Lab Results   Component Value Date    RBC 3.49 01/06/2024     Lab Results   Component Value Date    HGB 10.9 01/06/2024     Lab Results   Component Value Date    HCT 33.7 01/06/2024     Lab Results   Component Value Date    MCV 97 01/06/2024     Lab Results   Component Value Date    MCH 31.2 01/06/2024     Lab Results   Component Value Date    MCHC 32.3 01/06/2024     Lab Results   Component Value Date    RDW 13.4 01/06/2024     Lab Results   Component Value Date      01/06/2024     01/06/2024        @Last Comprehensive Metabolic Panel:  Sodium   Date Value Ref Range Status   01/23/2024 137 135 - 145 mmol/L Final     Comment:     Reference intervals for this test were updated on 09/26/2023 to more accurately reflect our healthy population. There may be differences in the flagging of prior results with similar values performed with this method. Interpretation of those prior results can be made in the context of the updated reference intervals.      Potassium   Date Value Ref Range Status   01/23/2024 3.4 3.4 - 5.3 mmol/L Final   06/28/2022 3.3 (L) 3.5 - 5.0 mmol/L Final     Chloride   Date Value Ref Range Status   01/23/2024 103 98 - 107 mmol/L Final   06/28/2022 103 98 - 107 mmol/L Final     Carbon Dioxide (CO2)   Date Value Ref Range Status   01/23/2024 23 22 - 29 mmol/L Final   06/28/2022 26 22 - 31 mmol/L Final     Anion Gap   Date Value Ref Range Status   01/23/2024 11 7 - 15 mmol/L Final   06/28/2022 13 5 - 18 mmol/L Final     Glucose   Date Value Ref Range Status   01/23/2024 135 (H) 70 - 99 mg/dL Final   06/28/2022 91 70 - 125 mg/dL Final     GLUCOSE BY METER POCT   Date Value Ref Range Status   01/06/2024 151 (H) 70 - 99 mg/dL Final     Urea Nitrogen   Date Value Ref Range Status   01/23/2024 22.0 8.0 - 23.0 mg/dL Final   06/28/2022 26 8 - 28 mg/dL Final     Creatinine   Date Value Ref Range Status   01/23/2024 1.13 0.67 - 1.17 mg/dL Final     GFR Estimate   Date Value Ref Range Status   01/23/2024 67 >60 mL/min/1.73m2 Final   05/19/2021 37 (L) >60 mL/min/1.73m2 Final     Calcium   Date Value Ref Range Status   01/23/2024 9.0 8.8 - 10.2 mg/dL Final       This note has been dictated using voice recognition software. Any grammatical or context distortions are unintentional and inherent to the software    Electronically signed by: Justina Barragan CNP       Sincerely,        Justina Barragan, NP

## 2024-01-30 LAB
ALBUMIN SERPL BCG-MCNC: 3.8 G/DL (ref 3.5–5.2)
ALP SERPL-CCNC: 42 U/L (ref 40–150)
ALT SERPL W P-5'-P-CCNC: 16 U/L (ref 0–70)
ANION GAP SERPL CALCULATED.3IONS-SCNC: 12 MMOL/L (ref 7–15)
AST SERPL W P-5'-P-CCNC: 25 U/L (ref 0–45)
BASOPHILS # BLD AUTO: 0.1 10E3/UL (ref 0–0.2)
BASOPHILS NFR BLD AUTO: 1 %
BILIRUB SERPL-MCNC: 0.4 MG/DL
BUN SERPL-MCNC: 18.4 MG/DL (ref 8–23)
CALCIUM SERPL-MCNC: 8.9 MG/DL (ref 8.8–10.2)
CHLORIDE SERPL-SCNC: 103 MMOL/L (ref 98–107)
CREAT SERPL-MCNC: 1.17 MG/DL (ref 0.67–1.17)
CRP SERPL-MCNC: <3 MG/L
DEPRECATED HCO3 PLAS-SCNC: 22 MMOL/L (ref 22–29)
EGFRCR SERPLBLD CKD-EPI 2021: 64 ML/MIN/1.73M2
EOSINOPHIL # BLD AUTO: 0.4 10E3/UL (ref 0–0.7)
EOSINOPHIL NFR BLD AUTO: 5 %
ERYTHROCYTE [DISTWIDTH] IN BLOOD BY AUTOMATED COUNT: 14 % (ref 10–15)
GLUCOSE SERPL-MCNC: 161 MG/DL (ref 70–99)
HCT VFR BLD AUTO: 41.5 % (ref 40–53)
HGB BLD-MCNC: 13.5 G/DL (ref 13.3–17.7)
IMM GRANULOCYTES # BLD: 0 10E3/UL
IMM GRANULOCYTES NFR BLD: 0 %
LYMPHOCYTES # BLD AUTO: 2 10E3/UL (ref 0.8–5.3)
LYMPHOCYTES NFR BLD AUTO: 27 %
MCH RBC QN AUTO: 31.8 PG (ref 26.5–33)
MCHC RBC AUTO-ENTMCNC: 32.5 G/DL (ref 31.5–36.5)
MCV RBC AUTO: 98 FL (ref 78–100)
MONOCYTES # BLD AUTO: 0.7 10E3/UL (ref 0–1.3)
MONOCYTES NFR BLD AUTO: 10 %
NEUTROPHILS # BLD AUTO: 4 10E3/UL (ref 1.6–8.3)
NEUTROPHILS NFR BLD AUTO: 57 %
NRBC # BLD AUTO: 0 10E3/UL
NRBC BLD AUTO-RTO: 0 /100
PLATELET # BLD AUTO: 176 10E3/UL (ref 150–450)
POTASSIUM SERPL-SCNC: 4.2 MMOL/L (ref 3.4–5.3)
PROT SERPL-MCNC: 7.4 G/DL (ref 6.4–8.3)
RBC # BLD AUTO: 4.24 10E6/UL (ref 4.4–5.9)
SODIUM SERPL-SCNC: 137 MMOL/L (ref 135–145)
WBC # BLD AUTO: 7.2 10E3/UL (ref 4–11)

## 2024-01-30 PROCEDURE — 36415 COLL VENOUS BLD VENIPUNCTURE: CPT | Performed by: FAMILY MEDICINE

## 2024-01-30 PROCEDURE — 80053 COMPREHEN METABOLIC PANEL: CPT | Performed by: FAMILY MEDICINE

## 2024-01-30 PROCEDURE — 85025 COMPLETE CBC W/AUTO DIFF WBC: CPT | Performed by: FAMILY MEDICINE

## 2024-01-30 PROCEDURE — P9604 ONE-WAY ALLOW PRORATED TRIP: HCPCS | Performed by: FAMILY MEDICINE

## 2024-01-30 PROCEDURE — 86140 C-REACTIVE PROTEIN: CPT | Performed by: FAMILY MEDICINE

## 2024-01-30 NOTE — PROGRESS NOTES
[facility] GERIATRIC SERVICES    Code Status:  FULL CODE   Visit Type:   Chief Complaint   Patient presents with    TCU Follow Up     Facility:  [facility] ([facility]) [[id number]]         HPI: Amor Zavaleta is a 78 year old male who I am seeing today for follow-up on the TCU.  Patient recently hospitalized on 12/22/2023 with worsening right wrist infection.  He was initially hospitalized on 12/11 to 12/20 for right hand infection following a cat bite with worsening right wrist infection.  He had a prolonged course.  Underwent I&D on 12/11 and 12/13.  Cultures grew polymicrobial with MSSA, Pasteurella and several anaerobic bacteria.  He was treated with IV antibiotic and discharged on Augmentin and Flagyl on 12/20.  Patient returned in 2 days with worsening infection.  He underwent I&D on 12/22 with no growth and repeat debridement on 12/25 with purulent and ruptured tendons.  Again underwent redebridement on 12/27 for ongoing infection with purulent material about the second and third compartment and ruptured tendon stumps.  He required multiple surgeries during this hospitalization including right wrist arthrotomy for septic arthritis and multiple I&D's.  He was seen by both hand surgery and infectious disease.  He was started on IV Unasyn and will continue this for 6 weeks until 2/6/2024.  He continues with local wound care.  Sutures in place.  He is nonweightbearing to the right upper extremity/right hand.  He is right-handed.  Diabetes mellitus type 2 with a hemoglobin A1c of 8.9.  He continues on Lantus and short acting insulin.    Transitional Care Course: Today patient sitting up in bedside chair. Incision to right wrist intact. Scabbing noted. Pt denies any pain. Blood pressures improving with increase in Amlodipine. Blood sugars improving. Occ 200s late evening. Pulses in the 50s. Pt asymptomatic. Reports this is chronic. Denies any dizziness or CP. Pt reports having some occ SOB with exertion at home  walking up stairs. He had cardiac work up 1 year prior and wore an event monitor. 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 He gets chest pressure at times when exposed to smoke or car exhaust. Questionable lung disease due to environmental exposure.     Assessment/Plan:     Cat bite, subsequent encounter  Bacterial sepsis (H)  Cellulitis of right upper extremity  Septic arthritis  Tendon exposure  -Status post multiple I&D.  -Status post right wrist arthrotomy.  -Continue topical wound care.  -IV Unasyn for 6 weeks until 2/6/2024.  -Weekly CBC, BMP and CRP.  No leukocytosis.  CRP WNL.   -ID following. Follow up next Thursday.   -Tylenol  for pain.  -Follow-up with hand surgeon late last week.Sutures removed. Follow up 2 weeks.   -Nonweightbearing right upper extremity.  Continue enhancement.    Type 2 diabetes mellitus with hyperosmolarity without coma, with long-term current use of insulin (H)  -Hemoglobin A1c 8.9.  -Patient home dose of Lantus is 56 units. Increase Lantus to 50 units.   -NovoLog  8 units with sliding scale.  -Blood sugars 4 times daily.  -Offer at bedtime snack.    Essential hypertension  -Norvasc 5 daily.  -Continue lisinopril/hydrochlorothiazide daily.  -Monitor (BP improving)     Bradycardia  -heart rates in the 50s   -asymptomatic  -Seen by Dr Fonseca ( [id number])      Results of monitor His heart rate from a 42 bpm to 83 bpm, average heartbeat of 53 bpm.  There is no long pauses more than 3 seconds, no other cardiac arrhythmia. No indication for PPM.   -Cardiac MRI 10/23 with no reproducible ischemia. EF 68%.     CKD3  -Follow BMP unremarkable.  -Pt to follow with nephrology out pt.     Environmental chemical exposure  -Recommend follow up with pulmonology out pt.     Active Ambulatory Problems     Diagnosis Date Noted    Diabetes mellitus, type 2 (H) 08/25/2023    Stage 3 chronic kidney disease, unspecified whether stage 3a or 3b CKD (H) 08/25/2023    Sinus bradycardia 08/25/2023     LBBB (left bundle branch block) 08/25/2023    Cellulitis of right upper extremity 12/11/2023    Infection of right hand due to bite, initial encounter 12/22/2023    Diverticular disease of large intestine 05/31/2016    Essential hypertension 01/20/2020    Gastroesophageal reflux disease 01/20/2020    Hemorrhoids 05/31/2016    History of colonic polyps 05/31/2016    Peripheral neuropathy 01/20/2020    Pure hypercholesterolemia 10/21/2004     Resolved Ambulatory Problems     Diagnosis Date Noted    Seizure (H) 08/10/2007     Past Medical History:   Diagnosis Date    Diabetes (H)     Sleep apnea      Allergies   Allergen Reactions    Sulfa Antibiotics Hives       All Meds and Allergies reviewed in the record at the facility and is the most up-to-date.    Current Outpatient Medications   Medication Sig    acetaminophen (TYLENOL) 325 MG tablet Take 2 tablets (650 mg) by mouth every 4 hours as needed for mild pain or other (and adjunct with moderate or severe pain or per patient request)    amLODIPine (NORVASC) 2.5 MG tablet Take 5 mg by mouth daily    ampicillin-sulbactam (UNASYN) 3 (2-1) g SOLR vial EVERY 6 HOURS UNTIL 2/6/2024    aspirin (ASA) 325 MG tablet Take 325 mg by mouth daily    famotidine (PEPCID) 20 MG tablet Take 20 mg by mouth daily    hydrOXYzine HCl (ATARAX) 10 MG tablet Take 1 tablet (10 mg) by mouth every 6 hours as needed for other (adjuvant pain)    insulin aspart (NOVOLOG PEN) 100 UNIT/ML pen Inject 16 Units Subcutaneous 3 times daily (with meals)    insulin glargine (LANTUS PEN) 100 UNIT/ML pen Inject 50 Units Subcutaneous every morning    lidocaine (LMX4) 4 % external cream Apply topically 2 times daily as needed for mild pain    lisinopril-hydrochlorothiazide (ZESTORETIC) 20-12.5 MG tablet Take 1 tablet by mouth daily    lovastatin (MEVACOR) 40 MG tablet Take 1 tablet by mouth 2 times daily    Multiple Vitamin (MULTIVITAMIN PO) Take 0.5 tablets by mouth 2 times daily    oxyCODONE (ROXICODONE) 5 MG  tablet Take 0.5 tablets (2.5 mg) by mouth every 4 hours as needed for severe pain    povidone-iodine (BETADINE) 10 % topical solution Apply topically as needed for wound care Apply topically to incision for wound care    senna-docusate (SENOKOT-S/PERICOLACE) 8.6-50 MG tablet Take 2 tablets by mouth 2 times daily as needed for constipation     No current facility-administered medications for this visit.       REVIEW OF SYSTEMS:   10 point review of systems reviewed and pertinent positives in the HPI.     PHYSICAL EXAMINATION:  Physical Exam     Vital signs: BP (!) 163/94   Pulse 62   Temp 97.7  F (36.5  C)   Resp 18   Ht 1.829 m (6')   Wt 88.7 kg (195 lb 9.6 oz)   SpO2 98%   BMI 26.53 kg/m    General: Awake, Alert, oriented x3, sitting up in bedside chair, conversant  HEENT:moist oral mucosa  NECK: Supple  BACK: No kyphosis of the thoracic spine  ABDOMEN: Soft, with positive bowel sounds  EXTREMITIES: Moves both upper and lower extremities without limitation of the right upper extremity, trace right hand edema, + CMS.   SKIN: Incision to right forearm dry and intact. No redness or warmth.  Well-approximated.  NEUROLOGIC: Intact, pulses palpable  PSYCHIATRIC: Cognition intact      Labs:  All labs reviewed in the nursing home record and Epic   @  Lab Results   Component Value Date    WBC 6.6 01/06/2024     Lab Results   Component Value Date    RBC 3.49 01/06/2024     Lab Results   Component Value Date    HGB 10.9 01/06/2024     Lab Results   Component Value Date    HCT 33.7 01/06/2024     Lab Results   Component Value Date    MCV 97 01/06/2024     Lab Results   Component Value Date    MCH 31.2 01/06/2024     Lab Results   Component Value Date    MCHC 32.3 01/06/2024     Lab Results   Component Value Date    RDW 13.4 01/06/2024     Lab Results   Component Value Date     01/06/2024     01/06/2024        @Last Comprehensive Metabolic Panel:  Sodium   Date Value Ref Range Status   01/23/2024 137 135 -  145 mmol/L Final     Comment:     Reference intervals for this test were updated on 09/26/2023 to more accurately reflect our healthy population. There may be differences in the flagging of prior results with similar values performed with this method. Interpretation of those prior results can be made in the context of the updated reference intervals.      Potassium   Date Value Ref Range Status   01/23/2024 3.4 3.4 - 5.3 mmol/L Final   06/28/2022 3.3 (L) 3.5 - 5.0 mmol/L Final     Chloride   Date Value Ref Range Status   01/23/2024 103 98 - 107 mmol/L Final   06/28/2022 103 98 - 107 mmol/L Final     Carbon Dioxide (CO2)   Date Value Ref Range Status   01/23/2024 23 22 - 29 mmol/L Final   06/28/2022 26 22 - 31 mmol/L Final     Anion Gap   Date Value Ref Range Status   01/23/2024 11 7 - 15 mmol/L Final   06/28/2022 13 5 - 18 mmol/L Final     Glucose   Date Value Ref Range Status   01/23/2024 135 (H) 70 - 99 mg/dL Final   06/28/2022 91 70 - 125 mg/dL Final     GLUCOSE BY METER POCT   Date Value Ref Range Status   01/06/2024 151 (H) 70 - 99 mg/dL Final     Urea Nitrogen   Date Value Ref Range Status   01/23/2024 22.0 8.0 - 23.0 mg/dL Final   06/28/2022 26 8 - 28 mg/dL Final     Creatinine   Date Value Ref Range Status   01/23/2024 1.13 0.67 - 1.17 mg/dL Final     GFR Estimate   Date Value Ref Range Status   01/23/2024 67 >60 mL/min/1.73m2 Final   05/19/2021 37 (L) >60 mL/min/1.73m2 Final     Calcium   Date Value Ref Range Status   01/23/2024 9.0 8.8 - 10.2 mg/dL Final       This note has been dictated using voice recognition software. Any grammatical or context distortions are unintentional and inherent to the software    Electronically signed by: Justina Barragan, CNP

## 2024-01-31 ENCOUNTER — TRANSITIONAL CARE UNIT VISIT (OUTPATIENT)
Dept: GERIATRICS | Facility: CLINIC | Age: 79
End: 2024-01-31
Payer: COMMERCIAL

## 2024-01-31 VITALS
BODY MASS INDEX: 26.49 KG/M2 | OXYGEN SATURATION: 96 % | TEMPERATURE: 98.1 F | DIASTOLIC BLOOD PRESSURE: 74 MMHG | HEIGHT: 72 IN | SYSTOLIC BLOOD PRESSURE: 168 MMHG | RESPIRATION RATE: 18 BRPM | HEART RATE: 66 BPM | WEIGHT: 195.6 LBS
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
## 2024-01-31 DIAGNOSIS — Z79.4 TYPE 2 DIABETES MELLITUS WITH HYPEROSMOLARITY WITHOUT COMA, WITH LONG-TERM CURRENT USE OF INSULIN (H): ICD-10-CM

## 2024-01-31 DIAGNOSIS — R00.1 BRADYCARDIA: ICD-10-CM

## 2024-01-31 DIAGNOSIS — I10 ESSENTIAL HYPERTENSION: ICD-10-CM

## 2024-01-31 DIAGNOSIS — A41.9 BACTERIAL SEPSIS (H): Primary | ICD-10-CM

## 2024-01-31 DIAGNOSIS — E11.00 TYPE 2 DIABETES MELLITUS WITH HYPEROSMOLARITY WITHOUT COMA, WITH LONG-TERM CURRENT USE OF INSULIN (H): ICD-10-CM

## 2024-01-31 PROCEDURE — 99309 SBSQ NF CARE MODERATE MDM 30: CPT | Performed by: NURSE PRACTITIONER

## 2024-01-31 NOTE — LETTER
1/31/2024        RE: Amor Zavaleta  [address]  [facility] [address]        [facility] GERIATRIC SERVICES    Code Status:  FULL CODE   Visit Type:   Chief Complaint   Patient presents with     TCU Follow Up     Facility:  [facility] ([facility]) [[id number]]         HPI: Amor Zavaleta is a 78 year old male who I am seeing today for follow-up on the TCU.  Patient recently hospitalized on 12/22/2023 with worsening right wrist infection.  He was initially hospitalized on 12/11 to 12/20 for right hand infection following a cat bite with worsening right wrist infection.  He had a prolonged course.  Underwent I&D on 12/11 and 12/13.  Cultures grew polymicrobial with MSSA, Pasteurella and several anaerobic bacteria.  He was treated with IV antibiotic and discharged on Augmentin and Flagyl on 12/20.  Patient returned in 2 days with worsening infection.  He underwent I&D on 12/22 with no growth and repeat debridement on 12/25 with purulent and ruptured tendons.  Again underwent redebridement on 12/27 for ongoing infection with purulent material about the second and third compartment and ruptured tendon stumps.  He required multiple surgeries during this hospitalization including right wrist arthrotomy for septic arthritis and multiple I&D's.  He was seen by both hand surgery and infectious disease.  He was started on IV Unasyn and will continue this for 6 weeks until 2/6/2024.  He continues with local wound care.  Sutures in place.  He is nonweightbearing to the right upper extremity/right hand.  He is right-handed.  Diabetes mellitus type 2 with a hemoglobin A1c of 8.9.  He continues on Lantus and short acting insulin.    Transitional Care Course: Today patient sitting up in bedside chair. Pt continues in hand splint. Continues on IV anbx. Tolerating well. No leukocytosis. Pt denies any pain. Blood pressures improving with increase in Amlodipine. Blood sugars elevated in the late evening. He continues on  Lantus and Novolog. Today we reviewed his home regimen. He was taking lantus at bedtime at home but currently on it in the am from hospital.  Patient was planning to discharge to assisted living.  Today he tells me he feels he cannot afford that.  He has 8 flights of stairs x 2 to get up to his apartment.    Assessment/Plan:     Cat bite, subsequent encounter  Bacterial sepsis (H)  Cellulitis of right upper extremity  Septic arthritis  Tendon exposure  -Status post multiple I&D.  -Status post right wrist arthrotomy.  -Continue topical wound care.  -IV Unasyn for 6 weeks until 2/6/2024.  -Weekly CBC, BMP and CRP.  No leukocytosis.  CRP WNL.   -ID following. Follow up next Thursday.   -Tylenol  for pain.  -Follow-up with hand surgeon late last week.Sutures removed. Follow up 2 weeks.   -Nonweightbearing right upper extremity.  Continues in splinting.     Type 2 diabetes mellitus with hyperosmolarity without coma, with long-term current use of insulin (H)  -Hemoglobin A1c 8.9.  -Patient home dose of Lantus is 56 units. Increase Lantus to 56 units.   -NovoLog  8 units with sliding scale. (At home he was taking 16 units with meals.)   -Blood sugars 4 times daily.  -Offer at bedtime snack.    Essential hypertension  -Norvasc 5 daily.  -Continue lisinopril/hydrochlorothiazide daily.  -Monitor     Bradycardia  -heart rates in the 50s   -asymptomatic  -Seen by Dr Fonseca ( [facility])      Results of monitor His heart rate from a 42 bpm to 83 bpm, average heartbeat of 53 bpm.  There is no long pauses more than 3 seconds, no other cardiac arrhythmia. No indication for PPM.   -Cardiac MRI 10/23 with no reproducible ischemia. EF 68%.     CKD3  -Follow BMP unremarkable.  -Pt to follow with nephrology out pt.       Active Ambulatory Problems     Diagnosis Date Noted     Diabetes mellitus, type 2 (H) 08/25/2023     Stage 3 chronic kidney disease, unspecified whether stage 3a or 3b CKD (H) 08/25/2023     Sinus bradycardia 08/25/2023      LBBB (left bundle branch block) 08/25/2023     Cellulitis of right upper extremity 12/11/2023     Infection of right hand due to bite, initial encounter 12/22/2023     Diverticular disease of large intestine 05/31/2016     Essential hypertension 01/20/2020     Gastroesophageal reflux disease 01/20/2020     Hemorrhoids 05/31/2016     History of colonic polyps 05/31/2016     Peripheral neuropathy 01/20/2020     Pure hypercholesterolemia 10/21/2004     Resolved Ambulatory Problems     Diagnosis Date Noted     Seizure (H) 08/10/2007     Past Medical History:   Diagnosis Date     Diabetes (H)      Sleep apnea      Allergies   Allergen Reactions     Sulfa Antibiotics Hives       All Meds and Allergies reviewed in the record at the facility and is the most up-to-date.    Current Outpatient Medications   Medication Sig     acetaminophen (TYLENOL) 325 MG tablet Take 2 tablets (650 mg) by mouth every 4 hours as needed for mild pain or other (and adjunct with moderate or severe pain or per patient request)     amLODIPine (NORVASC) 2.5 MG tablet Take 5 mg by mouth daily     ampicillin-sulbactam (UNASYN) 3 (2-1) g SOLR vial EVERY 6 HOURS UNTIL 2/6/2024     aspirin (ASA) 325 MG tablet Take 325 mg by mouth daily     famotidine (PEPCID) 20 MG tablet Take 20 mg by mouth daily     hydrOXYzine HCl (ATARAX) 10 MG tablet Take 1 tablet (10 mg) by mouth every 6 hours as needed for other (adjuvant pain)     insulin aspart (NOVOLOG PEN) 100 UNIT/ML pen Inject 16 Units Subcutaneous 3 times daily (with meals)     insulin glargine (LANTUS PEN) 100 UNIT/ML pen Inject 56 Units Subcutaneous every morning     lidocaine (LMX4) 4 % external cream Apply topically 2 times daily as needed for mild pain     lisinopril-hydrochlorothiazide (ZESTORETIC) 20-12.5 MG tablet Take 1 tablet by mouth daily     lovastatin (MEVACOR) 40 MG tablet Take 1 tablet by mouth 2 times daily     Multiple Vitamin (MULTIVITAMIN PO) Take 0.5 tablets by mouth 2 times daily      oxyCODONE (ROXICODONE) 5 MG tablet Take 0.5 tablets (2.5 mg) by mouth every 4 hours as needed for severe pain     povidone-iodine (BETADINE) 10 % topical solution Apply topically as needed for wound care Apply topically to incision for wound care     senna-docusate (SENOKOT-S/PERICOLACE) 8.6-50 MG tablet Take 2 tablets by mouth 2 times daily as needed for constipation     No current facility-administered medications for this visit.       REVIEW OF SYSTEMS:   10 point review of systems reviewed and pertinent positives in the HPI.     PHYSICAL EXAMINATION:  Physical Exam     Vital signs: BP (!) 168/74   Pulse 66   Temp 98.1  F (36.7  C)   Resp 18   Ht 1.829 m (6')   Wt 88.7 kg (195 lb 9.6 oz)   SpO2 96%   BMI 26.53 kg/m    General: Awake, Alert, oriented x3, sitting up in bedside chair, conversant  HEENT:moist oral mucosa  NECK: Supple  BACK: No kyphosis of the thoracic spine  EXTREMITIES: Moves both upper and lower extremities without limitation of the right upper extremity, trace right hand edema, + CMS. continues on splinting.  NEUROLOGIC: Intact, pulses palpable  PSYCHIATRIC: Cognition intact      Labs:  All labs reviewed in the nursing home record and Epic   @  Lab Results   Component Value Date    WBC 6.6 01/06/2024     Lab Results   Component Value Date    RBC 3.49 01/06/2024     Lab Results   Component Value Date    HGB 10.9 01/06/2024     Lab Results   Component Value Date    HCT 33.7 01/06/2024     Lab Results   Component Value Date    MCV 97 01/06/2024     Lab Results   Component Value Date    MCH 31.2 01/06/2024     Lab Results   Component Value Date    MCHC 32.3 01/06/2024     Lab Results   Component Value Date    RDW 13.4 01/06/2024     Lab Results   Component Value Date     01/06/2024     01/06/2024        @Last Comprehensive Metabolic Panel:  Sodium   Date Value Ref Range Status   01/30/2024 137 135 - 145 mmol/L Final     Comment:     Reference intervals for this test were updated  on 09/26/2023 to more accurately reflect our healthy population. There may be differences in the flagging of prior results with similar values performed with this method. Interpretation of those prior results can be made in the context of the updated reference intervals.      Potassium   Date Value Ref Range Status   01/30/2024 4.2 3.4 - 5.3 mmol/L Final   06/28/2022 3.3 (L) 3.5 - 5.0 mmol/L Final     Chloride   Date Value Ref Range Status   01/30/2024 103 98 - 107 mmol/L Final   06/28/2022 103 98 - 107 mmol/L Final     Carbon Dioxide (CO2)   Date Value Ref Range Status   01/30/2024 22 22 - 29 mmol/L Final   06/28/2022 26 22 - 31 mmol/L Final     Anion Gap   Date Value Ref Range Status   01/30/2024 12 7 - 15 mmol/L Final   06/28/2022 13 5 - 18 mmol/L Final     Glucose   Date Value Ref Range Status   01/30/2024 161 (H) 70 - 99 mg/dL Final   06/28/2022 91 70 - 125 mg/dL Final     GLUCOSE BY METER POCT   Date Value Ref Range Status   01/06/2024 151 (H) 70 - 99 mg/dL Final     Urea Nitrogen   Date Value Ref Range Status   01/30/2024 18.4 8.0 - 23.0 mg/dL Final   06/28/2022 26 8 - 28 mg/dL Final     Creatinine   Date Value Ref Range Status   01/30/2024 1.17 0.67 - 1.17 mg/dL Final     GFR Estimate   Date Value Ref Range Status   01/30/2024 64 >60 mL/min/1.73m2 Final   05/19/2021 37 (L) >60 mL/min/1.73m2 Final     Calcium   Date Value Ref Range Status   01/30/2024 8.9 8.8 - 10.2 mg/dL Final       This note has been dictated using voice recognition software. Any grammatical or context distortions are unintentional and inherent to the software    Electronically signed by: Justina Barragan CNP       Sincerely,        Justina Barragan NP

## 2024-02-01 ENCOUNTER — OFFICE VISIT (OUTPATIENT)
Dept: INFECTIOUS DISEASES | Facility: CLINIC | Age: 79
End: 2024-02-01
Payer: COMMERCIAL

## 2024-02-01 VITALS
BODY MASS INDEX: 26.92 KG/M2 | DIASTOLIC BLOOD PRESSURE: 98 MMHG | SYSTOLIC BLOOD PRESSURE: 166 MMHG | HEART RATE: 60 BPM | TEMPERATURE: 98.1 F | WEIGHT: 198.5 LBS

## 2024-02-01 DIAGNOSIS — L08.9: Primary | ICD-10-CM

## 2024-02-01 DIAGNOSIS — S61.451D: Primary | ICD-10-CM

## 2024-02-01 PROCEDURE — 99213 OFFICE O/P EST LOW 20 MIN: CPT | Performed by: INTERNAL MEDICINE

## 2024-02-01 NOTE — PROGRESS NOTES
[facility] GERIATRIC SERVICES    Code Status:  FULL CODE   Visit Type:   Chief Complaint   Patient presents with    TCU Follow Up     Facility:  [facility] ([facility]) [[id number]]         HPI: Amor Zavaleta is a 78 year old male who I am seeing today for follow-up on the TCU.  Patient recently hospitalized on 12/22/2023 with worsening right wrist infection.  He was initially hospitalized on 12/11 to 12/20 for right hand infection following a cat bite with worsening right wrist infection.  He had a prolonged course.  Underwent I&D on 12/11 and 12/13.  Cultures grew polymicrobial with MSSA, Pasteurella and several anaerobic bacteria.  He was treated with IV antibiotic and discharged on Augmentin and Flagyl on 12/20.  Patient returned in 2 days with worsening infection.  He underwent I&D on 12/22 with no growth and repeat debridement on 12/25 with purulent and ruptured tendons.  Again underwent redebridement on 12/27 for ongoing infection with purulent material about the second and third compartment and ruptured tendon stumps.  He required multiple surgeries during this hospitalization including right wrist arthrotomy for septic arthritis and multiple I&D's.  He was seen by both hand surgery and infectious disease.  He was started on IV Unasyn and will continue this for 6 weeks until 2/6/2024.  He continues with local wound care.  Sutures in place.  He is nonweightbearing to the right upper extremity/right hand.  He is right-handed.  Diabetes mellitus type 2 with a hemoglobin A1c of 8.9.  He continues on Lantus and short acting insulin.    Transitional Care Course: Today patient sitting up in bedside chair. Pt continues in hand splint. Continues on IV anbx. Tolerating well. No leukocytosis. Pt denies any pain. Blood pressures improving with increase in Amlodipine. Blood sugars elevated in the late evening. He continues on Lantus and Novolog. Today we reviewed his home regimen. He was taking lantus at bedtime at  home but currently on it in the am from hospital.  Patient was planning to discharge to assisted living.  Today he tells me he feels he cannot afford that.  He has 8 flights of stairs x 2 to get up to his apartment.    Assessment/Plan:     Cat bite, subsequent encounter  Bacterial sepsis (H)  Cellulitis of right upper extremity  Septic arthritis  Tendon exposure  -Status post multiple I&D.  -Status post right wrist arthrotomy.  -Continue topical wound care.  -IV Unasyn for 6 weeks until 2/6/2024.  -Weekly CBC, BMP and CRP.  No leukocytosis.  CRP WNL.   -ID following. Follow up next Thursday.   -Tylenol  for pain.  -Follow-up with hand surgeon late last week.Sutures removed. Follow up 2 weeks.   -Nonweightbearing right upper extremity.  Continues in splinting.     Type 2 diabetes mellitus with hyperosmolarity without coma, with long-term current use of insulin (H)  -Hemoglobin A1c 8.9.  -Patient home dose of Lantus is 56 units. Increase Lantus to 56 units.   -NovoLog  8 units with sliding scale. (At home he was taking 16 units with meals.)   -Blood sugars 4 times daily.  -Offer at bedtime snack.    Essential hypertension  -Norvasc 5 daily.  -Continue lisinopril/hydrochlorothiazide daily.  -Monitor     Bradycardia  -heart rates in the 50s   -asymptomatic  -Seen by Dr Fonseca ( [place])      Results of monitor His heart rate from a 42 bpm to 83 bpm, average heartbeat of 53 bpm.  There is no long pauses more than 3 seconds, no other cardiac arrhythmia. No indication for PPM.   -Cardiac MRI 10/23 with no reproducible ischemia. EF 68%.     CKD3  -Follow BMP unremarkable.  -Pt to follow with nephrology out pt.       Active Ambulatory Problems     Diagnosis Date Noted    Diabetes mellitus, type 2 (H) 08/25/2023    Stage 3 chronic kidney disease, unspecified whether stage 3a or 3b CKD (H) 08/25/2023    Sinus bradycardia 08/25/2023    LBBB (left bundle branch block) 08/25/2023    Cellulitis of right upper extremity 12/11/2023     Infection of right hand due to bite, initial encounter 12/22/2023    Diverticular disease of large intestine 05/31/2016    Essential hypertension 01/20/2020    Gastroesophageal reflux disease 01/20/2020    Hemorrhoids 05/31/2016    History of colonic polyps 05/31/2016    Peripheral neuropathy 01/20/2020    Pure hypercholesterolemia 10/21/2004     Resolved Ambulatory Problems     Diagnosis Date Noted    Seizure (H) 08/10/2007     Past Medical History:   Diagnosis Date    Diabetes (H)     Sleep apnea      Allergies   Allergen Reactions    Sulfa Antibiotics Hives       All Meds and Allergies reviewed in the record at the facility and is the most up-to-date.    Current Outpatient Medications   Medication Sig    acetaminophen (TYLENOL) 325 MG tablet Take 2 tablets (650 mg) by mouth every 4 hours as needed for mild pain or other (and adjunct with moderate or severe pain or per patient request)    amLODIPine (NORVASC) 2.5 MG tablet Take 5 mg by mouth daily    ampicillin-sulbactam (UNASYN) 3 (2-1) g SOLR vial EVERY 6 HOURS UNTIL 2/6/2024    aspirin (ASA) 325 MG tablet Take 325 mg by mouth daily    famotidine (PEPCID) 20 MG tablet Take 20 mg by mouth daily    hydrOXYzine HCl (ATARAX) 10 MG tablet Take 1 tablet (10 mg) by mouth every 6 hours as needed for other (adjuvant pain)    insulin aspart (NOVOLOG PEN) 100 UNIT/ML pen Inject 16 Units Subcutaneous 3 times daily (with meals)    insulin glargine (LANTUS PEN) 100 UNIT/ML pen Inject 56 Units Subcutaneous every morning    lidocaine (LMX4) 4 % external cream Apply topically 2 times daily as needed for mild pain    lisinopril-hydrochlorothiazide (ZESTORETIC) 20-12.5 MG tablet Take 1 tablet by mouth daily    lovastatin (MEVACOR) 40 MG tablet Take 1 tablet by mouth 2 times daily    Multiple Vitamin (MULTIVITAMIN PO) Take 0.5 tablets by mouth 2 times daily    oxyCODONE (ROXICODONE) 5 MG tablet Take 0.5 tablets (2.5 mg) by mouth every 4 hours as needed for severe pain     povidone-iodine (BETADINE) 10 % topical solution Apply topically as needed for wound care Apply topically to incision for wound care    senna-docusate (SENOKOT-S/PERICOLACE) 8.6-50 MG tablet Take 2 tablets by mouth 2 times daily as needed for constipation     No current facility-administered medications for this visit.       REVIEW OF SYSTEMS:   10 point review of systems reviewed and pertinent positives in the HPI.     PHYSICAL EXAMINATION:  Physical Exam     Vital signs: BP (!) 168/74   Pulse 66   Temp 98.1  F (36.7  C)   Resp 18   Ht 1.829 m (6')   Wt 88.7 kg (195 lb 9.6 oz)   SpO2 96%   BMI 26.53 kg/m    General: Awake, Alert, oriented x3, sitting up in bedside chair, conversant  HEENT:moist oral mucosa  NECK: Supple  BACK: No kyphosis of the thoracic spine  EXTREMITIES: Moves both upper and lower extremities without limitation of the right upper extremity, trace right hand edema, + CMS. continues on splinting.  NEUROLOGIC: Intact, pulses palpable  PSYCHIATRIC: Cognition intact      Labs:  All labs reviewed in the nursing home record and Epic   @  Lab Results   Component Value Date    WBC 6.6 01/06/2024     Lab Results   Component Value Date    RBC 3.49 01/06/2024     Lab Results   Component Value Date    HGB 10.9 01/06/2024     Lab Results   Component Value Date    HCT 33.7 01/06/2024     Lab Results   Component Value Date    MCV 97 01/06/2024     Lab Results   Component Value Date    MCH 31.2 01/06/2024     Lab Results   Component Value Date    MCHC 32.3 01/06/2024     Lab Results   Component Value Date    RDW 13.4 01/06/2024     Lab Results   Component Value Date     01/06/2024     01/06/2024        @Last Comprehensive Metabolic Panel:  Sodium   Date Value Ref Range Status   01/30/2024 137 135 - 145 mmol/L Final     Comment:     Reference intervals for this test were updated on 09/26/2023 to more accurately reflect our healthy population. There may be differences in the flagging of prior  results with similar values performed with this method. Interpretation of those prior results can be made in the context of the updated reference intervals.      Potassium   Date Value Ref Range Status   01/30/2024 4.2 3.4 - 5.3 mmol/L Final   06/28/2022 3.3 (L) 3.5 - 5.0 mmol/L Final     Chloride   Date Value Ref Range Status   01/30/2024 103 98 - 107 mmol/L Final   06/28/2022 103 98 - 107 mmol/L Final     Carbon Dioxide (CO2)   Date Value Ref Range Status   01/30/2024 22 22 - 29 mmol/L Final   06/28/2022 26 22 - 31 mmol/L Final     Anion Gap   Date Value Ref Range Status   01/30/2024 12 7 - 15 mmol/L Final   06/28/2022 13 5 - 18 mmol/L Final     Glucose   Date Value Ref Range Status   01/30/2024 161 (H) 70 - 99 mg/dL Final   06/28/2022 91 70 - 125 mg/dL Final     GLUCOSE BY METER POCT   Date Value Ref Range Status   01/06/2024 151 (H) 70 - 99 mg/dL Final     Urea Nitrogen   Date Value Ref Range Status   01/30/2024 18.4 8.0 - 23.0 mg/dL Final   06/28/2022 26 8 - 28 mg/dL Final     Creatinine   Date Value Ref Range Status   01/30/2024 1.17 0.67 - 1.17 mg/dL Final     GFR Estimate   Date Value Ref Range Status   01/30/2024 64 >60 mL/min/1.73m2 Final   05/19/2021 37 (L) >60 mL/min/1.73m2 Final     Calcium   Date Value Ref Range Status   01/30/2024 8.9 8.8 - 10.2 mg/dL Final       This note has been dictated using voice recognition software. Any grammatical or context distortions are unintentional and inherent to the software    Electronically signed by: Justina Barragan CNP

## 2024-02-01 NOTE — PROGRESS NOTES
[facility] INFECTIOUS DISEASE CLINIC [facility]   FOLLOW UP NOTE    Date: 02/01/2024   Patient Name: Amor Zavaleta   YOB: 1945  MRN: [id number]      ASSESSMENT:  78-year-old man with a history of diabetes here for follow-up for right wrist infection secondary to cat bite, with septic arthritis and osteomyelitis     Right wrist infection.  Active issue, hospitalized for wrist infection following cat bite/scratch.  Debridement on 12/11 and 12/13.  Polymicrobial cultures with MSSA, Pasteurella, and several anaerobic bacteria.  Discharged on Augmentin and Flagyl, but readmitted 2 days later after signs of persistent infection (see next)  Persistent right wrist infection.  Seen in follow-up in orthopedics clinic on 12/22.  Due to appearance of wound, patient was sent to the hospital for debridement. Multiple I&Ds: 12/22, 12/25, 12/27, 12/29, 12/31, and closure on 1/2.  Discharged with IV Unasyn.  Normalization of inflammatory markers.  Clinically improving.           PLAN:  -Finished course of IV Unasyn on 2/6  -PICC line can be removed at that time  -Follow-up with Dr. Austin at [facility] for ongoing cares.  Reportedly he may need wrist fusion in the future.    Return to clinic as needed.    Jacinto Vasquez MD  [facility]   Clinic phone: [phone]  Clinic fax: [phone]    ______________________________________________________________________    HISTORY OF PRESENT ILLNESS:   From inpatient ID consult on 12/23/2023:    Amor Zavaleta is a 78 year old man with a history of hypertension and diabetes.  He was hospitalized at [facility] from 12/11 to 12/20 for right wrist infection.  He was seen by ID.  He was caring for a new cat (Tiger) who began fighting with the patient's current cat (Dayo).  This resulted in injury to the right wrist (bites and or scratches).  He suffered severe swelling.  He had 2 debridements on 12/11 and again on 12/13.  He was on IV  antibiotics in the hospital for polymicrobial infection.  WBC normalized and the patient was discharged on oral antibiotics.  He continued to soak his hand at home.  He had a follow-up appointment yesterday at [facility] and there was concern for persistent infection, therefore it was recommended he come to the hospital for debridement.  Purulence was noted superficial to the extensor tendons.  No purulence was seen in the wrist joint.       SUBJECTIVE / INTERVAL HISTORY:   Amor Zavaleta returns for follow up.  He is here with his sister.  He was discharged to [facility] in [place].  He is receiving Unasyn.  He has no issues with the antibiotics or the PICC line.  He followed up with [facility] after surgery.  He wears her splint.  He has started therapy.  He continues to have swelling and decreased range of motion.  Incision sites are healing.      ROS:   No fevers, no rashes      Current Outpatient Medications:     acetaminophen (TYLENOL) 325 MG tablet, Take 2 tablets (650 mg) by mouth every 4 hours as needed for mild pain or other (and adjunct with moderate or severe pain or per patient request), Disp: 100 tablet, Rfl: 0    amLODIPine (NORVASC) 2.5 MG tablet, Take 5 mg by mouth daily, Disp: , Rfl:     ampicillin-sulbactam (UNASYN) 3 (2-1) g SOLR vial, EVERY 6 HOURS UNTIL 2/6/2024, Disp: , Rfl:     aspirin (ASA) 325 MG tablet, Take 325 mg by mouth daily, Disp: , Rfl:     famotidine (PEPCID) 20 MG tablet, Take 20 mg by mouth daily, Disp: , Rfl:     hydrOXYzine HCl (ATARAX) 10 MG tablet, Take 1 tablet (10 mg) by mouth every 6 hours as needed for other (adjuvant pain), Disp: , Rfl:     insulin aspart (NOVOLOG PEN) 100 UNIT/ML pen, Inject 16 Units Subcutaneous 3 times daily (with meals), Disp: , Rfl:     insulin glargine (LANTUS PEN) 100 UNIT/ML pen, Inject 56 Units Subcutaneous every morning, Disp: , Rfl:     lidocaine (LMX4) 4 % external cream, Apply topically 2 times daily as needed for  mild pain, Disp: , Rfl:     lisinopril-hydrochlorothiazide (ZESTORETIC) 20-12.5 MG tablet, Take 1 tablet by mouth daily, Disp: , Rfl:     lovastatin (MEVACOR) 40 MG tablet, Take 1 tablet by mouth 2 times daily, Disp: , Rfl:     Multiple Vitamin (MULTIVITAMIN PO), Take 0.5 tablets by mouth 2 times daily, Disp: , Rfl:     oxyCODONE (ROXICODONE) 5 MG tablet, Take 0.5 tablets (2.5 mg) by mouth every 4 hours as needed for severe pain, Disp: 30 tablet, Rfl: 0    povidone-iodine (BETADINE) 10 % topical solution, Apply topically as needed for wound care Apply topically to incision for wound care, Disp: 14.8 mL, Rfl: 0    senna-docusate (SENOKOT-S/PERICOLACE) 8.6-50 MG tablet, Take 2 tablets by mouth 2 times daily as needed for constipation, Disp: 30 tablet, Rfl: 0      OBJECTIVE:  BP (!) 166/98   Pulse 60   Temp 98.1  F (36.7  C)   Wt 90 kg (198 lb 8 oz)   BMI 26.92 kg/m        GEN: No acute distress.    HENT: Head is normocephalic, atraumatic.   EYES: Eyes have anicteric sclerae without conjunctival injection   RESPIRATORY:  Normal breathing pattern  CARDIOVASCULAR:  Regular rate    EXTREMITIES: Right wrist swelling, with limited range of motion.  No acute tenderness or fluctuance.  Crusted incision sites without drainage.  SKIN/HAIR/NAILS:  No rashes.  Left arm upper extremity PICC line is in place without any surrounding erythema.   NEUROLOGIC: Grossly nonfocal. Normal gait and station      Pertinent labs:    Lab Results   Component Value Date    CRPI <3.00 01/30/2024         Lab Results   Component Value Date    ALT 16 01/30/2024    AST 25 01/30/2024    ALKPHOS 42 01/30/2024         MICROBIOLOGY DATA:  Reviewed    Susceptibility data from last 90 days.  Collected Specimen Info Organism Ampicillin Azithromycin Ceftriaxone Clindamycin Daptomycin Doxycycline Erythromycin Gentamicin Levofloxacin Oxacillin Penicillin Tetracycline Trimethoprim/Sulfamethoxazole  Vancomycin   12/11/23 Tissue from Wrist, Right Bacteroides  pyogenes                 12/11/23 Tissue from Wrist, Right Staphylococcus aureus     S  S  S  S  S   S   S  S  S     Pasteurella multocida                 12/11/23 Tissue from Hand, Right Fusobacterium nucleatum                   Bacteroides pyogenes                 12/11/23 Tissue from Hand, Right Pasteurella multocida                   Staphylococcus aureus                 12/11/23 Abscess from Hand, Right Fusobacterium nucleatum                   Bacteroides pyogenes                 12/11/23 Abscess from Hand, Right Pasteurella multocida  S  S  S       S   S   S         RADIOLOGY:  Reviewed    Attestation:  Total time preparing to see this patient, face-to-face time, and coordinating care time on the same calendar date: 24 minutes.  Face-face time: 17 minutes.  Over 50% of face-to-face time was spent in counseling/coordination of care.

## 2024-02-04 ENCOUNTER — LAB REQUISITION (OUTPATIENT)
Dept: LAB | Facility: CLINIC | Age: 79
End: 2024-02-04
Payer: COMMERCIAL

## 2024-02-04 DIAGNOSIS — M86.141 OTHER ACUTE OSTEOMYELITIS, RIGHT HAND (H): ICD-10-CM

## 2024-02-06 ENCOUNTER — TRANSITIONAL CARE UNIT VISIT (OUTPATIENT)
Dept: GERIATRICS | Facility: CLINIC | Age: 79
End: 2024-02-06
Payer: COMMERCIAL

## 2024-02-06 VITALS
HEART RATE: 62 BPM | OXYGEN SATURATION: 96 % | SYSTOLIC BLOOD PRESSURE: 137 MMHG | WEIGHT: 185.6 LBS | RESPIRATION RATE: 18 BRPM | DIASTOLIC BLOOD PRESSURE: 87 MMHG | BODY MASS INDEX: 25.14 KG/M2 | HEIGHT: 72 IN | TEMPERATURE: 97.8 F

## 2024-02-06 DIAGNOSIS — A41.9 BACTERIAL SEPSIS (H): Primary | ICD-10-CM

## 2024-02-06 DIAGNOSIS — L03.113 CELLULITIS OF RIGHT UPPER EXTREMITY: ICD-10-CM

## 2024-02-06 DIAGNOSIS — I10 ESSENTIAL HYPERTENSION: ICD-10-CM

## 2024-02-06 DIAGNOSIS — Z79.4 TYPE 2 DIABETES MELLITUS WITH HYPEROSMOLARITY WITHOUT COMA, WITH LONG-TERM CURRENT USE OF INSULIN (H): ICD-10-CM

## 2024-02-06 DIAGNOSIS — M54.50 CHRONIC LOW BACK PAIN WITHOUT SCIATICA, UNSPECIFIED BACK PAIN LATERALITY: ICD-10-CM

## 2024-02-06 DIAGNOSIS — G89.29 CHRONIC LOW BACK PAIN WITHOUT SCIATICA, UNSPECIFIED BACK PAIN LATERALITY: ICD-10-CM
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
## 2024-02-06 DIAGNOSIS — R00.1 BRADYCARDIA: ICD-10-CM

## 2024-02-06 DIAGNOSIS — E11.00 TYPE 2 DIABETES MELLITUS WITH HYPEROSMOLARITY WITHOUT COMA, WITH LONG-TERM CURRENT USE OF INSULIN (H): ICD-10-CM

## 2024-02-06 LAB
ALBUMIN SERPL BCG-MCNC: 3.7 G/DL (ref 3.5–5.2)
ALP SERPL-CCNC: 42 U/L (ref 40–150)
ALT SERPL W P-5'-P-CCNC: 21 U/L (ref 0–70)
ANION GAP SERPL CALCULATED.3IONS-SCNC: 13 MMOL/L (ref 7–15)
AST SERPL W P-5'-P-CCNC: 24 U/L (ref 0–45)
BASOPHILS # BLD AUTO: 0.1 10E3/UL (ref 0–0.2)
BASOPHILS NFR BLD AUTO: 1 %
BILIRUB SERPL-MCNC: 0.4 MG/DL
BUN SERPL-MCNC: 16.7 MG/DL (ref 8–23)
CALCIUM SERPL-MCNC: 8.8 MG/DL (ref 8.8–10.2)
CHLORIDE SERPL-SCNC: 101 MMOL/L (ref 98–107)
CREAT SERPL-MCNC: 1.23 MG/DL (ref 0.67–1.17)
CRP SERPL-MCNC: <3 MG/L
DEPRECATED HCO3 PLAS-SCNC: 23 MMOL/L (ref 22–29)
EGFRCR SERPLBLD CKD-EPI 2021: 60 ML/MIN/1.73M2
EOSINOPHIL # BLD AUTO: 0.3 10E3/UL (ref 0–0.7)
EOSINOPHIL NFR BLD AUTO: 4 %
ERYTHROCYTE [DISTWIDTH] IN BLOOD BY AUTOMATED COUNT: 14 % (ref 10–15)
GLUCOSE SERPL-MCNC: 212 MG/DL (ref 70–99)
HCT VFR BLD AUTO: 39.6 % (ref 40–53)
HGB BLD-MCNC: 13.1 G/DL (ref 13.3–17.7)
IMM GRANULOCYTES # BLD: 0 10E3/UL
IMM GRANULOCYTES NFR BLD: 0 %
LYMPHOCYTES # BLD AUTO: 2.2 10E3/UL (ref 0.8–5.3)
LYMPHOCYTES NFR BLD AUTO: 30 %
MCH RBC QN AUTO: 31.2 PG (ref 26.5–33)
MCHC RBC AUTO-ENTMCNC: 33.1 G/DL (ref 31.5–36.5)
MCV RBC AUTO: 94 FL (ref 78–100)
MONOCYTES # BLD AUTO: 0.9 10E3/UL (ref 0–1.3)
MONOCYTES NFR BLD AUTO: 12 %
NEUTROPHILS # BLD AUTO: 3.8 10E3/UL (ref 1.6–8.3)
NEUTROPHILS NFR BLD AUTO: 53 %
NRBC # BLD AUTO: 0 10E3/UL
NRBC BLD AUTO-RTO: 0 /100
PLATELET # BLD AUTO: 183 10E3/UL (ref 150–450)
POTASSIUM SERPL-SCNC: 3.3 MMOL/L (ref 3.4–5.3)
PROT SERPL-MCNC: 7 G/DL (ref 6.4–8.3)
RBC # BLD AUTO: 4.2 10E6/UL (ref 4.4–5.9)
SODIUM SERPL-SCNC: 137 MMOL/L (ref 135–145)
WBC # BLD AUTO: 7.2 10E3/UL (ref 4–11)

## 2024-02-06 PROCEDURE — 85025 COMPLETE CBC W/AUTO DIFF WBC: CPT | Performed by: FAMILY MEDICINE

## 2024-02-06 PROCEDURE — 80053 COMPREHEN METABOLIC PANEL: CPT | Performed by: FAMILY MEDICINE

## 2024-02-06 PROCEDURE — 86140 C-REACTIVE PROTEIN: CPT | Performed by: FAMILY MEDICINE

## 2024-02-06 PROCEDURE — 99316 NF DSCHRG MGMT 30 MIN+: CPT | Performed by: NURSE PRACTITIONER

## 2024-02-06 NOTE — LETTER
2/6/2024        RE: Amor Zavaleta  [address]  [facility] [address]         HEALTH GERIATRIC SERVICES    Code Status:  FULL CODE   Visit Type:   Chief Complaint   Patient presents with     TCU Follow Up     Facility:  [facility] ([facility]) [[id number]]         HPI: Amor Zavaleta is a 78 year old male who I am seeing today for follow-up on the TCU.  Patient recently hospitalized on 12/22/2023 with worsening right wrist infection.  He was initially hospitalized on 12/11 to 12/20 for right hand infection following a cat bite with worsening right wrist infection.  He had a prolonged course.  Underwent I&D on 12/11 and 12/13.  Cultures grew polymicrobial with MSSA, Pasteurella and several anaerobic bacteria.  He was treated with IV antibiotic and discharged on Augmentin and Flagyl on 12/20.  Patient returned in 2 days with worsening infection.  He underwent I&D on 12/22 with no growth and repeat debridement on 12/25 with purulent and ruptured tendons.  Again underwent redebridement on 12/27 for ongoing infection with purulent material about the second and third compartment and ruptured tendon stumps.  He required multiple surgeries during this hospitalization including right wrist arthrotomy for septic arthritis and multiple I&D's.  He was seen by both hand surgery and infectious disease.  He was started on IV Unasyn and will continue this for 6 weeks until 2/6/2024.  He continues with local wound care.  Sutures in place.  He is nonweightbearing to the right upper extremity/right hand.  He is right-handed.  Diabetes mellitus type 2 with a hemoglobin A1c of 8.9.  He continues on Lantus and short acting insulin.    Transitional Care Course: Today patient sitting up in bedside chair. Pt continues in hand splint. He is completing his final dose of IV antibiotics today and PICC should be removed afterwards. Plan is to discharge home tomorrow. His pain is adequately managed with tylenol, has not been  using PRN oxycodone. His right wrist incision continues to heal nicely with scabbing and daily dressing changes with splint in use as well. He says he is also improving his range of motion working with therapy, and denies any sensation changes in the right hand. He is able to urinate adequately and has regular bowel movements. He is preparing to discharge home.    Assessment/Plan:     Cat bite, subsequent encounter  Bacterial sepsis (H)  Cellulitis of right upper extremity  Septic arthritis  Tendon exposure  -Status post multiple I&D.  -Status post right wrist arthrotomy.  -Continue topical wound care.  -IV Unasyn complete on 2/6/2024.  -No leukocytosis. CRP WNL.   -Seen by ID on 2/1/20024 and cleared.  -Tylenol for pain.  -Discontinue oxycodone  -Follow up with ortho 2/9.   -Continues in splinting.     Type 2 diabetes mellitus with hyperosmolarity without coma, with long-term current use of insulin (H)  -Hemoglobin A1c 8.9.  -Patient continues home dose of Lantus is 56 units.   -Increase NovoLog 12 units with sliding scale. (At home he was taking 16 units with meals.)   -Blood sugars 4 times daily.  -Offer at bedtime snack.    Essential hypertension  -Norvasc 5 daily.  -Continue lisinopril/hydrochlorothiazide daily.  -Monitor     Bradycardia  -heart rates in the 50s   -asymptomatic  -Seen by Dr Fonseca ( [employer])      Results of monitor His heart rate from a 42 bpm to 83 bpm, average heartbeat of 53 bpm.  There is no long pauses more than 3 seconds, no other cardiac arrhythmia. No indication for PPM.   -Cardiac MRI 10/23 with no reproducible ischemia. EF 68%.   -Monitor    CKD3  -Follow BMP unremarkable.  -Pt to follow with nephrology out pt.     Chronic back pain  -apply Aspercreme BID prn.     Environmental chemical exposure  -Occ SOB with exertion/stairs.   -Hx of chemical exposure at work.   -Recommend follow up with pulmonology out pt.    I agree with following assessment and plan. Changes noted.     -Ok to  discharge home with current meds and treatments.   -Home PT, OT, HHA and RN(med management)  -Follow up with PCP in 1-2 weeks.     Active Ambulatory Problems     Diagnosis Date Noted     Diabetes mellitus, type 2 (H) 08/25/2023     Stage 3 chronic kidney disease, unspecified whether stage 3a or 3b CKD (H) 08/25/2023     Sinus bradycardia 08/25/2023     LBBB (left bundle branch block) 08/25/2023     Cellulitis of right upper extremity 12/11/2023     Infection of right hand due to bite, initial encounter 12/22/2023     Diverticular disease of large intestine 05/31/2016     Essential hypertension 01/20/2020     Gastroesophageal reflux disease 01/20/2020     Hemorrhoids 05/31/2016     History of colonic polyps 05/31/2016     Peripheral neuropathy 01/20/2020     Pure hypercholesterolemia 10/21/2004     Resolved Ambulatory Problems     Diagnosis Date Noted     Seizure (H) 08/10/2007     Past Medical History:   Diagnosis Date     Diabetes (H)      Sleep apnea      Allergies   Allergen Reactions     Sulfa Antibiotics Hives       All Meds and Allergies reviewed in the record at the facility and is the most up-to-date.    Current Outpatient Medications   Medication Sig     acetaminophen (TYLENOL) 325 MG tablet Take 2 tablets (650 mg) by mouth every 4 hours as needed for mild pain or other (and adjunct with moderate or severe pain or per patient request)     amLODIPine (NORVASC) 2.5 MG tablet Take 5 mg by mouth daily     aspirin (ASA) 325 MG tablet Take 325 mg by mouth daily     famotidine (PEPCID) 20 MG tablet Take 20 mg by mouth daily     insulin aspart (NOVOLOG PEN) 100 UNIT/ML pen Inject 13 Units Subcutaneous 3 times daily (with meals)     insulin glargine (LANTUS PEN) 100 UNIT/ML pen Inject 56 Units Subcutaneous every morning     lidocaine (LMX4) 4 % external cream Apply topically 2 times daily as needed for mild pain     lisinopril-hydrochlorothiazide (ZESTORETIC) 20-12.5 MG tablet Take 1 tablet by mouth daily      lovastatin (MEVACOR) 40 MG tablet Take 1 tablet by mouth 2 times daily     Multiple Vitamin (MULTIVITAMIN PO) Take 0.5 tablets by mouth 2 times daily     senna-docusate (SENOKOT-S/PERICOLACE) 8.6-50 MG tablet Take 2 tablets by mouth 2 times daily as needed for constipation     No current facility-administered medications for this visit.       REVIEW OF SYSTEMS:   10 point review of systems reviewed and pertinent positives in the HPI.     PHYSICAL EXAMINATION:  Physical Exam     Vital signs: /87   Pulse 62   Temp 97.8  F (36.6  C)   Resp 18   Ht 1.829 m (6')   Wt 84.2 kg (185 lb 9.6 oz)   SpO2 96%   BMI 25.17 kg/m    General: Awake, Alert, oriented x3, sitting up in bedside chair, conversant  HEENT:moist oral mucosa  NECK: Supple  CARDIOVASCULAR: S1, S2 without murmur or gallop.   RESPIRATORY: No wheezes, rales or rhonchi.   BACK: No kyphosis of the thoracic spine  EXTREMITIES: Moves both upper and lower extremities without limitation of the right upper extremity, trace right hand edema, + CMS. continues on splinting.  SKIN: Incision to right wrist scabbed. No redness or warmth.   NEUROLOGIC: Intact, pulses palpable  PSYCHIATRIC: Cognition intact      Labs:  All labs reviewed in the nursing home record and Epic   @  Lab Results   Component Value Date    WBC 6.6 01/06/2024     Lab Results   Component Value Date    RBC 3.49 01/06/2024     Lab Results   Component Value Date    HGB 10.9 01/06/2024     Lab Results   Component Value Date    HCT 33.7 01/06/2024     Lab Results   Component Value Date    MCV 97 01/06/2024     Lab Results   Component Value Date    MCH 31.2 01/06/2024     Lab Results   Component Value Date    MCHC 32.3 01/06/2024     Lab Results   Component Value Date    RDW 13.4 01/06/2024     Lab Results   Component Value Date     01/06/2024     01/06/2024        @Last Comprehensive Metabolic Panel:  Sodium   Date Value Ref Range Status   01/30/2024 137 135 - 145 mmol/L Final      Comment:     Reference intervals for this test were updated on 09/26/2023 to more accurately reflect our healthy population. There may be differences in the flagging of prior results with similar values performed with this method. Interpretation of those prior results can be made in the context of the updated reference intervals.      Potassium   Date Value Ref Range Status   01/30/2024 4.2 3.4 - 5.3 mmol/L Final   06/28/2022 3.3 (L) 3.5 - 5.0 mmol/L Final     Chloride   Date Value Ref Range Status   01/30/2024 103 98 - 107 mmol/L Final   06/28/2022 103 98 - 107 mmol/L Final     Carbon Dioxide (CO2)   Date Value Ref Range Status   01/30/2024 22 22 - 29 mmol/L Final   06/28/2022 26 22 - 31 mmol/L Final     Anion Gap   Date Value Ref Range Status   01/30/2024 12 7 - 15 mmol/L Final   06/28/2022 13 5 - 18 mmol/L Final     Glucose   Date Value Ref Range Status   01/30/2024 161 (H) 70 - 99 mg/dL Final   06/28/2022 91 70 - 125 mg/dL Final     GLUCOSE BY METER POCT   Date Value Ref Range Status   01/06/2024 151 (H) 70 - 99 mg/dL Final     Urea Nitrogen   Date Value Ref Range Status   01/30/2024 18.4 8.0 - 23.0 mg/dL Final   06/28/2022 26 8 - 28 mg/dL Final     Creatinine   Date Value Ref Range Status   01/30/2024 1.17 0.67 - 1.17 mg/dL Final     GFR Estimate   Date Value Ref Range Status   01/30/2024 64 >60 mL/min/1.73m2 Final   05/19/2021 37 (L) >60 mL/min/1.73m2 Final     Calcium   Date Value Ref Range Status   01/30/2024 8.9 8.8 - 10.2 mg/dL Final         DISCHARGE PLAN/FACE TO FACE:  I certify that this patient is under my care and that I, or a nurse practitioner or physician's assistant working with me, had a face-to-face encounter that meets the physician face-to-face encounter requirements with this patient.       I certify that, based on my findings, the following services are medically necessary home health services.    My clinical findings support the need for the above skilled services.    This patient is  homebound because: Recent right hand infection following a cat bite with worsening right wrist infection, s/p I & D and arthrotomy.     The patient is, or has been, under my care and I have initiated the establishment of the plan of care. This patient will be followed by a physician who will periodically review the plan of care.    > 35 minutes of time spent for this visit, reviewing discharge medications, home care services and follow ups as well as collaborating with nursing staff and SW.     This note has been dictated using voice recognition software. Any grammatical or context distortions are unintentional and inherent to the software      I was present with the medical student/advanced practice registered nurse, Rosio Pineda NP student yola and in the documentation of this note. I have verified the history and personally performed the physical exam and medical decision making. I agree with the assessment and plan of care as documented in the note.       Electronically signed by: Justina Barragan CNP       Sincerely,        Justina Barragan NP

## 2024-02-06 NOTE — PROGRESS NOTES
[facility] GERIATRIC SERVICES    Code Status:  FULL CODE   Visit Type:   Chief Complaint   Patient presents with    TCU Follow Up     Facility:  [facility] ([facility]) [[id number]]         HPI: Amor Zavaleta is a 78 year old male who I am seeing today for follow-up on the TCU.  Patient recently hospitalized on 12/22/2023 with worsening right wrist infection.  He was initially hospitalized on 12/11 to 12/20 for right hand infection following a cat bite with worsening right wrist infection.  He had a prolonged course.  Underwent I&D on 12/11 and 12/13.  Cultures grew polymicrobial with MSSA, Pasteurella and several anaerobic bacteria.  He was treated with IV antibiotic and discharged on Augmentin and Flagyl on 12/20.  Patient returned in 2 days with worsening infection.  He underwent I&D on 12/22 with no growth and repeat debridement on 12/25 with purulent and ruptured tendons.  Again underwent redebridement on 12/27 for ongoing infection with purulent material about the second and third compartment and ruptured tendon stumps.  He required multiple surgeries during this hospitalization including right wrist arthrotomy for septic arthritis and multiple I&D's.  He was seen by both hand surgery and infectious disease.  He was started on IV Unasyn and will continue this for 6 weeks until 2/6/2024.  He continues with local wound care.  Sutures in place.  He is nonweightbearing to the right upper extremity/right hand.  He is right-handed.  Diabetes mellitus type 2 with a hemoglobin A1c of 8.9.  He continues on Lantus and short acting insulin.    Transitional Care Course: Today patient sitting up in bedside chair. Pt continues in hand splint. He is completing his final dose of IV antibiotics today and PICC should be removed afterwards. Plan is to discharge home tomorrow. His pain is adequately managed with tylenol, has not been using PRN oxycodone. His right wrist incision continues to heal nicely with scabbing and  daily dressing changes with splint in use as well. He says he is also improving his range of motion working with therapy, and denies any sensation changes in the right hand. He is able to urinate adequately and has regular bowel movements. He is preparing to discharge home.    Assessment/Plan:     Cat bite, subsequent encounter  Bacterial sepsis (H)  Cellulitis of right upper extremity  Septic arthritis  Tendon exposure  -Status post multiple I&D.  -Status post right wrist arthrotomy.  -Continue topical wound care.  -IV Unasyn complete on 2/6/2024.  -No leukocytosis. CRP WNL.   -Seen by ID on 2/1/20024 and cleared.  -Tylenol for pain.  -Discontinue oxycodone  -Follow up with ortho 2/9.   -Continues in splinting.     Type 2 diabetes mellitus with hyperosmolarity without coma, with long-term current use of insulin (H)  -Hemoglobin A1c 8.9.  -Patient continues home dose of Lantus is 56 units.   -Increase NovoLog 12 units with sliding scale. (At home he was taking 16 units with meals.)   -Blood sugars 4 times daily.  -Offer at bedtime snack.    Essential hypertension  -Norvasc 5 daily.  -Continue lisinopril/hydrochlorothiazide daily.  -Monitor     Bradycardia  -heart rates in the 50s   -asymptomatic  -Seen by Dr Fonseca ( [employer])      Results of monitor His heart rate from a 42 bpm to 83 bpm, average heartbeat of 53 bpm.  There is no long pauses more than 3 seconds, no other cardiac arrhythmia. No indication for PPM.   -Cardiac MRI 10/23 with no reproducible ischemia. EF 68%.   -Monitor    CKD3  -Follow BMP unremarkable.  -Pt to follow with nephrology out pt.     Chronic back pain  -apply Aspercreme BID prn.     Environmental chemical exposure  -Occ SOB with exertion/stairs.   -Hx of chemical exposure at work.   -Recommend follow up with pulmonology out pt.    I agree with following assessment and plan. Changes noted.     -Ok to discharge home with current meds and treatments.   -Home PT, OT, HHA and RN(med  management)  -Follow up with PCP in 1-2 weeks.     Active Ambulatory Problems     Diagnosis Date Noted    Diabetes mellitus, type 2 (H) 08/25/2023    Stage 3 chronic kidney disease, unspecified whether stage 3a or 3b CKD (H) 08/25/2023    Sinus bradycardia 08/25/2023    LBBB (left bundle branch block) 08/25/2023    Cellulitis of right upper extremity 12/11/2023    Infection of right hand due to bite, initial encounter 12/22/2023    Diverticular disease of large intestine 05/31/2016    Essential hypertension 01/20/2020    Gastroesophageal reflux disease 01/20/2020    Hemorrhoids 05/31/2016    History of colonic polyps 05/31/2016    Peripheral neuropathy 01/20/2020    Pure hypercholesterolemia 10/21/2004     Resolved Ambulatory Problems     Diagnosis Date Noted    Seizure (H) 08/10/2007     Past Medical History:   Diagnosis Date    Diabetes (H)     Sleep apnea      Allergies   Allergen Reactions    Sulfa Antibiotics Hives       All Meds and Allergies reviewed in the record at the facility and is the most up-to-date.    Current Outpatient Medications   Medication Sig    acetaminophen (TYLENOL) 325 MG tablet Take 2 tablets (650 mg) by mouth every 4 hours as needed for mild pain or other (and adjunct with moderate or severe pain or per patient request)    amLODIPine (NORVASC) 2.5 MG tablet Take 5 mg by mouth daily    aspirin (ASA) 325 MG tablet Take 325 mg by mouth daily    famotidine (PEPCID) 20 MG tablet Take 20 mg by mouth daily    insulin aspart (NOVOLOG PEN) 100 UNIT/ML pen Inject 13 Units Subcutaneous 3 times daily (with meals)    insulin glargine (LANTUS PEN) 100 UNIT/ML pen Inject 56 Units Subcutaneous every morning    lidocaine (LMX4) 4 % external cream Apply topically 2 times daily as needed for mild pain    lisinopril-hydrochlorothiazide (ZESTORETIC) 20-12.5 MG tablet Take 1 tablet by mouth daily    lovastatin (MEVACOR) 40 MG tablet Take 1 tablet by mouth 2 times daily    Multiple Vitamin (MULTIVITAMIN PO)  Take 0.5 tablets by mouth 2 times daily    senna-docusate (SENOKOT-S/PERICOLACE) 8.6-50 MG tablet Take 2 tablets by mouth 2 times daily as needed for constipation     No current facility-administered medications for this visit.       REVIEW OF SYSTEMS:   10 point review of systems reviewed and pertinent positives in the HPI.     PHYSICAL EXAMINATION:  Physical Exam     Vital signs: /87   Pulse 62   Temp 97.8  F (36.6  C)   Resp 18   Ht 1.829 m (6')   Wt 84.2 kg (185 lb 9.6 oz)   SpO2 96%   BMI 25.17 kg/m    General: Awake, Alert, oriented x3, sitting up in bedside chair, conversant  HEENT:moist oral mucosa  NECK: Supple  CARDIOVASCULAR: S1, S2 without murmur or gallop.   RESPIRATORY: No wheezes, rales or rhonchi.   BACK: No kyphosis of the thoracic spine  EXTREMITIES: Moves both upper and lower extremities without limitation of the right upper extremity, trace right hand edema, + CMS. continues on splinting.  SKIN: Incision to right wrist scabbed. No redness or warmth.   NEUROLOGIC: Intact, pulses palpable  PSYCHIATRIC: Cognition intact      Labs:  All labs reviewed in the nursing home record and Epic   @  Lab Results   Component Value Date    WBC 6.6 01/06/2024     Lab Results   Component Value Date    RBC 3.49 01/06/2024     Lab Results   Component Value Date    HGB 10.9 01/06/2024     Lab Results   Component Value Date    HCT 33.7 01/06/2024     Lab Results   Component Value Date    MCV 97 01/06/2024     Lab Results   Component Value Date    MCH 31.2 01/06/2024     Lab Results   Component Value Date    MCHC 32.3 01/06/2024     Lab Results   Component Value Date    RDW 13.4 01/06/2024     Lab Results   Component Value Date     01/06/2024     01/06/2024        @Last Comprehensive Metabolic Panel:  Sodium   Date Value Ref Range Status   01/30/2024 137 135 - 145 mmol/L Final     Comment:     Reference intervals for this test were updated on 09/26/2023 to more accurately reflect our healthy  population. There may be differences in the flagging of prior results with similar values performed with this method. Interpretation of those prior results can be made in the context of the updated reference intervals.      Potassium   Date Value Ref Range Status   01/30/2024 4.2 3.4 - 5.3 mmol/L Final   06/28/2022 3.3 (L) 3.5 - 5.0 mmol/L Final     Chloride   Date Value Ref Range Status   01/30/2024 103 98 - 107 mmol/L Final   06/28/2022 103 98 - 107 mmol/L Final     Carbon Dioxide (CO2)   Date Value Ref Range Status   01/30/2024 22 22 - 29 mmol/L Final   06/28/2022 26 22 - 31 mmol/L Final     Anion Gap   Date Value Ref Range Status   01/30/2024 12 7 - 15 mmol/L Final   06/28/2022 13 5 - 18 mmol/L Final     Glucose   Date Value Ref Range Status   01/30/2024 161 (H) 70 - 99 mg/dL Final   06/28/2022 91 70 - 125 mg/dL Final     GLUCOSE BY METER POCT   Date Value Ref Range Status   01/06/2024 151 (H) 70 - 99 mg/dL Final     Urea Nitrogen   Date Value Ref Range Status   01/30/2024 18.4 8.0 - 23.0 mg/dL Final   06/28/2022 26 8 - 28 mg/dL Final     Creatinine   Date Value Ref Range Status   01/30/2024 1.17 0.67 - 1.17 mg/dL Final     GFR Estimate   Date Value Ref Range Status   01/30/2024 64 >60 mL/min/1.73m2 Final   05/19/2021 37 (L) >60 mL/min/1.73m2 Final     Calcium   Date Value Ref Range Status   01/30/2024 8.9 8.8 - 10.2 mg/dL Final         DISCHARGE PLAN/FACE TO FACE:  I certify that this patient is under my care and that I, or a nurse practitioner or physician's assistant working with me, had a face-to-face encounter that meets the physician face-to-face encounter requirements with this patient.       I certify that, based on my findings, the following services are medically necessary home health services.    My clinical findings support the need for the above skilled services.    This patient is homebound because: Recent right hand infection following a cat bite with worsening right wrist infection, s/p I & D and  arthrotomy.     The patient is, or has been, under my care and I have initiated the establishment of the plan of care. This patient will be followed by a physician who will periodically review the plan of care.    > 35 minutes of time spent for this visit, reviewing discharge medications, home care services and follow ups as well as collaborating with nursing staff and SW.     This note has been dictated using voice recognition software. Any grammatical or context distortions are unintentional and inherent to the software      I was present with the medical student/advanced practice registered nurse, Rosio Pineda, JUSTIN aceves and in the documentation of this note. I have verified the history and personally performed the physical exam and medical decision making. I agree with the assessment and plan of care as documented in the note.       Electronically signed by: Justina Barragan CNP

## 2024-02-28 ENCOUNTER — TRANSFERRED RECORDS (OUTPATIENT)
Dept: HEALTH INFORMATION MANAGEMENT | Facility: CLINIC | Age: 79
End: 2024-02-28
Payer: COMMERCIAL

## 2024-02-28 LAB — HBA1C MFR BLD: 7 % (ref 4.2–6.1)

## 2024-02-29 ENCOUNTER — LAB REQUISITION (OUTPATIENT)
Dept: LAB | Facility: CLINIC | Age: 79
End: 2024-02-29

## 2024-02-29 DIAGNOSIS — E78.5 HYPERLIPIDEMIA, UNSPECIFIED: ICD-10-CM

## 2024-02-29 DIAGNOSIS — N18.32 CHRONIC KIDNEY DISEASE, STAGE 3B (H): ICD-10-CM

## 2024-02-29 LAB
BASOPHILS # BLD AUTO: 0.1 10E3/UL (ref 0–0.2)
BASOPHILS NFR BLD AUTO: 1 %
EOSINOPHIL # BLD AUTO: 0.2 10E3/UL (ref 0–0.7)
EOSINOPHIL NFR BLD AUTO: 2 %
ERYTHROCYTE [DISTWIDTH] IN BLOOD BY AUTOMATED COUNT: 13.5 % (ref 10–15)
HCT VFR BLD AUTO: 43.2 % (ref 40–53)
HGB BLD-MCNC: 14 G/DL (ref 13.3–17.7)
IMM GRANULOCYTES # BLD: 0 10E3/UL
IMM GRANULOCYTES NFR BLD: 0 %
LYMPHOCYTES # BLD AUTO: 2.1 10E3/UL (ref 0.8–5.3)
LYMPHOCYTES NFR BLD AUTO: 23 %
MCH RBC QN AUTO: 30.6 PG (ref 26.5–33)
MCHC RBC AUTO-ENTMCNC: 32.4 G/DL (ref 31.5–36.5)
MCV RBC AUTO: 95 FL (ref 78–100)
MONOCYTES # BLD AUTO: 0.8 10E3/UL (ref 0–1.3)
MONOCYTES NFR BLD AUTO: 9 %
NEUTROPHILS # BLD AUTO: 6 10E3/UL (ref 1.6–8.3)
NEUTROPHILS NFR BLD AUTO: 65 %
NRBC # BLD AUTO: 0 10E3/UL
NRBC BLD AUTO-RTO: 0 /100
PLATELET # BLD AUTO: 209 10E3/UL (ref 150–450)
RBC # BLD AUTO: 4.57 10E6/UL (ref 4.4–5.9)
WBC # BLD AUTO: 9.1 10E3/UL (ref 4–11)

## 2024-02-29 PROCEDURE — 84156 ASSAY OF PROTEIN URINE: CPT | Performed by: FAMILY MEDICINE

## 2024-02-29 PROCEDURE — 85025 COMPLETE CBC W/AUTO DIFF WBC: CPT | Performed by: FAMILY MEDICINE

## 2024-02-29 PROCEDURE — 80053 COMPREHEN METABOLIC PANEL: CPT | Performed by: FAMILY MEDICINE

## 2024-02-29 PROCEDURE — 80061 LIPID PANEL: CPT | Performed by: FAMILY MEDICINE

## 2024-03-01 LAB
ALBUMIN MFR UR ELPH: 39.8 MG/DL
ALBUMIN SERPL BCG-MCNC: 4.1 G/DL (ref 3.5–5.2)
ALP SERPL-CCNC: 43 U/L (ref 40–150)
ALT SERPL W P-5'-P-CCNC: 27 U/L (ref 0–70)
ANION GAP SERPL CALCULATED.3IONS-SCNC: 15 MMOL/L (ref 7–15)
AST SERPL W P-5'-P-CCNC: 30 U/L (ref 0–45)
BILIRUB SERPL-MCNC: 0.4 MG/DL
BUN SERPL-MCNC: 29.3 MG/DL (ref 8–23)
CALCIUM SERPL-MCNC: 9.2 MG/DL (ref 8.8–10.2)
CHLORIDE SERPL-SCNC: 99 MMOL/L (ref 98–107)
CHOLEST SERPL-MCNC: 144 MG/DL
CREAT SERPL-MCNC: 1.57 MG/DL (ref 0.67–1.17)
CREAT UR-MCNC: 221 MG/DL
DEPRECATED HCO3 PLAS-SCNC: 23 MMOL/L (ref 22–29)
EGFRCR SERPLBLD CKD-EPI 2021: 45 ML/MIN/1.73M2
FASTING STATUS PATIENT QL REPORTED: ABNORMAL
GLUCOSE SERPL-MCNC: 171 MG/DL (ref 70–99)
HDLC SERPL-MCNC: 39 MG/DL
LDLC SERPL CALC-MCNC: 82 MG/DL
NONHDLC SERPL-MCNC: 105 MG/DL
POTASSIUM SERPL-SCNC: 3.7 MMOL/L (ref 3.4–5.3)
PROT SERPL-MCNC: 7.6 G/DL (ref 6.4–8.3)
PROT/CREAT 24H UR: 0.18 MG/MG CR (ref 0–0.2)
SODIUM SERPL-SCNC: 137 MMOL/L (ref 135–145)
TRIGL SERPL-MCNC: 115 MG/DL

## 2024-03-11 ENCOUNTER — OFFICE VISIT (OUTPATIENT)
Dept: PHARMACY | Facility: PHYSICIAN GROUP | Age: 79
End: 2024-03-11
Payer: COMMERCIAL

## 2024-03-11 VITALS — DIASTOLIC BLOOD PRESSURE: 66 MMHG | SYSTOLIC BLOOD PRESSURE: 100 MMHG | HEART RATE: 55 BPM

## 2024-03-11 DIAGNOSIS — I10 ESSENTIAL HYPERTENSION: ICD-10-CM

## 2024-03-11 DIAGNOSIS — Z86.73 HISTORY OF TIA (TRANSIENT ISCHEMIC ATTACK) AND STROKE: ICD-10-CM

## 2024-03-11 DIAGNOSIS — K21.9 GASTROESOPHAGEAL REFLUX DISEASE WITHOUT ESOPHAGITIS: ICD-10-CM

## 2024-03-11 DIAGNOSIS — E11.9 TYPE 2 DIABETES MELLITUS WITHOUT COMPLICATION, WITH LONG-TERM CURRENT USE OF INSULIN (H): Primary | ICD-10-CM

## 2024-03-11 DIAGNOSIS — Z79.4 TYPE 2 DIABETES MELLITUS WITHOUT COMPLICATION, WITH LONG-TERM CURRENT USE OF INSULIN (H): Primary | ICD-10-CM

## 2024-03-11 DIAGNOSIS — R52 PAIN: ICD-10-CM

## 2024-03-11 DIAGNOSIS — E78.00 PURE HYPERCHOLESTEROLEMIA: ICD-10-CM

## 2024-03-11 PROCEDURE — 99605 MTMS BY PHARM NP 15 MIN: CPT | Performed by: PHARMACIST

## 2024-03-11 RX ORDER — ASPIRIN 81 MG/1
2 TABLET, CHEWABLE ORAL AT BEDTIME
COMMUNITY

## 2024-03-11 NOTE — Clinical Note
Medication List        Prepared on: 03/15/2024     Bring your Medication List when you go to the doctor, hospital, or   emergency room. And, share it with your family or caregivers.     Note any changes to how you take your medications.  Cross out medications when you no longer use them.    Medication How I take it Why I use it Prescriber   acetaminophen (TYLENOL) 500 MG tablet Take 500-1,000 mg by mouth every 6 hours as needed for pain Pain Patient Reported   amLODIPine (NORVASC) 2.5 MG tablet Take 5 mg by mouth daily   Patient Reported   aspirin (ASA) 325 MG tablet Take 325 mg by mouth daily   Patient Reported   aspirin (ASA) 81 MG chewable tablet Take 2 tablets by mouth at bedtime Pain Patient Reported   calcium carbonate (ANTACID) 500 MG chewable tablet Take 1 chew tab by mouth daily as needed for heartburn Acid Indigestion; Gastroesophageal Reflux Disease; Heartburn Patient Reported   Continuous Blood Gluc Sensor (FREESTYLE PELON 2 SENSOR) MISC Change every 14 days. Type 2 Diabetes Farzaneh Sandy MD   famotidine (PEPCID) 20 MG tablet Take 20 mg by mouth 2 times daily   Patient Reported   insulin aspart (NOVOLOG PEN) 100 UNIT/ML pen Inject 12 Units Subcutaneous 3 times daily (with meals)   Patient Reported   insulin glargine (LANTUS PEN) 100 UNIT/ML pen Inject 56 Units Subcutaneous every morning   Patient Reported   lisinopril-hydrochlorothiazide (ZESTORETIC) 20-12.5 MG tablet Take 1 tablet by mouth daily   Patient Reported   lovastatin (MEVACOR) 40 MG tablet Take 1 tablet by mouth 2 times daily   Patient Reported   Multiple Vitamin (MULTIVITAMIN PO) Take 0.5 tablets by mouth 2 times daily   Patient Reported         Add new medications, over-the-counter drugs, herbals, vitamins, or  minerals in the blank rows below.    Medication How I take it Why I use it Prescriber                                      Allergies:      sulfa antibiotics        Side effects I have had:               Other Information:               My notes and questions:

## 2024-03-11 NOTE — LETTER
March 15, 2024  Amor Zavaleta  [id number]   [facility] [address]    Dear Mr. Zavaleta, [facility]     Thank you for talking with me on Mar 11, 2024 about your health and medications. As a follow-up to our conversation, I have included two documents:      Your Recommended To-Do List has steps you should take to get the best results from your medications.  Your Medication List will help you keep track of your medications and how to take them.    If you want to talk about these documents, please call Shilpi Souza PharmD at phone: [phone], Monday-Friday 8-4:30pm.    I look forward to working with you and your doctors to make sure your medications work well for you.    Sincerely,  Shilpi Souza, Praneeth  [facility] Pharmacist, [facility]

## 2024-03-11 NOTE — LETTER
_  Medication List        Prepared on: 03/15/2024     Bring your Medication List when you go to the doctor, hospital, or   emergency room. And, share it with your family or caregivers.     Note any changes to how you take your medications.  Cross out medications when you no longer use them.    Medication How I take it Why I use it Prescriber   acetaminophen (TYLENOL) 500 MG tablet Take 500-1,000 mg by mouth every 6 hours as needed for pain Pain Patient Reported   amLODIPine (NORVASC) 2.5 MG tablet Take 5 mg by mouth daily High Blood Pressure Disorder Farzaneh Sandy MD   aspirin (ASA) 325 MG tablet Take 325 mg by mouth daily Temporary Stroke Patient Reported   aspirin (ASA) 81 MG chewable tablet Take 2 tablets by mouth at bedtime Pain Patient Reported   calcium carbonate (ANTACID) 500 MG chewable tablet Take 1 chew tab by mouth daily as needed for heartburn Acid Indigestion; Gastroesophageal Reflux Disease; Heartburn Patient Reported   famotidine (PEPCID) 20 MG tablet Take 20 mg by mouth 2 times daily Gastroesophageal Reflux Disease Farzaneh Sandy MD   insulin aspart (NOVOLOG PEN) 100 UNIT/ML pen Inject 12 Units Subcutaneous 3 times daily (with meals) Type 2 Diabetes Farzaneh Sandy MD   insulin glargine (LANTUS PEN) 100 UNIT/ML pen Inject 56 Units Subcutaneous every morning Type 2 Diabetes Farzaneh Sandy MD   lisinopril-hydrochlorothiazide (ZESTORETIC) 20-12.5 MG tablet Take 1 tablet by mouth daily High Blood Pressure Disorder Farzaneh Sandy MD   lovastatin (MEVACOR) 40 MG tablet Take 1 tablet by mouth 2 times daily High Amount of Fats in the Blood; Temporary Stroke Farzaneh Sandy MD   Multiple Vitamin (MULTIVITAMIN PO) Take 0.5 tablets by mouth 2 times daily General Health Patient Reported         Add new medications, over-the-counter drugs, herbals, vitamins, or  minerals in the blank rows below.    Medication How I take it Why I use it Prescriber                                      Allergies:      sulfa  antibiotics        Side effects I have had:               Other Information:              My notes and questions:

## 2024-03-11 NOTE — PROGRESS NOTES
Medication Therapy Management (MTM) Encounter    ASSESSMENT:                            Medication Adherence/Access:   No issues identified    Diabetes   A1c is <8%.  Home blood sugars are not at goal time in target >70%. Reviewed using [employer] phone abel to share readings with clinic. He would benefit from SGLT-2 inhibitor given chronic kidney disease but cost may be a barrier and he prefers to continue insulin at this time. Reviewed diet recommendations.     Hypertension , Chronic Kidney Disease  Blood pressure is at goal of <130/80 mmHg. See below about interaction of amlodipine and lovastatin.     Hyperlipidemia   Given history of possible TIA he would benefit from high intensity statin. Prefers to continue current regimen for now. An American Heart Association scientific statement recommends limiting the dose of lovastatin to a maximum of 20 mg/day with concurrent use of amlodipine. He would benefit from switching lovastatin to rosuvastatin.     History of possible TIA:    Unclear rationale for using 325 mg aspirin in addition to 162 mg aspirin he takes at night for pain. He did not want to make changes during our visit today. Given his age and GERD bleeding risk is a concern. Recommend continuing to discuss need for high dose of aspirin daily.       GERD    Stable.     Pain:    Stable. As noted above monitor for signs or symptoms of bleeding with high aspirin dose.     Supplements:   Stable.     PLAN:                            Use [employer] phone abel as the reader when you change your sensor next  Recommend switching lovastatin to a different cholesterol medicine called rosuvastatin. Your amlodipine medicine interacts with lovastatin but it does not with rosuvastatin.   Your total daily aspirin dose is higher than usual recommended dose of aspirin after possible transient ischemic attack (TIA). Risk of bleeding and worsening acid reflux issues is higher with current aspirin use. Recommend talking with Dr. Sandy about  this.     Follow-up: diabetes check with Dr. Sandy in June    Shilpi Souza, PharmD, BCACP  Medication Therapy Management Pharmacist  [facility]  [phone]    SUBJECTIVE/OBJECTIVE:                          Amor Zavaleta is a 78 year old male coming in for an initial visit. He was referred to me from Dr. Sandy.      Reason for visit: Diabetes management, assist with José Miguel.    Allergies/ADRs: sulfa antibiotics   Past Medical History: Reviewed in chart  Tobacco: He reports that he has never smoked. He has never used smokeless tobacco.  Alcohol: none    Medication Adherence/Access:   No issues reported    Diabetes   Lantus 56 units once daily AM  Novolog 12 units three times a day before meals  Aspirin 325mg daily in morning and aspirin 81 mg 2 tablets at night  Patient is not experiencing side effects. He has been managed by insulin for a long time. He had a severe infection after a cat bite that lead to sepsis and stay in transitional care unit in late December-early January.   Blood sugar monitoring: Continuous Glucose Monitor - currently using reader but is interested in switching to his phone  José Miguel 2 Continuous Glucose Monitoring Results:   Average Glucose Last 90 Days = 165 mg/dl  Time in Target Last 90 Days:  Above 180 mg/dL: 34%  In target  mg/dL: 66%  Below 70 mg/dL: 0%  GMI 7.3%    Current diabetes symptoms: none  Diet/Exercise: tries to watch carbs and sugar  A1c: 2/28/2024- 7%     Eye exam in the last 12 months? Not discussed today  Foot exam: up to date  Urine Albumin:   Lab Results   Component Value Date    UMALCR 11.56 07/07/2023        Hypertension , Chronic Kidney Disease  Amlodipine 2.5 mg once daily  Lisinopril-hydrochlorothiazide 20-12.5 mg once daily  Patient reports no current medication side effects  Patient does not self-monitor blood pressure.       BP Readings from Last 3 Encounters:   03/11/24 100/66   02/06/24 137/87   02/01/24 (!) 166/98     Pulse Readings from Last 3  Encounters:   03/11/24 55   02/06/24 62   02/01/24 60       Hyperlipidemia   Lovastatin 40 mg twice daily  Patient reports no significant myalgias or other side effects.  The ASCVD Risk score (Wing MICHAUD, et al., 2019) failed to calculate for the following reasons:    The patient has a prior MI or stroke diagnosis       Recent Labs   Lab Test 02/29/24  1344 04/20/23  0914   CHOL 144 108   HDL 39* 35*   LDL 82 51   TRIG 115 108     History of possible TIA:    Aspirin 325 mg 1 tablet in morning   Aspirin 81 mg 2 tablets at night  He was hospitalized in 2007 after experiencing a seizure with unknown etiology. Concern at the time of possible TIA. He was already taking 81 mg aspirin on admission so was told to take 325 mg on discharge and has been taking this ever since. He also has always taken two 81 mg aspirin tablets at night for pain and said his providers always knew he was always doing this. He does take famotidine for stomach and acid issues but no other signs or symptoms of significant bruising or bleeding.         GERD    Famotidine 20 mg twice daily   Antacids as needed   Patient feels that current regimen is effective.       Pain:    Acetaminophen 500 mg every 6 hours as needed   Aspirin 81 mg 2 tablets at bedtime  He avoids other NSAIDs. Only takes acetaminophen as needed.       Supplements:   Multivitamin 0.5 tablet twice a day   No reported issues at this time.     Today's Vitals: /66 (BP Location: Left arm, Patient Position: Sitting, Cuff Size: Adult Large)   Pulse 55   ----------------      I spent 60 minutes with this patient today. All changes were made via collaborative practice agreement with Farzaneh Sandy MD. A copy of the visit note was provided to the patient's provider(s).    A summary of these recommendations was sent via [employer].    Shilpi Souza, PharmD, BCACP  Medication Therapy Management Pharmacist  [facility]  [phone]           Medication Therapy  Recommendations  History of TIA (transient ischemic attack) and stroke    Current Medication: aspirin (ASA) 325 MG tablet   Rationale: Dose too high - Dosage too high - Safety   Recommendation: Decrease Dose   Status: Declined per Patient         Pure hypercholesterolemia    Current Medication: lovastatin (MEVACOR) 40 MG tablet   Rationale: Medication interaction - Dosage too high - Safety   Recommendation: Change Medication - rosuvastatin 20 MG tablet   Status: Contact Provider - Awaiting Response

## 2024-03-11 NOTE — LETTER
"Recommended To-Do List      Prepared on: 03/15/2024       You can get the best results from your medications by completing the items on this \"To-Do List.\"      Bring your To-Do List when you go to your doctor. And, share it with your family or caregivers.    My To-Do List:  What we talked about: What I should do:   Your medication dosage being too high. An American Heart Association scientific statement recommends limiting the dose of lovastatin to a maximum of 20 mg per day when taking with amlodipine.    Recommend changing the medication you are taking from lovastatin (MEVACOR) to rosuvastatin (CRESTOR). Your amlodipine medicine interacts with lovastatin but it does not with rosuvastatin.           What we talked about: What I should do:    Your total daily aspirin dose is higher than usual recommended dose of aspirin after possible transient ischemic attack (TIA). Risk of bleeding and worsening acid reflux issues is higher with current aspirin use.    Recommend talking with Dr. Sandy about aspirin at your next visit.               "

## 2024-03-15 RX ORDER — ASPIRIN 81 MG/1
162 TABLET ORAL DAILY
COMMUNITY
End: 2024-03-15

## 2024-03-15 RX ORDER — CALCIUM CARBONATE 500 MG/1
1 TABLET, CHEWABLE ORAL DAILY PRN
COMMUNITY

## 2024-03-15 RX ORDER — ACETAMINOPHEN 500 MG
500-1000 TABLET ORAL EVERY 6 HOURS PRN
COMMUNITY

## 2024-03-16 NOTE — PATIENT INSTRUCTIONS
Recommendations from today's MTM visit:                                                       Use [employer] phone abel as the reader when you change your sensor next  Recommend switching lovastatin to a different cholesterol medicine called rosuvastatin. Your amlodipine medicine interacts with lovastatin but it does not with rosuvastatin.   Your total daily aspirin dose is higher than usual recommended dose of aspirin after possible transient ischemic attack (TIA). Risk of bleeding and worsening acid reflux issues is higher with current aspirin use. Recommend talking with Dr. Sandy about this.     Follow-up: diabetes check with Dr. Sandy in June    It was great speaking with you today.  I value your experience and would be very thankful for your time in providing feedback in our clinic survey. In the next few days, you may receive an email or text message from [employer] with a link to a survey related to your clinical pharmacist.    To schedule another MTM appointment, please call [phone].    My Clinical Pharmacist's contact information:                                                      Please feel free to contact me with any questions or concerns you have.      Shilpi Souza, PharmD, BCACP  Medication Therapy Management Pharmacist  [facility]  Voicemail: [phone]

## 2024-06-12 ENCOUNTER — TRANSFERRED RECORDS (OUTPATIENT)
Dept: HEALTH INFORMATION MANAGEMENT | Facility: CLINIC | Age: 79
End: 2024-06-12
Payer: COMMERCIAL

## 2024-06-12 LAB — HBA1C MFR BLD: 6.8 % (ref 4.2–6.1)

## 2024-06-27 ENCOUNTER — OFFICE VISIT (OUTPATIENT)
Dept: PHARMACY | Facility: PHYSICIAN GROUP | Age: 79
End: 2024-06-27
Payer: COMMERCIAL

## 2024-06-27 DIAGNOSIS — E78.00 PURE HYPERCHOLESTEROLEMIA: ICD-10-CM

## 2024-06-27 DIAGNOSIS — E11.9 TYPE 2 DIABETES MELLITUS WITHOUT COMPLICATION, WITH LONG-TERM CURRENT USE OF INSULIN (H): Primary | ICD-10-CM

## 2024-06-27 DIAGNOSIS — Z79.4 TYPE 2 DIABETES MELLITUS WITHOUT COMPLICATION, WITH LONG-TERM CURRENT USE OF INSULIN (H): Primary | ICD-10-CM

## 2024-06-27 PROCEDURE — 99606 MTMS BY PHARM EST 15 MIN: CPT | Performed by: PHARMACIST

## 2024-06-27 NOTE — PATIENT INSTRUCTIONS
Recommendations from today's MTM visit:                                                         Stop lovastatin. Replace it with rosuvastatin 20 mg once daily. This does not interact with amlodipine.   Talk with your kidney specialists about starting a medicine called Jardiance. This would help with your blood sugar and kidneys.     Follow-up: diabetes check with Dr. Mahan in Oct, MTM pharmacist sooner if kidney specialists want you to start Jardiance    It was great speaking with you today.  I value your experience and would be very thankful for your time in providing feedback in our clinic survey. In the next few days, you may receive an email or text message from [employer] with a link to a survey related to your clinical pharmacist.    To schedule another MTM appointment, please call [phone].    My Clinical Pharmacist's contact information:                                                      Please feel free to contact me with any questions or concerns you have.      Shilpi Souza, PharmD, BCACP  Medication Therapy Management Pharmacist  [facility]  [phone]

## 2024-06-27 NOTE — PROGRESS NOTES
Medication Therapy Management (MTM) Encounter    ASSESSMENT:                            Medication Adherence/Access: No issues identified  _  Diabetes   A1c is at goal of <7%.   Patient has been meeting goal of time in range >70% with continuous glucose monitor except last week using reader his readings are slightly higher. Reasonable to continue current doses and monitoring. Assisted patient with setting Influx abel up again on his phone and connected to clinic. Encouraged him to discuss benefits of SGLT-2 inhibitor for renal protection with nephrology at his next appointment.   Due for annual eye exam.   _  Hyperlipidemia   Last LDL is not at goal of <70 mg/dL. Given history of possible TIA he would benefit from high intensity statin. Prefers to continue current regimen for now. An American Heart Association scientific statement recommends limiting the dose of lovastatin to a maximum of 20 mg/day with concurrent use of amlodipine. He would benefit from switching lovastatin to rosuvastatin.     PLAN:                            Stop lovastatin. Replace it with rosuvastatin 20 mg once daily. This does not interact with amlodipine.   Talk with your kidney specialists about starting a medicine called Jardiance. This would help with your blood sugar and kidneys.      Follow-up: diabetes check with Dr. Mahan in Oct, MTM pharmacist sooner if kidney specialists want you to start Jardiance    SUBJECTIVE/OBJECTIVE:                          Amor Zavaleta is a 78 year old male seen for a follow-up visit.       Reason for visit: Diabetes follow-up.    Allergies/ADRs: sulfa antibiotics   Past Medical History: Reviewed in chart  Tobacco: He reports that he has never smoked. He has never used smokeless tobacco.  Alcohol: none    Medication Adherence/Access:   No issues reported    Diabetes   Lantus 40 units once daily AM  Novolog 16 units three times a day before meals plus sliding scale  Patient is not experiencing side effects.  He has been managed by insulin for a long time. He had a severe infection after a cat bite that lead to sepsis and stay in transitional care unit in late December-early January.   Blood sugar monitoring: Continuous Glucose Monitor - currently using reader because he reset his phone and Savaree abel was deleted, he is here today for help getting it back setup on his phone  José Miguel 2 ([place]) Continuous Glucose Monitoring Results:   Report Period: 06/20/2024 - 06/27/2024 (8 days)- READER  % Time CGM Active: 57%  Glucose Statistics and Targets  Average Glucose: 164 mg/dL  Glucose Management Indicator (GMI): 7.2%  Glucose Variability (%CV): 31.2%  Target Range: 70 - 180 mg/dL  Time in Ranges  Very High: >250 mg/dL --- 7%  High: 181 - 250 mg/dL --- 27%  Target Range: 70 - 180 mg/dL --- 65%  Low: 54 - 69 mg/dL --- 1%  Very Low: <54 mg/dL --- 0%      Report Period: 06/05/2024 - 06/18/2024 (14 days)- PHONE  % Time CGM Active: 90%  Glucose Statistics and Targets  Average Glucose: 137 mg/dL  Glucose Management Indicator (GMI): 6.6%  Glucose Variability (%CV): 25.6%  Target Range: 70 - 180 mg/dL  Time in Ranges  Very High: >250 mg/dL --- 1%  High: 181 - 250 mg/dL --- 10%  Target Range: 70 - 180 mg/dL --- 88%  Low: 54 - 69 mg/dL --- 1%  Very Low: <54 mg/dL --- 0%      Current diabetes symptoms: none  Diet/Exercise: tries to watch carbs and sugar  A1c: 6/12/2024-6.8%     Eye exam in the last 12 months? No  Foot exam: up to date    Hyperlipidemia   Lovastatin 40 mg twice daily  He has been on this dose for a long time. Patient reports no significant myalgias or other side effects. He does have a history of possible TIA. He did not switch to rosuvastatin after our last visit. He is open to doing this now.   Recent Labs   Lab Test 02/29/24  1344 04/20/23  0914   CHOL 144 108   HDL 39* 35*   LDL 82 51   TRIG 115 108            Today's Vitals: /70 (BP Location: Right arm, Patient Position: Sitting, Cuff Size: Adult Regular)    ----------------      I spent 30 minutes with this patient today. All changes were made via collaborative practice agreement with Farzaneh Sandy MD. A copy of the visit note was provided to the patient's provider(s).    A summary of these recommendations was given to the patient and was sent via [employer].    Shilpi Souza, PharmD, BCACP  Medication Therapy Management Pharmacist  [facility]  [phone]         Medication Therapy Recommendations  Pure hypercholesterolemia    Current Medication: lovastatin (MEVACOR) 40 MG tablet (Discontinued)   Rationale: Medication interaction - Dosage too high - Safety   Recommendation: Change Medication - rosuvastatin 20 MG tablet   Status: Accepted per CPA

## 2024-07-01 VITALS — SYSTOLIC BLOOD PRESSURE: 132 MMHG | DIASTOLIC BLOOD PRESSURE: 70 MMHG

## 2024-07-01 RX ORDER — ROSUVASTATIN CALCIUM 20 MG/1
20 TABLET, COATED ORAL DAILY
COMMUNITY

## 2024-07-02 ENCOUNTER — LAB (OUTPATIENT)
Dept: LAB | Facility: CLINIC | Age: 79
End: 2024-07-02
Payer: COMMERCIAL

## 2024-07-02 DIAGNOSIS — I10 BENIGN HYPERTENSION: ICD-10-CM

## 2024-07-02 DIAGNOSIS — N18.32 CHRONIC KIDNEY DISEASE (CKD) STAGE G3B/A1, MODERATELY DECREASED GLOMERULAR FILTRATION RATE (GFR) BETWEEN 30-44 ML/MIN/1.73 SQUARE METER AND ALBUMINURIA CREATININE RATIO LESS THAN 30 MG/G (H): Primary | ICD-10-CM

## 2024-07-02 DIAGNOSIS — I10 ESSENTIAL HYPERTENSION: ICD-10-CM

## 2024-07-02 DIAGNOSIS — E11.9 DIABETES MELLITUS (H): ICD-10-CM

## 2024-07-02 LAB
ERYTHROCYTE [DISTWIDTH] IN BLOOD BY AUTOMATED COUNT: 12.6 % (ref 10–15)
HCT VFR BLD AUTO: 44.3 % (ref 40–53)
HGB BLD-MCNC: 15.4 G/DL (ref 13.3–17.7)
MCH RBC QN AUTO: 32.2 PG (ref 26.5–33)
MCHC RBC AUTO-ENTMCNC: 34.8 G/DL (ref 31.5–36.5)
MCV RBC AUTO: 93 FL (ref 78–100)
PLATELET # BLD AUTO: 173 10E3/UL (ref 150–450)
RBC # BLD AUTO: 4.79 10E6/UL (ref 4.4–5.9)
WBC # BLD AUTO: 7.1 10E3/UL (ref 4–11)

## 2024-07-02 PROCEDURE — 85027 COMPLETE CBC AUTOMATED: CPT

## 2024-07-02 PROCEDURE — 36415 COLL VENOUS BLD VENIPUNCTURE: CPT

## 2024-07-02 PROCEDURE — 80069 RENAL FUNCTION PANEL: CPT

## 2024-07-02 PROCEDURE — 82610 CYSTATIN C: CPT

## 2024-07-02 PROCEDURE — 84156 ASSAY OF PROTEIN URINE: CPT

## 2024-07-02 PROCEDURE — 82306 VITAMIN D 25 HYDROXY: CPT

## 2024-07-03 LAB
ALBUMIN MFR UR ELPH: 26.7 MG/DL
ALBUMIN SERPL BCG-MCNC: 4.5 G/DL (ref 3.5–5.2)
ANION GAP SERPL CALCULATED.3IONS-SCNC: 12 MMOL/L (ref 7–15)
BUN SERPL-MCNC: 28.4 MG/DL (ref 8–23)
CALCIUM SERPL-MCNC: 9.9 MG/DL (ref 8.8–10.2)
CHLORIDE SERPL-SCNC: 99 MMOL/L (ref 98–107)
CREAT SERPL-MCNC: 1.58 MG/DL (ref 0.67–1.17)
CREAT UR-MCNC: 129 MG/DL
CYSTATIN C (ROCHE): 1.9 MG/L (ref 0.6–1)
DEPRECATED HCO3 PLAS-SCNC: 25 MMOL/L (ref 22–29)
EGFRCR SERPLBLD CKD-EPI 2021: 44 ML/MIN/1.73M2
GFR/BSA.PRED SERPLBLD CYS-BASED-ARV: 31 ML/MIN/1.73M2
GLUCOSE SERPL-MCNC: 147 MG/DL (ref 70–99)
PHOSPHATE SERPL-MCNC: 2.9 MG/DL (ref 2.5–4.5)
POTASSIUM SERPL-SCNC: 3.5 MMOL/L (ref 3.4–5.3)
PROT/CREAT 24H UR: 0.21 MG/MG CR (ref 0–0.2)
SODIUM SERPL-SCNC: 136 MMOL/L (ref 135–145)
VIT D+METAB SERPL-MCNC: 38 NG/ML (ref 20–50)

## 2024-07-15 NOTE — PLAN OF CARE
Problem: Adult Inpatient Plan of Care  Goal: Optimal Comfort and Wellbeing  Outcome: Progressing   Goal Outcome Evaluation:         Patient alert and oriented.  Able to make needs known. VSS. RA. Saline locked in between antibiotics. Tolerating Regular diet.  ACHS. Voiding and Ambulating A1 with walker and gaitbelt. Soaks done on R arm. For repeat I & D tomorrow 12/27/2023. NPO at midnight.                Previously Negative (within the last year)

## 2024-09-28 ENCOUNTER — HEALTH MAINTENANCE LETTER (OUTPATIENT)
Age: 79
End: 2024-09-28

## 2024-10-21 ENCOUNTER — TRANSFERRED RECORDS (OUTPATIENT)
Dept: HEALTH INFORMATION MANAGEMENT | Facility: CLINIC | Age: 79
End: 2024-10-21

## 2024-10-24 ENCOUNTER — TRANSFERRED RECORDS (OUTPATIENT)
Dept: MULTI SPECIALTY CLINIC | Facility: CLINIC | Age: 79
End: 2024-10-24

## 2024-10-24 LAB — RETINOPATHY: NORMAL

## 2024-11-25 ENCOUNTER — OFFICE VISIT (OUTPATIENT)
Dept: PHARMACY | Facility: PHYSICIAN GROUP | Age: 79
End: 2024-11-25
Payer: COMMERCIAL

## 2024-11-25 VITALS — SYSTOLIC BLOOD PRESSURE: 133 MMHG | DIASTOLIC BLOOD PRESSURE: 74 MMHG

## 2024-11-25 DIAGNOSIS — E11.9 TYPE 2 DIABETES MELLITUS WITHOUT COMPLICATION, WITH LONG-TERM CURRENT USE OF INSULIN (H): Primary | ICD-10-CM

## 2024-11-25 DIAGNOSIS — Z79.4 TYPE 2 DIABETES MELLITUS WITHOUT COMPLICATION, WITH LONG-TERM CURRENT USE OF INSULIN (H): Primary | ICD-10-CM

## 2024-11-25 PROCEDURE — 99606 MTMS BY PHARM EST 15 MIN: CPT | Performed by: PHARMACIST

## 2024-11-25 RX ORDER — LOVASTATIN 40 MG/1
20 TABLET ORAL AT BEDTIME
COMMUNITY

## 2024-11-25 NOTE — PROGRESS NOTES
Medication Therapy Management (MTM) Encounter    ASSESSMENT:                            Medication Adherence/Access: No issues identified.  _  Diabetes   A1c is not at goal of <7.5% (goal based on age and fall risk without CV disease).   Patient is meeting goal of time in range >70% with continuous glucose monitor.   Glucose readings improved with higher basal insulin dose. If he continues to require use of sliding scale due to high pre-meal glucose readings may consider trying to split basal insulin doses twice a day to help improve fasting readings between meals. Encouraged him to track mealtime insulin doses on José Miguel abel to help determine if current sliding scale is appropriate.   ___________________  PLAN:                            Continue Lantus 56 units once daily at bedtime  Continue Novolog 18 units with meals plus sliding scale. Start adding the note on José Miguel abel to track how much of this you are using every meal.     Follow-up: Return in 4 weeks (on 12/23/2024) for diabetes medication management, with me, using a phone visit at 11:00 AM .    SUBJECTIVE/OBJECTIVE:                          Amor Zavaleta is a 79 year old male seen for a follow-up visit.       Reason for visit: Diabetes follow-up.    Allergies/ADRs: Reviewed in chart  Past Medical History: Reviewed in chart  Tobacco: He reports that he has never smoked. He has never used smokeless tobacco.  Alcohol: none    Medication Adherence/Access:   No issues reported. He has Medicare Extra Help for prescriptions so does not pay anything for medications currently.     Diabetes   Lantus 56 units once daily bedtime  Novolog 18 units three times a day before meals plus sliding scale  Sliding scale:   139 or lower-0 units  140 169- 2 units  170-219- 4 units  220-279-6 units  280-320-8 units  321 or higher- 10 units    Patient is not experiencing side effects. He has been managed by insulin for a long time. He has increased his Lantus insulin over the past  month or so and has been at 56 units at bedtime for the last 2 weeks. This was after he had diabetes check and his A1c went up. He switched from taking in the morning to bedtime as well. He estimates using about 20 units on average of Novolog before meals based on his blood sugar and sliding scale. Sometimes will be less and sometimes more. He has not tracked his doses on José Miguel abel but is open to doing this. He has not had any low blood sugar readings the past week. He did not end up starting Jardiance, he has a follow-up with nephrology scheduled in January.   Current diabetes symptoms: none  Diet/Exercise: tries to watch carbs and sugar  A1c: 10/21/2024- 7.8%     Blood sugar monitoring: Continuous Glucose Monitor   José Miguel 2 (Phone) Continuous Glucose Monitoring Results:   Name: Amor Zavaleta  YOB: 1945  Report Period: 11/19/2024 - 11/25/2024 (7 days)  Generated: 11/25/2024  % Time CGM Active: 91%  Glucose Statistics and Targets  Average Glucose: 152 mg/dL  Glucose Management Indicator (GMI): 6.9%  Glucose Variability (%CV): 25.7%  Target Range: 70 - 180 mg/dL  Time in Ranges  Very High: >250 mg/dL --- 2%  High: 181 - 250 mg/dL --- 17%  Target Range: 70 - 180 mg/dL --- 81%  Low: 54 - 69 mg/dL --- 0%  Very Low: <54 mg/dL --- 0%      Eye exam in the last 12 months? Yes- Date of last eye exam: 10/24/2024,  Location: [facility]  Foot exam: up to date        Today's Vitals: /74 (BP Location: Left arm, Patient Position: Sitting, Cuff Size: Adult Regular)   ----------------      I spent 25 minutes with this patient today. All changes were made via collaborative practice agreement with Farzaneh Sandy MD. A copy of the visit note was provided to the patient's provider(s).    A summary of these recommendations was given to the patient.    Shilpi Souza, PharmD, BCACP  Medication Therapy Management Pharmacist  [facility]  [phone]           Medication Therapy  Recommendations  No medication therapy recommendations to display

## 2024-11-25 NOTE — PATIENT INSTRUCTIONS
Recommendations from today's MTM visit:                                                       Continue Lantus 56 units once daily at bedtime    Continue Novolog 18 units with meals plus sliding scale. Start adding the note on José Miguel abel to track how much of this you are using every meal.     Follow-up: Return in 4 weeks (on 12/23/2024) for diabetes medication management, with me, using a phone visit at 11:00 AM .    It was great speaking with you today.  I value your experience and would be very thankful for your time in providing feedback in our clinic survey. In the next few days, you may receive an email or text message from [employer] with a link to a survey related to your clinical pharmacist.    To schedule another MTM appointment, please call [phone].    My Clinical Pharmacist's contact information:                                                      Please feel free to contact me with any questions or concerns you have.      Shilpi Souza, PharmD, BCACP  Medication Therapy Management Pharmacist  [facility]  [phone]

## 2024-12-17 DIAGNOSIS — N18.32 CHRONIC KIDNEY DISEASE (CKD) STAGE G3B/A1, MODERATELY DECREASED GLOMERULAR FILTRATION RATE (GFR) BETWEEN 30-44 ML/MIN/1.73 SQUARE METER AND ALBUMINURIA CREATININE RATIO LESS THAN 30 MG/G (H): Primary | ICD-10-CM

## 2024-12-17 DIAGNOSIS — E11.9 DIABETES MELLITUS (H): ICD-10-CM

## 2024-12-17 DIAGNOSIS — I10 ESSENTIAL HYPERTENSION, MALIGNANT: ICD-10-CM

## 2024-12-23 ENCOUNTER — VIRTUAL VISIT (OUTPATIENT)
Dept: PHARMACY | Facility: PHYSICIAN GROUP | Age: 79
End: 2024-12-23
Payer: COMMERCIAL

## 2024-12-23 DIAGNOSIS — Z79.4 TYPE 2 DIABETES MELLITUS WITHOUT COMPLICATION, WITH LONG-TERM CURRENT USE OF INSULIN (H): Primary | ICD-10-CM

## 2024-12-23 DIAGNOSIS — E11.9 TYPE 2 DIABETES MELLITUS WITHOUT COMPLICATION, WITH LONG-TERM CURRENT USE OF INSULIN (H): Primary | ICD-10-CM

## 2024-12-23 PROCEDURE — 99607 MTMS BY PHARM ADDL 15 MIN: CPT | Mod: 93 | Performed by: PHARMACIST

## 2024-12-23 PROCEDURE — 99606 MTMS BY PHARM EST 15 MIN: CPT | Mod: 93 | Performed by: PHARMACIST

## 2024-12-23 NOTE — PROGRESS NOTES
Medication Therapy Management (MTM) Encounter    ASSESSMENT:                            Medication Adherence/Access: No issues identified.  _  Diabetes  A1c is not at goal of <7.5% (goal based on age and fall risk without CV disease).   Patient is not meeting goal of time in range >60% with continuous glucose monitor.   Based on his tracked Novolog doses appears he most often needs correction with breakfast and lunch and may benefit from adjusting base doses to 20 units at these meal times to see if that helps provide better post-prandial control. If this is not effective, may consider splitting basal insulin dose to twice a day next visit.   _________________  PLAN:                            Increase base Novolog to 20 units before breakfast and lunch, continue 18 units before dinner plus sliding scale  Continue Lantus 56 units at bedtime for now. We may consider splitting this up twice a day at next visit if needed.     Follow-up: Return in 4 weeks (on 1/20/2025) for medication therapy management, with me, using a phone visit at 11:00 AM .    SUBJECTIVE/OBJECTIVE:                          Amor Zavaleta is a 79 year old male seen for a follow-up visit.       Reason for visit: Diabetes follow-up.    Allergies/ADRs: Reviewed in chart  Past Medical History: Reviewed in chart  Tobacco: He reports that he has never smoked. He has never used smokeless tobacco.  Alcohol: none    Medication Adherence/Access: no issues reported.    Diabetes   Lantus 56 units once daily bedtime  Novolog 18 units three times a day before meals plus sliding scale  Sliding scale:   139 or lower-0 units  140 169- 2 units  170-219- 4 units  220-279-6 units  280-320-8 units  321 or higher- 10 units    Patient is not experiencing side effects. He started tracking his insulin doses on finalsite abel and is most often using 20 units on average with his sliding scale. His average still stays on the higher side with this. He did not end up starting  Jardiance, he has a follow-up with nephrology scheduled in January.   He prefers to manage with insulin  Current diabetes symptoms: none  Diet/Exercise: tries to watch carbs and sugar  A1c: 10/21/2024- 7.8%       Blood sugar monitoring: Continuous Glucose Monitor   José Miguel 2 (Phone) Continuous Glucose Monitoring Results:   Name: Amor Zavaleta  YOB: 1945  Report Period: 12/10/2024 - 12/23/2024 (14 days)  Generated: 12/23/2024  % Time CGM Active: 97%  Glucose Statistics and Targets  Average Glucose: 177 mg/dL  Glucose Management Indicator (GMI): 7.5%  Glucose Variability (%CV): 26.2%  Target Range: 70 - 180 mg/dL  Time in Ranges  Very High: >250 mg/dL --- 7%  High: 181 - 250 mg/dL --- 35%  Target Range: 70 - 180 mg/dL --- 58%  Low: 54 - 69 mg/dL --- 0%  Very Low: <54 mg/dL --- 0%      Eye exam is up to date  Foot exam: up to date      Today's Vitals: There were no vitals taken for this visit.  ----------------      I spent 10 minutes with this patient today. All changes were made via collaborative practice agreement with Farzaneh Sandy MD.     A summary of these recommendations was sent via [employer].    Shilpi Souza, PharmD, BCACP  Medication Therapy Management Pharmacist  [facility]  [phone]      Telemedicine Visit Details  The patient's medications can be safely assessed via a telemedicine encounter.  Type of service:  Telephone visit  Originating Location (pt. Location): Home    Distant Location (provider location):  On-site  Start Time:  11:34 AM  End Time:  11:44 AM     Medication Therapy Recommendations  Diabetes mellitus, type 2 (H)   1 Current Medication: insulin aspart (NOVOLOG PEN) 100 UNIT/ML pen   Current Medication Sig: Inject 18-20 Units subcutaneously 3 times daily (with meals). (Plus sliding scale-   139 or lower-0 units  140 169- 2 units  170-219- 4 units  220-279-6 units  280-320-8 units  321 or higher- 10 units)   Rationale: Dose too low - Dosage too low -  Effectiveness   Recommendation: Increase Dose   Status: Accepted per CPA   Identified Date: 12/23/2024 Completed Date: 12/23/2024

## 2024-12-29 NOTE — PATIENT INSTRUCTIONS
Recommendations from today's MTM visit:                                                       Increase base Novolog to 20 units before breakfast and lunch, continue 18 units before dinner plus sliding scale  Continue Lantus 56 units at bedtime for now. We may consider splitting this up twice a day at next visit if needed.     Follow-up: Return in 4 weeks (on 1/20/2025) for medication therapy management, with me, using a phone visit at 11:00 AM .    It was great speaking with you today.  I value your experience and would be very thankful for your time in providing feedback in our clinic survey. In the next few days, you may receive an email or text message from [facility] lettrs with a link to a survey related to your clinical pharmacist.    To schedule another MTM appointment, please call [phone].    My Clinical Pharmacist's contact information:                                                      Please feel free to contact me with any questions or concerns you have.      Shilpi Souza, PharmD, BCACP  Medication Therapy Management Pharmacist  [facility]  [phone]

## 2025-01-07 ENCOUNTER — LAB (OUTPATIENT)
Dept: LAB | Facility: CLINIC | Age: 80
End: 2025-01-07
Payer: COMMERCIAL

## 2025-01-07 DIAGNOSIS — E11.9 DIABETES MELLITUS (H): ICD-10-CM

## 2025-01-07 DIAGNOSIS — I10 ESSENTIAL HYPERTENSION, MALIGNANT: ICD-10-CM

## 2025-01-07 DIAGNOSIS — N18.32 CHRONIC KIDNEY DISEASE (CKD) STAGE G3B/A1, MODERATELY DECREASED GLOMERULAR FILTRATION RATE (GFR) BETWEEN 30-44 ML/MIN/1.73 SQUARE METER AND ALBUMINURIA CREATININE RATIO LESS THAN 30 MG/G (H): ICD-10-CM

## 2025-01-07 LAB
ALBUMIN MFR UR ELPH: 44.5 MG/DL
ALBUMIN SERPL BCG-MCNC: 4.3 G/DL (ref 3.5–5.2)
ALBUMIN UR-MCNC: 30 MG/DL
ANION GAP SERPL CALCULATED.3IONS-SCNC: 13 MMOL/L (ref 7–15)
APPEARANCE UR: CLEAR
BACTERIA #/AREA URNS HPF: ABNORMAL /HPF
BILIRUB UR QL STRIP: NEGATIVE
BUN SERPL-MCNC: 25.4 MG/DL (ref 8–23)
CALCIUM SERPL-MCNC: 9.3 MG/DL (ref 8.8–10.4)
CHLORIDE SERPL-SCNC: 101 MMOL/L (ref 98–107)
COLOR UR AUTO: YELLOW
CREAT SERPL-MCNC: 1.59 MG/DL (ref 0.67–1.17)
CREAT UR-MCNC: 154 MG/DL
CREAT UR-MCNC: 154 MG/DL
EGFRCR SERPLBLD CKD-EPI 2021: 44 ML/MIN/1.73M2
ERYTHROCYTE [DISTWIDTH] IN BLOOD BY AUTOMATED COUNT: 12.5 % (ref 10–15)
EST. AVERAGE GLUCOSE BLD GHB EST-MCNC: 169 MG/DL
GLUCOSE SERPL-MCNC: 242 MG/DL (ref 70–99)
GLUCOSE UR STRIP-MCNC: 100 MG/DL
HBA1C MFR BLD: 7.5 % (ref 0–5.6)
HCO3 SERPL-SCNC: 23 MMOL/L (ref 22–29)
HCT VFR BLD AUTO: 45 % (ref 40–53)
HGB BLD-MCNC: 15.6 G/DL (ref 13.3–17.7)
HGB UR QL STRIP: NEGATIVE
HYALINE CASTS #/AREA URNS LPF: ABNORMAL /LPF
KETONES UR STRIP-MCNC: NEGATIVE MG/DL
LEUKOCYTE ESTERASE UR QL STRIP: NEGATIVE
MCH RBC QN AUTO: 32.5 PG (ref 26.5–33)
MCHC RBC AUTO-ENTMCNC: 34.7 G/DL (ref 31.5–36.5)
MCV RBC AUTO: 94 FL (ref 78–100)
MICROALBUMIN UR-MCNC: 81 MG/L
MICROALBUMIN/CREAT UR: 52.6 MG/G CR (ref 0–17)
NITRATE UR QL: NEGATIVE
PH UR STRIP: 6.5 [PH] (ref 5–8)
PHOSPHATE SERPL-MCNC: 2.4 MG/DL (ref 2.5–4.5)
PLATELET # BLD AUTO: 163 10E3/UL (ref 150–450)
POTASSIUM SERPL-SCNC: 3.8 MMOL/L (ref 3.4–5.3)
PROT/CREAT 24H UR: 0.29 MG/MG CR (ref 0–0.2)
PTH-INTACT SERPL-MCNC: 31 PG/ML (ref 15–65)
RBC # BLD AUTO: 4.8 10E6/UL (ref 4.4–5.9)
RBC #/AREA URNS AUTO: ABNORMAL /HPF
SODIUM SERPL-SCNC: 137 MMOL/L (ref 135–145)
SP GR UR STRIP: 1.02 (ref 1–1.03)
SQUAMOUS #/AREA URNS AUTO: ABNORMAL /LPF
UROBILINOGEN UR STRIP-ACNC: 0.2 E.U./DL
VIT D+METAB SERPL-MCNC: 47 NG/ML (ref 20–50)
WBC # BLD AUTO: 6.4 10E3/UL (ref 4–11)
WBC #/AREA URNS AUTO: ABNORMAL /HPF

## 2025-01-07 PROCEDURE — 80069 RENAL FUNCTION PANEL: CPT

## 2025-01-07 PROCEDURE — 82043 UR ALBUMIN QUANTITATIVE: CPT

## 2025-01-07 PROCEDURE — 82570 ASSAY OF URINE CREATININE: CPT

## 2025-01-07 PROCEDURE — 84156 ASSAY OF PROTEIN URINE: CPT

## 2025-01-07 PROCEDURE — 83970 ASSAY OF PARATHORMONE: CPT

## 2025-01-07 PROCEDURE — 82306 VITAMIN D 25 HYDROXY: CPT

## 2025-02-03 ENCOUNTER — VIRTUAL VISIT (OUTPATIENT)
Dept: PHARMACY | Facility: PHYSICIAN GROUP | Age: 80
End: 2025-02-03
Payer: COMMERCIAL

## 2025-02-03 DIAGNOSIS — R52 PAIN: ICD-10-CM

## 2025-02-03 DIAGNOSIS — Z86.73 HISTORY OF TIA (TRANSIENT ISCHEMIC ATTACK) AND STROKE: ICD-10-CM

## 2025-02-03 DIAGNOSIS — Z79.4 TYPE 2 DIABETES MELLITUS WITHOUT COMPLICATION, WITH LONG-TERM CURRENT USE OF INSULIN (H): Primary | ICD-10-CM

## 2025-02-03 DIAGNOSIS — K21.9 GASTROESOPHAGEAL REFLUX DISEASE WITHOUT ESOPHAGITIS: ICD-10-CM

## 2025-02-03 DIAGNOSIS — Z78.9 TAKES DIETARY SUPPLEMENTS: ICD-10-CM

## 2025-02-03 DIAGNOSIS — N18.30 STAGE 3 CHRONIC KIDNEY DISEASE, UNSPECIFIED WHETHER STAGE 3A OR 3B CKD (H): ICD-10-CM

## 2025-02-03 DIAGNOSIS — E11.9 TYPE 2 DIABETES MELLITUS WITHOUT COMPLICATION, WITH LONG-TERM CURRENT USE OF INSULIN (H): Primary | ICD-10-CM

## 2025-02-03 DIAGNOSIS — I10 ESSENTIAL HYPERTENSION: ICD-10-CM

## 2025-02-03 DIAGNOSIS — E78.00 PURE HYPERCHOLESTEROLEMIA: ICD-10-CM

## 2025-02-03 PROCEDURE — 99607 MTMS BY PHARM ADDL 15 MIN: CPT | Mod: 93 | Performed by: PHARMACIST

## 2025-02-03 PROCEDURE — 99605 MTMS BY PHARM NP 15 MIN: CPT | Mod: 93 | Performed by: PHARMACIST

## 2025-02-03 NOTE — LETTER
"Recommended To-Do List      Prepared on: Feb 3, 2025       You can get the best results from your medications by completing the items on this \"To-Do List.\"      Bring your To-Do List when you go to your doctor. And, share it with your family or caregivers.    My To-Do List:  What we talked about: What I should do:   A medication that is not working    Split up Lantus dose to 28 units twice a day   Continue current Novolog dose with sliding scale before meals  Sent refill of One Touch test strips to mail order pharmacy          What we talked about: What I should do:   Your medication dosage being too high.  Based on your most recent kidney labs, the maximum dose of famotidine recommended is 20 mg a day. Higher doses can accumulate and increase risk for side effects like confusion or agitation.     Decrease your dosage of famotidine (PEPCID). You could take 10 mg twice a day or 20 mg once a day.           What we talked about: What I should do:    What my medicines are for, how to know if my medicines are working, made sure my medicines are safe for me and reviewed how to take my medicines.      Take my medicines every day                "

## 2025-02-03 NOTE — LETTER
February 3, 2025  Amor Zavaleta  [address]   [place] [address]    Dear Mr. Zavaleta, [facility]     Thank you for talking with me on Feb 3, 2025 about your health and medications. As a follow-up to our conversation, I have included two documents:      Your Recommended To-Do List has steps you should take to get the best results from your medications.  Your Medication List will help you keep track of your medications and how to take them.    If you want to talk about these documents, please call Shilpi Souza PharmD at phone: [phone], Monday-Friday 8-4:30pm.    I look forward to working with you and your doctors to make sure your medications work well for you.    Sincerely,  Shilpi Souza, Praneeth  [facility] Pharmacist, [facility]

## 2025-02-03 NOTE — PROGRESS NOTES
Medication Therapy Management (MTM) Encounter    ASSESSMENT:                            Medication Adherence/Access: No issues identified.    Diabetes   A1c is just above goal of <7.5% (goal based on age and fall risk without CV disease).   Patient is just below meeting goal of time in range >60% with continuous glucose monitor.   He may benefit from splitting basal insulin to twice daily regimen to see if that helps lower daytime highs and reduce risk of lows overnight.  _  Hypertension   Chronic Kidney Disease  Last blood pressure in clinic is <130/80 mmHg.  _  Hyperlipidemia   Stable. Due to recheck lipid panel at future visit.  _  History of possible TIA  Unclear rationale for using 325 mg aspirin in addition to 162 mg aspirin he takes at night for pain. He did not want to make changes during our visit today. Given his age and GERD bleeding risk is a concern. Recommend continuing to discuss need for high dose of aspirin daily.   _  GERD    Renal impairment (CrCl 30 to 60 mL/min) in symptomatic nonerosive GERD: maximum recommended dose is 20 mg orally once daily. Current CrCl is 44.9 mL/min, using higher than maximum recommended dose which may increase risk for CNS side effects (confusion, agitation). He may benefit from lowering dose to 10 mg twice a day to reduce risk of side effects.  _  Pain  Stable.   _  Supplements   Stable.  ______________  PLAN:                            Diabetes:  Split up Lantus dose to 28 units twice a day   Continue current Novolog dose with sliding scale before meals  Sent refill of One Touch test strips to mail order pharmacy    Other medications:  Based on your most recent kidney labs, the maximum dose of famotidine recommended is 20 mg a day. You could take 10 mg twice a day or 20 mg once a day. Higher doses can accumulate and increase risk for side effects like confusion or agitation.   Sent refill of lovastatin 20 mg tablets to mail order pharmacy    Follow-up: Return in 1  month (on 3/3/2025) for diabetes medication management, with me, using a phone visit at 2:30 pm .    SUBJECTIVE/OBJECTIVE:                          Amor Zavaleta is a 79 year old male seen for a follow-up visit.       Reason for visit: Diabetes follow-up.    Allergies/ADRs: Reviewed in chart  Past Medical History: Reviewed in chart  Tobacco: He reports that he has never smoked. He has never used smokeless tobacco.  Alcohol: none    Medication Adherence/Access: no issues reported.    Diabetes   Lantus 56 units once daily bedtime  Novolog 18-20 units three times a day before meals plus sliding scale  Sliding scale:   139 or lower-0 units  140 169- 2 units  170-219- 4 units  220-279-6 units  280-320-8 units  321 or higher- 10 units    Patient is not experiencing side effects. He started tracking his insulin doses on José Miguel abel and is most often using 20 units on average with his sliding scale. His average still stays on the higher side with this. He has had a couple of lows overnight and is currently using full Lantus dose at bedtime. He sometimes will have orange juice or a snack before going to bed if he is starting off lower. He is interested in splitting up basal insulin dose like we discussed last visit.   He did not end up starting Jardiance and had follow-up with nephrology in January, no protein in urine so SGLT2 inhibitor not started,   He prefers to manage with insulin  Current diabetes symptoms: none  Diet/Exercise: tries to watch carbs and sugar  A1c: 1/7/2025- 7.5%     Blood sugar monitoring: Continuous Glucose Monitor   José Miguel 2 Continuous Glucose Monitoring Results:   Name: Amor Zavaleta  YOB: 1945  Report Period: 01/07/2025 - 02/03/2025 (28 days)  Generated: 02/03/2025  Time CGM Active: 96%  Glucose Statistics and Targets  Average Glucose: 167 mg/dL  Glucose Management Indicator (GMI): 7.3%  Glucose Variability (%CV): 34.2%  Target Range: 70 - 180 mg/dL  Time in Ranges  Very High: >250  mg/dL --- 9%  High: 181 - 250 mg/dL --- 30%  Target Range: 70 - 180 mg/dL --- 60%  Low: 54 - 69 mg/dL --- 1%  Very Low: <54 mg/dL --- 0%    Eye exam is up to date  Foot exam: up to date    Hypertension   Chronic Kidney Disease  Amlodipine 2.5 mg once daily  Lisinopril-hydrochlorothiazide 20-12.5 mg once daily  Patient reports no current medication side effects  Patient does not self-monitor blood pressure.    Last clinic blood pressure: 1/30/25- 124/70     Hyperlipidemia   Lovastatin 20 mg once daily  He reduced dose after our previous visit due to interaction between amlodipine and lovastatin recommending 20 mg as maximum daily dose. Patient reports no significant myalgias or other side effects. He does have a history of possible TIA. Needs refill, currently splitting 40 mg tablets in half.      History of possible TIA  Aspirin 325 mg 1 tablet in morning   Aspirin 81 mg 2 tablets at night  He was hospitalized in 2007 after experiencing a seizure with unknown etiology. Concern at the time of possible TIA. He was already taking 81 mg aspirin on admission so was told to take 325 mg on discharge and has been taking this ever since. He also has always taken two 81 mg aspirin tablets at night for pain and said his providers always knew he was always doing this. He does take famotidine for stomach and acid issues but no other signs or symptoms of significant bruising or bleeding.       GERD    Famotidine 20 mg twice daily   Antacids as needed   Patient feels that current regimen is effective. Denies experiencing any side effects.      Pain  Acetaminophen 500 mg every 6 hours as needed   Aspirin 81 mg 2 tablets at bedtime  He avoids other NSAIDs. Only takes acetaminophen as needed.       Supplements     Multivitamin 1/2 tablet twice a day   No reported issues at this time.        Today's Vitals: There were no vitals taken for this visit.  ----------------      I spent 22 minutes with this patient today. All changes were  made via collaborative practice agreement with Farzaneh Sandy MD.     A summary of these recommendations was sent via [employer].    Shilpi Souza, PharmD, BCACP  Medication Therapy Management Pharmacist  [facility]  [phone]      Telemedicine Visit Details  The patient's medications can be safely assessed via a telemedicine encounter.  Type of service:  Telephone visit  Originating Location (pt. Location): Home    Distant Location (provider location):  On-site  Start Time:  1:29 PM  End Time: 1:52 PM     Medication Therapy Recommendations  Diabetes mellitus, type 2 (H)   1 Current Medication: insulin glargine (LANTUS PEN) 100 UNIT/ML pen   Current Medication Sig: Inject 56 Units subcutaneously at bedtime.   Rationale: Condition refractory to medication - Ineffective medication - Effectiveness   Recommendation: Change Administration Time   Status: Accepted - no CPA Needed   Identified Date: 2/3/2025 Completed Date: 2/3/2025         Gastroesophageal reflux disease   1 Current Medication: famotidine (PEPCID) 20 MG tablet   Current Medication Sig: Take 20 mg by mouth 2 times daily   Rationale: Dose too high - Dosage too high - Safety   Recommendation: Decrease Dose   Status: Accepted - no CPA Needed   Identified Date: 2/3/2025 Completed Date: 2/3/2025

## 2025-02-03 NOTE — LETTER
_  Medication List        Prepared on: Feb 3, 2025     Bring your Medication List when you go to the doctor, hospital, or   emergency room. And, share it with your family or caregivers.     Note any changes to how you take your medications.  Cross out medications when you no longer use them.    Medication How I take it Why I use it Prescriber   acetaminophen (TYLENOL) 500 MG tablet Take 500-1,000 mg by mouth every 6 hours as needed for pain Pain Patient Reported   amLODIPine (NORVASC) 2.5 MG tablet Take 2.5 mg by mouth daily. High Blood Pressure Farzaneh Sandy MD   aspirin (ASA) 325 MG tablet Take 325 mg by mouth daily Temporary Stroke Patient Reported   aspirin (ASA) 81 MG chewable tablet Take 2 tablets by mouth at bedtime Pain Patient Reported   calcium carbonate (ANTACID) 500 MG chewable tablet Take 1 chew tab by mouth daily as needed for heartburn Acid Indigestion; Heartburn Patient Reported   famotidine (PEPCID) 20 MG tablet Take 10 mg by mouth 2 times daily. Gastroesophageal Reflux Disease Farzaneh Sandy MD   insulin aspart (NOVOLOG PEN) 100 UNIT/ML pen Inject 18-20 Units subcutaneously 3 times daily (with meals). (Plus sliding scale- 139 or lower-0 [id number] 169- 2 [id number]- 4 [id number] [id number] or higher- 10 units) Type 2 Diabetes Farzaneh Sandy MD   insulin glargine (LANTUS PEN) 100 UNIT/ML pen Inject 28 Units subcutaneously 2 times daily. Type 2 Diabetes Farzaneh Sandy MD   lisinopril-hydrochlorothiazide (ZESTORETIC) 20-12.5 MG tablet Take 1 tablet by mouth daily High Blood Pressure Farzaneh Sandy MD   lovastatin (MEVACOR) 20 MG tablet Take 20 mg by mouth at bedtime. High Cholesterol, History of Possible TIA Farzaneh Sandy MD   Multiple Vitamin (MULTIVITAMIN PO) Take 0.5 tablets by mouth 2 times daily General Health Patient Reported         Add new medications, over-the-counter drugs, herbals, vitamins, or  minerals in the blank rows below.    Medication How I take it Why I use  it Prescriber                                      Allergies:      - Sulfa Antibiotics - Hives        Side effects I have had:      Not on File        Other Information:              My notes and questions:

## 2025-02-17 ENCOUNTER — OFFICE VISIT (OUTPATIENT)
Dept: PHARMACY | Facility: PHYSICIAN GROUP | Age: 80
End: 2025-02-17
Payer: COMMERCIAL

## 2025-02-17 DIAGNOSIS — Z79.4 TYPE 2 DIABETES MELLITUS WITHOUT COMPLICATION, WITH LONG-TERM CURRENT USE OF INSULIN (H): Primary | ICD-10-CM

## 2025-02-17 DIAGNOSIS — E11.9 TYPE 2 DIABETES MELLITUS WITHOUT COMPLICATION, WITH LONG-TERM CURRENT USE OF INSULIN (H): Primary | ICD-10-CM

## 2025-02-17 PROCEDURE — 99607 MTMS BY PHARM ADDL 15 MIN: CPT | Performed by: PHARMACIST

## 2025-02-17 PROCEDURE — 99606 MTMS BY PHARM EST 15 MIN: CPT | Performed by: PHARMACIST

## 2025-02-17 NOTE — PROGRESS NOTES
Medication Therapy Management (MTM) Encounter    ASSESSMENT:                            Medication Adherence/Access: No issues identified.  _  Diabetes  A1c is just above goal of <7.5% (goal based on age and fall risk without CV disease).   Patient experiencing nocturnal hypoglycemia since adjusting insulin to 28 units BID. Morning BG satisfactory, but overnight lows persist.  Patient uses bedtime snack to stabilize BG which has helped.  No CGM readings available since change due to phone issues so difficult to interpret Novolog impact on hypoglycemia.   Patient would benefit from:  - Adjust basal insulin: 30 units AM, 20 units PM  - Continue current mealtime insulin doses, may need to adjust base dose in the future  - Downloaded José Miguel 2 abel on phone during visit and paired with sensor  - Continue bedtime snack to prevent nocturnal hypoglycemia  _______________  PLAN:                            - Adjust your Lantus insulin dosage to 30 units in morning and 20 units at night    This adjustment aims to better manage overnight lows.    - Continue current Novolog dose with sliding scale before meals    - Setup Freestyle José Miguel 2 abel again on your phone today, let me know if you have any issues       Follow-up:  - Phone visit scheduled for March 3rd to review your glucose readings and adjust treatment as necessary    SUBJECTIVE/OBJECTIVE:                          Amor Zavaleta is a 79 year old male seen for a follow-up visit.       Reason for visit: Diabetes follow-up, help getting José Miguel abel back on phone.    Allergies/ADRs: Reviewed in chart  Past Medical History: Reviewed in chart  Tobacco: He reports that he has never smoked. He has never used smokeless tobacco.  Alcohol: none    Medication Adherence/Access: no issues reported.    Diabetes   Lantus (insulin glargine) 28 units twice daily (AM and PM)  Novolog 18-20 units three times a day before meals plus sliding scale  Sliding scale:   139 or lower-0 units  140 169-  2 units  170-219- 4 units  220-279-6 units  280-320-8 units  321 or higher- 10 units    Patient reports issues with blood glucose management. Experiencing low blood glucose readings almost every other night since adjusting insulin to 28 units twice daily. Hypoglycemia typically occurs 12-2 AM, once at 8 PM. He is usually taking PM Lantus dose around 8-8:30 pm.   Patient consumes donna crackers with milk/water, or cheese at 10 PM to stabilize nocturnal blood glucose.  Morning blood glucose readings satisfactory.  Freestyle José Miguel 2 Continuous Glucose Monitor- he had issues with his phone so his abel was deleted and has been unable to use since 2/3. He has been doing fingersticks. Most AM and pre-preprandial glucose readings . As noted above had some readings in 50's at 12-2 am.     Today's Vitals: There were no vitals taken for this visit.  ----------------      I spent 22 minutes with this patient today. All changes were made via collaborative practice agreement with Farzaneh Sandy MD.     Patient consented to the use of [employer] to record and transcribe notes during this visit.    A summary of these recommendations was sent via [employer].    Shilpi Souza, PharmD, BCACP  Medication Therapy Management Pharmacist  [facility]  [phone]           Medication Therapy Recommendations  Diabetes mellitus, type 2 (H)   1 Current Medication: insulin glargine (LANTUS PEN) 100 UNIT/ML pen   Current Medication Sig: Inject 30 Units subcutaneously every morning. And 20 units evening   Rationale: Dose too high - Dosage too high - Safety   Recommendation: Decrease Dose   Status: Accepted per CPA   Identified Date: 2/17/2025 Completed Date: 2/17/2025

## 2025-02-17 NOTE — PATIENT INSTRUCTIONS
Recommendations from today's MTM visit:                                                       - Adjust your Lantus insulin dosage to 30 units in morning and 20 units at night    This adjustment aims to better manage overnight lows.    - Continue current Novolog dose with sliding scale before meals    - Setup Freestyle José Miguel 2 abel again on your phone today, let me know if you have any issues       Follow-up:  - Phone visit scheduled for March 3rd to review your glucose readings and adjust treatment as necessary    It was great speaking with you today.  I value your experience and would be very thankful for your time in providing feedback in our clinic survey. In the next few days, you may receive an email or text message from [employer] with a link to a survey related to your clinical pharmacist.    To schedule another MTM appointment, please call [phone].    My Clinical Pharmacist's contact information:                                                      Please feel free to contact me with any questions or concerns you have.      Shilpi Souza, PharmD, BCACP  Medication Therapy Management Pharmacist  [facility]  [phone]

## 2025-03-03 ENCOUNTER — VIRTUAL VISIT (OUTPATIENT)
Dept: PHARMACY | Facility: PHYSICIAN GROUP | Age: 80
End: 2025-03-03
Payer: COMMERCIAL

## 2025-03-03 DIAGNOSIS — Z79.4 TYPE 2 DIABETES MELLITUS WITHOUT COMPLICATION, WITH LONG-TERM CURRENT USE OF INSULIN (H): Primary | ICD-10-CM

## 2025-03-03 DIAGNOSIS — E11.9 TYPE 2 DIABETES MELLITUS WITHOUT COMPLICATION, WITH LONG-TERM CURRENT USE OF INSULIN (H): Primary | ICD-10-CM

## 2025-03-03 PROCEDURE — 99607 MTMS BY PHARM ADDL 15 MIN: CPT | Mod: 93 | Performed by: PHARMACIST

## 2025-03-03 PROCEDURE — 99606 MTMS BY PHARM EST 15 MIN: CPT | Mod: 93 | Performed by: PHARMACIST

## 2025-03-03 NOTE — PATIENT INSTRUCTIONS
Recommendations from today's MTM visit:                                                         Insulin Doses:  - Change Lantus to 28 units morning and 20 units at night  - Continue Novolog 18 units three times a day before meals plus sliding scale  Sliding scale:   139 or lower-0 units  140 169- 2 units  170-219- 4 units  220-279-6 units  280-320-8 units  321 or higher- 10 units    Follow-Up  - Phone visit on 3/31/2025 at 2:30 PM to review your readings and discuss any further adjustments.    It was great speaking with you today.  I value your experience and would be very thankful for your time in providing feedback in our clinic survey. In the next few days, you may receive an email or text message from [employer] with a link to a survey related to your clinical pharmacist.    To schedule another MTM appointment, please call [phone].    My Clinical Pharmacist's contact information:                                                      Please feel free to contact me with any questions or concerns you have.      Shilpi Souza, PharmD, BCACP  Medication Therapy Management Pharmacist  [facility]  [phone]

## 2025-03-31 ENCOUNTER — VIRTUAL VISIT (OUTPATIENT)
Dept: PHARMACY | Facility: PHYSICIAN GROUP | Age: 80
End: 2025-03-31
Payer: COMMERCIAL

## 2025-03-31 DIAGNOSIS — Z79.4 TYPE 2 DIABETES MELLITUS WITHOUT COMPLICATION, WITH LONG-TERM CURRENT USE OF INSULIN (H): Primary | ICD-10-CM

## 2025-03-31 DIAGNOSIS — E11.9 TYPE 2 DIABETES MELLITUS WITHOUT COMPLICATION, WITH LONG-TERM CURRENT USE OF INSULIN (H): Primary | ICD-10-CM

## 2025-03-31 PROCEDURE — 99606 MTMS BY PHARM EST 15 MIN: CPT | Mod: 93 | Performed by: PHARMACIST

## 2025-03-31 NOTE — PATIENT INSTRUCTIONS
Recommendations from today's MTM visit:                                                       Medications:  - Continue Lantus to 28 units morning and 20 units at night  - Continue Novolog 18 units three times a day before meals plus sliding scale  Sliding scale:   139 or lower-0 units  140 169- 2 units  170-219- 4 units  220-279-6 units  280-320-8 units  321 or higher- 10 units    Blood Sugar Management:  - Check blood sugar regularly  - If blood sugar is low at night:    - Drink a glass of orange juice    - Wait 15 minutes, then check blood sugar again    - If still low, have another glass of orange juice    - If you have lows overnight often we will need to decrease your Lantus dose in the evening    Follow-up:  - Next A1C check with Dr. Sandy on 6/2/2025  - MTM pharmacist after as needed or in 1 year for medication review    It was great speaking with you today.  I value your experience and would be very thankful for your time in providing feedback in our clinic survey. In the next few days, you may receive an email or text message from [employer] with a link to a survey related to your clinical pharmacist.    To schedule another MTM appointment, please call [phone].    My Clinical Pharmacist's contact information:                                                      Please feel free to contact me with any questions or concerns you have.      Shilpi Souza, PharmD, BCACP  Medication Therapy Management Pharmacist  [facility]  [phone]

## 2025-03-31 NOTE — PROGRESS NOTES
Medication Therapy Management (MTM) Encounter    ASSESSMENT:                            Medication Adherence/Access: No issues identified.  _  Diabetes  A1c is just above goal of <7.5% (goal based on age and fall risk without CV disease)  BG control improved with current insulin regimen.  CGM data shows BG in target range 90% of time over past 2 weeks, avg 129 mg/dL.  Occasional nocturnal hypoglycemia (69-70 mg/dL), resolves with oral glucose.  Patient would benefit from:  - Continue current insulin regimen  - Monitor for increased nocturnal hypoglycemia; may reduce PM Lantus if frequent  - Continue using CGM and treating nocturnal hypoglycemia with orange juice  _________________________________________  PLAN:                            Medications:  - Continue Lantus to 28 units morning and 20 units at night  - Continue Novolog 18 units three times a day before meals plus sliding scale  Sliding scale:   139 or lower-0 units  140 169- 2 units  170-219- 4 units  220-279-6 units  280-320-8 units  321 or higher- 10 units    Blood Sugar Management:  - Check blood sugar regularly  - If blood sugar is low at night:    - Drink a glass of orange juice    - Wait 15 minutes, then check blood sugar again    - If still low, have another glass of orange juice    - If you have lows overnight often we will need to decrease your Lantus dose in the evening    Follow-up:  - Next A1C check with Dr. Sandy on 6/2/2025  - MTM pharmacist after as needed or in 1 year for medication review    SUBJECTIVE/OBJECTIVE:                          Amor Zavaleta is a 79 year old male seen for a follow-up visit.       Reason for visit: Diabetes follow-up.    Allergies/ADRs: Reviewed in chart  Past Medical History: Reviewed in chart  Tobacco: He reports that he has never smoked. He has never used smokeless tobacco.  Alcohol: none  Living: Independent, needs transportation assistance  Social support: [facility]  ([place]) assisting with  transportation needs    Medication Adherence/Access:   No issues reported. He decided to switch back to [facility] pharmacy because he prefers to use local site. He was using [facility] but had issues with delayed delivery. He does not drive but has looked into programs with [employer] to help with transportation when needed and now will have some assistance with [employer].     Diabetes  - Lantus 28 units AM, 20 units PM  - NovoLog 18-20 units three times a day before meals plus sliding scale  Sliding scale:   139 or lower-0 units  140 169- 2 units  170-219- 4 units  220-279-6 units  280-320-8 units  321 or higher- 10 units    Reports good glycemic control over past month  Only one high reading in last 12-30 days  Current insulin regimen working well with less lows overnight.   Reports occasional nocturnal hypoglycemia (69-70 mg/dL)  Manages hypoglycemia with orange juice, raising glucose to 125-130 mg/dL within an hour  NovoLog use: rarely needing to add 2 units extra over 18 base, maybe every 3-4 days, usually PM when glucose higher    [facility] CGM Report:   Name: Amor Zavaleta  YOB: 1945  Report Period: 03/18/2025 - 03/31/2025 (14 days)  Generated: 03/31/2025  Time CGM Active: 99%  Glucose Statistics and Targets  Average Glucose: 129 mg/dL  Glucose Management Indicator (GMI): 6.4%  Glucose Variability (%CV): 27.7%  Target Range: 70 - 180 mg/dL  Time in Ranges  Very High: >250 mg/dL --- 1%  High: 181 - 250 mg/dL --- 8%  Target Range: 70 - 180 mg/dL --- 90%  Low: 54 - 69 mg/dL --- 1%  Very Low: <54 mg/dL --- 0%        Today's Vitals: There were no vitals taken for this visit.  ----------------      I spent 10 minutes with this patient today. All changes were made via collaborative practice agreement with Farzaneh Sandy MD.     A summary of these recommendations was sent via [employer].    Shilpi Souza, PharmD, BCACP  Medication Therapy Management Pharmacist  [facility]  [phone]      Telemedicine Visit Details  The patient's medications can be safely assessed via a telemedicine encounter.  Type of service:  Telephone visit  Originating Location (pt. Location): Home    Distant Location (provider location):  On-site  Start Time:  2:39 PM  End Time: 2:49 PM     Medication Therapy Recommendations  No medication therapy recommendations to display

## 2025-06-02 ENCOUNTER — LAB REQUISITION (OUTPATIENT)
Dept: LAB | Facility: CLINIC | Age: 80
End: 2025-06-02

## 2025-06-02 ENCOUNTER — TRANSFERRED RECORDS (OUTPATIENT)
Dept: HEALTH INFORMATION MANAGEMENT | Facility: CLINIC | Age: 80
End: 2025-06-02
Payer: COMMERCIAL

## 2025-06-02 DIAGNOSIS — Z12.5 ENCOUNTER FOR SCREENING FOR MALIGNANT NEOPLASM OF PROSTATE: ICD-10-CM

## 2025-06-02 DIAGNOSIS — E78.5 HYPERLIPIDEMIA, UNSPECIFIED: ICD-10-CM

## 2025-06-02 LAB — HBA1C MFR BLD: 6.4 % (ref 4.2–6.1)

## 2025-06-02 PROCEDURE — 82465 ASSAY BLD/SERUM CHOLESTEROL: CPT | Performed by: FAMILY MEDICINE

## 2025-06-02 PROCEDURE — G0103 PSA SCREENING: HCPCS | Performed by: FAMILY MEDICINE

## 2025-06-03 LAB
CHOLEST SERPL-MCNC: 212 MG/DL
FASTING STATUS PATIENT QL REPORTED: ABNORMAL
HDLC SERPL-MCNC: 38 MG/DL
LDLC SERPL CALC-MCNC: 131 MG/DL
NONHDLC SERPL-MCNC: 174 MG/DL
PSA SERPL DL<=0.01 NG/ML-MCNC: 7.79 NG/ML (ref 0–6.5)
TRIGL SERPL-MCNC: 215 MG/DL

## 2025-07-01 ENCOUNTER — LAB REQUISITION (OUTPATIENT)
Dept: LAB | Facility: CLINIC | Age: 80
End: 2025-07-01

## 2025-07-01 DIAGNOSIS — R97.20 ELEVATED PROSTATE SPECIFIC ANTIGEN (PSA): ICD-10-CM

## 2025-07-01 PROCEDURE — 84153 ASSAY OF PSA TOTAL: CPT | Performed by: FAMILY MEDICINE

## 2025-07-02 LAB — PSA SERPL DL<=0.01 NG/ML-MCNC: 8.75 NG/ML (ref 0–6.5)

## 2025-07-09 ENCOUNTER — LAB (OUTPATIENT)
Dept: LAB | Facility: CLINIC | Age: 80
End: 2025-07-09
Payer: COMMERCIAL

## 2025-07-09 DIAGNOSIS — N18.6 TYPE 2 DIABETES MELLITUS WITH ESRD (END-STAGE RENAL DISEASE) (H): ICD-10-CM

## 2025-07-09 DIAGNOSIS — N18.32 CHRONIC KIDNEY DISEASE (CKD) STAGE G3B/A1, MODERATELY DECREASED GLOMERULAR FILTRATION RATE (GFR) BETWEEN 30-44 ML/MIN/1.73 SQUARE METER AND ALBUMINURIA CREATININE RATIO LESS THAN 30 MG/G (H): ICD-10-CM

## 2025-07-09 DIAGNOSIS — K21.9 ESOPHAGEAL REFLUX: ICD-10-CM

## 2025-07-09 DIAGNOSIS — E11.22 TYPE 2 DIABETES MELLITUS WITH ESRD (END-STAGE RENAL DISEASE) (H): ICD-10-CM

## 2025-07-09 DIAGNOSIS — E11.9 DIABETES MELLITUS (H): ICD-10-CM

## 2025-07-09 DIAGNOSIS — I12.9 MALIGNANT HYPERTENSIVE KIDNEY DISEASE WITH CHRONIC KIDNEY DISEASE STAGE I THROUGH STAGE IV, OR UNSPECIFIED(403.00): ICD-10-CM

## 2025-07-09 LAB
ALBUMIN MFR UR ELPH: 92.7 MG/DL
ALBUMIN SERPL BCG-MCNC: 4.1 G/DL (ref 3.5–5.2)
ANION GAP SERPL CALCULATED.3IONS-SCNC: 13 MMOL/L (ref 7–15)
BUN SERPL-MCNC: 29 MG/DL (ref 8–23)
CALCIUM SERPL-MCNC: 9.5 MG/DL (ref 8.8–10.4)
CHLORIDE SERPL-SCNC: 102 MMOL/L (ref 98–107)
CREAT SERPL-MCNC: 1.59 MG/DL (ref 0.67–1.17)
CREAT UR-MCNC: 196 MG/DL
CREAT UR-MCNC: 196 MG/DL
EGFRCR SERPLBLD CKD-EPI 2021: 44 ML/MIN/1.73M2
ERYTHROCYTE [DISTWIDTH] IN BLOOD BY AUTOMATED COUNT: 12.3 % (ref 10–15)
GLUCOSE SERPL-MCNC: 221 MG/DL (ref 70–99)
HCO3 SERPL-SCNC: 22 MMOL/L (ref 22–29)
HCT VFR BLD AUTO: 45 % (ref 40–53)
HGB BLD-MCNC: 15.6 G/DL (ref 13.3–17.7)
MCH RBC QN AUTO: 32.3 PG (ref 26.5–33)
MCHC RBC AUTO-ENTMCNC: 34.7 G/DL (ref 31.5–36.5)
MCV RBC AUTO: 93 FL (ref 78–100)
MICROALBUMIN UR-MCNC: 296 MG/L
MICROALBUMIN/CREAT UR: 151.02 MG/G CR (ref 0–17)
PHOSPHATE SERPL-MCNC: 2.7 MG/DL (ref 2.5–4.5)
PLATELET # BLD AUTO: 166 10E3/UL (ref 150–450)
POTASSIUM SERPL-SCNC: 3.4 MMOL/L (ref 3.4–5.3)
PROT/CREAT 24H UR: 0.47 MG/MG CR (ref 0–0.2)
PTH-INTACT SERPL-MCNC: 30 PG/ML (ref 15–65)
RBC # BLD AUTO: 4.83 10E6/UL (ref 4.4–5.9)
SODIUM SERPL-SCNC: 137 MMOL/L (ref 135–145)
WBC # BLD AUTO: 6.8 10E3/UL (ref 4–11)

## 2025-07-09 PROCEDURE — 83970 ASSAY OF PARATHORMONE: CPT

## 2025-07-09 PROCEDURE — 82043 UR ALBUMIN QUANTITATIVE: CPT

## 2025-07-09 PROCEDURE — 80069 RENAL FUNCTION PANEL: CPT

## 2025-07-09 PROCEDURE — 82570 ASSAY OF URINE CREATININE: CPT

## 2025-07-09 PROCEDURE — 36415 COLL VENOUS BLD VENIPUNCTURE: CPT

## 2025-07-09 PROCEDURE — 84156 ASSAY OF PROTEIN URINE: CPT

## 2025-07-09 PROCEDURE — 85027 COMPLETE CBC AUTOMATED: CPT

## 2025-09-03 ENCOUNTER — LAB REQUISITION (OUTPATIENT)
Dept: LAB | Facility: CLINIC | Age: 80
End: 2025-09-03

## 2025-09-03 DIAGNOSIS — E11.22 TYPE 2 DIABETES MELLITUS WITH DIABETIC CHRONIC KIDNEY DISEASE (H): ICD-10-CM

## 2025-09-03 LAB
CREAT UR-MCNC: 175 MG/DL
MICROALBUMIN UR-MCNC: 17.8 MG/L
MICROALBUMIN/CREAT UR: 10.17 MG/G CR (ref 0–17)

## 2025-09-03 PROCEDURE — 82570 ASSAY OF URINE CREATININE: CPT | Performed by: FAMILY MEDICINE

## (undated) DEVICE — TOURNIQUET CUFF 18" REPRO RED 60-7070-103

## (undated) DEVICE — COLLECTION KIT E SWAB REG 220245

## (undated) DEVICE — DRSG KERLIX 4 1/2"X4YDS ROLL 6715

## (undated) DEVICE — SLING ARM FOAM M 0814-0793

## (undated) DEVICE — PREP CHLORAPREP 26ML TINTED HI-LITE ORANGE 930815

## (undated) DEVICE — CUFF TOURN 24IN STRL DISP

## (undated) DEVICE — CAST PADDING 3" STERILE 9043S

## (undated) DEVICE — PADDING CAST 4IN WEBRIL STRL 2502

## (undated) DEVICE — NDL 27GA 1 1/2" 1188827112

## (undated) DEVICE — DRSG GAUZE 4X4" 3033

## (undated) DEVICE — KIT CULTURE TRANSPORT SYS A.C.T. II DUAL ANEROBE R124022

## (undated) DEVICE — GLOVE UNDER INDICATOR PI SZ 6.5 LF 41665

## (undated) DEVICE — GLOVE SURG PI ULTRA TOUCH M SZ 7 LF 42670

## (undated) DEVICE — SU ETHILON 3-0 PS-2 18" 1669H

## (undated) DEVICE — GLOVE PI ULTRATCH M LF SZ 6.5 PF CUFF TEXT STRL LF 42665

## (undated) DEVICE — SOL NACL 0.9% INJ 1000ML BAG 2B1324X

## (undated) DEVICE — ESU GROUND PAD ADULT REM W/15' CORD E7507DB

## (undated) DEVICE — CUFF TOURN 18IN STRL DISP

## (undated) DEVICE — SYR 10ML LL W/O NDL 302995

## (undated) DEVICE — DRESSING XEROFORM PETROLATUM 5X9 33605

## (undated) DEVICE — TUBE PENROSE 1/4 X 12 STRL 30414-025

## (undated) DEVICE — SUCTION MANIFOLD NEPTUNE 2 SYS 1 PORT 702-025-000

## (undated) DEVICE — DRSG XEROFORM 1X8"

## (undated) DEVICE — GOWN IMPERVIOUS BREATHABLE SMART XLG 89045

## (undated) DEVICE — SPONGE RAY-TEC 4X8" 7318

## (undated) DEVICE — ESU CORD BIPOLAR 12' E0512

## (undated) DEVICE — SOL NACL 0.9% IRRIG 1000ML BOTTLE 2F7124

## (undated) DEVICE — CUSTOM PACK UPPER EXTREMITY SOP5BUEHEC

## (undated) DEVICE — DRSG GAUZE 4X4" TRAY 6939

## (undated) DEVICE — PREP POVIDONE-IODINE 10% SOLUTION 4OZ BOTTLE MDS093944

## (undated) DEVICE — SU ETHILON 3-0 PS-1 18" 1663G

## (undated) DEVICE — PREP DYNA-HEX 4% CHG SCRUB 4OZ BOTTLE MDS098710

## (undated) DEVICE — DRAIN PENROSE 1/4 X 18 LF GR201

## (undated) DEVICE — SOLIDIFIER FLD 3.2OZ  CL-FREE F/ BIOHZRD WASTE 3000ML CANIST

## (undated) DEVICE — DRSG ABD TNDRSRB WET PRUF 8IN X 10IN STRL  9194A

## (undated) DEVICE — SU SILK 4-0 P-3 8" 641G

## (undated) DEVICE — GLOVE BIOGEL PI ULTRATOUCH G SZ 6.5 42165

## (undated) DEVICE — GOWN LG DISP 9515

## (undated) DEVICE — DRAPE STOCKINETTE 4" 8544

## (undated) DEVICE — GLOVE BIOGEL PI ULTRATOUCH G SZ 8.5 42185

## (undated) DEVICE — SUCTION TIP YANKAUER W/O VENT K86

## (undated) DEVICE — SUCTION CANISTER MEDIVAC LINER 3000ML W/LID 65651-530

## (undated) DEVICE — SOL WATER IRRIG 1000ML BOTTLE 2F7114

## (undated) DEVICE — PREP POVIDONE-IODINE 7.5% SCRUB 4OZ BOTTLE MDS093945

## (undated) DEVICE — GLOVE BIOGEL PI ULTRATOUCH G SZ 7.0 42170

## (undated) DEVICE — Device

## (undated) DEVICE — TUBING SUCTION MEDI-VAC 1/4"X20' N620A

## (undated) DEVICE — CAST PADDING 4" STERILE 9044S

## (undated) DEVICE — PADDING CAST 3IN WEBRIL STRL 2394

## (undated) DEVICE — TRAY PREP DRY SKIN SCRUB 067

## (undated) DEVICE — SYR BULB IRRIG DOVER 60 ML LATEX FREE 67000

## (undated) DEVICE — BNDG ELASTIC 3"X5YDS UNSTERILE 6611-30

## (undated) DEVICE — CANISTER WOUND VAC W/GEL 500ML M8275063/5

## (undated) DEVICE — ELECTRODE PATIENT RETURN ADULT L10 FT 2 PLATE CORD 0855C

## (undated) DEVICE — GLOVE UNDER INDICATOR PI SZ 7.0 LF 41670

## (undated) DEVICE — GLOVE BIOGEL PI SZ 6.5 40865

## (undated) DEVICE — DRSG WOUND VAC SPONGE MED BLACK M8275052/5

## (undated) DEVICE — CONTAINER URINE SPEC 4OZ STRL 1053

## (undated) DEVICE — GLOVE SURG PI ULTRA TOUCH M SZ 8-1/2 LF

## (undated) DEVICE — GOWN XLG DISP 9545

## (undated) DEVICE — TUBING IRRIG TUR Y TYPE 96" LF 6543-01

## (undated) DEVICE — SPONGE LAP 18X18" X8435

## (undated) DEVICE — BLADE KNIFE SURG 10 371110

## (undated) DEVICE — SU ETHILON 4-0 PS-2 18" BLACK 1667H

## (undated) DEVICE — DRAPE SHEET REV FOLD 3/4 9349

## (undated) DEVICE — SOLUTION IRRIG 2B7127 .9NS 3000ML BAG

## (undated) DEVICE — BLADE KNIFE SURG 15 371115

## (undated) DEVICE — SYR 10ML FINGER CONTROL W/O NDL 309695

## (undated) RX ORDER — CEFAZOLIN SODIUM 1 G/3ML
INJECTION, POWDER, FOR SOLUTION INTRAMUSCULAR; INTRAVENOUS
Status: DISPENSED
Start: 2023-12-22

## (undated) RX ORDER — GLYCOPYRROLATE 0.2 MG/ML
INJECTION, SOLUTION INTRAMUSCULAR; INTRAVENOUS
Status: DISPENSED
Start: 2023-12-22

## (undated) RX ORDER — FENTANYL CITRATE 50 UG/ML
INJECTION, SOLUTION INTRAMUSCULAR; INTRAVENOUS
Status: DISPENSED
Start: 2023-12-25

## (undated) RX ORDER — PROPOFOL 10 MG/ML
INJECTION, EMULSION INTRAVENOUS
Status: DISPENSED
Start: 2023-12-29

## (undated) RX ORDER — PROPOFOL 10 MG/ML
INJECTION, EMULSION INTRAVENOUS
Status: DISPENSED
Start: 2023-12-27

## (undated) RX ORDER — LIDOCAINE HYDROCHLORIDE 10 MG/ML
INJECTION, SOLUTION EPIDURAL; INFILTRATION; INTRACAUDAL; PERINEURAL
Status: DISPENSED
Start: 2023-12-11

## (undated) RX ORDER — PROPOFOL 10 MG/ML
INJECTION, EMULSION INTRAVENOUS
Status: DISPENSED
Start: 2023-12-22

## (undated) RX ORDER — FENTANYL CITRATE 50 UG/ML
INJECTION, SOLUTION INTRAMUSCULAR; INTRAVENOUS
Status: DISPENSED
Start: 2023-12-31

## (undated) RX ORDER — ONDANSETRON 2 MG/ML
INJECTION INTRAMUSCULAR; INTRAVENOUS
Status: DISPENSED
Start: 2023-12-31

## (undated) RX ORDER — EPHEDRINE SULFATE 50 MG/ML
INJECTION, SOLUTION INTRAMUSCULAR; INTRAVENOUS; SUBCUTANEOUS
Status: DISPENSED
Start: 2024-01-02

## (undated) RX ORDER — PROPOFOL 10 MG/ML
INJECTION, EMULSION INTRAVENOUS
Status: DISPENSED
Start: 2023-12-31

## (undated) RX ORDER — PROPOFOL 10 MG/ML
INJECTION, EMULSION INTRAVENOUS
Status: DISPENSED
Start: 2023-12-11

## (undated) RX ORDER — LIDOCAINE HYDROCHLORIDE 10 MG/ML
INJECTION, SOLUTION EPIDURAL; INFILTRATION; INTRACAUDAL; PERINEURAL
Status: DISPENSED
Start: 2023-12-29

## (undated) RX ORDER — CEFAZOLIN SODIUM 1 G/3ML
INJECTION, POWDER, FOR SOLUTION INTRAMUSCULAR; INTRAVENOUS
Status: DISPENSED
Start: 2023-12-31

## (undated) RX ORDER — FENTANYL CITRATE 50 UG/ML
INJECTION, SOLUTION INTRAMUSCULAR; INTRAVENOUS
Status: DISPENSED
Start: 2024-01-02

## (undated) RX ORDER — ONDANSETRON 2 MG/ML
INJECTION INTRAMUSCULAR; INTRAVENOUS
Status: DISPENSED
Start: 2023-12-13

## (undated) RX ORDER — CEFAZOLIN SODIUM 1 G/3ML
INJECTION, POWDER, FOR SOLUTION INTRAMUSCULAR; INTRAVENOUS
Status: DISPENSED
Start: 2023-12-29

## (undated) RX ORDER — FENTANYL CITRATE 50 UG/ML
INJECTION, SOLUTION INTRAMUSCULAR; INTRAVENOUS
Status: DISPENSED
Start: 2023-12-11

## (undated) RX ORDER — BUPIVACAINE HYDROCHLORIDE 5 MG/ML
INJECTION, SOLUTION EPIDURAL; INTRACAUDAL
Status: DISPENSED
Start: 2023-12-13

## (undated) RX ORDER — FENTANYL CITRATE 50 UG/ML
INJECTION, SOLUTION INTRAMUSCULAR; INTRAVENOUS
Status: DISPENSED
Start: 2023-12-29

## (undated) RX ORDER — ONDANSETRON 2 MG/ML
INJECTION INTRAMUSCULAR; INTRAVENOUS
Status: DISPENSED
Start: 2023-12-27

## (undated) RX ORDER — CEFAZOLIN SODIUM 1 G/3ML
INJECTION, POWDER, FOR SOLUTION INTRAMUSCULAR; INTRAVENOUS
Status: DISPENSED
Start: 2023-12-27

## (undated) RX ORDER — PROPOFOL 10 MG/ML
INJECTION, EMULSION INTRAVENOUS
Status: DISPENSED
Start: 2023-12-13

## (undated) RX ORDER — LIDOCAINE HYDROCHLORIDE 10 MG/ML
INJECTION, SOLUTION EPIDURAL; INFILTRATION; INTRACAUDAL; PERINEURAL
Status: DISPENSED
Start: 2023-12-31

## (undated) RX ORDER — LIDOCAINE HYDROCHLORIDE 10 MG/ML
INJECTION, SOLUTION EPIDURAL; INFILTRATION; INTRACAUDAL; PERINEURAL
Status: DISPENSED
Start: 2023-12-13

## (undated) RX ORDER — GLYCOPYRROLATE 0.2 MG/ML
INJECTION, SOLUTION INTRAMUSCULAR; INTRAVENOUS
Status: DISPENSED
Start: 2023-12-31

## (undated) RX ORDER — EPHEDRINE SULFATE 50 MG/ML
INJECTION, SOLUTION INTRAMUSCULAR; INTRAVENOUS; SUBCUTANEOUS
Status: DISPENSED
Start: 2023-12-11

## (undated) RX ORDER — ONDANSETRON 2 MG/ML
INJECTION INTRAMUSCULAR; INTRAVENOUS
Status: DISPENSED
Start: 2023-12-29

## (undated) RX ORDER — ONDANSETRON 2 MG/ML
INJECTION INTRAMUSCULAR; INTRAVENOUS
Status: DISPENSED
Start: 2023-12-11

## (undated) RX ORDER — LIDOCAINE HYDROCHLORIDE 20 MG/ML
INJECTION, SOLUTION INFILTRATION; PERINEURAL
Status: DISPENSED
Start: 2023-12-13

## (undated) RX ORDER — FENTANYL CITRATE-0.9 % NACL/PF 10 MCG/ML
PLASTIC BAG, INJECTION (ML) INTRAVENOUS
Status: DISPENSED
Start: 2023-12-22